# Patient Record
Sex: MALE | Race: WHITE | NOT HISPANIC OR LATINO | Employment: OTHER | URBAN - NONMETROPOLITAN AREA
[De-identification: names, ages, dates, MRNs, and addresses within clinical notes are randomized per-mention and may not be internally consistent; named-entity substitution may affect disease eponyms.]

---

## 2017-04-17 ENCOUNTER — OFFICE VISIT (OUTPATIENT)
Dept: SURGERY | Facility: CLINIC | Age: 62
End: 2017-04-17

## 2017-04-17 VITALS — BODY MASS INDEX: 24.28 KG/M2 | WEIGHT: 179 LBS

## 2017-04-17 DIAGNOSIS — Z45.2 ENCOUNTER FOR VENOUS ACCESS DEVICE CARE: Primary | ICD-10-CM

## 2017-04-17 PROCEDURE — 36590 REMOVAL TUNNELED CV CATH: CPT | Performed by: SURGERY

## 2017-04-17 NOTE — PROGRESS NOTES
Subjective   Jhonny Washington is a 62 y.o. male.     History of Present Illness He had a port for lung cancer but cannot get any more chemo. The port has not been used for several weeks. He needs it removed.    The following portions of the patient's history were reviewed and updated as appropriate: allergies, current medications, past family history, past medical history, past social history, past surgical history and problem list.    Review of Systems lung cancer    Objective   Physical Exam removed port    Assessment/Plan   Jhonny was seen today for follow-up.    Diagnoses and all orders for this visit:    Encounter for venous access device care    Return for sutures 1 wk.

## 2017-10-30 ENCOUNTER — HOSPITAL ENCOUNTER (EMERGENCY)
Facility: HOSPITAL | Age: 62
Discharge: HOME | End: 2017-10-30
Attending: EMERGENCY MEDICINE | Admitting: EMERGENCY MEDICINE

## 2017-10-30 VITALS
OXYGEN SATURATION: 98 % | WEIGHT: 172 LBS | RESPIRATION RATE: 22 BRPM | HEART RATE: 65 BPM | DIASTOLIC BLOOD PRESSURE: 90 MMHG | SYSTOLIC BLOOD PRESSURE: 153 MMHG | HEIGHT: 72 IN | TEMPERATURE: 98.6 F | BODY MASS INDEX: 23.3 KG/M2

## 2017-10-30 DIAGNOSIS — F41.9 ANXIETY: Primary | ICD-10-CM

## 2017-10-30 PROCEDURE — 99283 EMERGENCY DEPT VISIT LOW MDM: CPT

## 2017-10-30 RX ORDER — LORAZEPAM 1 MG/1
1 TABLET ORAL ONCE
Status: DISCONTINUED | OUTPATIENT
Start: 2017-10-30 | End: 2017-10-30 | Stop reason: HOSPADM

## 2017-10-30 RX ORDER — KETOROLAC TROMETHAMINE 10 MG/1
20 TABLET, FILM COATED ORAL ONCE
Status: DISCONTINUED | OUTPATIENT
Start: 2017-10-30 | End: 2017-10-30 | Stop reason: HOSPADM

## 2017-11-03 ENCOUNTER — APPOINTMENT (OUTPATIENT)
Dept: GENERAL RADIOLOGY | Facility: HOSPITAL | Age: 62
End: 2017-11-03

## 2017-11-03 ENCOUNTER — HOSPITAL ENCOUNTER (EMERGENCY)
Facility: HOSPITAL | Age: 62
Discharge: HOME | End: 2017-11-04
Attending: EMERGENCY MEDICINE | Admitting: EMERGENCY MEDICINE

## 2017-11-03 DIAGNOSIS — S67.22XA CRUSHING INJURY OF LEFT HAND, INITIAL ENCOUNTER: Primary | ICD-10-CM

## 2017-11-03 PROCEDURE — 73130 X-RAY EXAM OF HAND: CPT

## 2017-11-03 PROCEDURE — 99284 EMERGENCY DEPT VISIT MOD MDM: CPT

## 2017-11-03 PROCEDURE — 96372 THER/PROPH/DIAG INJ SC/IM: CPT

## 2017-11-03 PROCEDURE — 73130 X-RAY EXAM OF HAND: CPT | Performed by: RADIOLOGY

## 2017-11-03 PROCEDURE — 25010000002 HYDROMORPHONE PER 4 MG: Performed by: EMERGENCY MEDICINE

## 2017-11-03 RX ORDER — ONDANSETRON 4 MG/1
4 TABLET, ORALLY DISINTEGRATING ORAL ONCE
Status: COMPLETED | OUTPATIENT
Start: 2017-11-03 | End: 2017-11-03

## 2017-11-03 RX ADMIN — HYDROMORPHONE HYDROCHLORIDE 1 MG: 1 INJECTION, SOLUTION INTRAMUSCULAR; INTRAVENOUS; SUBCUTANEOUS at 23:37

## 2017-11-03 RX ADMIN — ONDANSETRON 4 MG: 4 TABLET, ORALLY DISINTEGRATING ORAL at 23:37

## 2017-11-04 ENCOUNTER — HOSPITAL ENCOUNTER (EMERGENCY)
Facility: HOSPITAL | Age: 62
Discharge: LEFT BEFORE DISCHARGE | End: 2017-11-05
Attending: FAMILY MEDICINE | Admitting: FAMILY MEDICINE

## 2017-11-04 VITALS
BODY MASS INDEX: 23.3 KG/M2 | WEIGHT: 172 LBS | RESPIRATION RATE: 20 BRPM | SYSTOLIC BLOOD PRESSURE: 161 MMHG | OXYGEN SATURATION: 98 % | DIASTOLIC BLOOD PRESSURE: 90 MMHG | TEMPERATURE: 98 F | HEART RATE: 69 BPM | HEIGHT: 72 IN

## 2017-11-04 VITALS
SYSTOLIC BLOOD PRESSURE: 152 MMHG | HEART RATE: 70 BPM | HEIGHT: 72 IN | DIASTOLIC BLOOD PRESSURE: 72 MMHG | OXYGEN SATURATION: 97 % | WEIGHT: 172 LBS | TEMPERATURE: 98.3 F | RESPIRATION RATE: 22 BRPM | BODY MASS INDEX: 23.3 KG/M2

## 2017-11-04 VITALS
DIASTOLIC BLOOD PRESSURE: 74 MMHG | HEART RATE: 80 BPM | HEIGHT: 72 IN | BODY MASS INDEX: 23.3 KG/M2 | OXYGEN SATURATION: 96 % | RESPIRATION RATE: 20 BRPM | WEIGHT: 172 LBS | SYSTOLIC BLOOD PRESSURE: 129 MMHG | TEMPERATURE: 98.3 F

## 2017-11-04 DIAGNOSIS — G89.3 MALIGNANT BONE PAIN: Primary | ICD-10-CM

## 2017-11-04 DIAGNOSIS — M89.8X9 MALIGNANT BONE PAIN: Primary | ICD-10-CM

## 2017-11-04 DIAGNOSIS — R52 PAIN: Primary | ICD-10-CM

## 2017-11-04 PROCEDURE — 99283 EMERGENCY DEPT VISIT LOW MDM: CPT

## 2017-11-04 RX ORDER — OXYCODONE AND ACETAMINOPHEN 10; 325 MG/1; MG/1
1 TABLET ORAL ONCE
Status: COMPLETED | OUTPATIENT
Start: 2017-11-04 | End: 2017-11-04

## 2017-11-04 RX ADMIN — OXYCODONE HYDROCHLORIDE AND ACETAMINOPHEN 1 TABLET: 10; 325 TABLET ORAL at 03:08

## 2017-11-04 NOTE — ED PROVIDER NOTES
Subjective   Patient is a 62 y.o. male presenting with upper extremity pain.   History provided by:  Patient  Upper Extremity Issue   Location:  Hand  Hand location:  L hand  Injury: yes    Time since incident:  1 day  Mechanism of injury: crush    Crush injury:     Mechanism: elevator.  Pain details:     Quality:  Aching    Severity:  Moderate    Onset quality:  Sudden  Relieved by:  Nothing  Associated symptoms: decreased range of motion, fatigue and swelling    Associated symptoms: no fever    Risk factors: known bone disorder        Review of Systems   Constitutional: Positive for fatigue and unexpected weight change. Negative for fever.   HENT: Negative.    Respiratory: Negative.    Cardiovascular: Negative.  Negative for chest pain.   Gastrointestinal: Negative.  Negative for abdominal pain.   Endocrine: Negative.    Genitourinary: Negative.  Negative for dysuria.   Musculoskeletal: Positive for myalgias.   Skin: Negative.    Neurological: Negative.    Psychiatric/Behavioral: Negative.    All other systems reviewed and are negative.      Past Medical History:   Diagnosis Date   • Cancer        Allergies   Allergen Reactions   • Sulfa Antibiotics        History reviewed. No pertinent surgical history.    History reviewed. No pertinent family history.    Social History     Social History   • Marital status:      Spouse name: N/A   • Number of children: N/A   • Years of education: N/A     Social History Main Topics   • Smoking status: Current Every Day Smoker     Packs/day: 0.50     Types: Cigarettes   • Smokeless tobacco: None   • Alcohol use No   • Drug use: No   • Sexual activity: Not Asked     Other Topics Concern   • None     Social History Narrative           Objective   Physical Exam   Constitutional: He is oriented to person, place, and time. He appears well-developed and well-nourished. No distress.   HENT:   Head: Normocephalic and atraumatic.   Nose: Nose normal.   Eyes: Conjunctivae and EOM  are normal. Pupils are equal, round, and reactive to light.   Neck: Normal range of motion. Neck supple. No JVD present. No tracheal deviation present.   Cardiovascular: Normal rate, regular rhythm and normal heart sounds.    No murmur heard.  Pulmonary/Chest: Effort normal and breath sounds normal. No respiratory distress. He has no wheezes.   Abdominal: Soft. Bowel sounds are normal. There is no tenderness.   Musculoskeletal: He exhibits edema and tenderness. He exhibits no deformity.   Tenderness and edema noted to thenar area left hand, and dorsum of left hand.    Neurological: He is alert and oriented to person, place, and time. No cranial nerve deficit.   Skin: Skin is warm and dry. No rash noted. He is not diaphoretic. No erythema. No pallor.   Psychiatric: He has a normal mood and affect. His behavior is normal. Thought content normal.   Nursing note and vitals reviewed.      Procedures         ED Course  ED Course   Comment By Time   X-ray interpreted by Dr. Li: No apparent acute bony abnormalities. JARETH Molina 11/04 0003                  MDM  Number of Diagnoses or Management Options  minor     Amount and/or Complexity of Data Reviewed  Tests in the radiology section of CPT®: reviewed    Risk of Complications, Morbidity, and/or Mortality  Presenting problems: low  Diagnostic procedures: low  Management options: low    Patient Progress  Patient progress: stable      Final diagnoses:   Crushing injury of left hand, initial encounter            JARETH Molina  11/04/17 0040

## 2017-11-05 ENCOUNTER — HOSPITAL ENCOUNTER (EMERGENCY)
Facility: HOSPITAL | Age: 62
Discharge: HOME | End: 2017-11-05
Attending: FAMILY MEDICINE | Admitting: FAMILY MEDICINE

## 2017-11-05 ENCOUNTER — HOSPITAL ENCOUNTER (EMERGENCY)
Facility: HOSPITAL | Age: 62
Discharge: HOME | End: 2017-11-05
Attending: EMERGENCY MEDICINE | Admitting: EMERGENCY MEDICINE

## 2017-11-05 VITALS
HEART RATE: 72 BPM | SYSTOLIC BLOOD PRESSURE: 125 MMHG | HEIGHT: 72 IN | TEMPERATURE: 97.6 F | WEIGHT: 165 LBS | OXYGEN SATURATION: 98 % | DIASTOLIC BLOOD PRESSURE: 72 MMHG | BODY MASS INDEX: 22.35 KG/M2 | RESPIRATION RATE: 16 BRPM

## 2017-11-05 VITALS
TEMPERATURE: 98 F | SYSTOLIC BLOOD PRESSURE: 110 MMHG | DIASTOLIC BLOOD PRESSURE: 68 MMHG | OXYGEN SATURATION: 99 % | HEART RATE: 98 BPM | WEIGHT: 172 LBS | BODY MASS INDEX: 23.3 KG/M2 | HEIGHT: 72 IN | RESPIRATION RATE: 20 BRPM

## 2017-11-05 DIAGNOSIS — G89.3 CHRONIC PAIN DUE TO NEOPLASM: Primary | ICD-10-CM

## 2017-11-05 DIAGNOSIS — R52 PAIN: Primary | ICD-10-CM

## 2017-11-05 DIAGNOSIS — F41.9 ANXIETY: ICD-10-CM

## 2017-11-05 PROCEDURE — 99283 EMERGENCY DEPT VISIT LOW MDM: CPT

## 2017-11-05 RX ORDER — DIAZEPAM 5 MG/1
10 TABLET ORAL ONCE
Status: COMPLETED | OUTPATIENT
Start: 2017-11-05 | End: 2017-11-05

## 2017-11-05 RX ORDER — OXYCODONE HYDROCHLORIDE AND ACETAMINOPHEN 5; 325 MG/1; MG/1
1 TABLET ORAL ONCE
Status: COMPLETED | OUTPATIENT
Start: 2017-11-05 | End: 2017-11-05

## 2017-11-05 RX ORDER — OXYCODONE AND ACETAMINOPHEN 10; 325 MG/1; MG/1
1 TABLET ORAL ONCE
Status: COMPLETED | OUTPATIENT
Start: 2017-11-05 | End: 2017-11-05

## 2017-11-05 RX ADMIN — OXYCODONE HYDROCHLORIDE AND ACETAMINOPHEN 1 TABLET: 10; 325 TABLET ORAL at 01:29

## 2017-11-05 RX ADMIN — OXYCODONE HYDROCHLORIDE AND ACETAMINOPHEN 1 TABLET: 5; 325 TABLET ORAL at 11:08

## 2017-11-05 RX ADMIN — DIAZEPAM 10 MG: 5 TABLET ORAL at 10:45

## 2017-11-05 NOTE — ED PROVIDER NOTES
Subjective   History of Present Illness  63 y/o M here w/ chest wall pain worse on his R side 2/2 cancer. Pt has been seen 3 times within the past 24 hours for this pain. Pt continues to request opiates. Pt left AMA several hours ago b/c we would not give opiates. Pt states no change in his pain.   Review of Systems   Constitutional: Negative for chills, fatigue and fever.   Eyes: Negative for photophobia and visual disturbance.   Respiratory: Negative for cough, chest tightness, shortness of breath and wheezing.    Cardiovascular: Negative for chest pain and palpitations.   Gastrointestinal: Negative for abdominal distention and abdominal pain.   Genitourinary: Negative for difficulty urinating and dysuria.   Musculoskeletal: Negative for back pain, myalgias, neck pain and neck stiffness.        +chest wall pain   Skin: Negative for color change and pallor.   All other systems reviewed and are negative.      Past Medical History:   Diagnosis Date   • Cancer        Allergies   Allergen Reactions   • Sulfa Antibiotics        No past surgical history on file.    No family history on file.    Social History     Social History   • Marital status:      Spouse name: N/A   • Number of children: N/A   • Years of education: N/A     Social History Main Topics   • Smoking status: Current Every Day Smoker     Packs/day: 0.50     Types: Cigarettes   • Smokeless tobacco: Never Used   • Alcohol use No   • Drug use: No   • Sexual activity: Not on file     Other Topics Concern   • Not on file     Social History Narrative   • No narrative on file           Objective   Physical Exam   Constitutional: He is oriented to person, place, and time. He appears well-developed and well-nourished. He is active.   HENT:   Head: Normocephalic and atraumatic.   Right Ear: Hearing and external ear normal.   Left Ear: Hearing and external ear normal.   Nose: Nose normal.   Mouth/Throat: Uvula is midline, oropharynx is clear and moist and  mucous membranes are normal.   Eyes: Conjunctivae, EOM and lids are normal. Pupils are equal, round, and reactive to light.   Neck: Trachea normal, normal range of motion, full passive range of motion without pain and phonation normal. Neck supple.   Cardiovascular: Normal rate, regular rhythm and normal heart sounds.    Pulmonary/Chest: Effort normal and breath sounds normal. He exhibits tenderness.       Abdominal: Soft. Normal appearance.   Neurological: He is alert and oriented to person, place, and time. GCS eye subscore is 4. GCS verbal subscore is 5. GCS motor subscore is 6.   Skin: Skin is warm, dry and intact.   Psychiatric: He has a normal mood and affect. His speech is normal and behavior is normal. Cognition and memory are normal.   Nursing note and vitals reviewed.      Procedures         ED Course  ED Course    Pt given percocet and told that would be the most we would be prescribing tonight. Pt agreeable.               MDM  Number of Diagnoses or Management Options  Pain: established and worsening  Risk of Complications, Morbidity, and/or Mortality  Presenting problems: high  Diagnostic procedures: high  Management options: high    Patient Progress  Patient progress: stable      Final diagnoses:   Pain            Jesus Berg MD  11/05/17 0124

## 2017-11-05 NOTE — ED PROVIDER NOTES
"Subjective   HPI Comments: Pt here with chronic pain from his lung/bone cancer, and also anxiety because he is out of his valium. He has been seen in the ED several times over the past few days d/t his pain. He states that he has had episodes of \"passing out\" over the past couple of days, because when he is out of his valium, he gets anxious, and then \"goes out\" for a few minutes. He denies any change in his chronic pain. States that if he could just get a valium to help calm his nerves, that would help him more than anything. States he was told by his oncologist that he only has 3 months to live and that is causing him increased anxiety and he has been having to take more of his prescribed valium. States he does not need anything for pain today, just a valium. He adamantly refuses any blood work or further workup.    Patient is a 62 y.o. male presenting with general illness.   History provided by:  Patient and EMS personnel   used: No    Illness   Associated symptoms: no chest pain, no congestion, no cough, no diarrhea, no ear pain, no fever, no headaches, no myalgias, no nausea, no rash, no shortness of breath, no sore throat, no vomiting and no wheezing        Review of Systems   Constitutional: Negative for activity change and fever.   HENT: Negative for congestion, ear pain and sore throat.    Eyes: Negative for pain.   Respiratory: Negative for cough, shortness of breath and wheezing.    Cardiovascular: Negative for chest pain.   Gastrointestinal: Negative for abdominal distention, diarrhea, nausea and vomiting.   Genitourinary: Negative for difficulty urinating and dysuria.   Musculoskeletal: Negative for arthralgias and myalgias.   Skin: Negative for rash and wound.   Neurological: Negative for dizziness and headaches.   Psychiatric/Behavioral: Negative for agitation.   All other systems reviewed and are negative.      Past Medical History:   Diagnosis Date   • Cancer        Allergies " "  Allergen Reactions   • Sulfa Antibiotics        History reviewed. No pertinent surgical history.    History reviewed. No pertinent family history.    Social History     Social History   • Marital status:      Spouse name: N/A   • Number of children: N/A   • Years of education: N/A     Social History Main Topics   • Smoking status: Current Every Day Smoker     Packs/day: 0.50     Types: Cigarettes   • Smokeless tobacco: Never Used   • Alcohol use No   • Drug use: No   • Sexual activity: Not Asked     Other Topics Concern   • None     Social History Narrative           Objective   Physical Exam   Constitutional: He is oriented to person, place, and time. He appears well-developed and well-nourished.   HENT:   Head: Normocephalic and atraumatic.   Eyes: EOM are normal. Pupils are equal, round, and reactive to light.   Neck: Normal range of motion. Neck supple.   Cardiovascular: Normal rate, regular rhythm and normal heart sounds.    Pulmonary/Chest: Effort normal and breath sounds normal. He has no decreased breath sounds. He has no rhonchi.   Abdominal: Soft. Bowel sounds are normal.   Musculoskeletal: Normal range of motion.   Neurological: He is alert and oriented to person, place, and time.   Skin: Skin is warm and dry.   Psychiatric: He has a normal mood and affect. His behavior is normal. Judgment and thought content normal.   Nursing note and vitals reviewed.      Procedures         ED Course  ED Course   Comment By Time   Pt initially stated he did not want anything for pain, then came up to nurses station requesting \"just a little something for pain.\" 1 percocet 5 ordered, he has been told he will not receive any more pain medicine here; he has an appt with his doctor tomorrow. JARETH Herrera 11/05 1059                  MDM  Number of Diagnoses or Management Options  Anxiety:   Chronic pain due to neoplasm:      Amount and/or Complexity of Data Reviewed  Clinical lab tests: ordered and " reviewed  Tests in the radiology section of CPT®: ordered and reviewed  Tests in the medicine section of CPT®: reviewed and ordered    Patient Progress  Patient progress: stable      Final diagnoses:   Chronic pain due to neoplasm   Anxiety            JARETH Herrera  11/05/17 1138

## 2017-11-07 ENCOUNTER — HOSPITAL ENCOUNTER (EMERGENCY)
Facility: HOSPITAL | Age: 62
Discharge: HOME | End: 2017-11-07
Attending: INTERNAL MEDICINE | Admitting: INTERNAL MEDICINE

## 2017-11-07 ENCOUNTER — HOSPITAL ENCOUNTER (EMERGENCY)
Facility: HOSPITAL | Age: 62
Discharge: HOME | End: 2017-11-07
Attending: EMERGENCY MEDICINE | Admitting: EMERGENCY MEDICINE

## 2017-11-07 VITALS
SYSTOLIC BLOOD PRESSURE: 128 MMHG | WEIGHT: 172 LBS | HEIGHT: 72 IN | TEMPERATURE: 98 F | HEART RATE: 80 BPM | RESPIRATION RATE: 18 BRPM | BODY MASS INDEX: 23.3 KG/M2 | OXYGEN SATURATION: 96 % | DIASTOLIC BLOOD PRESSURE: 74 MMHG

## 2017-11-07 VITALS
HEIGHT: 72 IN | SYSTOLIC BLOOD PRESSURE: 132 MMHG | RESPIRATION RATE: 20 BRPM | TEMPERATURE: 98.7 F | HEART RATE: 89 BPM | BODY MASS INDEX: 23.3 KG/M2 | WEIGHT: 172 LBS | DIASTOLIC BLOOD PRESSURE: 75 MMHG | OXYGEN SATURATION: 98 %

## 2017-11-07 DIAGNOSIS — G89.3 CHRONIC PAIN DUE TO NEOPLASM: Primary | ICD-10-CM

## 2017-11-07 PROCEDURE — 99284 EMERGENCY DEPT VISIT MOD MDM: CPT

## 2017-11-07 RX ORDER — DIAZEPAM 10 MG/1
10 TABLET ORAL 2 TIMES DAILY PRN
COMMUNITY
End: 2018-01-26 | Stop reason: HOSPADM

## 2017-11-07 RX ORDER — OXYCODONE HYDROCHLORIDE AND ACETAMINOPHEN 5; 325 MG/1; MG/1
1 TABLET ORAL ONCE
Status: COMPLETED | OUTPATIENT
Start: 2017-11-07 | End: 2017-11-07

## 2017-11-07 RX ORDER — OXYCODONE AND ACETAMINOPHEN 7.5; 325 MG/1; MG/1
1 TABLET ORAL ONCE
Status: COMPLETED | OUTPATIENT
Start: 2017-11-07 | End: 2017-11-07

## 2017-11-07 RX ORDER — OXYCODONE HCL 20 MG/1
20 TABLET, FILM COATED, EXTENDED RELEASE ORAL EVERY 12 HOURS
COMMUNITY
End: 2018-01-15

## 2017-11-07 RX ORDER — DIAZEPAM 5 MG/1
10 TABLET ORAL ONCE
Status: COMPLETED | OUTPATIENT
Start: 2017-11-07 | End: 2017-11-07

## 2017-11-07 RX ADMIN — DIAZEPAM 10 MG: 5 TABLET ORAL at 19:25

## 2017-11-07 RX ADMIN — DIAZEPAM 10 MG: 5 TABLET ORAL at 07:43

## 2017-11-07 RX ADMIN — OXYCODONE HYDROCHLORIDE AND ACETAMINOPHEN 1 TABLET: 5; 325 TABLET ORAL at 08:29

## 2017-11-07 RX ADMIN — OXYCODONE HYDROCHLORIDE AND ACETAMINOPHEN 1 TABLET: 5; 325 TABLET ORAL at 07:43

## 2017-11-07 RX ADMIN — OXYCODONE HYDROCHLORIDE AND ACETAMINOPHEN 1 TABLET: 7.5; 325 TABLET ORAL at 19:25

## 2017-11-07 NOTE — ED NOTES
Removed ccollar placed by [facility] ems per verbal order Dr. Park. Pt tolerated well.     Melida Jackson RN  11/07/17 0825
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
## 2017-11-09 ENCOUNTER — HOSPITAL ENCOUNTER (EMERGENCY)
Facility: HOSPITAL | Age: 62
Discharge: HOME | End: 2017-11-09
Attending: EMERGENCY MEDICINE | Admitting: EMERGENCY MEDICINE

## 2017-11-09 ENCOUNTER — APPOINTMENT (OUTPATIENT)
Dept: GENERAL RADIOLOGY | Facility: HOSPITAL | Age: 62
End: 2017-11-09

## 2017-11-09 VITALS
TEMPERATURE: 97.5 F | BODY MASS INDEX: 23.3 KG/M2 | RESPIRATION RATE: 20 BRPM | OXYGEN SATURATION: 97 % | DIASTOLIC BLOOD PRESSURE: 80 MMHG | HEIGHT: 72 IN | WEIGHT: 172 LBS | SYSTOLIC BLOOD PRESSURE: 122 MMHG | HEART RATE: 89 BPM

## 2017-11-09 DIAGNOSIS — S22.31XA CLOSED FRACTURE OF ONE RIB OF RIGHT SIDE, INITIAL ENCOUNTER: Primary | ICD-10-CM

## 2017-11-09 LAB
ANION GAP SERPL CALCULATED.3IONS-SCNC: 2.3 MMOL/L (ref 3.6–11.2)
ANISOCYTOSIS BLD QL: NORMAL
BASOPHILS # BLD AUTO: 0.03 10*3/MM3 (ref 0–0.3)
BASOPHILS NFR BLD AUTO: 0.4 % (ref 0–2)
BUN BLD-MCNC: 12 MG/DL (ref 7–21)
BUN/CREAT SERPL: 18.8 (ref 7–25)
CALCIUM SPEC-SCNC: 8.8 MG/DL (ref 7.7–10)
CHLORIDE SERPL-SCNC: 111 MMOL/L (ref 99–112)
CO2 SERPL-SCNC: 23.7 MMOL/L (ref 24.3–31.9)
CREAT BLD-MCNC: 0.64 MG/DL (ref 0.43–1.29)
DEPRECATED RDW RBC AUTO: 37 FL (ref 37–54)
EOSINOPHIL # BLD AUTO: 0.24 10*3/MM3 (ref 0–0.7)
EOSINOPHIL NFR BLD AUTO: 3.2 % (ref 0–5)
ERYTHROCYTE [DISTWIDTH] IN BLOOD BY AUTOMATED COUNT: 16.8 % (ref 11.5–14.5)
GFR SERPL CREATININE-BSD FRML MDRD: 127 ML/MIN/1.73
GLUCOSE BLD-MCNC: 115 MG/DL (ref 70–110)
HCT VFR BLD AUTO: 33 % (ref 42–52)
HGB BLD-MCNC: 10.6 G/DL (ref 14–18)
HYPOCHROMIA BLD QL: NORMAL
IMM GRANULOCYTES # BLD: 0.01 10*3/MM3 (ref 0–0.03)
IMM GRANULOCYTES NFR BLD: 0.1 % (ref 0–0.5)
LYMPHOCYTES # BLD AUTO: 1.34 10*3/MM3 (ref 1–3)
LYMPHOCYTES NFR BLD AUTO: 18 % (ref 21–51)
MCH RBC QN AUTO: 20 PG (ref 27–33)
MCHC RBC AUTO-ENTMCNC: 32.1 G/DL (ref 33–37)
MCV RBC AUTO: 62.4 FL (ref 80–94)
MICROCYTES BLD QL: NORMAL
MONOCYTES # BLD AUTO: 0.66 10*3/MM3 (ref 0.1–0.9)
MONOCYTES NFR BLD AUTO: 8.9 % (ref 0–10)
NEUTROPHILS # BLD AUTO: 5.15 10*3/MM3 (ref 1.4–6.5)
NEUTROPHILS NFR BLD AUTO: 69.4 % (ref 30–70)
OSMOLALITY SERPL CALC.SUM OF ELEC: 274.5 MOSM/KG (ref 273–305)
PLAT MORPH BLD: NORMAL
PLATELET # BLD AUTO: 255 10*3/MM3 (ref 130–400)
PMV BLD AUTO: 10.6 FL (ref 6–10)
POIKILOCYTOSIS BLD QL SMEAR: NORMAL
POTASSIUM BLD-SCNC: 4.3 MMOL/L (ref 3.5–5.3)
RBC # BLD AUTO: 5.29 10*6/MM3 (ref 4.7–6.1)
SODIUM BLD-SCNC: 137 MMOL/L (ref 135–153)
TROPONIN I SERPL-MCNC: <0.006 NG/ML
WBC NRBC COR # BLD: 7.43 10*3/MM3 (ref 4.5–12.5)

## 2017-11-09 PROCEDURE — 36415 COLL VENOUS BLD VENIPUNCTURE: CPT

## 2017-11-09 PROCEDURE — 71111 X-RAY EXAM RIBS/CHEST4/> VWS: CPT

## 2017-11-09 PROCEDURE — 80048 BASIC METABOLIC PNL TOTAL CA: CPT | Performed by: EMERGENCY MEDICINE

## 2017-11-09 PROCEDURE — 93005 ELECTROCARDIOGRAM TRACING: CPT | Performed by: EMERGENCY MEDICINE

## 2017-11-09 PROCEDURE — 84484 ASSAY OF TROPONIN QUANT: CPT | Performed by: EMERGENCY MEDICINE

## 2017-11-09 PROCEDURE — 85025 COMPLETE CBC W/AUTO DIFF WBC: CPT | Performed by: EMERGENCY MEDICINE

## 2017-11-09 PROCEDURE — 99283 EMERGENCY DEPT VISIT LOW MDM: CPT

## 2017-11-09 PROCEDURE — 85007 BL SMEAR W/DIFF WBC COUNT: CPT | Performed by: EMERGENCY MEDICINE

## 2017-11-09 PROCEDURE — 71111 X-RAY EXAM RIBS/CHEST4/> VWS: CPT | Performed by: RADIOLOGY

## 2017-11-09 RX ORDER — LORAZEPAM 1 MG/1
1 TABLET ORAL ONCE
Status: COMPLETED | OUTPATIENT
Start: 2017-11-09 | End: 2017-11-09

## 2017-11-09 RX ORDER — OXYCODONE HYDROCHLORIDE AND ACETAMINOPHEN 5; 325 MG/1; MG/1
1 TABLET ORAL ONCE
Status: COMPLETED | OUTPATIENT
Start: 2017-11-09 | End: 2017-11-09

## 2017-11-09 RX ORDER — GABAPENTIN 400 MG/1
400 CAPSULE ORAL 3 TIMES DAILY
COMMUNITY
End: 2018-01-15

## 2017-11-09 RX ORDER — HYDROCODONE BITARTRATE AND ACETAMINOPHEN 7.5; 325 MG/1; MG/1
1 TABLET ORAL EVERY 6 HOURS PRN
Qty: 12 TABLET | Refills: 0 | Status: SHIPPED | OUTPATIENT
Start: 2017-11-09 | End: 2018-01-06

## 2017-11-09 RX ADMIN — OXYCODONE HYDROCHLORIDE AND ACETAMINOPHEN 1 TABLET: 5; 325 TABLET ORAL at 09:19

## 2017-11-09 RX ADMIN — LORAZEPAM 1 MG: 1 TABLET ORAL at 09:19

## 2017-11-09 NOTE — DISCHARGE INSTRUCTIONS

## 2017-11-09 NOTE — ED PROVIDER NOTES
Subjective   HPI Comments: 9 th visit to ED for pain meds in last 10 days. Now has soft neck collar.     Patient is a 62 y.o. male presenting with syncope.   History provided by:  Patient   used: No    Syncope   Episode history:  Single  Most recent episode:  Today  Duration:  30 minutes  Timing:  Constant  Progression:  Partially resolved  Chronicity:  Recurrent  Context: standing up    Witnessed: no    Relieved by:  Nothing  Worsened by:  Nothing  Ineffective treatments:  None tried  Associated symptoms: no chest pain and no fever        Review of Systems   Constitutional: Negative.  Negative for fever.   HENT: Negative.    Respiratory: Negative.    Cardiovascular: Positive for syncope. Negative for chest pain.   Gastrointestinal: Negative.  Negative for abdominal pain.   Endocrine: Negative.    Genitourinary: Negative.  Negative for dysuria.   Skin: Negative.    Psychiatric/Behavioral: Negative.    All other systems reviewed and are negative.      Past Medical History:   Diagnosis Date   • Cancer    • Neck fracture        Allergies   Allergen Reactions   • Sulfa Antibiotics        Past Surgical History:   Procedure Laterality Date   • APPENDECTOMY         History reviewed. No pertinent family history.    Social History     Social History   • Marital status:      Spouse name: N/A   • Number of children: N/A   • Years of education: N/A     Social History Main Topics   • Smoking status: Current Every Day Smoker     Packs/day: 0.50     Types: Cigarettes   • Smokeless tobacco: Never Used   • Alcohol use No   • Drug use: No   • Sexual activity: Defer     Other Topics Concern   • None     Social History Narrative   • None           Objective   Physical Exam   Constitutional: He is oriented to person, place, and time. He appears well-developed and well-nourished. No distress.   HENT:   Head: Normocephalic and atraumatic.   Right Ear: External ear normal.   Left Ear: External ear normal.    Nose: Nose normal.   Eyes: Conjunctivae and EOM are normal. Pupils are equal, round, and reactive to light.   Neck: Normal range of motion. Neck supple. No JVD present. No tracheal deviation present.   Cardiovascular: Normal rate, regular rhythm and normal heart sounds.    No murmur heard.  Pulmonary/Chest: Effort normal and breath sounds normal. No respiratory distress. He has no wheezes. He exhibits tenderness.       Abdominal: Soft. Bowel sounds are normal. There is no tenderness.   Musculoskeletal: Normal range of motion. He exhibits no edema or deformity.   Neurological: He is alert and oriented to person, place, and time. No cranial nerve deficit.   Skin: Skin is warm and dry. No rash noted. He is not diaphoretic. No erythema. No pallor.   Psychiatric: He has a normal mood and affect. His behavior is normal. Thought content normal.   Nursing note and vitals reviewed.      Procedures        XR Ribs Bilateral 4+ View With PA Chest   Final Result       1. Subtle nondisplaced anterolateral right eighth rib fracture near   costochondral junction.   2. No additional rib fractures radiographically evident.   3. Stable appearance of the chest with COPD and chronic changes.       This report was finalized on 11/9/2017 10:27 AM by Dr. Olivier Marshall MD.                ED Course  ED Course    improved with pain meds              MDM    Final diagnoses:   Closed fracture of one rib of right side, initial encounter            Hai Park MD  11/10/17 0658

## 2017-11-13 ENCOUNTER — HOSPITAL ENCOUNTER (EMERGENCY)
Facility: HOSPITAL | Age: 62
Discharge: HOME | End: 2017-11-13
Attending: FAMILY MEDICINE | Admitting: FAMILY MEDICINE

## 2017-11-13 VITALS
HEIGHT: 74 IN | BODY MASS INDEX: 22.2 KG/M2 | DIASTOLIC BLOOD PRESSURE: 82 MMHG | OXYGEN SATURATION: 98 % | WEIGHT: 173 LBS | TEMPERATURE: 97.7 F | RESPIRATION RATE: 18 BRPM | HEART RATE: 87 BPM | SYSTOLIC BLOOD PRESSURE: 131 MMHG

## 2017-11-13 DIAGNOSIS — F41.8 ANXIETY ABOUT HEALTH: Primary | ICD-10-CM

## 2017-11-13 PROCEDURE — 99283 EMERGENCY DEPT VISIT LOW MDM: CPT

## 2017-11-13 RX ORDER — DIAZEPAM 5 MG/1
10 TABLET ORAL ONCE
Status: COMPLETED | OUTPATIENT
Start: 2017-11-13 | End: 2017-11-13

## 2017-11-13 RX ADMIN — DIAZEPAM 10 MG: 5 TABLET ORAL at 21:23

## 2017-11-14 NOTE — ED NOTES
[employer] contacted to give patient a ride home. States that it will be approximately 45 minutes before cab arrives. Provider and patient made aware. Will continue to monitor.      Shahla Jimenez RN  11/13/17 6598
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
## 2017-11-14 NOTE — ED NOTES
Discharge instructions reviewed with patient, patient instructed to return to ED if symptoms worsen or if any new problems arise. Patient verbalizes understanding of discharge instructions, patient ambulatory out of ED. No acute distress noted.       Shahla Jimenez RN  11/13/17 7365

## 2017-12-07 ENCOUNTER — HOSPITAL ENCOUNTER (EMERGENCY)
Facility: HOSPITAL | Age: 62
Discharge: HOME | End: 2017-12-07
Attending: EMERGENCY MEDICINE | Admitting: EMERGENCY MEDICINE

## 2017-12-07 VITALS
RESPIRATION RATE: 16 BRPM | DIASTOLIC BLOOD PRESSURE: 70 MMHG | WEIGHT: 170 LBS | BODY MASS INDEX: 23.03 KG/M2 | HEIGHT: 72 IN | TEMPERATURE: 98.1 F | SYSTOLIC BLOOD PRESSURE: 108 MMHG | OXYGEN SATURATION: 99 % | HEART RATE: 75 BPM

## 2017-12-07 DIAGNOSIS — F41.8 ANXIETY ABOUT HEALTH: ICD-10-CM

## 2017-12-07 DIAGNOSIS — G89.3 CHRONIC PAIN DUE TO NEOPLASM: Primary | ICD-10-CM

## 2017-12-07 PROCEDURE — 99283 EMERGENCY DEPT VISIT LOW MDM: CPT

## 2017-12-07 RX ORDER — OXYCODONE AND ACETAMINOPHEN 7.5; 325 MG/1; MG/1
1 TABLET ORAL ONCE
Status: COMPLETED | OUTPATIENT
Start: 2017-12-07 | End: 2017-12-07

## 2017-12-07 RX ORDER — DIAZEPAM 5 MG/1
10 TABLET ORAL ONCE
Status: COMPLETED | OUTPATIENT
Start: 2017-12-07 | End: 2017-12-07

## 2017-12-07 RX ADMIN — DIAZEPAM 10 MG: 5 TABLET ORAL at 15:44

## 2017-12-07 RX ADMIN — OXYCODONE HYDROCHLORIDE AND ACETAMINOPHEN 1 TABLET: 7.5; 325 TABLET ORAL at 15:44

## 2017-12-07 NOTE — ED NOTES
"[place] Pt had to be awoke with loud verbal and tactile stimuli. Pt states that he is in \"bad pain\".      Liz Andrade RN  12/07/17 4608    "
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
## 2017-12-07 NOTE — DISCHARGE INSTRUCTIONS
Please follow up with your PCP, pain management and oncology for further treatment of chronic pain and anxiety. Please return to ER if symptoms worsen.

## 2017-12-14 ENCOUNTER — HOSPITAL ENCOUNTER (EMERGENCY)
Facility: HOSPITAL | Age: 62
Discharge: HOME | End: 2017-12-14
Attending: EMERGENCY MEDICINE | Admitting: EMERGENCY MEDICINE

## 2017-12-14 VITALS
DIASTOLIC BLOOD PRESSURE: 73 MMHG | OXYGEN SATURATION: 96 % | TEMPERATURE: 97.8 F | HEIGHT: 72 IN | SYSTOLIC BLOOD PRESSURE: 121 MMHG | HEART RATE: 89 BPM | WEIGHT: 170 LBS | RESPIRATION RATE: 18 BRPM | BODY MASS INDEX: 23.03 KG/M2

## 2017-12-14 DIAGNOSIS — F19.10 SUBSTANCE ABUSE (HCC): Primary | ICD-10-CM

## 2017-12-14 PROCEDURE — 99284 EMERGENCY DEPT VISIT MOD MDM: CPT

## 2017-12-14 NOTE — NURSING NOTE
Spoke to Dr. Blackwood. Instructed that if pt does not feel his medication is a problem then he would not probably benefit from detox. Dr. blackwood also suggested to have his family help with his med management and pain control during this time due to his physical illnesses. Rbvox2. Patient and ed provider made aware of plan of care.

## 2017-12-14 NOTE — NURSING NOTE
Spoke to Brother with patients permission. Instructed that the patient got into trouble where he lives due to not paying his rent and they told him that if he goes to detox they will let him stay. He also reports that the patient has cancer and needs his meds and was hoping that if he brought him here we could give him a statement to take to the place saying he cant be taken off his meds. Instructed him that he would need to take the patient to his prescribing doctor to accomplish this and that due to him using this am and not being in withdrawal he does not meet criteria for inpt. Also instructed him that the patient is not wanting off all his meds and feels he needs them.

## 2017-12-14 NOTE — NURSING NOTE
Patient presents to intake and says that he is only here to please his family and his . He reports that they seem that he has a pill problem. He reports that he is a cancer patient and was only given several months go live now and that his dr writes him a lot of pain medicine and is family is concerned that he may be abusing it. He states that he does not feel he has a problem and that it actually helps him out. Denies any current withdrawal symptoms. patient reports that he took two 20 mg oxycodone this am. No etoh or other drug use reported.

## 2017-12-14 NOTE — ED PROVIDER NOTES
Subjective   HPI Comments: Pt doesn't want to go through detox     Patient is a 62 y.o. male presenting with drug/alcohol assessment.   History provided by:  Patient   used: No    Drug / Alcohol Assessment   This is a chronic problem. The current episode started 3 to 5 hours ago. The problem has not changed since onset.Suspected agents include opiates and prescription drugs. Pertinent negatives include no fever, no nausea and no vomiting.       Review of Systems   Constitutional: Negative for chills and fever.   HENT: Negative for congestion, ear pain and sore throat.    Respiratory: Negative for cough, shortness of breath and wheezing.    Cardiovascular: Negative for chest pain.   Gastrointestinal: Negative for diarrhea, nausea and vomiting.   Genitourinary: Negative for dysuria and flank pain.   Skin: Negative for rash.   Neurological: Negative for headaches.   Psychiatric/Behavioral: Negative for dysphoric mood and suicidal ideas. The patient is not nervous/anxious.    All other systems reviewed and are negative.      Past Medical History:   Diagnosis Date   • Cancer     bone and lung   • Depression    • Neck fracture        Allergies   Allergen Reactions   • Sulfa Antibiotics        Past Surgical History:   Procedure Laterality Date   • APPENDECTOMY         No family history on file.    Social History     Social History   • Marital status:      Spouse name: N/A   • Number of children: N/A   • Years of education: N/A     Social History Main Topics   • Smoking status: Current Every Day Smoker     Packs/day: 0.50     Types: Cigarettes   • Smokeless tobacco: Never Used   • Alcohol use No   • Drug use: No      Comment: oxycodone 20 mg tabs. and valium for my nerves.    • Sexual activity: Defer     Other Topics Concern   • None     Social History Narrative           Objective   Physical Exam   Constitutional: He is oriented to person, place, and time. He appears well-developed and  well-nourished.   HENT:   Head: Normocephalic.   Mouth/Throat: Oropharynx is clear and moist.   Neck: Neck supple.   Cardiovascular: Normal rate and regular rhythm.    Pulmonary/Chest: Effort normal and breath sounds normal.   Abdominal: Soft. Bowel sounds are normal. There is no tenderness.   Musculoskeletal: Normal range of motion.   Neurological: He is alert and oriented to person, place, and time.   Skin: Skin is warm and dry.   Psychiatric: He has a normal mood and affect. His behavior is normal. Judgment and thought content normal. He expresses no suicidal plans and no homicidal plans.   Nursing note and vitals reviewed.      Procedures         ED Course  ED Course                  MDM    Final diagnoses:   Substance abuse            JARETH Avila  12/14/17 7970

## 2018-01-06 ENCOUNTER — HOSPITAL ENCOUNTER (EMERGENCY)
Facility: HOSPITAL | Age: 63
Discharge: HOME | End: 2018-01-06
Attending: EMERGENCY MEDICINE | Admitting: EMERGENCY MEDICINE

## 2018-01-06 ENCOUNTER — APPOINTMENT (OUTPATIENT)
Dept: GENERAL RADIOLOGY | Facility: HOSPITAL | Age: 63
End: 2018-01-06

## 2018-01-06 ENCOUNTER — APPOINTMENT (OUTPATIENT)
Dept: CT IMAGING | Facility: HOSPITAL | Age: 63
End: 2018-01-06

## 2018-01-06 VITALS
SYSTOLIC BLOOD PRESSURE: 123 MMHG | RESPIRATION RATE: 18 BRPM | TEMPERATURE: 98 F | HEART RATE: 73 BPM | BODY MASS INDEX: 23.8 KG/M2 | OXYGEN SATURATION: 98 % | WEIGHT: 170 LBS | DIASTOLIC BLOOD PRESSURE: 74 MMHG | HEIGHT: 71 IN

## 2018-01-06 DIAGNOSIS — M51.9 LUMBAR DISC DISEASE: Primary | ICD-10-CM

## 2018-01-06 LAB
6-ACETYL MORPHINE: NEGATIVE
ALBUMIN SERPL-MCNC: 3.8 G/DL (ref 3.4–4.8)
ALBUMIN/GLOB SERPL: 1.3 G/DL (ref 1.5–2.5)
ALP SERPL-CCNC: 83 U/L (ref 40–129)
ALT SERPL W P-5'-P-CCNC: 10 U/L (ref 10–44)
AMPHET+METHAMPHET UR QL: NEGATIVE
ANION GAP SERPL CALCULATED.3IONS-SCNC: 1 MMOL/L (ref 3.6–11.2)
ANISOCYTOSIS BLD QL: ABNORMAL
AST SERPL-CCNC: 18 U/L (ref 10–34)
BARBITURATES UR QL SCN: NEGATIVE
BASOPHILS # BLD MANUAL: 0.23 10*3/MM3 (ref 0–0.3)
BASOPHILS NFR BLD AUTO: 3 % (ref 0–2)
BENZODIAZ UR QL SCN: POSITIVE
BILIRUB SERPL-MCNC: 0.8 MG/DL (ref 0.2–1.8)
BILIRUB UR QL STRIP: NEGATIVE
BUN BLD-MCNC: 8 MG/DL (ref 7–21)
BUN/CREAT SERPL: 13.6 (ref 7–25)
BUPRENORPHINE SERPL-MCNC: NEGATIVE NG/ML
CALCIUM SPEC-SCNC: 8.9 MG/DL (ref 7.7–10)
CANNABINOIDS SERPL QL: NEGATIVE
CHLORIDE SERPL-SCNC: 112 MMOL/L (ref 99–112)
CLARITY UR: CLEAR
CO2 SERPL-SCNC: 28 MMOL/L (ref 24.3–31.9)
COCAINE UR QL: NEGATIVE
COLOR UR: YELLOW
CREAT BLD-MCNC: 0.59 MG/DL (ref 0.43–1.29)
DEPRECATED RDW RBC AUTO: 37.3 FL (ref 37–54)
EOSINOPHIL # BLD MANUAL: 0.38 10*3/MM3 (ref 0–0.7)
EOSINOPHIL NFR BLD MANUAL: 5 % (ref 0–5)
ERYTHROCYTE [DISTWIDTH] IN BLOOD BY AUTOMATED COUNT: 16.7 % (ref 11.5–14.5)
FLUAV AG NPH QL: NEGATIVE
FLUBV AG NPH QL IA: NEGATIVE
GFR SERPL CREATININE-BSD FRML MDRD: 140 ML/MIN/1.73
GLOBULIN UR ELPH-MCNC: 3 GM/DL
GLUCOSE BLD-MCNC: 79 MG/DL (ref 70–110)
GLUCOSE UR STRIP-MCNC: NEGATIVE MG/DL
HCT VFR BLD AUTO: 34.8 % (ref 42–52)
HGB BLD-MCNC: 10.8 G/DL (ref 14–18)
HGB UR QL STRIP.AUTO: NEGATIVE
HYPOCHROMIA BLD QL: ABNORMAL
KETONES UR QL STRIP: NEGATIVE
LEUKOCYTE ESTERASE UR QL STRIP.AUTO: NEGATIVE
LYMPHOCYTES # BLD MANUAL: 1.06 10*3/MM3 (ref 1–3)
LYMPHOCYTES NFR BLD MANUAL: 14 % (ref 21–51)
LYMPHOCYTES NFR BLD MANUAL: 8 % (ref 0–10)
MCH RBC QN AUTO: 19.8 PG (ref 27–33)
MCHC RBC AUTO-ENTMCNC: 31 G/DL (ref 33–37)
MCV RBC AUTO: 63.7 FL (ref 80–94)
METHADONE UR QL SCN: NEGATIVE
MICROCYTES BLD QL: ABNORMAL
MONOCYTES # BLD AUTO: 0.6 10*3/MM3 (ref 0.1–0.9)
NEUTROPHILS # BLD AUTO: 5.28 10*3/MM3 (ref 1.4–6.5)
NEUTROPHILS NFR BLD MANUAL: 70 % (ref 30–70)
NITRITE UR QL STRIP: NEGATIVE
OPIATES UR QL: NEGATIVE
OSMOLALITY SERPL CALC.SUM OF ELEC: 278.5 MOSM/KG (ref 273–305)
OXYCODONE UR QL SCN: NEGATIVE
PCP UR QL SCN: NEGATIVE
PH UR STRIP.AUTO: 8 [PH] (ref 5–8)
PLAT MORPH BLD: NORMAL
PLATELET # BLD AUTO: 264 10*3/MM3 (ref 130–400)
PMV BLD AUTO: 10.3 FL (ref 6–10)
POIKILOCYTOSIS BLD QL SMEAR: ABNORMAL
POTASSIUM BLD-SCNC: 4.2 MMOL/L (ref 3.5–5.3)
PROT SERPL-MCNC: 6.8 G/DL (ref 6–8)
PROT UR QL STRIP: NEGATIVE
RBC # BLD AUTO: 5.46 10*6/MM3 (ref 4.7–6.1)
S PYO AG THROAT QL: NEGATIVE
SCAN SLIDE: NORMAL
SODIUM BLD-SCNC: 141 MMOL/L (ref 135–153)
SP GR UR STRIP: <=1.005 (ref 1–1.03)
UROBILINOGEN UR QL STRIP: NORMAL
WBC NRBC COR # BLD: 7.54 10*3/MM3 (ref 4.5–12.5)

## 2018-01-06 PROCEDURE — 80053 COMPREHEN METABOLIC PANEL: CPT | Performed by: EMERGENCY MEDICINE

## 2018-01-06 PROCEDURE — 80307 DRUG TEST PRSMV CHEM ANLYZR: CPT | Performed by: EMERGENCY MEDICINE

## 2018-01-06 PROCEDURE — 81003 URINALYSIS AUTO W/O SCOPE: CPT | Performed by: EMERGENCY MEDICINE

## 2018-01-06 PROCEDURE — 85025 COMPLETE CBC W/AUTO DIFF WBC: CPT | Performed by: EMERGENCY MEDICINE

## 2018-01-06 PROCEDURE — 71045 X-RAY EXAM CHEST 1 VIEW: CPT

## 2018-01-06 PROCEDURE — 71045 X-RAY EXAM CHEST 1 VIEW: CPT | Performed by: RADIOLOGY

## 2018-01-06 PROCEDURE — 72131 CT LUMBAR SPINE W/O DYE: CPT

## 2018-01-06 PROCEDURE — 96361 HYDRATE IV INFUSION ADD-ON: CPT

## 2018-01-06 PROCEDURE — 72131 CT LUMBAR SPINE W/O DYE: CPT | Performed by: RADIOLOGY

## 2018-01-06 PROCEDURE — 87880 STREP A ASSAY W/OPTIC: CPT | Performed by: EMERGENCY MEDICINE

## 2018-01-06 PROCEDURE — 96360 HYDRATION IV INFUSION INIT: CPT

## 2018-01-06 PROCEDURE — 87804 INFLUENZA ASSAY W/OPTIC: CPT | Performed by: EMERGENCY MEDICINE

## 2018-01-06 PROCEDURE — 99284 EMERGENCY DEPT VISIT MOD MDM: CPT

## 2018-01-06 PROCEDURE — 87081 CULTURE SCREEN ONLY: CPT | Performed by: EMERGENCY MEDICINE

## 2018-01-06 PROCEDURE — 85007 BL SMEAR W/DIFF WBC COUNT: CPT | Performed by: EMERGENCY MEDICINE

## 2018-01-06 RX ORDER — OXYCODONE AND ACETAMINOPHEN 10; 325 MG/1; MG/1
1 TABLET ORAL ONCE
Status: COMPLETED | OUTPATIENT
Start: 2018-01-06 | End: 2018-01-06

## 2018-01-06 RX ORDER — HYDROCODONE BITARTRATE AND ACETAMINOPHEN 7.5; 325 MG/1; MG/1
1 TABLET ORAL EVERY 6 HOURS PRN
Qty: 8 TABLET | Refills: 0 | Status: SHIPPED | OUTPATIENT
Start: 2018-01-06 | End: 2018-01-15

## 2018-01-06 RX ORDER — SODIUM CHLORIDE 9 MG/ML
125 INJECTION, SOLUTION INTRAVENOUS CONTINUOUS
Status: DISCONTINUED | OUTPATIENT
Start: 2018-01-06 | End: 2018-01-06 | Stop reason: HOSPADM

## 2018-01-06 RX ADMIN — SODIUM CHLORIDE 500 ML: 9 INJECTION, SOLUTION INTRAVENOUS at 12:51

## 2018-01-06 RX ADMIN — OXYCODONE HYDROCHLORIDE AND ACETAMINOPHEN 1 TABLET: 10; 325 TABLET ORAL at 12:51

## 2018-01-06 RX ADMIN — SODIUM CHLORIDE 125 ML/HR: 9 INJECTION, SOLUTION INTRAVENOUS at 12:51

## 2018-01-06 NOTE — ED PROVIDER NOTES
Subjective   HPI Comments: Patient is a 62-year-old white male who reports that he has multiple myeloma, and that he underwent chemotherapy that did not seem to help.  He now has a lot of pain in his spine, and he reports that his cancer doctor told him that he would probably be bedridden from his spine disease within the next couple of months.  He states that today he went out for a while, got approximately a quarter mile from his house, felt very weak in his legs and didn't have the strength to walk home.  Therefore he called an ambulance which brought him here.  By the time he arrives here he states that the feeling of weakness in his legs has disappeared.  He does tell me that he ran out of his pain medication yesterday and does not have an appointment with his pain doctor until Monday, wants to know if he can write him a small amount of pain medicine to last him until Monday.  He denies other symptoms or complaints.  He reports that he has otherwise been doing well, has not been ill.  He denies fever, chills, chest pain, shortness breath, cough, abdominal pain, nausea, vomiting, diarrhea, bloody stools, any fall or injury, any other symptoms or other complaints.      History provided by:  Patient      Review of Systems   Constitutional: Negative for chills and fever.   HENT: Negative for congestion, ear pain, sneezing and sore throat.    Eyes: Negative for photophobia, pain and redness.   Respiratory: Negative for cough and shortness of breath.    Cardiovascular: Negative for chest pain and palpitations.   Gastrointestinal: Negative for abdominal distention, abdominal pain, blood in stool, diarrhea, nausea and vomiting.   Endocrine: Negative for polydipsia, polyphagia and polyuria.   Genitourinary: Negative for difficulty urinating and flank pain.   Musculoskeletal: Positive for back pain. Negative for neck pain and neck stiffness.   Skin: Negative for color change and pallor.   Neurological: Negative for  seizures, syncope, speech difficulty, numbness and headaches.   Psychiatric/Behavioral: Negative for confusion.   All other systems reviewed and are negative.      Past Medical History:   Diagnosis Date   • Cancer     bone and lung   • Depression    • Neck fracture        Allergies   Allergen Reactions   • Sulfa Antibiotics        Past Surgical History:   Procedure Laterality Date   • APPENDECTOMY         History reviewed. No pertinent family history.    Social History     Social History   • Marital status:      Spouse name: N/A   • Number of children: N/A   • Years of education: N/A     Social History Main Topics   • Smoking status: Current Every Day Smoker     Packs/day: 0.50     Types: Cigarettes   • Smokeless tobacco: Never Used   • Alcohol use No   • Drug use: No      Comment: oxycodone 20 mg tabs. and valium for my nerves.    • Sexual activity: Defer     Other Topics Concern   • None     Social History Narrative           Objective   Physical Exam   Constitutional: He is oriented to person, place, and time. He appears well-developed and well-nourished. No distress.   HENT:   Head: Normocephalic and atraumatic.   Eyes: EOM are normal. Pupils are equal, round, and reactive to light. No scleral icterus.   Neck: Normal range of motion. Neck supple. No rigidity. No tracheal deviation present.   Cardiovascular: Normal rate, regular rhythm and intact distal pulses.    Pulmonary/Chest: Effort normal and breath sounds normal. No respiratory distress. He exhibits no tenderness.   Abdominal: Soft. Bowel sounds are normal. There is no tenderness. There is no rebound and no guarding.   Musculoskeletal: Normal range of motion. He exhibits no tenderness.   Neurological: He is alert and oriented to person, place, and time. He has normal strength. No cranial nerve deficit or sensory deficit. He exhibits normal muscle tone. Coordination normal. GCS eye subscore is 4. GCS verbal subscore is 5. GCS motor subscore is 6.    Deep tendon reflexes are 1+ and symmetrical throughout.  Cremasteric reflexes are present but weak bilaterally.  There is good rectal sphincter tone.  There is a negative straight leg raise test bilaterally.  There is no focal numbness or weakness, no focal deficits of any kind are appreciated.   Skin: Skin is warm and dry. No cyanosis. No pallor.   Psychiatric: He has a normal mood and affect. His behavior is normal.   Vitals reviewed.      Procedures  CT Lumbar Spine Without Contrast   ED Interpretation   Per virtual radiology, and congenitally diminutive canal with   spondylolisthesis and mid and lower lumbar stenosis.  Mild   broad-based disc bulge without stenosis L3 4 and more significant   stenosis L4 5.  Concern for possible dendritic change at the T10   level.      XR Chest 1 View   ED Interpretation   No apparent acute disease pending radiologist.        Results for orders placed or performed during the hospital encounter of 01/06/18   Influenza Antigen, Rapid - Swab, Nasopharynx   Result Value Ref Range    Influenza A Ag, EIA Negative Negative    Influenza B Ag, EIA Negative Negative   Rapid Strep A Screen - Swab, Throat   Result Value Ref Range    Strep A Ag Negative Negative   Comprehensive Metabolic Panel   Result Value Ref Range    Glucose 79 70 - 110 mg/dL    BUN 8 7 - 21 mg/dL    Creatinine 0.59 0.43 - 1.29 mg/dL    Sodium 141 135 - 153 mmol/L    Potassium 4.2 3.5 - 5.3 mmol/L    Chloride 112 99 - 112 mmol/L    CO2 28.0 24.3 - 31.9 mmol/L    Calcium 8.9 7.7 - 10.0 mg/dL    Total Protein 6.8 6.0 - 8.0 g/dL    Albumin 3.80 3.40 - 4.80 g/dL    ALT (SGPT) 10 10 - 44 U/L    AST (SGOT) 18 10 - 34 U/L    Alkaline Phosphatase 83 40 - 129 U/L    Total Bilirubin 0.8 0.2 - 1.8 mg/dL    eGFR Non African Amer 140 >60 mL/min/1.73    Globulin 3.0 gm/dL    A/G Ratio 1.3 (L) 1.5 - 2.5 g/dL    BUN/Creatinine Ratio 13.6 7.0 - 25.0    Anion Gap 1.0 (L) 3.6 - 11.2 mmol/L   Urinalysis With / Culture If Indicated -  Urine, Clean Catch   Result Value Ref Range    Color, UA Yellow Yellow, Straw    Appearance, UA Clear Clear    pH, UA 8.0 5.0 - 8.0    Specific Gravity, UA <=1.005 1.005 - 1.030    Glucose, UA Negative Negative    Ketones, UA Negative Negative    Bilirubin, UA Negative Negative    Blood, UA Negative Negative    Protein, UA Negative Negative    Leuk Esterase, UA Negative Negative    Nitrite, UA Negative Negative    Urobilinogen, UA 0.2 E.U./dL 0.2 - 1.0 E.U./dL   Urine Drug Screen - Urine, Clean Catch   Result Value Ref Range    Amphetamine Screen, Urine Negative Negative    Barbiturates Screen, Urine Negative Negative    Benzodiazepine Screen, Urine Positive (A) Negative    Cocaine Screen, Urine Negative Negative    Methadone Screen, Urine Negative Negative    Opiate Screen Negative Negative    Phencyclidine (PCP), Urine Negative Negative    THC, Screen, Urine Negative Negative    6-ACETYL MORPHINE Negative Negative    Buprenorphine, Screen, Urine Negative Negative    Oxycodone Screen, Urine Negative Negative   CBC Auto Differential   Result Value Ref Range    WBC 7.54 4.50 - 12.50 10*3/mm3    RBC 5.46 4.70 - 6.10 10*6/mm3    Hemoglobin 10.8 (L) 14.0 - 18.0 g/dL    Hematocrit 34.8 (L) 42.0 - 52.0 %    MCV 63.7 (L) 80.0 - 94.0 fL    MCH 19.8 (L) 27.0 - 33.0 pg    MCHC 31.0 (L) 33.0 - 37.0 g/dL    RDW 16.7 (H) 11.5 - 14.5 %    RDW-SD 37.3 37.0 - 54.0 fl    MPV 10.3 (H) 6.0 - 10.0 fL    Platelets 264 130 - 400 10*3/mm3   Scan Slide   Result Value Ref Range    Scan Slide     Osmolality, Calculated   Result Value Ref Range    Osmolality Calc 278.5 273.0 - 305.0 mOsm/kg   Manual Differential   Result Value Ref Range    Neutrophil % 70.0 30.0 - 70.0 %    Lymphocyte % 14.0 (L) 21.0 - 51.0 %    Monocyte % 8.0 0.0 - 10.0 %    Eosinophil % 5.0 0.0 - 5.0 %    Basophil % 3.0 (H) 0.0 - 2.0 %    Neutrophils Absolute 5.28 1.40 - 6.50 10*3/mm3    Lymphocytes Absolute 1.06 1.00 - 3.00 10*3/mm3    Monocytes Absolute 0.60 0.10 - 0.90  10*3/mm3    Eosinophils Absolute 0.38 0.00 - 0.70 10*3/mm3    Basophils Absolute 0.23 0.00 - 0.30 10*3/mm3    Anisocytosis Slight/1+ None Seen    Hypochromia Mod/2+ None Seen    Microcytes Slight/1+ None Seen    Poikilocytes Slight/1+ None Seen    Platelet Morphology Normal Normal              ED Course  ED Course   Patient's emergency department stay has been uncomplicated.  Never has he shown any signs of distress.  [facility] report #[id number].  Patient filled a prescription for 120 oxycodone 20 mg on December 8.  Also a prescription for 30 diazepam 5 mg on January 3.   We discussed his test results and his plan of care.  He voices understanding and agreement.              MDM    Final diagnoses:   Lumbar disc disease             Please note that portions of this note were completed with a voice recognition program. Efforts were made to edit the dictations, but occasionally words are mistranscribed.       José Miguel Li MD  01/06/18 4757

## 2018-01-06 NOTE — DISCHARGE INSTRUCTIONS
Home to rest.  Drink plenty of fluids.  Take your medications as prescribed.  Follow-up with your pain management doctor in [facility] on Monday as scheduled.  Return to the emergency department if problems.    Hypertension  Hypertension, commonly called high blood pressure, is when the force of blood pumping through your arteries is too strong. Your arteries are the blood vessels that carry blood from your heart throughout your body. A blood pressure reading consists of a higher number over a lower number, such as 110/72. The higher number (systolic) is the pressure inside your arteries when your heart pumps. The lower number (diastolic) is the pressure inside your arteries when your heart relaxes. Ideally you want your blood pressure below 120/80.  Hypertension forces your heart to work harder to pump blood. Your arteries may become narrow or stiff. Having untreated or uncontrolled hypertension can cause heart attack, stroke, kidney disease, and other problems.  RISK FACTORS  Some risk factors for high blood pressure are controllable. Others are not.   Risk factors you cannot control include:   · Race. You may be at higher risk if you are .  · Age. Risk increases with age.  · Gender. Men are at higher risk than women before age 45 years. After age 65, women are at higher risk than men.  Risk factors you can control include:  · Not getting enough exercise or physical activity.  · Being overweight.  · Getting too much fat, sugar, calories, or salt in your diet.  · Drinking too much alcohol.  SIGNS AND SYMPTOMS  Hypertension does not usually cause signs or symptoms. Extremely high blood pressure (hypertensive crisis) may cause headache, anxiety, shortness of breath, and nosebleed.  DIAGNOSIS  To check if you have hypertension, your health care provider will measure your blood pressure while you are seated, with your arm held at the level of your heart. It should be measured at least twice using  the same arm. Certain conditions can cause a difference in blood pressure between your right and left arms. A blood pressure reading that is higher than normal on one occasion does not mean that you need treatment. If it is not clear whether you have high blood pressure, you may be asked to return on a different day to have your blood pressure checked again. Or, you may be asked to monitor your blood pressure at home for 1 or more weeks.  TREATMENT  Treating high blood pressure includes making lifestyle changes and possibly taking medicine. Living a healthy lifestyle can help lower high blood pressure. You may need to change some of your habits.  Lifestyle changes may include:  · Following the DASH diet. This diet is high in fruits, vegetables, and whole grains. It is low in salt, red meat, and added sugars.  · Keep your sodium intake below 2,300 mg per day.  · Getting at least 30-45 minutes of aerobic exercise at least 4 times per week.  · Losing weight if necessary.  · Not smoking.  · Limiting alcoholic beverages.  · Learning ways to reduce stress.  Your health care provider may prescribe medicine if lifestyle changes are not enough to get your blood pressure under control, and if one of the following is true:  · You are 18-59 years of age and your systolic blood pressure is above 140.  · You are 60 years of age or older, and your systolic blood pressure is above 150.  · Your diastolic blood pressure is above 90.  · You have diabetes, and your systolic blood pressure is over 140 or your diastolic blood pressure is over 90.  · You have kidney disease and your blood pressure is above 140/90.  · You have heart disease and your blood pressure is above 140/90.  Your personal target blood pressure may vary depending on your medical conditions, your age, and other factors.  HOME CARE INSTRUCTIONS  · Have your blood pressure rechecked as directed by your health care provider.    · Take medicines only as directed by your  health care provider. Follow the directions carefully. Blood pressure medicines must be taken as prescribed. The medicine does not work as well when you skip doses. Skipping doses also puts you at risk for problems.  · Do not smoke.    · Monitor your blood pressure at home as directed by your health care provider.   SEEK MEDICAL CARE IF:   · You think you are having a reaction to medicines taken.  · You have recurrent headaches or feel dizzy.  · You have swelling in your ankles.  · You have trouble with your vision.  SEEK IMMEDIATE MEDICAL CARE IF:  · You develop a severe headache or confusion.  · You have unusual weakness, numbness, or feel faint.  · You have severe chest or abdominal pain.  · You vomit repeatedly.  · You have trouble breathing.  MAKE SURE YOU:   · Understand these instructions.  · Will watch your condition.  · Will get help right away if you are not doing well or get worse.     This information is not intended to replace advice given to you by your health care provider. Make sure you discuss any questions you have with your health care provider.     Document Released: 12/18/2006 Document Revised: 05/03/2016 Document Reviewed: 10/10/2014  [employer] Interactive Patient Education ©2017 [employer]

## 2018-01-07 LAB — BACTERIA SPEC AEROBE CULT: NORMAL

## 2018-01-15 ENCOUNTER — HOSPITAL ENCOUNTER (INPATIENT)
Facility: HOSPITAL | Age: 63
LOS: 11 days | Discharge: NURSING, REHAB OR LONG-TERM CARE | End: 2018-01-26
Attending: EMERGENCY MEDICINE | Admitting: FAMILY MEDICINE

## 2018-01-15 ENCOUNTER — APPOINTMENT (OUTPATIENT)
Dept: GENERAL RADIOLOGY | Facility: HOSPITAL | Age: 63
End: 2018-01-15

## 2018-01-15 ENCOUNTER — APPOINTMENT (OUTPATIENT)
Dept: CT IMAGING | Facility: HOSPITAL | Age: 63
End: 2018-01-15

## 2018-01-15 DIAGNOSIS — M62.82 NON-TRAUMATIC RHABDOMYOLYSIS: ICD-10-CM
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
## 2018-01-15 DIAGNOSIS — L89.159 DECUBITUS ULCER OF COCCYX, UNSPECIFIED PRESSURE ULCER STAGE: Primary | ICD-10-CM

## 2018-01-15 LAB
6-ACETYL MORPHINE: NEGATIVE
ALBUMIN SERPL-MCNC: 4 G/DL (ref 3.4–4.8)
ALBUMIN/GLOB SERPL: 1.1 G/DL (ref 1.5–2.5)
ALP SERPL-CCNC: 72 U/L (ref 40–129)
ALT SERPL W P-5'-P-CCNC: 36 U/L (ref 10–44)
AMMONIA BLD-SCNC: 28 UMOL/L (ref 16–60)
AMPHET+METHAMPHET UR QL: NEGATIVE
ANION GAP SERPL CALCULATED.3IONS-SCNC: 4.4 MMOL/L (ref 3.6–11.2)
ANION GAP SERPL CALCULATED.3IONS-SCNC: 7.8 MMOL/L (ref 3.6–11.2)
ANISOCYTOSIS BLD QL: NORMAL
AST SERPL-CCNC: 133 U/L (ref 10–34)
BACTERIA UR QL AUTO: ABNORMAL /HPF
BARBITURATES UR QL SCN: NEGATIVE
BASOPHILS # BLD AUTO: 0.03 10*3/MM3 (ref 0–0.3)
BASOPHILS NFR BLD AUTO: 0.2 % (ref 0–2)
BENZODIAZ UR QL SCN: POSITIVE
BILIRUB SERPL-MCNC: 4.2 MG/DL (ref 0.2–1.8)
BILIRUB UR QL STRIP: ABNORMAL
BUN BLD-MCNC: 45 MG/DL (ref 7–21)
BUN BLD-MCNC: 55 MG/DL (ref 7–21)
BUN/CREAT SERPL: 44.6 (ref 7–25)
BUN/CREAT SERPL: 52.4 (ref 7–25)
BUPRENORPHINE SERPL-MCNC: NEGATIVE NG/ML
CALCIUM SPEC-SCNC: 8.5 MG/DL (ref 7.7–10)
CALCIUM SPEC-SCNC: 8.7 MG/DL (ref 7.7–10)
CANNABINOIDS SERPL QL: NEGATIVE
CHLORIDE SERPL-SCNC: 102 MMOL/L (ref 99–112)
CHLORIDE SERPL-SCNC: 105 MMOL/L (ref 99–112)
CK SERPL-CCNC: 3339 U/L (ref 24–204)
CLARITY UR: ABNORMAL
CO2 SERPL-SCNC: 25.2 MMOL/L (ref 24.3–31.9)
CO2 SERPL-SCNC: 26.6 MMOL/L (ref 24.3–31.9)
COCAINE UR QL: NEGATIVE
COLOR UR: ABNORMAL
CREAT BLD-MCNC: 1.01 MG/DL (ref 0.43–1.29)
CREAT BLD-MCNC: 1.05 MG/DL (ref 0.43–1.29)
CRP SERPL-MCNC: 15.63 MG/DL (ref 0–0.99)
D-LACTATE SERPL-SCNC: 1.8 MMOL/L (ref 0.5–2)
DEPRECATED RDW RBC AUTO: 33.8 FL (ref 37–54)
EOSINOPHIL # BLD AUTO: 0 10*3/MM3 (ref 0–0.7)
EOSINOPHIL NFR BLD AUTO: 0 % (ref 0–5)
ERYTHROCYTE [DISTWIDTH] IN BLOOD BY AUTOMATED COUNT: 15.8 % (ref 11.5–14.5)
ERYTHROCYTE [SEDIMENTATION RATE] IN BLOOD: 49 MM/HR (ref 0–20)
GFR SERPL CREATININE-BSD FRML MDRD: 72 ML/MIN/1.73
GFR SERPL CREATININE-BSD FRML MDRD: 75 ML/MIN/1.73
GLOBULIN UR ELPH-MCNC: 3.7 GM/DL
GLUCOSE BLD-MCNC: 113 MG/DL (ref 70–110)
GLUCOSE BLD-MCNC: 133 MG/DL (ref 70–110)
GLUCOSE UR STRIP-MCNC: NEGATIVE MG/DL
HCT VFR BLD AUTO: 35.6 % (ref 42–52)
HGB BLD-MCNC: 11.7 G/DL (ref 14–18)
HGB UR QL STRIP.AUTO: ABNORMAL
HYALINE CASTS UR QL AUTO: ABNORMAL /LPF
HYPOCHROMIA BLD QL: NORMAL
IMM GRANULOCYTES # BLD: 0.06 10*3/MM3 (ref 0–0.03)
IMM GRANULOCYTES NFR BLD: 0.5 % (ref 0–0.5)
KETONES UR QL STRIP: NEGATIVE
LEUKOCYTE ESTERASE UR QL STRIP.AUTO: ABNORMAL
LYMPHOCYTES # BLD AUTO: 1.07 10*3/MM3 (ref 1–3)
LYMPHOCYTES NFR BLD AUTO: 8.2 % (ref 21–51)
MCH RBC QN AUTO: 19.9 PG (ref 27–33)
MCHC RBC AUTO-ENTMCNC: 32.9 G/DL (ref 33–37)
MCV RBC AUTO: 60.4 FL (ref 80–94)
METHADONE UR QL SCN: NEGATIVE
MICROCYTES BLD QL: NORMAL
MONOCYTES # BLD AUTO: 0.97 10*3/MM3 (ref 0.1–0.9)
MONOCYTES NFR BLD AUTO: 7.4 % (ref 0–10)
MYOGLOBIN SERPL-MCNC: 556 NG/ML (ref 0–109)
NEUTROPHILS # BLD AUTO: 10.98 10*3/MM3 (ref 1.4–6.5)
NEUTROPHILS NFR BLD AUTO: 83.7 % (ref 30–70)
NITRITE UR QL STRIP: POSITIVE
OPIATES UR QL: POSITIVE
OSMOLALITY SERPL CALC.SUM OF ELEC: 284.3 MOSM/KG (ref 273–305)
OSMOLALITY SERPL CALC.SUM OF ELEC: 287.1 MOSM/KG (ref 273–305)
OXYCODONE UR QL SCN: POSITIVE
PCP UR QL SCN: NEGATIVE
PH UR STRIP.AUTO: <=5 [PH] (ref 5–8)
PLAT MORPH BLD: NORMAL
PLATELET # BLD AUTO: 121 10*3/MM3 (ref 130–400)
PMV BLD AUTO: ABNORMAL FL (ref 6–10)
POIKILOCYTOSIS BLD QL SMEAR: NORMAL
POTASSIUM BLD-SCNC: 4.6 MMOL/L (ref 3.5–5.3)
POTASSIUM BLD-SCNC: 4.8 MMOL/L (ref 3.5–5.3)
PROT SERPL-MCNC: 7.7 G/DL (ref 6–8)
PROT UR QL STRIP: ABNORMAL
RBC # BLD AUTO: 5.89 10*6/MM3 (ref 4.7–6.1)
RBC # UR: ABNORMAL /HPF
REF LAB TEST METHOD: ABNORMAL
SODIUM BLD-SCNC: 135 MMOL/L (ref 135–153)
SODIUM BLD-SCNC: 136 MMOL/L (ref 135–153)
SP GR UR STRIP: 1.03 (ref 1–1.03)
SQUAMOUS #/AREA URNS HPF: ABNORMAL /HPF
TARGETS BLD QL SMEAR: NORMAL
TROPONIN I SERPL-MCNC: 0.06 NG/ML
TROPONIN I SERPL-MCNC: 0.07 NG/ML
UROBILINOGEN UR QL STRIP: ABNORMAL
WBC NRBC COR # BLD: 13.11 10*3/MM3 (ref 4.5–12.5)
WBC UR QL AUTO: ABNORMAL /HPF

## 2018-01-15 PROCEDURE — 72131 CT LUMBAR SPINE W/O DYE: CPT | Performed by: RADIOLOGY

## 2018-01-15 PROCEDURE — 85025 COMPLETE CBC W/AUTO DIFF WBC: CPT | Performed by: PHYSICIAN ASSISTANT

## 2018-01-15 PROCEDURE — 80307 DRUG TEST PRSMV CHEM ANLYZR: CPT | Performed by: PHYSICIAN ASSISTANT

## 2018-01-15 PROCEDURE — 99284 EMERGENCY DEPT VISIT MOD MDM: CPT

## 2018-01-15 PROCEDURE — 72131 CT LUMBAR SPINE W/O DYE: CPT

## 2018-01-15 PROCEDURE — 36415 COLL VENOUS BLD VENIPUNCTURE: CPT

## 2018-01-15 PROCEDURE — 85652 RBC SED RATE AUTOMATED: CPT | Performed by: PHYSICIAN ASSISTANT

## 2018-01-15 PROCEDURE — 93005 ELECTROCARDIOGRAM TRACING: CPT | Performed by: PHYSICIAN ASSISTANT

## 2018-01-15 PROCEDURE — 70450 CT HEAD/BRAIN W/O DYE: CPT

## 2018-01-15 PROCEDURE — 93010 ELECTROCARDIOGRAM REPORT: CPT | Performed by: INTERNAL MEDICINE

## 2018-01-15 PROCEDURE — 87086 URINE CULTURE/COLONY COUNT: CPT | Performed by: PHYSICIAN ASSISTANT

## 2018-01-15 PROCEDURE — 71045 X-RAY EXAM CHEST 1 VIEW: CPT

## 2018-01-15 PROCEDURE — 86140 C-REACTIVE PROTEIN: CPT | Performed by: PHYSICIAN ASSISTANT

## 2018-01-15 PROCEDURE — 71045 X-RAY EXAM CHEST 1 VIEW: CPT | Performed by: RADIOLOGY

## 2018-01-15 PROCEDURE — 81001 URINALYSIS AUTO W/SCOPE: CPT | Performed by: PHYSICIAN ASSISTANT

## 2018-01-15 PROCEDURE — 82140 ASSAY OF AMMONIA: CPT | Performed by: PHYSICIAN ASSISTANT

## 2018-01-15 PROCEDURE — 82550 ASSAY OF CK (CPK): CPT | Performed by: PHYSICIAN ASSISTANT

## 2018-01-15 PROCEDURE — 83874 ASSAY OF MYOGLOBIN: CPT | Performed by: PHYSICIAN ASSISTANT

## 2018-01-15 PROCEDURE — 25010000002 HYDROMORPHONE PER 4 MG

## 2018-01-15 PROCEDURE — 84484 ASSAY OF TROPONIN QUANT: CPT | Performed by: PHYSICIAN ASSISTANT

## 2018-01-15 PROCEDURE — 83605 ASSAY OF LACTIC ACID: CPT | Performed by: PHYSICIAN ASSISTANT

## 2018-01-15 PROCEDURE — 87040 BLOOD CULTURE FOR BACTERIA: CPT | Performed by: PHYSICIAN ASSISTANT

## 2018-01-15 PROCEDURE — 70450 CT HEAD/BRAIN W/O DYE: CPT | Performed by: RADIOLOGY

## 2018-01-15 PROCEDURE — 80074 ACUTE HEPATITIS PANEL: CPT | Performed by: PHYSICIAN ASSISTANT

## 2018-01-15 PROCEDURE — 80053 COMPREHEN METABOLIC PANEL: CPT | Performed by: PHYSICIAN ASSISTANT

## 2018-01-15 PROCEDURE — 85007 BL SMEAR W/DIFF WBC COUNT: CPT | Performed by: PHYSICIAN ASSISTANT

## 2018-01-15 RX ORDER — OXYCODONE HYDROCHLORIDE 20 MG/1
20 TABLET ORAL EVERY 6 HOURS PRN
COMMUNITY
End: 2018-01-26 | Stop reason: HOSPADM

## 2018-01-15 RX ORDER — SODIUM CHLORIDE 9 MG/ML
150 INJECTION, SOLUTION INTRAVENOUS CONTINUOUS
Status: DISCONTINUED | OUTPATIENT
Start: 2018-01-15 | End: 2018-01-18

## 2018-01-15 RX ORDER — DIAZEPAM 5 MG/1
10 TABLET ORAL 2 TIMES DAILY PRN
Status: DISCONTINUED | OUTPATIENT
Start: 2018-01-15 | End: 2018-01-16

## 2018-01-15 RX ORDER — HYDROMORPHONE HCL 110MG/55ML
PATIENT CONTROLLED ANALGESIA SYRINGE INTRAVENOUS
Status: COMPLETED
Start: 2018-01-15 | End: 2018-01-15

## 2018-01-15 RX ORDER — SODIUM CHLORIDE 0.9 % (FLUSH) 0.9 %
10 SYRINGE (ML) INJECTION AS NEEDED
Status: DISCONTINUED | OUTPATIENT
Start: 2018-01-15 | End: 2018-01-26 | Stop reason: HOSPADM

## 2018-01-15 RX ADMIN — SODIUM CHLORIDE 1000 ML: 9 INJECTION, SOLUTION INTRAVENOUS at 12:55

## 2018-01-15 RX ADMIN — SODIUM CHLORIDE 150 ML/HR: 9 INJECTION, SOLUTION INTRAVENOUS at 19:38

## 2018-01-15 RX ADMIN — HYDROMORPHONE HYDROCHLORIDE 1 MG: 2 INJECTION INTRAMUSCULAR; INTRAVENOUS; SUBCUTANEOUS at 17:03

## 2018-01-15 RX ADMIN — OXYCODONE HYDROCHLORIDE 20 MG: 5 TABLET ORAL at 23:21

## 2018-01-15 RX ADMIN — SODIUM CHLORIDE 150 ML/HR: 9 INJECTION, SOLUTION INTRAVENOUS at 19:37

## 2018-01-15 NOTE — ED PROVIDER NOTES
Subjective   HPI Comments: This is a 62-year-old white male presents with chief complaint fall.  Patient was brought in some emergency room by EMS after fall illness floor and sustained down for approximately 7 hours.  Patient does have significant history for multiple myeloma where he underwent chemotherapy at that did not seem to help.  Patient states that he fell and was unable to get up from the floor.  Patient states he has had generalized weakness for the past several days.  Patient denies any fever, chills, chest pain, shortness of breath, cough, abdominal pain, nausea, vomiting, diarrhea, bloody stools.      Patient is a 62 y.o. male presenting with fall.   History provided by:  Patient   used: No    Fall   Mechanism of injury: fall    Injury location:  Torso  Torso injury location:  Back  Incident location:  Around machinery  Time since incident:  1 day  Arrived directly from scene: no    Fall:     Fall occurred:  Standing    Impact surface:  Hard floor    Entrapped after fall: no    Suspicion of alcohol use: no    Suspicion of drug use: no    Tetanus status:  Unknown  Prior to arrival data:     Bystander interventions:  None    Patient ambulatory at scene: no      Blood loss:  Moderate    Orientation at scene:  Person, place, situation and time    Loss of consciousness: no      Amnesic to event: no      Airway interventions:  None    Breathing interventions:  None    IV access status:  None    IO access:  None    Fluids administered:  None    Cardiac interventions:  None    Medications administered:  None  Associated symptoms: back pain    Associated symptoms: no chest pain, no difficulty breathing, no headaches, no hearing loss, no loss of consciousness, no neck pain, no seizures and no vomiting    Risk factors: no AICD and no anticoagulation therapy        Review of Systems   HENT: Negative for hearing loss.    Cardiovascular: Negative for chest pain.   Gastrointestinal: Negative for  vomiting.   Musculoskeletal: Positive for back pain. Negative for neck pain.   Neurological: Negative for seizures, loss of consciousness and headaches.   All other systems reviewed and are negative.      Past Medical History:   Diagnosis Date   • Cancer     bone and lung   • Depression    • Neck fracture        Allergies   Allergen Reactions   • Sulfa Antibiotics        Past Surgical History:   Procedure Laterality Date   • APPENDECTOMY         History reviewed. No pertinent family history.    Social History     Social History   • Marital status:      Spouse name: N/A   • Number of children: N/A   • Years of education: N/A     Social History Main Topics   • Smoking status: Current Every Day Smoker     Packs/day: 0.50     Types: Cigarettes   • Smokeless tobacco: Never Used   • Alcohol use No   • Drug use: No      Comment: oxycodone 20 mg tabs. and valium for my nerves.    • Sexual activity: Defer     Other Topics Concern   • None     Social History Narrative           Objective   Physical Exam   Constitutional: He is oriented to person, place, and time. He appears well-developed and well-nourished.   HENT:   Head: Normocephalic and atraumatic.   Right Ear: External ear normal.   Left Ear: External ear normal.   Nose: Nose normal.   Mouth/Throat: Oropharynx is clear and moist.   Eyes: Conjunctivae and EOM are normal. Pupils are equal, round, and reactive to light.   Neck: Normal range of motion. Neck supple.   Cardiovascular: Normal rate, regular rhythm, normal heart sounds and intact distal pulses.  Exam reveals no friction rub.    No murmur heard.  Pulmonary/Chest: Effort normal and breath sounds normal.   Abdominal: Soft. Bowel sounds are normal.   Musculoskeletal: He exhibits tenderness. He exhibits no edema or deformity.        Lumbar back: He exhibits decreased range of motion, tenderness, swelling, pain and spasm. He exhibits no deformity.   Neurological: He is oriented to person, place, and time. He  has normal reflexes.   Skin: Skin is dry. Rash noted. There is erythema.        Large 5 X 7 cm  Sacral/coccyx decubitus ulcer    Psychiatric: He has a normal mood and affect. His behavior is normal. Judgment and thought content normal.   Nursing note and vitals reviewed.      Procedures         ED Course  ED Course   Comment By Time   Discussed care with Dr. Van will admit to telemetry. Jesus Carreon PA-C 01/15 2402   Discussed care with Dr. Del Cid. States that Dr. Dutton is taking call for him. Patient will be admitted to telemetry bed. Jesus Crareon PA-C 01/15 4983                  MDM  Number of Diagnoses or Management Options  Decubitus ulcer of coccyx, unspecified pressure ulcer stage: new and requires workup  Fall, initial encounter: new and requires workup  Non-traumatic rhabdomyolysis: new and requires workup     Amount and/or Complexity of Data Reviewed  Clinical lab tests: reviewed and ordered  Tests in the radiology section of CPT®: reviewed and ordered  Decide to obtain previous medical records or to obtain history from someone other than the patient: yes  Independent visualization of images, tracings, or specimens: yes    Risk of Complications, Morbidity, and/or Mortality  Presenting problems: moderate  Diagnostic procedures: moderate  Management options: moderate    Patient Progress  Patient progress: stable      Final diagnoses:   Decubitus ulcer of coccyx, unspecified pressure ulcer stage   Fall, initial encounter   Non-traumatic rhabdomyolysis            Jesus Carreon PA-C  01/15/18 0872

## 2018-01-15 NOTE — ED NOTES
[facility] [phone]     Xavier Stephenson RN  01/15/18 [id number]

## 2018-01-15 NOTE — SIGNIFICANT NOTE
Patient initially admitted to our service, patient later found to be Dr. Hobson patient. Defer any admission to Dr. Hobson, I have removed the patient from our list. Please feel free to call with any and all questions. Thank you.

## 2018-01-15 NOTE — ED NOTES
Pt has an unstageable escar on coccyx and sacrum. Pt's posterior is excoriated from urine saturated clothing. Pt has bruising to heels and posterior bony prominences. Pt states he was in floor for 7 hours, but the level of decay of skin integrity and presence of amount of urine makes timeline questionable.      Xavier Stephenson RN  01/15/18 7940

## 2018-01-15 NOTE — PLAN OF CARE
Problem: Skin Integrity Impairment, Risk/Actual (Adult)  Goal: Identify Related Risk Factors and Signs and Symptoms  Outcome: Outcome(s) achieved Date Met: 01/15/18    Goal: Skin Integrity/Wound Healing  Outcome: Ongoing (interventions implemented as appropriate)      Problem: Pain, Chronic (Adult)  Goal: Identify Related Risk Factors and Signs and Symptoms  Outcome: Outcome(s) achieved Date Met: 01/15/18    Goal: Acceptable Pain Control/Comfort Level  Outcome: Ongoing (interventions implemented as appropriate)

## 2018-01-16 ENCOUNTER — APPOINTMENT (OUTPATIENT)
Dept: ULTRASOUND IMAGING | Facility: HOSPITAL | Age: 63
End: 2018-01-16

## 2018-01-16 ENCOUNTER — APPOINTMENT (OUTPATIENT)
Dept: GENERAL RADIOLOGY | Facility: HOSPITAL | Age: 63
End: 2018-01-16

## 2018-01-16 LAB
ALBUMIN SERPL-MCNC: 3.4 G/DL (ref 3.4–4.8)
ALP SERPL-CCNC: 109 U/L (ref 40–129)
ALT SERPL W P-5'-P-CCNC: 46 U/L (ref 10–44)
ANION GAP SERPL CALCULATED.3IONS-SCNC: 5.1 MMOL/L (ref 3.6–11.2)
ANISOCYTOSIS BLD QL: NORMAL
AST SERPL-CCNC: 118 U/L (ref 10–34)
BASOPHILS # BLD AUTO: 0.02 10*3/MM3 (ref 0–0.3)
BASOPHILS NFR BLD AUTO: 0.2 % (ref 0–2)
BILIRUB CONJ SERPL-MCNC: 3.1 MG/DL (ref 0–0.2)
BILIRUB INDIRECT SERPL-MCNC: 1.6 MG/DL
BILIRUB SERPL-MCNC: 4.7 MG/DL (ref 0.2–1.8)
BUN BLD-MCNC: 44 MG/DL (ref 7–21)
BUN/CREAT SERPL: 44.4 (ref 7–25)
CALCIUM SPEC-SCNC: 8.2 MG/DL (ref 7.7–10)
CHLORIDE SERPL-SCNC: 107 MMOL/L (ref 99–112)
CK SERPL-CCNC: 1693 U/L (ref 24–204)
CO2 SERPL-SCNC: 25.9 MMOL/L (ref 24.3–31.9)
CREAT BLD-MCNC: 0.99 MG/DL (ref 0.43–1.29)
DEPRECATED RDW RBC AUTO: 34.4 FL (ref 37–54)
EOSINOPHIL # BLD AUTO: 0 10*3/MM3 (ref 0–0.7)
EOSINOPHIL NFR BLD AUTO: 0 % (ref 0–5)
ERYTHROCYTE [DISTWIDTH] IN BLOOD BY AUTOMATED COUNT: 16 % (ref 11.5–14.5)
GFR SERPL CREATININE-BSD FRML MDRD: 77 ML/MIN/1.73
GLUCOSE BLD-MCNC: 134 MG/DL (ref 70–110)
HAV IGM SERPL QL IA: NORMAL
HBV CORE IGM SERPL QL IA: NORMAL
HBV SURFACE AG SERPL QL IA: NORMAL
HCT VFR BLD AUTO: 32.6 % (ref 42–52)
HCV AB SER DONR QL: NORMAL
HGB BLD-MCNC: 10.5 G/DL (ref 14–18)
HYPOCHROMIA BLD QL: NORMAL
IMM GRANULOCYTES # BLD: 0.03 10*3/MM3 (ref 0–0.03)
IMM GRANULOCYTES NFR BLD: 0.4 % (ref 0–0.5)
LYMPHOCYTES # BLD AUTO: 0.97 10*3/MM3 (ref 1–3)
LYMPHOCYTES NFR BLD AUTO: 11.9 % (ref 21–51)
MCH RBC QN AUTO: 19.7 PG (ref 27–33)
MCHC RBC AUTO-ENTMCNC: 32.2 G/DL (ref 33–37)
MCV RBC AUTO: 61.3 FL (ref 80–94)
MICROCYTES BLD QL: NORMAL
MONOCYTES # BLD AUTO: 0.84 10*3/MM3 (ref 0.1–0.9)
MONOCYTES NFR BLD AUTO: 10.3 % (ref 0–10)
MYOGLOBIN SERPL-MCNC: 209 NG/ML (ref 0–109)
NEUTROPHILS # BLD AUTO: 6.28 10*3/MM3 (ref 1.4–6.5)
NEUTROPHILS NFR BLD AUTO: 77.2 % (ref 30–70)
NRBC BLD MANUAL-RTO: 0 /100 WBC (ref 0–0)
OSMOLALITY SERPL CALC.SUM OF ELEC: 288.8 MOSM/KG (ref 273–305)
OVALOCYTES BLD QL SMEAR: NORMAL
PLATELET # BLD AUTO: 125 10*3/MM3 (ref 130–400)
PMV BLD AUTO: ABNORMAL FL (ref 6–10)
POTASSIUM BLD-SCNC: 4.4 MMOL/L (ref 3.5–5.3)
PROT SERPL-MCNC: 6.4 G/DL (ref 6–8)
RBC # BLD AUTO: 5.32 10*6/MM3 (ref 4.7–6.1)
SMALL PLATELETS BLD QL SMEAR: NORMAL
SODIUM BLD-SCNC: 138 MMOL/L (ref 135–153)
TARGETS BLD QL SMEAR: NORMAL
WBC NRBC COR # BLD: 8.14 10*3/MM3 (ref 4.5–12.5)

## 2018-01-16 PROCEDURE — 82550 ASSAY OF CK (CPK): CPT | Performed by: FAMILY MEDICINE

## 2018-01-16 PROCEDURE — 80076 HEPATIC FUNCTION PANEL: CPT | Performed by: PHYSICIAN ASSISTANT

## 2018-01-16 PROCEDURE — 85025 COMPLETE CBC W/AUTO DIFF WBC: CPT | Performed by: FAMILY MEDICINE

## 2018-01-16 PROCEDURE — 25010000002 HEPARIN (PORCINE) PER 1000 UNITS: Performed by: PHYSICIAN ASSISTANT

## 2018-01-16 PROCEDURE — 80048 BASIC METABOLIC PNL TOTAL CA: CPT | Performed by: FAMILY MEDICINE

## 2018-01-16 PROCEDURE — 25010000002 CEFEPIME: Performed by: PHYSICIAN ASSISTANT

## 2018-01-16 PROCEDURE — 76700 US EXAM ABDOM COMPLETE: CPT

## 2018-01-16 PROCEDURE — 83874 ASSAY OF MYOGLOBIN: CPT | Performed by: FAMILY MEDICINE

## 2018-01-16 PROCEDURE — 25010000002 VANCOMYCIN PER 500 MG

## 2018-01-16 PROCEDURE — 73522 X-RAY EXAM HIPS BI 3-4 VIEWS: CPT

## 2018-01-16 PROCEDURE — 83874 ASSAY OF MYOGLOBIN: CPT | Performed by: PHYSICIAN ASSISTANT

## 2018-01-16 PROCEDURE — 73522 X-RAY EXAM HIPS BI 3-4 VIEWS: CPT | Performed by: RADIOLOGY

## 2018-01-16 PROCEDURE — 76700 US EXAM ABDOM COMPLETE: CPT | Performed by: RADIOLOGY

## 2018-01-16 PROCEDURE — 99221 1ST HOSP IP/OBS SF/LOW 40: CPT | Performed by: SURGERY

## 2018-01-16 PROCEDURE — 85007 BL SMEAR W/DIFF WBC COUNT: CPT | Performed by: FAMILY MEDICINE

## 2018-01-16 PROCEDURE — 94799 UNLISTED PULMONARY SVC/PX: CPT

## 2018-01-16 RX ORDER — OXYCODONE HYDROCHLORIDE 5 MG/1
5 TABLET ORAL EVERY 4 HOURS PRN
Status: DISPENSED | OUTPATIENT
Start: 2018-01-16 | End: 2018-01-26

## 2018-01-16 RX ORDER — HEPARIN SODIUM 5000 [USP'U]/ML
5000 INJECTION, SOLUTION INTRAVENOUS; SUBCUTANEOUS EVERY 8 HOURS SCHEDULED
Status: DISCONTINUED | OUTPATIENT
Start: 2018-01-16 | End: 2018-01-26 | Stop reason: HOSPADM

## 2018-01-16 RX ORDER — ACETAMINOPHEN 325 MG/1
650 TABLET ORAL EVERY 6 HOURS PRN
Status: DISCONTINUED | OUTPATIENT
Start: 2018-01-16 | End: 2018-01-26 | Stop reason: HOSPADM

## 2018-01-16 RX ADMIN — OXYCODONE HYDROCHLORIDE 20 MG: 5 TABLET ORAL at 05:54

## 2018-01-16 RX ADMIN — VANCOMYCIN HYDROCHLORIDE 1000 MG: 5 INJECTION, POWDER, LYOPHILIZED, FOR SOLUTION INTRAVENOUS at 14:38

## 2018-01-16 RX ADMIN — SODIUM CHLORIDE 150 ML/HR: 9 INJECTION, SOLUTION INTRAVENOUS at 02:35

## 2018-01-16 RX ADMIN — HEPARIN SODIUM 5000 UNITS: 5000 INJECTION, SOLUTION INTRAVENOUS; SUBCUTANEOUS at 14:39

## 2018-01-16 RX ADMIN — OXYCODONE HYDROCHLORIDE 5 MG: 5 TABLET ORAL at 17:52

## 2018-01-16 RX ADMIN — ACETAMINOPHEN 650 MG: 325 TABLET ORAL at 19:13

## 2018-01-16 RX ADMIN — CEFEPIME 2 G: 2 INJECTION, POWDER, FOR SOLUTION INTRAVENOUS at 12:40

## 2018-01-16 RX ADMIN — CEFEPIME 2 G: 2 INJECTION, POWDER, FOR SOLUTION INTRAVENOUS at 17:53

## 2018-01-16 RX ADMIN — HEPARIN SODIUM 5000 UNITS: 5000 INJECTION, SOLUTION INTRAVENOUS; SUBCUTANEOUS at 19:13

## 2018-01-16 RX ADMIN — SODIUM CHLORIDE 150 ML/HR: 9 INJECTION, SOLUTION INTRAVENOUS at 19:13

## 2018-01-16 RX ADMIN — SODIUM CHLORIDE 150 ML/HR: 9 INJECTION, SOLUTION INTRAVENOUS at 10:12

## 2018-01-16 RX ADMIN — METRONIDAZOLE 500 MG: 500 INJECTION, SOLUTION INTRAVENOUS at 11:36

## 2018-01-16 RX ADMIN — METRONIDAZOLE 500 MG: 500 INJECTION, SOLUTION INTRAVENOUS at 19:17

## 2018-01-16 NOTE — PROGRESS NOTES
Patient initiated on vancomycin for sepsis. Based on patient and population kinetics (ke = 0.06 hrs-1, t 1/2 = 10.9 hrs, Vd = 45.8 L), will start vancomycin at 1 gm q12 hrs. Will check a trough level at steady state and follow.    Thank you,    Carlie Olvera, [facility]

## 2018-01-16 NOTE — CONSULTS
Inpatient Consult to General Surgery  Consult performed by: MEÑO CONTRERAS  Consult ordered by: KAY NUNEZ  Reason for consult: Necrotic pressure ulceration of the sacrum and right hip  Assessment/Recommendations: No evidence of deep tissue necrosis at this time.  Appears to be dry gangrenous necrotic skin on the sacrum and right hip.  We'll continue to follow the wounds and recommend painting with Betadine twice daily.          Patient Care Team:  Samuel Duane Kreis, MD as PCP - General    Chief complaint: Sacral and hip wound    Subjective     HPI Comments: 63-year-old male found down presenting with rhabdomyolysis and pressure ulceration of the sacrum and right hip.  On examination the patient has a large area of necrotic skin but no evidence of tunneling purulence or malodor of a large area above the sacrum measuring approximately 15 x 8 cm.  There is a small area of necrotic tissue right at the trochanter of the right hip with no evidence of deep tissue infection.  No cellulitis in the area.  The wounds are tender to palpation.    Back Pain   Pertinent negatives include no abdominal pain, chest pain, dysuria, fever, headaches or weakness.       Review of Systems   Constitutional: Negative for activity change, appetite change, chills and fever.   HENT: Negative for sore throat and trouble swallowing.    Eyes: Negative for visual disturbance.   Respiratory: Negative for cough and shortness of breath.    Cardiovascular: Negative for chest pain and palpitations.   Gastrointestinal: Negative for abdominal distention, abdominal pain, blood in stool, constipation, diarrhea, nausea and vomiting.   Endocrine: Negative for cold intolerance and heat intolerance.   Genitourinary: Negative for dysuria.   Musculoskeletal: Positive for back pain. Negative for joint swelling.   Skin: Positive for wound. Negative for color change and rash.   Allergic/Immunologic: Negative for immunocompromised state.   Neurological:  Negative for dizziness, seizures, weakness and headaches.   Hematological: Negative for adenopathy. Does not bruise/bleed easily.   Psychiatric/Behavioral: Negative for agitation and confusion.        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 No pertinent family history.,   Social History   Substance Use Topics   • Smoking status: Current Every Day Smoker     Packs/day: 0.50     Types: Cigarettes   • Smokeless tobacco: Never Used   • Alcohol use No   ,   Prescriptions Prior to Admission   Medication Sig Dispense Refill Last Dose   • diazePAM (VALIUM) 10 MG tablet Take 10 mg by mouth 2 (Two) Times a Day As Needed for Anxiety.   1/15/2018 at 1100   • oxyCODONE (ROXICODONE) 20 MG tablet Take 20 mg by mouth Every 6 (Six) Hours As Needed for Moderate Pain .   1/15/2018 at 1100   , Scheduled Meds:    cefepime 2 g Intravenous Q12H   heparin (porcine) 5,000 Units Subcutaneous Q8H   HYDROmorphone 1 mg Intravenous Once   metroNIDAZOLE 500 mg Intravenous Q8H   vancomycin 1,000 mg Intravenous Q12H   , Continuous Infusions:    Pharmacy to dose vancomycin     sodium chloride 150 mL/hr Last Rate: 150 mL/hr (01/16/18 1012)   , PRN Meds:  •  acetaminophen  •  oxyCODONE  •  Pharmacy to dose vancomycin  •  Insert peripheral IV **AND** sodium chloride and Allergies:  Sulfa antibiotics    Objective      Vital Signs  Temp:  [98.1 °F (36.7 °C)-101.3 °F (38.5 °C)] 98.2 °F (36.8 °C)  Heart Rate:  [] 78  Resp:  [18-20] 18  BP: ()/(46-77)  103/68    Physical Exam   Constitutional: He is oriented to person, place, and time. He appears well-developed.   HENT:   Head: Normocephalic and atraumatic.   Mouth/Throat: Mucous membranes are normal.   Eyes: Conjunctivae are normal. Pupils are equal, round, and reactive to light.   Neck: Neck supple. No JVD present. No tracheal deviation present. No thyromegaly present.   Cardiovascular: Normal rate and regular rhythm.  Exam reveals no gallop and no friction rub.    No murmur heard.  Pulmonary/Chest: Effort normal and breath sounds normal.   Abdominal: Soft. He exhibits no distension. There is no splenomegaly or hepatomegaly. There is no tenderness. No hernia.   Musculoskeletal: Normal range of motion. He exhibits no deformity.   Neurological: He is alert and oriented to person, place, and time.   Skin: Skin is warm and dry.   63-year-old male with eschar and necrotic skin of the sacrum measuring 15 x 8 cm with no evidence of deep tissue infection cellulitis or purulent drainage.  The patient also has a similar eschar on the right hip measuring 3 x 3 cm with no deep infection cellulitis or drainage.   Psychiatric: He has a normal mood and affect.       Results Review:    I reviewed the patient's new clinical results.        Assessment/Plan     Active Problems:    Rhabdomyolysis    Decubitus ulcer of coccyx, unspecified pressure ulcer stage      Assessment:  (63-year-old male found down with pressure wounds of the sacrum and right hip.  This appears to involve only necrotic skin with no deep infection at this time.  Recommend painting the wound with Betadine twice daily and will follow.).     Plan:   (Painting the wound with Betadine twice daily  q2h turning to relieve pressure).       I discussed the patients findings and my recommendations with patient and nursing staff    Mohan Santoyo MD  01/16/18  3:55 PM

## 2018-01-16 NOTE — PROGRESS NOTES
Nutrition Services    Patient Name:  Jhonny Washington  YOB: 1955  MRN: [id number]  Admit Date:  1/15/2018    Recommend add MVI daily to promote wound healing.     Thank you    Electronically signed by:  Jaquelin Tristan RD  01/16/18 11:11 AM

## 2018-01-16 NOTE — CONSULTS
INFECTIOUS DISEASE CONSULTATION REPORT      Referring Provider: Dr. Del Cid  Reason for Consultation: fever/antibiotic managament      Principal problem: <principal problem not specified>    Subjective .     History of present illness:    As you well know Dr. Del Cid, Mr. Jhonny Washington is a 63 y.o. years old male with past medical history significant for anxiety, arthritis, depression, multiple myeloma, polysubstance dependency, TIA, who presented to [facility] Emergency Department on 1/15/2018 after being found with altered mental status and found unresponsive by his son.  Fever of 101.3 overnight with normal white count and elevated CRP level 15.63.  Patient has a large sacral ulcer and right hip ulcer.    Infectious Disease consultation was requested for antimicrobial management.     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 No pertinent family history.    Social History    Social History   Substance Use Topics   • Smoking status: Current Every Day Smoker     Packs/day: 0.50     Types: Cigarettes   • Smokeless tobacco: Never Used   • Alcohol use No       Allergies    Sulfa antibiotics    Objective     BP 92/53 (BP Location: Right arm, Patient Position: Lying)  Pulse 85  Temp 98.4 °F (36.9 °C)  "(Oral)   Resp 18  Ht 182.9 cm (72\")  Wt 65.4 kg (144 lb 1.6 oz)  SpO2 95%  BMI 19.54 kg/m2    Temp:  [98.1 °F (36.7 °C)-101.3 °F (38.5 °C)] 98.4 °F (36.9 °C)        Intake/Output Summary (Last 24 hours) at 01/16/18 1054  Last data filed at 01/16/18 1045   Gross per 24 hour   Intake             2040 ml   Output              275 ml   Net             1765 ml         Physical Exam:      General Appearance:    Alert, cooperative, in no acute distress, thin, frail    Head:    Normocephalic, without obvious abnormality, atraumatic   Eyes:            Lids and lashes normal, conjunctivae and sclerae normal, no   icterus, no pallor, corneas clear, PERRLA   Ears:    Ears appear intact with no abnormalities noted   Throat:   No oral lesions, no thrush, oral mucosa moist   Neck:   No adenopathy, supple, trachea midline, no thyromegaly, no   carotid bruit, no JVD   Back:     No tenderness to percussion or palpation, range of motion   normal   Lungs:     Clear to auscultation,respirations regular, even and unlabored. No wheezing, no ronchi and no crackles.    Heart:    Regular rhythm and normal rate, normal S1 and S2, no            murmur, no gallop, no rub, no click   Chest Wall:    No abnormalities observed   Abdomen:     Normal bowel sounds, no masses, no organomegaly, soft        non-tender, non-distended, no guarding, no rebound                tenderness   Rectal:     Deferred   Extremities:   Moves all extremities well, no edema, no cyanosis, no             redness   Pulses:   Pulses palpable and equal bilaterally   Skin:   Sacral decubitus ulcer and right hip ulcer    Lymph nodes:   No palpable adenopathy   Neurologic:   Awake, alert, Garbled speech        Results:      Results from last 7 days  Lab Units 01/16/18  0110 01/15/18  1245   WBC 10*3/mm3 8.14 13.11*     Lab Results   Component Value Date    NEUTROABS 6.28 01/16/2018         Results from last 7 days  Lab Units 01/16/18  0110   CREATININE mg/dL 0.99 "         Results from last 7 days  Lab Units 01/15/18  1245   CRP mg/dL 15.63*       Imaging Results (last 24 hours)     Procedure Component Value Units Date/Time    XR Chest AP [[id number]] Collected:  01/15/18 1323     Updated:  01/15/18 1348    Narrative:       EXAMINATION:  XR CHEST AP-      CLINICAL INDICATION:     weakness     TECHNIQUE:  XR CHEST AP-       COMPARISON: January 6, 2028      FINDINGS:   Coarse interstitial markings suggestive of chronic interstitial lung  disease.   Heart size is stable.   No pneumothorax.   No pleural effusion.   Bony and soft tissue structures are unremarkable.            Impression:       Stable radiographic appearance of the chest.     This report was finalized on 1/15/2018 1:23 PM by Dr. Carlos Jiménez MD.       CT Head Without Contrast [[id number]] Collected:  01/15/18 1307     Updated:  01/15/18 1349    Narrative:          EXAMINATION: CT HEAD WO CONTRAST-      CLINICAL INDICATION:     fall     TECHNIQUE: Contiguous axial CT images of the head were obtained without  contrast administration.      Radiation dose reduction techniques were utilized per ALARA protocol.  Automated exposure control was initiated through either or [employer] or  [employer] software packages by  protocol.       735.86 mGy.cm     COMPARISON:  None.       FINDINGS: Generalized cerebral atrophy is present. There is no mass  effect, midline displacement, or hydrocephalus. There are patchy areas  of decreased density within the periventricular white matter which  likely reflect chronic small vessel ischemic changes. There is no CT  evidence of acute infarct or hemorrhage. Bone windows reveal no osseous  abnormalities or fractures.        Impression:       Atrophy and chronic small vessel ischemic change, but there  are no acute intracranial abnormalities identified.         This report was finalized on 1/15/2018 1:08 PM by Dr. Carlos Jiménez MD.       CT Lumbar Spine Without Contrast [[id number]]  Collected:  01/15/18 1315     Updated:  01/15/18 1349    Narrative:       EXAMINATION: CT LUMBAR SPINE WO CONTRAST-      CLINICAL INDICATION:     fall     TECHNIQUE: Multiple axial CT images of the entire lumbar spine were  obtained without contrast administration. Reformatted images in the  coronal and/or sagittal plane(s) were generated from the axial data set  to facilitate diagnostic accuracy and/or surgical planning.      Radiation dose reduction techniques were utilized per ALARA protocol.  Automated exposure control was initiated through either or [employer] or  [employer] software packages by  protocol.       541.68 mGy.cm     COMPARISON:  None.       FINDINGS: Degenerative changes are present within the lumbar spine, but  resulting in no significant bony stenosis of the spinal canal. No acute  fracture or malalignment of the lumbar spine is identified.        Impression:       No acute fracture of the lumbar spine.      This report was finalized on 1/15/2018 1:16 PM by Dr. Carlos Jiménez MD.               Cultures:    Blood Culture   Date Value Ref Range Status   01/15/2018 No growth at less than 24 hours  Preliminary   01/15/2018 No growth at less than 24 hours  Preliminary     Urine Culture   Date Value Ref Range Status   01/15/2018 No growth at 24 hours  Preliminary       Results Review:    I have personally reviewed laboratory data, culture results, radiology studies and antimicrobial therapy.    Hospital Medications (active)       Dose Frequency Start End    acetaminophen (TYLENOL) tablet 650 mg 650 mg Every 6 Hours PRN 1/16/2018     Sig - Route: Take 2 tablets by mouth Every 6 (Six) Hours As Needed for Mild Pain . - Oral    Cosign for Ordering: Required by Samuel Duane Kreis, MD    cefepime (MAXIPIME) 2 g/100 mL 0.9% NS (mbp) 2 g Once 1/16/2018     Sig - Route: Infuse 100 mL into a venous catheter 1 (One) Time. - Intravenous    Cosign for Ordering: Required by Misty Bae MD     "cefepime (MAXIPIME) 2 g/100 mL 0.9% NS (mbp) 2 g Every 12 Hours 1/16/2018 1/23/2018    Sig - Route: Infuse 100 mL into a venous catheter Every 12 (Twelve) Hours. - Intravenous    Cosign for Ordering: Required by Misty Bae MD    heparin (porcine) 5000 UNIT/ML injection 5,000 Units 5,000 Units Every 8 Hours Scheduled 1/16/2018     Sig - Route: Inject 1 mL under the skin Every 8 (Eight) Hours. - Subcutaneous    Cosign for Ordering: Required by Samuel Duane Kreis, MD    HYDROmorphone (DILAUDID) 2 MG/ML injection  - ADS Override Pull   1/15/2018 1/15/2018    Notes to Pharmacy: Created by cabinet override    HYDROmorphone (DILAUDID) injection 1 mg 1 mg Once 1/15/2018     Sig - Route: Infuse 1 mL into a venous catheter 1 (One) Time. - Intravenous    metroNIDAZOLE (FLAGYL) IVPB 500 mg 500 mg Every 8 Hours 1/16/2018 1/23/2018    Sig - Route: Infuse 100 mL into a venous catheter Every 8 (Eight) Hours. - Intravenous    Cosign for Ordering: Required by Misty Bae MD    Pharmacy to dose vancomycin  Continuous PRN 1/16/2018 1/23/2018    Sig - Route: Continuous As Needed for Consult. - Does not apply    Cosign for Ordering: Required by Misty Bae MD    sodium chloride 0.9 % bolus 1,000 mL 1,000 mL Once 1/15/2018 1/15/2018    Sig - Route: Infuse 1,000 mL into a venous catheter 1 (One) Time. - Intravenous    Cosign for Ordering: Accepted by José Miguel Li MD on 1/15/2018  1:21 PM    sodium chloride 0.9 % flush 10 mL 10 mL As Needed 1/15/2018     Sig - Route: Infuse 10 mL into a venous catheter As Needed for Line Care. - Intravenous    Cosign for Ordering: Accepted by José Miguel Li MD on 1/15/2018  1:21 PM    Linked Group 1:  \"And\" Linked Group Details        sodium chloride 0.9 % infusion 150 mL/hr Continuous 1/15/2018     Sig - Route: Infuse 150 mL/hr into a venous catheter Continuous. - Intravenous    diazePAM (VALIUM) tablet 10 mg (Discontinued) 10 mg 2 Times Daily PRN 1/15/2018 1/16/2018    " Sig - Route: Take 2 tablets by mouth 2 (Two) Times a Day As Needed for Anxiety. - Oral    enoxaparin (LOVENOX) syringe 40 mg (Discontinued) 40 mg Every 24 Hours 1/16/2018 1/16/2018    Sig - Route: Inject 0.4 mL under the skin Daily. - Subcutaneous    Cosign for Ordering: Required by Samuel Duane Kreis, MD    oxyCODONE (ROXICODONE) immediate release tablet 20 mg (Discontinued) 20 mg Every 6 Hours PRN 1/15/2018 1/16/2018    Sig - Route: Take 20 mg by mouth Every 6 (Six) Hours As Needed for Moderate Pain . - Oral              Assessment/Plan     ASSESSMENT:    1.  Severe sepsis with encephalopathy  2.  Aspiration pneumonia    PLAN:    Patient is at high risk for MRSA and pseudomonal infection and need for anaerobic coverage as well.  Would recommend to initiate vancomycin per pharmacy dosing, cefepime 2 g IV every 12 hours and Flagyl 500 mg IV every 8 hours.  CRP ordered for a.m.  We will continue to follow closely and adjust antibiotic therapy appropriately.      Patient's findings and recommendations were discussed with patient and nursing staff    Code Status: Full Code    Shameka Galeana PA-C  01/16/18  10:54 AM

## 2018-01-16 NOTE — PLAN OF CARE
Problem: Skin Integrity Impairment, Risk/Actual (Adult)  Goal: Skin Integrity/Wound Healing  Outcome: Ongoing (interventions implemented as appropriate)      Problem: Pain, Chronic (Adult)  Goal: Acceptable Pain Control/Comfort Level  Outcome: Ongoing (interventions implemented as appropriate)      Problem: Patient Care Overview (Adult)  Goal: Plan of Care Review  Outcome: Ongoing (interventions implemented as appropriate)    Goal: Adult Individualization and Mutuality  Outcome: Ongoing (interventions implemented as appropriate)    Goal: Discharge Needs Assessment  Outcome: Ongoing (interventions implemented as appropriate)

## 2018-01-16 NOTE — PROGRESS NOTES
Discharge Planning Assessment   Nabe[place]     Patient Name: Jhonny Washington  MRN: [id number]  Today's Date: 1/16/2018    Admit Date: 1/15/2018          Discharge Needs Assessment       01/16/18 1607    Living Environment    Lives With alone    Living Arrangements apartment    Home Accessibility no concerns    Type of Financial/Environmental Concern none    Transportation Available car    Living Environment    Provides Primary Care For no one, unable/limited ability to care for self            Discharge Plan       01/16/18 1607    Case Management/Social Work Plan    Plan Pt admitted on 1/15/18.  SS received consult per [facility] for discharge planning.  SS spoke with pt on this date.  Pt lives at home alone at [facility].  Pt currently utilizes home 02 via unknown provider.  Pt currently does not utilize home health services.  Pt states no POA or Living will and states no further education needed.  SS will follow and assist with discharge needs.         Discharge Placement     No information found                Demographic Summary       01/16/18 1606    Referral Information    Admission Type inpatient    Arrived From admitted as an inpatient    Referral Source nursing    Reason For Consult discharge planning            Functional Status     None            Psychosocial     None            Abuse/Neglect     None            Legal     None            Substance Abuse     None            Patient Forms     None          Amber Jimenes

## 2018-01-16 NOTE — NURSING NOTE
Patient presents with unstageable pressure injury to right hip  6 x 4 cm, black and yellow necrotic,dry,    and coccyx 17 x 10 cm, black and yellow necrotic,dry  Dr Santoyo has been consulted and has given orders.  Speciality bed has been ordered as patient has no turning surface.

## 2018-01-16 NOTE — H&P
Chief complaint   Chief Complaint   Patient presents with   • Back Pain       Subjective     Patient is a 63 y.o. male presented to [facility] emergency room secondary to being found down and unresponsive.  Per nursing staff, patient was found by his son to be down and unresponsive with increased confusion, disorientation, and incontinent of urine.  No seizure activity was reported however EMS was activated and patient was brought to the emergency room for further evaluation.  Upon presentation patient was noted to have elevation in creatinine kinase at 3339, bilirubin 4.2, WBC 13,111, CRP 15.63 , CT head was negative for acute findings, he was apparently complaining of acute on chronic low back pain as well, however, CT lumbar spine demonstrated no focal findings.  He was found to have large sacral decubitus ulcer as well as right hip ulcer noted at the trochanteric region with noted large blackened eschar on both areas.  Urine was noted to be nitrite positive.  Patient was diagnosed with rhabdomyolysis and placed on IV fluid hydration and admitted for further evaluation.  Overnight patient has been noted febrile with temperature of 101.3.  He has continued to have chronic generalized pain throughout his body.  He has been somewhat lethargic per nursing staff overnight, drifting in and out of coherent speech.  Patient does take chronic pain medication with oxycodone 20 mg every 6 hours as well as Valium 10 mg every 12 hours.  He does have history of multiple myeloma diagnosed in 2015 which was treated with radiation and chemotherapy,although he does not endorse recent follow-up.  He appears to have had surgery evaluation to have his port removed in April 2017.  He has had long history of chronic neck and low back pain.  He has had innumerable emergency room visits over the preceding 3-4 years with most recently having 13 ER visits over the last 10 weeks most related to chronic pain and his pain and nerve  medications being stolen on an outpatient basis.  He states he follows with Dr. Orozco in [place] for pain and nerve medications.  CPK overnight has improved this 1693.  Renal function has remained stable.  He currently is on no antibiotic therapy.  Nursing staff does endorse that his oxygen saturation does dip below 90% when sleeping.  He reports that he has been basically bedridden over the preceding few  Weeks to months related to chronic neck and back pain.  Per nursing report, he was living at home with his girlfriend.  He cannot articulate degree of mobility within the home as well as how he completed transportation and community activities.  He states he was not urinary incontinent prior to this most recent hospitalization.  Family reports to nursing staff they did have contact with him however states that this was not consistent and he was ambulatory at [place], not utilizing any assistance devices.    Review of Systems   On review of systems the patient denies the following unless noted above:     Constitutional:  Fevers, chills, weight change, fatigue     Eyes: Vision changes, headache, double vision, loss of vision     ENT: Runny nose, nose bleeds, ringing in ears, pain with swallowing, sore throat     Cardiovascular: Chest pains, palpitations, PND, orthopnea     Respiratory: Cough, wheezing, SOA, hemoptysis     GI:  Abdominal pain, diarrhea, constipation, change in stool caliber,    Rectal bleeding, vomiting or nausea     : Difficulty voiding, dysuria, hematuria     Musculoskeletal: Changes of any chronic joint pain, swelling     Skin: Rash, itching, easy bruisability     Neurological: Unilateral weakness, new onset numbness, speech difficulties     Psychiatric: Sadness, tearfulness, feelings of hopelessness, racing thoughts     Endocrine:  Heat or cold intolerance, mood swings, polydipsia, polyphagia,    recent hypoglycemia      History  Past Medical History:   Diagnosis Date   • Anxiety    •  "Arthritis    • Chronic pain    • Decubitus ulcer    • Depression    • Migraine headache    • Neck fracture    • 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 No pertinent family history.  Social History   Substance Use Topics   • Smoking status: Current Every Day Smoker     Packs/day: 0.50     Types: Cigarettes   • Smokeless tobacco: Never Used   • Alcohol use No     Prescriptions Prior to Admission   Medication Sig Dispense Refill Last Dose   • diazePAM (VALIUM) 10 MG tablet Take 10 mg by mouth 2 (Two) Times a Day As Needed for Anxiety.   1/15/2018 at 1100   • oxyCODONE (ROXICODONE) 20 MG tablet Take 20 mg by mouth Every 6 (Six) Hours As Needed for Moderate Pain .   1/15/2018 at 1100     Allergies:  Sulfa antibiotics    Scheduled Meds:  HYDROmorphone 1 mg Intravenous Once     Continuous Infusions:  sodium chloride 150 mL/hr Last Rate: 150 mL/hr (01/16/18 0235)     PRN Meds:.•  diazePAM  •  oxyCODONE  •  Insert peripheral IV **AND** sodium chloride          Objective     Vital Signs    /68 (BP Location: Right arm, Patient Position: Lying)  Pulse 88  Temp 98.1 °F (36.7 °C) (Oral)   Resp 18  Ht 182.9 cm (72\")  Wt 65.4 kg (144 lb 1.6 oz)  SpO2 95%  BMI 19.54 kg/m2        Physical Exam:   General Appearance: alert, pleasant, appears older than stated age, interactive and   Cooperative, thin, frail   Head: normocephalic, without obvious abnormality and atraumatic   Eyes: lids and lashes normal, conjunctivae and sclerae normal, noted mild icterus, no   pallor, corneas clear and PERRLA   Ears: ears appear intact with no abnormalities noted   Nose: nares normal, septum midline, mucosa normal and no " drainage   Throat: no oral lesions, no thrush, oral mucosa dry and oopharynx normal   Neck: no adenopathy, supple, trachea midline, no thyromegaly, no carotid bruit   and no JVD   Back: no kyphosis present, no scoliosis present, no skin lesions, erythema, or   scars, no tenderness to percussion or palpation and range of motion normal   Lungs: clear to auscultation, respirations regular, respirations even and    respirations unlabored. No accessory muscle use.    Heart:: regular rhythm & normal rate, normal S1, S2, no murmur, no gallop, no   rub and no click.  Chest wall with no abnormalities observed. PMI nondisplaced   Abdomen: normal bowel sounds in all quadrants, no masses, no hepatomegaly,   no splenomegaly, soft non-tender, no guarding and no rebound tenderness   Extremities: no edema, no cyanosis, no redness, no tenderness, no clubbing   Musculoskeletal: joints with full range of motion and joints, no effusion.  Pedal   pulses palpable and equal bilaterally   Skin: no bleeding, bruising or rash and no lesions noted, large stage III-VI sacral decubitus ulcer, right hip            stage III- IV ulcer lateral trochanter   Lymph Nodes: no palpable adenopathy   Neurologic: Mental Status not orientated to person, place, time and situation.    Speech is intelligible, yet garbled, slurred, Nonlabored.  Alertness alert and awake and mood/affect   normal, Cranial Nerves 2 - 12 grossly intact as examined   Sensory intact in BLE and BUE.   Motor strength  LUE is 5/5 proximally, 5/5 distally      RUE is 5/5 proximally, 5/5 distally      LLE is 5/5 @ hip flexors, quads as well as       dorsiflexion / plantar flexion      RLE is 5/5 @ hip flexors, quads as well as        dosriflexion / plantar flexion   Reflexes: Right:  2+ biceps, 2+ brachioradialis      2+ patella, 2+ achilles     Left: 2+ biceps, 2+ brachioradialis      2+ patella, 2+ achilles    Results Review:   Lab Results (last 24 hours)     Procedure Component Value  Units Date/Time    Urine Culture - Urine, Urine, Clean Catch [[id number]] Collected:  01/15/18 1253    Specimen:  Urine from Urine, Clean Catch Updated:  01/15/18 1317    Urinalysis With / Culture If Indicated - Urine, Clean Catch [[id number]]  (Abnormal) Collected:  01/15/18 1253    Specimen:  Urine from Urine, Clean Catch Updated:  01/15/18 1317     Color, UA Orange (A)     Appearance, UA Cloudy (A)     pH, UA <=5.0     Specific Gravity, UA 1.026     Glucose, UA Negative     Ketones, UA Negative     Bilirubin, UA Small (1+) (A)     Blood, UA Small (1+) (A)     Protein, UA Trace (A)     Leuk Esterase, UA Trace (A)     Nitrite, UA Positive (A)     Urobilinogen, UA 1.0 E.U./dL    Urinalysis, Microscopic Only - Urine, Clean Catch [[id number]]  (Abnormal) Collected:  01/15/18 1253    Specimen:  Urine from Urine, Clean Catch Updated:  01/15/18 1320     RBC, UA 7-12 (A) /HPF      WBC, UA 0-2 /HPF      Bacteria, UA None Seen /HPF      Squamous Epithelial Cells, UA None Seen /HPF      Hyaline Casts, UA 3-6 /LPF      Methodology Automated Microscopy    Sedimentation Rate [[id number]]  (Abnormal) Collected:  01/15/18 1245    Specimen:  Blood from Arm, Right Updated:  01/15/18 1332     Sed Rate 49 (H) mm/hr     Lactic Acid, Plasma [[id number]]  (Normal) Collected:  01/15/18 1245    Specimen:  Blood from Arm, Right Updated:  01/15/18 1334     Lactate 1.8 mmol/L     CBC Auto Differential [[id number]]  (Abnormal) Collected:  01/15/18 1245    Specimen:  Blood from Arm, Right Updated:  01/15/18 1336     WBC 13.11 (H) 10*3/mm3      RBC 5.89 10*6/mm3      Hemoglobin 11.7 (L) g/dL      Hematocrit 35.6 (L) %      MCV 60.4 (L) fL      MCH 19.9 (L) pg      MCHC 32.9 (L) g/dL      RDW 15.8 (H) %      RDW-SD 33.8 (L) fl      MPV -- fL       Unable to calculate.         Platelets 121 (L) 10*3/mm3      Neutrophil % 83.7 (H) %      Lymphocyte % 8.2 (L) %      Monocyte % 7.4 %      Eosinophil % 0.0 %      Basophil % 0.2 %      Immature Grans % 0.5  %      Neutrophils, Absolute 10.98 (H) 10*3/mm3      Lymphocytes, Absolute 1.07 10*3/mm3      Monocytes, Absolute 0.97 (H) 10*3/mm3      Eosinophils, Absolute 0.00 10*3/mm3      Basophils, Absolute 0.03 10*3/mm3      Immature Grans, Absolute 0.06 (H) 10*3/mm3     Urine Drug Screen - Urine, Clean Catch [[id number]]  (Abnormal) Collected:  01/15/18 1253    Specimen:  Urine from Urine, Clean Catch Updated:  01/15/18 1347     Amphetamine Screen, Urine Negative     Barbiturates Screen, Urine Negative     Benzodiazepine Screen, Urine Positive (A)     Cocaine Screen, Urine Negative     Methadone Screen, Urine Negative     Opiate Screen Positive (A)     Phencyclidine (PCP), Urine Negative     THC, Screen, Urine Negative     6-ACETYL MORPHINE Negative     Buprenorphine, Screen, Urine Negative     Oxycodone Screen, Urine Positive (A)    Narrative:       Negative Thresholds For Drugs Screened:                  Amphetamines              1000 ng/ml               Barbiturates               200 ng/ml               Benzodiazepines            200 ng/ml              Cocaine                    300 ng/ml              Methadone                  300 ng/ml              Opiates                    300 ng/ml               Phencyclidine               25 ng/ml               THC                         50 ng/ml              6-Acetyl Morphine           10 ng/ml              Buprenorphine                5 ng/ml              Oxycodone                  300 ng/ml    The reference range for all drugs tested is negative. This report includes final unconfirmed qualitative results to be used for medical treatment purposes only. Unconfirmed results must not be used for non-medical purposes such as employment or legal testing. Clinical consideration should be applied to any drug of abuse test, especially when unconfirmed quantitative results are used.      Ammonia [[id number]]  (Normal) Collected:  01/15/18 1258    Specimen:  Blood from Arm, Right Updated:   01/15/18 1349     Ammonia 28 umol/L     Troponin [[id number]]  (Abnormal) Collected:  01/15/18 1245    Specimen:  Blood from Arm, Right Updated:  01/15/18 1350     Troponin I 0.065 (H) ng/mL     Narrative:       Ultra Troponin I Reference Range:         <=0.039 ng/mL: Negative    0.04-0.779 ng/mL: Indeterminate Range. Suspicious of MI.  Clinical correlation required.       >=0.78  ng/mL: Consistent with myocardial injury.  Clinical correlation required.    C-reactive Protein [[id number]]  (Abnormal) Collected:  01/15/18 1245    Specimen:  Blood from Arm, Right Updated:  01/15/18 1351     C-Reactive Protein 15.63 (H) mg/dL       1+ Icteric        Comprehensive Metabolic Panel [[id number]]  (Abnormal) Collected:  01/15/18 1245    Specimen:  Blood from Arm, Right Updated:  01/15/18 1352     Glucose 133 (H) mg/dL      BUN 55 (H) mg/dL      Creatinine 1.05 mg/dL      Sodium 135 mmol/L      Potassium 4.6 mmol/L      Chloride 102 mmol/L      CO2 25.2 mmol/L      Calcium 8.7 mg/dL      Total Protein 7.7 g/dL      Albumin 4.00 g/dL      ALT (SGPT) 36 U/L      AST (SGOT) 133 (H) U/L      Alkaline Phosphatase 72 U/L       Note New Reference Ranges        Total Bilirubin 4.2 (H) mg/dL       1+ Icteric         eGFR Non African Amer 72 mL/min/1.73      Globulin 3.7 gm/dL      A/G Ratio 1.1 (L) g/dL      BUN/Creatinine Ratio 52.4 (H)     Anion Gap 7.8 mmol/L     Osmolality, Calculated [[id number]]  (Normal) Collected:  01/15/18 1245    Specimen:  Blood from Arm, Right Updated:  01/15/18 1352     Osmolality Calc 287.1 mOsm/kg     CK [[id number]]  (Abnormal) Collected:  01/15/18 1245    Specimen:  Blood from Arm, Right Updated:  01/15/18 1403     Creatine Kinase 3339 (C) U/L       1+ Icteric        Myoglobin, Serum [[id number]]  (Abnormal) Collected:  01/15/18 1245    Specimen:  Blood from Arm, Right Updated:  01/15/18 1404     Myoglobin 556.0 (C) ng/mL     CBC & Differential [[id number]] Collected:  01/15/18 1245    Specimen:  Blood  Updated:  01/15/18 1433    Narrative:       The following orders were created for panel order CBC & Differential.  Procedure                               Abnormality         Status                     ---------                               -----------         ------                     Scan Slide[[id number]]                                       Final result               CBC Auto Differential[[id number]]        Abnormal            Final result                 Please view results for these tests on the individual orders.    Scan Slide [[id number]] Collected:  01/15/18 1245    Specimen:  Blood from Arm, Right Updated:  01/15/18 1433     Anisocytosis Slight/1+     Hypochromia Large/3+     Microcytes Mod/2+     Poikilocytes Slight/1+     Target Cells Mod/2+     Platelet Morphology Normal    Troponin [[id number]]  (Abnormal) Collected:  01/15/18 1502    Specimen:  Blood from Arm, Left Updated:  01/15/18 1534     Troponin I 0.058 (H) ng/mL     Narrative:       Ultra Troponin I Reference Range:         <=0.039 ng/mL: Negative    0.04-0.779 ng/mL: Indeterminate Range. Suspicious of MI.  Clinical correlation required.       >=0.78  ng/mL: Consistent with myocardial injury.  Clinical correlation required.    Basic Metabolic Panel [[id number]]  (Abnormal) Collected:  01/15/18 1918    Specimen:  Blood Updated:  01/15/18 2023     Glucose 113 (H) mg/dL      BUN 45 (H) mg/dL      Creatinine 1.01 mg/dL      Sodium 136 mmol/L      Potassium 4.8 mmol/L       1+ Icteric         Chloride 105 mmol/L      CO2 26.6 mmol/L      Calcium 8.5 mg/dL      eGFR Non African Amer 75 mL/min/1.73      BUN/Creatinine Ratio 44.6 (H)     Anion Gap 4.4 mmol/L     Narrative:       GFR Normal >60  Chronic Kidney Disease <60  Kidney Failure <15    Osmolality, Calculated [[id number]]  (Normal) Collected:  01/15/18 1918    Specimen:  Blood Updated:  01/15/18 2023     Osmolality Calc 284.3 mOsm/kg     Blood Culture - Blood, Blood, Venous Line [[id number]]   (Normal) Collected:  01/15/18 1245    Specimen:  Blood from Arm, Right Updated:  01/16/18 0201     Blood Culture No growth at less than 24 hours    Blood Culture - Blood, Blood, Venous Line [[id number]]  (Normal) Collected:  01/15/18 1327    Specimen:  Blood from Arm, Left Updated:  01/16/18 0201     Blood Culture No growth at less than 24 hours    Basic Metabolic Panel [[id number]]  (Abnormal) Collected:  01/16/18 0110    Specimen:  Blood Updated:  01/16/18 0250     Glucose 134 (H) mg/dL      BUN 44 (H) mg/dL      Creatinine 0.99 mg/dL      Sodium 138 mmol/L      Potassium 4.4 mmol/L       1+ Icteric         Chloride 107 mmol/L      CO2 25.9 mmol/L      Calcium 8.2 mg/dL      eGFR Non African Amer 77 mL/min/1.73      BUN/Creatinine Ratio 44.4 (H)     Anion Gap 5.1 mmol/L     Narrative:       GFR Normal >60  Chronic Kidney Disease <60  Kidney Failure <15    Osmolality, Calculated [[id number]]  (Normal) Collected:  01/16/18 0110    Specimen:  Blood Updated:  01/16/18 0250     Osmolality Calc 288.8 mOsm/kg     CK [[id number]]  (Abnormal) Collected:  01/16/18 0110    Specimen:  Blood Updated:  01/16/18 0300     Creatine Kinase 1693 (C) U/L       1+ Icteric        Myoglobin, Serum [[id number]]  (Abnormal) Collected:  01/16/18 0110    Specimen:  Blood Updated:  01/16/18 0300     Myoglobin 209.0 (C) ng/mL     CBC & Differential [[id number]] Collected:  01/16/18 0110    Specimen:  Blood Updated:  01/16/18 0342    Narrative:       The following orders were created for panel order CBC & Differential.  Procedure                               Abnormality         Status                     ---------                               -----------         ------                     Scan Slide[[id number]]                                       Final result               CBC Auto Differential[[id number]]        Abnormal            Final result                 Please view results for these tests on the individual orders.    CBC Auto Differential  [[id number]]  (Abnormal) Collected:  01/16/18 0110    Specimen:  Blood Updated:  01/16/18 0342     WBC 8.14 10*3/mm3      RBC 5.32 10*6/mm3      Hemoglobin 10.5 (L) g/dL      Hematocrit 32.6 (L) %      MCV 61.3 (L) fL      MCH 19.7 (L) pg      MCHC 32.2 (L) g/dL      RDW 16.0 (H) %      RDW-SD 34.4 (L) fl      MPV -- fL       Unable to calculate.         Platelets 125 (L) 10*3/mm3      Neutrophil % 77.2 (H) %      Lymphocyte % 11.9 (L) %      Monocyte % 10.3 (H) %      Eosinophil % 0.0 %      Basophil % 0.2 %      Immature Grans % 0.4 %      Neutrophils, Absolute 6.28 10*3/mm3      Lymphocytes, Absolute 0.97 (L) 10*3/mm3      Monocytes, Absolute 0.84 10*3/mm3      Eosinophils, Absolute 0.00 10*3/mm3      Basophils, Absolute 0.02 10*3/mm3      Immature Grans, Absolute 0.03 10*3/mm3      nRBC 0.0 /100 WBC     Scan Slide [[id number]] Collected:  01/16/18 0110    Specimen:  Blood Updated:  01/16/18 0342     Anisocytosis Slight/1+     Hypochromia Slight/1+     Microcytes Large/3+     Ovalocytes Slight/1+     Target Cells Slight/1+     Platelet Estimate Decreased        Imaging Results (last 24 hours)     Procedure Component Value Units Date/Time    XR Chest AP [[id number]] Collected:  01/15/18 1323     Updated:  01/15/18 1348    Narrative:       EXAMINATION:  XR CHEST AP-      CLINICAL INDICATION:     weakness     TECHNIQUE:  XR CHEST AP-       COMPARISON: January 6, 2028      FINDINGS:   Coarse interstitial markings suggestive of chronic interstitial lung  disease.   Heart size is stable.   No pneumothorax.   No pleural effusion.   Bony and soft tissue structures are unremarkable.            Impression:       Stable radiographic appearance of the chest.     This report was finalized on 1/15/2018 1:23 PM by Dr. Carlos Jiménez MD.       CT Head Without Contrast [[id number]] Collected:  01/15/18 1307     Updated:  01/15/18 1349    Narrative:          EXAMINATION: CT HEAD WO CONTRAST-      CLINICAL INDICATION:     fall      TECHNIQUE: Contiguous axial CT images of the head were obtained without  contrast administration.      Radiation dose reduction techniques were utilized per ALARA protocol.  Automated exposure control was initiated through either or CareDose or  DoseRight software packages by  protocol.       735.86 mGy.cm     COMPARISON:  None.       FINDINGS: Generalized cerebral atrophy is present. There is no mass  effect, midline displacement, or hydrocephalus. There are patchy areas  of decreased density within the periventricular white matter which  likely reflect chronic small vessel ischemic changes. There is no CT  evidence of acute infarct or hemorrhage. Bone windows reveal no osseous  abnormalities or fractures.        Impression:       Atrophy and chronic small vessel ischemic change, but there  are no acute intracranial abnormalities identified.         This report was finalized on 1/15/2018 1:08 PM by Dr. Carlos Jiménez MD.       CT Lumbar Spine Without Contrast [[id number]] Collected:  01/15/18 1315     Updated:  01/15/18 1349    Narrative:       EXAMINATION: CT LUMBAR SPINE WO CONTRAST-      CLINICAL INDICATION:     fall     TECHNIQUE: Multiple axial CT images of the entire lumbar spine were  obtained without contrast administration. Reformatted images in the  coronal and/or sagittal plane(s) were generated from the axial data set  to facilitate diagnostic accuracy and/or surgical planning.      Radiation dose reduction techniques were utilized per ALARA protocol.  Automated exposure control was initiated through either or CareDose or  DoseRight software packages by  protocol.       541.68 mGy.cm     COMPARISON:  None.       FINDINGS: Degenerative changes are present within the lumbar spine, but  resulting in no significant bony stenosis of the spinal canal. No acute  fracture or malalignment of the lumbar spine is identified.        Impression:       No acute fracture of the lumbar spine.       This report was finalized on 1/15/2018 1:16 PM by Dr. Carlos Jiménez MD.             Assessment/Plan   Rhabdomyolysis  Fever  Sacral Decubitus Ulcer/Hip Ulcer  Hyperbilirubinemia  Altered Mental Status  HX Multiple Myeloma  Chronic Neck Pain/Low Back Pain    Consult Surgery for sacral decubitus/wound care    Consult ID for fever and antibiotic management    Hold Oxycodone and Valium secondary to altered mental status    Check RUQ U/S secondary to hyperbilirubinemia and urine myoglobin    Repeat LFT's this AM    Continue with IVF @ 150 ml/hour    Tylenol prn fever    Wound care consult    Lovenox for DVT prophylaxis    Start O2 via NC to maintain O2 sats >90%    CBC, CMP daily    JARETH Condon  01/16/18  6:39 AM    I have seen this patient today and agree with the above note.  This patient was admitted to my service to the emergency department where supposedly he is a patient of mine.  However, he has never been seen in my clinic.  He sees a Dr. Orozco in [place].  This patient has a rather complex history with history of myeloma.  He takes chronic opioid medications for this.  He states that he has not been ambulatory for at least a week or 2 since falling out of the bed.  He states he hurt his lower back when this happened.  He was noted to have a CT of the lumbar spine revealing no acute fracture.  He was found to have a large sacral decubitus ulcer.  He was also noted to have acute rhabdomyolysis and was febrile overnight.  I have examined the patient and agree with the above note.  He is chronically ill in appearance.  Thin and frail.  He is able to answer questions but is a little bit somnolent.  Lung exam reveals clear to auscultation bilaterally cardiac exam reveals regular rate and regular rhythm no murmur, rub or gallop.  Abdominal exam is benign.  Extremities no cyanosis, clubbing or edema.  He is able to move both lower extremities.  He has a large decubitus ulceration and please refer to nursing  documentation for further details.  Is noted to have a white count when he came in.    Impression:  Sepsis due to decubitus ulceration.  Decubitus ulceration of the hip  Hyperbilirubinemia  Rhabdomyolysis  Metabolic encephalopathy due to sepsis  Multiplemyeloma  Chronic opioid use      Plan:  ID consult.  Start cefepime, Flagyl and vancomycin    Surgical consultation and wound care consultation  IV fluid hydration for abdomen mild lysis.  Monitor renal function.  Monitor CPK to ensure the trend is downward    Subcutaneous heparin for DVT prophylaxis    Discontinue Valium and discontinue high-dose oxycodone.  Start oxycodone 5 mg every 4 hours when necessary for pain.    Monitor LFTs.  Probably elevated due to sepsis with hypotension    Oxygen to maintain sat greater than 90%    Xray bilateral hips / pelvis    Samuel Duane Kreis, MD  01/16/18  1:36 PM

## 2018-01-17 ENCOUNTER — APPOINTMENT (OUTPATIENT)
Dept: GENERAL RADIOLOGY | Facility: HOSPITAL | Age: 63
End: 2018-01-17

## 2018-01-17 LAB
ALBUMIN SERPL-MCNC: 2.8 G/DL (ref 3.4–4.8)
ALBUMIN/GLOB SERPL: 0.9 G/DL (ref 1.5–2.5)
ALP SERPL-CCNC: 120 U/L (ref 40–129)
ALT SERPL W P-5'-P-CCNC: 37 U/L (ref 10–44)
ANION GAP SERPL CALCULATED.3IONS-SCNC: 3.3 MMOL/L (ref 3.6–11.2)
ANISOCYTOSIS BLD QL: NORMAL
AST SERPL-CCNC: 78 U/L (ref 10–34)
BACTERIA SPEC AEROBE CULT: NORMAL
BASOPHILS # BLD AUTO: 0.02 10*3/MM3 (ref 0–0.3)
BASOPHILS NFR BLD AUTO: 0.2 % (ref 0–2)
BILIRUB SERPL-MCNC: 3.5 MG/DL (ref 0.2–1.8)
BUN BLD-MCNC: 22 MG/DL (ref 7–21)
BUN/CREAT SERPL: 34.4 (ref 7–25)
CALCIUM SPEC-SCNC: 7.9 MG/DL (ref 7.7–10)
CHLORIDE SERPL-SCNC: 112 MMOL/L (ref 99–112)
CK SERPL-CCNC: 454 U/L (ref 24–204)
CO2 SERPL-SCNC: 22.7 MMOL/L (ref 24.3–31.9)
CREAT BLD-MCNC: 0.64 MG/DL (ref 0.43–1.29)
CRP SERPL-MCNC: 9.55 MG/DL (ref 0–0.99)
DEPRECATED RDW RBC AUTO: 35.4 FL (ref 37–54)
ELLIPTOCYTES BLD QL SMEAR: NORMAL
EOSINOPHIL # BLD AUTO: 0.03 10*3/MM3 (ref 0–0.7)
EOSINOPHIL NFR BLD AUTO: 0.3 % (ref 0–5)
ERYTHROCYTE [DISTWIDTH] IN BLOOD BY AUTOMATED COUNT: 16.1 % (ref 11.5–14.5)
GFR SERPL CREATININE-BSD FRML MDRD: 127 ML/MIN/1.73
GLOBULIN UR ELPH-MCNC: 3 GM/DL
GLUCOSE BLD-MCNC: 84 MG/DL (ref 70–110)
HCT VFR BLD AUTO: 29.9 % (ref 42–52)
HGB BLD-MCNC: 9.4 G/DL (ref 14–18)
HYPOCHROMIA BLD QL: NORMAL
IMM GRANULOCYTES # BLD: 0.04 10*3/MM3 (ref 0–0.03)
IMM GRANULOCYTES NFR BLD: 0.4 % (ref 0–0.5)
LYMPHOCYTES # BLD AUTO: 1.1 10*3/MM3 (ref 1–3)
LYMPHOCYTES NFR BLD AUTO: 10.9 % (ref 21–51)
MCH RBC QN AUTO: 19.8 PG (ref 27–33)
MCHC RBC AUTO-ENTMCNC: 31.4 G/DL (ref 33–37)
MCV RBC AUTO: 62.9 FL (ref 80–94)
MICROCYTES BLD QL: NORMAL
MONOCYTES # BLD AUTO: 0.73 10*3/MM3 (ref 0.1–0.9)
MONOCYTES NFR BLD AUTO: 7.2 % (ref 0–10)
NEUTROPHILS # BLD AUTO: 8.16 10*3/MM3 (ref 1.4–6.5)
NEUTROPHILS NFR BLD AUTO: 81 % (ref 30–70)
OSMOLALITY SERPL CALC.SUM OF ELEC: 278.2 MOSM/KG (ref 273–305)
PLAT MORPH BLD: NORMAL
PLATELET # BLD AUTO: 179 10*3/MM3 (ref 130–400)
PMV BLD AUTO: ABNORMAL FL (ref 6–10)
POIKILOCYTOSIS BLD QL SMEAR: NORMAL
POTASSIUM BLD-SCNC: 4.1 MMOL/L (ref 3.5–5.3)
PROT SERPL-MCNC: 5.8 G/DL (ref 6–8)
RBC # BLD AUTO: 4.75 10*6/MM3 (ref 4.7–6.1)
SCHISTOCYTES BLD QL SMEAR: NORMAL
SODIUM BLD-SCNC: 138 MMOL/L (ref 135–153)
WBC NRBC COR # BLD: 10.08 10*3/MM3 (ref 4.5–12.5)

## 2018-01-17 PROCEDURE — 85025 COMPLETE CBC W/AUTO DIFF WBC: CPT | Performed by: PHYSICIAN ASSISTANT

## 2018-01-17 PROCEDURE — 71046 X-RAY EXAM CHEST 2 VIEWS: CPT

## 2018-01-17 PROCEDURE — 97116 GAIT TRAINING THERAPY: CPT

## 2018-01-17 PROCEDURE — 97530 THERAPEUTIC ACTIVITIES: CPT

## 2018-01-17 PROCEDURE — G8979 MOBILITY GOAL STATUS: HCPCS

## 2018-01-17 PROCEDURE — 25010000002 CEFEPIME: Performed by: PHYSICIAN ASSISTANT

## 2018-01-17 PROCEDURE — 85007 BL SMEAR W/DIFF WBC COUNT: CPT | Performed by: PHYSICIAN ASSISTANT

## 2018-01-17 PROCEDURE — G8978 MOBILITY CURRENT STATUS: HCPCS

## 2018-01-17 PROCEDURE — 97162 PT EVAL MOD COMPLEX 30 MIN: CPT

## 2018-01-17 PROCEDURE — 99231 SBSQ HOSP IP/OBS SF/LOW 25: CPT | Performed by: SURGERY

## 2018-01-17 PROCEDURE — 25010000002 HEPARIN (PORCINE) PER 1000 UNITS: Performed by: PHYSICIAN ASSISTANT

## 2018-01-17 PROCEDURE — 94799 UNLISTED PULMONARY SVC/PX: CPT

## 2018-01-17 PROCEDURE — 99222 1ST HOSP IP/OBS MODERATE 55: CPT | Performed by: PSYCHIATRY & NEUROLOGY

## 2018-01-17 PROCEDURE — 80053 COMPREHEN METABOLIC PANEL: CPT | Performed by: PHYSICIAN ASSISTANT

## 2018-01-17 PROCEDURE — 71046 X-RAY EXAM CHEST 2 VIEWS: CPT | Performed by: RADIOLOGY

## 2018-01-17 PROCEDURE — 86140 C-REACTIVE PROTEIN: CPT | Performed by: PHYSICIAN ASSISTANT

## 2018-01-17 PROCEDURE — 25010000002 VANCOMYCIN PER 500 MG

## 2018-01-17 PROCEDURE — 82550 ASSAY OF CK (CPK): CPT | Performed by: FAMILY MEDICINE

## 2018-01-17 RX ADMIN — METRONIDAZOLE 500 MG: 500 INJECTION, SOLUTION INTRAVENOUS at 04:30

## 2018-01-17 RX ADMIN — OXYCODONE HYDROCHLORIDE 5 MG: 5 TABLET ORAL at 11:14

## 2018-01-17 RX ADMIN — HEPARIN SODIUM 5000 UNITS: 5000 INJECTION, SOLUTION INTRAVENOUS; SUBCUTANEOUS at 13:03

## 2018-01-17 RX ADMIN — OXYCODONE HYDROCHLORIDE 5 MG: 5 TABLET ORAL at 04:30

## 2018-01-17 RX ADMIN — VANCOMYCIN HYDROCHLORIDE 1000 MG: 5 INJECTION, POWDER, LYOPHILIZED, FOR SOLUTION INTRAVENOUS at 13:03

## 2018-01-17 RX ADMIN — HEPARIN SODIUM 5000 UNITS: 5000 INJECTION, SOLUTION INTRAVENOUS; SUBCUTANEOUS at 04:32

## 2018-01-17 RX ADMIN — SODIUM CHLORIDE 150 ML/HR: 9 INJECTION, SOLUTION INTRAVENOUS at 16:42

## 2018-01-17 RX ADMIN — METRONIDAZOLE 500 MG: 500 INJECTION, SOLUTION INTRAVENOUS at 20:19

## 2018-01-17 RX ADMIN — CEFEPIME 2 G: 2 INJECTION, POWDER, FOR SOLUTION INTRAVENOUS at 17:35

## 2018-01-17 RX ADMIN — HEPARIN SODIUM 5000 UNITS: 5000 INJECTION, SOLUTION INTRAVENOUS; SUBCUTANEOUS at 20:19

## 2018-01-17 RX ADMIN — OXYCODONE HYDROCHLORIDE 5 MG: 5 TABLET ORAL at 22:05

## 2018-01-17 RX ADMIN — CEFEPIME 2 G: 2 INJECTION, POWDER, FOR SOLUTION INTRAVENOUS at 04:30

## 2018-01-17 RX ADMIN — OXYCODONE HYDROCHLORIDE 5 MG: 5 TABLET ORAL at 17:34

## 2018-01-17 RX ADMIN — METRONIDAZOLE 500 MG: 500 INJECTION, SOLUTION INTRAVENOUS at 11:14

## 2018-01-17 RX ADMIN — VANCOMYCIN HYDROCHLORIDE 1000 MG: 5 INJECTION, POWDER, LYOPHILIZED, FOR SOLUTION INTRAVENOUS at 02:00

## 2018-01-17 NOTE — PROGRESS NOTES
Nutrition Services    Patient Name:  Jhonny Washington  YOB: 1955  MRN: [id number]  Admit Date:  1/15/2018    Recommend add MVI daily to promote wound healing.     Thank you    Electronically signed by:  Jaquelin Tristan RD  01/17/18 2:10 PM

## 2018-01-17 NOTE — PROGRESS NOTES
CC: sacral wound    No acute change. Patient without new complaints.    AFVSS  Sacral skin necrosis without cellulitis, purulence, or crepitance  Stable eschar of right hip wound.    64 yo M found down with rhabdomyolysis and wounds from pressure necrosis involving only the skin.  No infection at this time.  Continue to pain with betadine  Will continue to follow  OK to discharge home from wound standpoint with short term follow up once other medical issues resolved.

## 2018-01-17 NOTE — PLAN OF CARE
Problem: Skin Integrity Impairment, Risk/Actual (Adult)  Goal: Skin Integrity/Wound Healing  Outcome: Ongoing (interventions implemented as appropriate)      Problem: Pain, Chronic (Adult)  Goal: Acceptable Pain Control/Comfort Level  Outcome: Ongoing (interventions implemented as appropriate)      Problem: Patient Care Overview (Adult)  Goal: Plan of Care Review  Outcome: Ongoing (interventions implemented as appropriate)

## 2018-01-17 NOTE — NURSING NOTE
[facility] ADMISSION NOTE:  Patient enrolled in the [facility] program to assist with education and support during transition home from hospital. [facility] home  will see patient at home within 48 hours of discharge and will follow up with patient in home and telephonically for 6 weeks post discharge under the [facility] Model.    Clinical Assessment Instrument Scores:  Short Portable Mental Status Questionnaire- 5  Geriatric Depression Scale-8  Instrumental Activities of Daily Living-5  ALBERT Activities of Daily Living-6  Overall Quality of Life- 1  Subjective Health Rating- 0  Symptom Bother Scale-12  Generalized Anxiety Scale-8  Health Literacy Test- 5

## 2018-01-17 NOTE — PROGRESS NOTES
"  I have personally seen and examined the patient today and discussed overnight interval progress and pertinent issues with nursing staff.    Subjective:    The patient seems to be more comfortable today but is slightly confused with improving CRP level and normal white count.  Fever overnight of 101 and afebrile this morning.  Daughter at bedside and case discussed with her and the patient. Surgery recommends to continue wound care and no surgery at this time.    History taken from: patient chart family RN      Vital Signs    /73 (BP Location: Left arm, Patient Position: Lying)  Pulse 76  Temp 97.8 °F (36.6 °C) (Oral)   Resp 18  Ht 182.9 cm (72\")  Wt 70.8 kg (156 lb 1.6 oz)  SpO2 98%  BMI 21.17 kg/m2    Temp:  [97.8 °F (36.6 °C)-101 °F (38.3 °C)] 97.8 °F (36.6 °C)      Intake/Output Summary (Last 24 hours) at 01/17/18 1125  Last data filed at 01/17/18 0800   Gross per 24 hour   Intake             1120 ml   Output                0 ml   Net             1120 ml     Intake & Output (last 3 days)       01/14 0701 - 01/15 0700 01/15 0701 - 01/16 0700 01/16 0701 - 01/17 0700 01/17 0701 - 01/18 0700    P.O.  240 880 240    I.V. (mL/kg)  800 (12.2)      IV Piggyback  1000      Total Intake(mL/kg)  2040 (31.2) 880 (12.4) 240 (3.4)    Urine (mL/kg/hr)   275 (0.2)     Total Output     275      Net   +2040 +605 +240            Unmeasured Urine Occurrence  4 x 4 x 1 x        Physical Exam:       General Appearance:    Alert, cooperative, in no acute distress, thin, frail, daughter at bedside    Head:    Normocephalic, without obvious abnormality, atraumatic   Eyes:            Lids and lashes normal, conjunctivae and sclerae normal, no   icterus, no pallor, corneas clear, PERRLA   Ears:    Ears appear intact with no abnormalities noted   Throat:   No oral lesions, no thrush, oral mucosa moist   Neck:   No adenopathy, supple, trachea midline, no thyromegaly, no   carotid bruit, no JVD   Back:     No tenderness to " percussion or palpation, range of motion   normal   Lungs:    coarse breath sounds to right.    Heart:    Regular rhythm and normal rate, normal S1 and S2, no            murmur, no gallop, no rub, no click   Chest Wall:    No abnormalities observed   Abdomen:     Normal bowel sounds, no masses, no organomegaly, soft        non-tender, non-distended, no guarding, no rebound                tenderness   Rectal:     Deferred   Extremities:   Moves all extremities well, no edema, no cyanosis, no             redness   Pulses:   Pulses palpable and equal bilaterally   Skin:   Sacral decubitus ulcer and right hip ulcer    Lymph nodes:   No palpable adenopathy   Neurologic:   Awake, alert, Garbled speech        Results:      Results from last 7 days  Lab Units 01/17/18  0454 01/16/18  0110 01/15/18  1245   WBC 10*3/mm3 10.08 8.14 13.11*     Lab Results   Component Value Date    NEUTROABS 8.16 (H) 01/17/2018         Results from last 7 days  Lab Units 01/17/18  0454   CREATININE mg/dL 0.64         Results from last 7 days  Lab Units 01/17/18  0454 01/15/18  1245   CRP mg/dL 9.55* 15.63*       Imaging Results (last 24 hours)     Procedure Component Value Units Date/Time    US Abdomen Complete [[id number]] Collected:  01/16/18 1502     Updated:  01/16/18 1506    Narrative:       EXAMINATION: US ABDOMEN COMPLETE-         CLINICAL INDICATION:     hyperbilirubinemia; L89.159-Pressure ulcer of  sacral region, unspecified stage; W19.XXXA-Unspecified fall, initial  encounter; M62.82-Rhabdomyolysis     TECHNIQUE: Multiplanar gray scale ultrasound of the abdomen.      COMPARISON: NONE      FINDINGS:   Visualized pancreas is unremarkable.   Abnormal wall thickening of the gallbladder with extensive luminal  sludge noted. No large shadowing stones or obvious pericholecystic fluid  identified.  The common bile duct measures 5.5 mm and is within normal limits. .  There is diffuse fatty infiltration of the liver. No focal lesion  or  intrahepatic biliary dilatation identified.  Spleen is borderline enlarged measuring 14.2 cm in length without focal  splenic abnormality.   The RIGHT kidney measures 11.2 cm  in length without mass,  hydronephrosis, or shadowing stone.   The LEFT kidney measures 12.5 cm in length without mass, hydronephrosis,  or shadowing stone. 3.9 cm simple appearing cyst lower pole.  No ascites demonstrated.   IVC shows patency.  There is normal directional flow within the portal  vein.  Aorta is 2.2 cm diameter and is within normal limits.       Impression:       1. Abnormal wall thickening and distention of gallbladder with extensive  luminal sludge or lithogenic bile. Early changes of cholecystitis cannot  be excluded.  2. Common bile duct within normal limits for diameter.  3. Borderline splenic enlargement.      This report was finalized on 1/16/2018 3:04 PM by Dr. Olivier Marshall MD.       XR Hips Bilateral With or Without Pelvis 3-4 View [[id number]] Collected:  01/16/18 1559     Updated:  01/16/18 1602    Narrative:       EXAMINATION: XR HIPS BILATERAL W OR WO PELVIS 3-4 VIEW-      CLINICAL INDICATION:  bilateral hip and pelvic pain; L89.159-Pressure  ulcer of sacral region, unspecified stage; W19.XXXA-Unspecified fall,  initial encounter; M62.82-Rhabdomyolysis      TECHNIQUE: X-rays of the right and left hip in two views. One frontal  view of the pelvis.     COMPARISON: None      FINDINGS:  The alignment of the osseous structures is anatomic. There  is no displaced fracture, evidence of AVN nor intraosseous destructive  lesion. There are no soft tissue abnormalities.        Impression:       No acute displaced fracture.     This report was finalized on 1/16/2018 3:59 PM by Dr. Carlos Jiménez MD.       XR Chest PA & Lateral [[id number]] Updated:  01/17/18 1047            Results Review:    I have personally reviewed laboratory data, culture results, radiology studies and antimicrobial therapy.    Hospital Medications  (active)       Dose Frequency Start End    acetaminophen (TYLENOL) tablet 650 mg 650 mg Every 6 Hours PRN 1/16/2018     Sig - Route: Take 2 tablets by mouth Every 6 (Six) Hours As Needed for Mild Pain . - Oral    Cosign for Ordering: Accepted by Samuel Duane Kreis, MD on 1/16/2018  1:17 PM    cefepime (MAXIPIME) 2 g/100 mL 0.9% NS (mbp) 2 g Once 1/16/2018 1/16/2018    Sig - Route: Infuse 100 mL into a venous catheter 1 (One) Time. - Intravenous    Cosign for Ordering: Required by Misty Bae MD    cefepime (MAXIPIME) 2 g/100 mL 0.9% NS (mbp) 2 g Every 12 Hours 1/16/2018 1/23/2018    Sig - Route: Infuse 100 mL into a venous catheter Every 12 (Twelve) Hours. - Intravenous    Cosign for Ordering: Required by Misty Bae MD    heparin (porcine) 5000 UNIT/ML injection 5,000 Units 5,000 Units Every 8 Hours Scheduled 1/16/2018     Sig - Route: Inject 1 mL under the skin Every 8 (Eight) Hours. - Subcutaneous    Cosign for Ordering: Accepted by Samuel Duane Kreis, MD on 1/16/2018  1:17 PM    HYDROmorphone (DILAUDID) injection 1 mg 1 mg Once 1/15/2018     Sig - Route: Infuse 1 mL into a venous catheter 1 (One) Time. - Intravenous    metroNIDAZOLE (FLAGYL) IVPB 500 mg 500 mg Every 8 Hours 1/16/2018 1/23/2018    Sig - Route: Infuse 100 mL into a venous catheter Every 8 (Eight) Hours. - Intravenous    Cosign for Ordering: Required by Misty Bae MD    oxyCODONE (ROXICODONE) immediate release tablet 5 mg 5 mg Every 4 Hours PRN 1/16/2018 1/26/2018    Sig - Route: Take 1 tablet by mouth Every 4 (Four) Hours As Needed for Moderate Pain . - Oral    Pharmacy to dose vancomycin  Continuous PRN 1/16/2018 1/23/2018    Sig - Route: Continuous As Needed for Consult. - Does not apply    Cosign for Ordering: Required by Misty Bae MD    sodium chloride 0.9 % flush 10 mL 10 mL As Needed 1/15/2018     Sig - Route: Infuse 10 mL into a venous catheter As Needed for Line Care. - Intravenous    Cosign for Ordering:  "Accepted by José Miguel Li MD on 1/15/2018  1:21 PM    Linked Group 1:  \"And\" Linked Group Details        sodium chloride 0.9 % infusion 150 mL/hr Continuous 1/15/2018     Sig - Route: Infuse 150 mL/hr into a venous catheter Continuous. - Intravenous    vancomycin (VANCOCIN) 1,000 mg in sodium chloride 0.9 % 250 mL IVPB 1,000 mg Every 12 Hours 1/16/2018 1/23/2018    Sig - Route: Infuse 1,000 mg into a venous catheter Every 12 (Twelve) Hours. - Intravenous            Cultures:    Blood Culture   Date Value Ref Range Status   01/15/2018 No growth at 24 hours  Preliminary   01/15/2018 No growth at 24 hours  Preliminary     Urine Culture   Date Value Ref Range Status   01/15/2018 No growth at 24 hours  Preliminary           Assessment/Plan     ASSESSMENT:    1.  Severe sepsis with encephalopathy  2.  Aspiration pneumonia     PLAN:    The patient seems to be more comfortable today but is slightly confused with improving CRP level and normal white count.  Fever overnight of 101 and afebrile this morning.  Daughter at bedside and case discussed with her and the patient. Surgery recommends to continue wound care and no surgery at this time.     Patient is at high risk for MRSA and pseudomonal infection and need for anaerobic coverage as well.  Would recommend to continue vancomycin per pharmacy dosing, cefepime 2 g IV every 12 hours and Flagyl 500 mg IV every 8 hours for aspiration pneumonia.  CRP ordered for a.m.  We will continue to follow closely and adjust antibiotic therapy appropriately.      Patient's findings and recommendations were discussed with patient, family and nursing staff    Code Status: Full Code    Shameka Galeana PA-C  01/17/18  11:25 AM    "

## 2018-01-17 NOTE — NURSING NOTE
Spoke with Dr. Washburn's office, in regards to request per Dr. Del Cid about Dr. Washburn speaking with the family, Dr. Washburn will not speak with family regarding care unless they have POA or Guardianship

## 2018-01-17 NOTE — PROGRESS NOTES
Discharge Planning Assessment  [facility]     Patient Name: Jhonny Washington  MRN: [id number]  Today's Date: 1/17/2018    Admit Date: 1/15/2018          Discharge Needs Assessment     None            Discharge Plan       01/17/18 1615    Case Management/Social Work Plan    Plan SS spoke with pt regarding discharge plans. Pt gave SS permission and requested SS call his daughter Gita Palmer at [phone] regarding possible nursing home placement.  Pt and daughter agreeable to nursing home placement.  SS will follow.      01/17/18 1509    Case Management/Social Work Plan    Plan Pt brother/caregiver at [facility] on this date and stated concerns regarding pt's ability to make own decisions.  SS requested Psych evaluation for ability to make own decisions.  SS noted Psych evaluation.  SS will follow and assist with discharge needs.     Patient/Family In Agreement With Plan yes        Discharge Placement     No information found                Demographic Summary     None            Functional Status     None            Psychosocial     None            Abuse/Neglect     None            Legal     None            Substance Abuse     None            Patient Forms     None          Amber Jimenes

## 2018-01-17 NOTE — PLAN OF CARE
Problem: Inpatient Physical Therapy  Goal: Bed Mobility Goal LTG- PT  Outcome: Ongoing (interventions implemented as appropriate)   01/17/18 1652   Bed Mobility PT LTG   Bed Mobility PT LTG, Date Established 01/17/18   Bed Mobility PT LTG, Time to Achieve by discharge   Bed Mobility PT LTG, Activity Type all bed mobility   Bed Mobility PT LTG, [place] Level minimum assist (75% patient effort)   Bed Mobility PT Goal LTG, Assist Device bed rails     Goal: Transfer Training Goal 1 LTG- PT  Outcome: Ongoing (interventions implemented as appropriate)   01/17/18 1652   Transfer Training PT LTG   Transfer Training PT LTG, Date Established 01/17/18   Transfer Training PT LTG, Time to Achieve by discharge   Transfer Training PT LTG, Activity Type all transfers   Transfer Training PT LTG, [place] Level minimum assist (75% patient effort)   Transfer Training PT LTG, Assist Device walker, rolling     Goal: Gait Training Goal LTG- PT  Outcome: Ongoing (interventions implemented as appropriate)   01/17/18 1652   Gait Training PT LTG   Gait Training Goal PT LTG, Date Established 01/17/18   Gait Training Goal PT LTG, Time to Achieve by discharge   Gait Training Goal PT LTG, [place] Level minimum assist (75% patient effort)   Gait Training Goal PT LTG, Assist Device walker, rolling   Gait Training Goal PT LTG, Distance to Achieve 40

## 2018-01-17 NOTE — THERAPY EVALUATION
Acute Care - Physical Therapy Initial Evaluation   Nabeel     Patient Name: Jhonny Washington  : 1955  MRN: [id number]  Today's Date: 2018   Onset of Illness/Injury or Date of Surgery Date: 01/15/18  Date of Referral to PT: 18  Referring Physician: Dr. Del Cid      Admit Date: 1/15/2018     Visit Dx:    ICD-10-CM ICD-9-CM   1. Decubitus ulcer of coccyx, unspecified pressure ulcer stage L89.159 707.03     707.20   2. Fall, initial encounter W19.XXXA E888.9   3. Non-traumatic rhabdomyolysis M62.82 728.88     Patient Active Problem List   Diagnosis   • Encounter for venous access device care   • Rhabdomyolysis   • Decubitus ulcer of coccyx, unspecified pressure ulcer stage     Past Medical History:   Diagnosis Date   • Anxiety    • Arthritis    • Chronic pain    • Decubitus ulcer    • Depression    • Migraine headache    • Neck fracture    • Plasmacytoma/Multiple myeloma     Dx: 2015, Right [place] of Lung with T2 vertebral, and second Rib infiltration, S/P radiation/Chemotherapy   • Polysubstance dependency    • TIA (transient ischemic attack)          Past Surgical History:   Procedure Laterality Date   • APPENDECTOMY     • BACK SURGERY     • LUNG BIOPSY            PT ASSESSMENT (last 72 hours)      PT Evaluation       18 1637 18 0800    Rehab Evaluation    Document Type evaluation  -BC     Subjective Information agree to therapy;complains of;pain;weakness  -BC     Patient Effort, Rehab Treatment fair  -BC     Symptoms Noted During/After Treatment increased pain  -BC     General Information    Patient Profile Review yes  -BC     Onset of Illness/Injury or Date of Surgery Date 01/15/18  -BC     Referring Physician Dr. Del Cid  -BC     General Observations Pt. supine in bed, dtr in room. Pt. awake and alert, has pain with movement.  -BC     Prior Level of Function independent:;all household mobility  -BC     Equipment Currently Used at Home none  -BC     Plans/Goals Discussed  With patient and family;agreed upon  -BC     Risks Reviewed patient and family:;LOB;nausea/vomiting;dizziness;increased discomfort;change in vital signs;lines disloged  -BC     Benefits Reviewed patient and family:;improve function;increase independence;increase strength;increase balance;decrease pain;decrease risk of DVT;increase knowledge;improve skin integrity  -BC     Living Environment    Lives With alone  -BC     Living Arrangements apartment  -BC     Clinical Impression    Date of Referral to PT 01/17/18  -BC     Functional Level At Time Of Evaluation fair  -BC     Criteria for Skilled Therapeutic Interventions Met yes;treatment indicated  -BC     Rehab Potential fair, will monitor progress closely  -BC     Predicted Duration of Therapy Intervention (days/wks) LOS  -BC     Pain Assessment    Pain Assessment Ng-Baker FACES  -BC     Ng-Baker FACES Pain Rating 8  -BC     Pain Type Acute pain  -BC     Pain Location Sacrum   pressure over sacral area.  -BC     Cognitive Assessment/Intervention    Current Cognitive/Communication Assessment functional  -BC     Orientation Status oriented to;person;situation  -BC     Follows Commands/Answers Questions 100% of the time;able to follow single-step instructions;needs cueing  -BC     Personal Safety decreased awareness, need for assist;decreased awareness, need for safety;decreased insight to deficits  -BC     Personal Safety Interventions fall prevention program maintained;gait belt;muscle strengthening facilitated;nonskid shoes/slippers when out of bed  -BC     ROM (Range of Motion)    General ROM lower extremity range of motion deficits identified  -BC     General ROM Detail BLE ROM decreased by 30%  -BC     MMT (Manual Muscle Testing)    General MMT Assessment lower extremity strength deficits identified  -BC     General MMT Assessment Detail BLE MMT 3/5  -BC     Muscle Tone Assessment    Muscle Tone Assessment  Bilateral Upper Extremities;Bilateral Lower  Extremities  -EA    Bilateral Upper Extremities Muscle Tone Assessment  mildly decreased tone  -EA    Bilateral Lower Extremities Muscle Tone Assessment  mildly decreased tone  -EA    Bed Mobility, Assessment/Treatment    Bed Mobility, Assistive Device bed rails  -BC     Bed Mobility, Roll Left, [place] moderate assist (50% patient effort);2 person assist required  -BC     Bed Mobility, Roll Right, [place] moderate assist (50% patient effort);2 person assist required  -BC     Bed Mob, Supine to Sit, [place] moderate assist (50% patient effort);2 person assist required  -BC     Bed Mob, Sit to Supine, [place] moderate assist (50% patient effort);2 person assist required  -BC     Transfer Assessment/Treatment    Transfers, Sit-Stand [place] moderate assist (50% patient effort);2 person assist required  -BC     Transfers, Stand-Sit [place] moderate assist (50% patient effort);2 person assist required  -BC     Transfers, Sit-Stand-Sit, Assist Device rolling walker  -BC     Gait Assessment/Treatment    Gait, [place] Level moderate assist (50% patient effort);2 person assist required  -BC     Gait, Assistive Device rolling walker  -BC     Gait, Distance (Feet) 30  -BC     Positioning and Restraints    Pre-Treatment Position in bed  -BC     Post Treatment Position chair  -BC     In Chair notified nsg;reclined;call light within reach;encouraged to call for assist;with family/caregiver  -BC       01/16/18 1607 01/16/18 1000    Living Environment    Lives With alone  -SG     Living Arrangements apartment  -SG     Home Accessibility no concerns  -SG     Type of Financial/Environmental Concern none  -SG     Transportation Available car  -SG     Muscle Tone Assessment    Muscle Tone Assessment  Bilateral Upper Extremities;Bilateral Lower Extremities  -BT    Bilateral Upper Extremities Muscle Tone Assessment  moderately decreased tone  -BT    Bilateral Lower Extremities Muscle Tone Assessment   moderately decreased tone  -BT      01/15/18 2000 01/15/18 1800    General Information    Equipment Currently Used at Home  none  -MP    Living Environment    Lives With  alone  -MP    Living Arrangements  apartment  -MP    Home Accessibility  no concerns  -MP    Stair Railings at Home  none  -MP    Type of Financial/Environmental Concern  none  -MP    Transportation Available  car;family or friend will provide  -MP    Muscle Tone Assessment    Muscle Tone Assessment Bilateral Upper Extremities;Bilateral Lower Extremities  -TONEY     Bilateral Upper Extremities Muscle Tone Assessment moderately decreased tone  -TONEY     Bilateral Lower Extremities Muscle Tone Assessment moderately decreased tone  -TONEY       User Key  (r) = Recorded By, (t) = Taken By, (c) = Cosigned By    Initials Name Provider Type    EA Sabrina Gracia, RN Registered Nurse    TONEY Shoemaker, RN Registered Nurse    ELLY Dutton, RN Registered Nurse    BC Tanna Nettles, PT Physical Therapist    SG Amber Jimenes     BT Jesus Lanier, RN Registered Nurse          Physical Therapy Education     Title: PT OT SLP Therapies (Done)     Topic: Physical Therapy (Done)     Point: Mobility training (Done)    Learning Progress Summary    Learner Readiness Method Response Comment Documented by Status   Patient Acceptance E [facility] 01/17/18 1652 Done               Point: Home exercise program (Done)    Learning Progress Summary    Learner Readiness Method Response Comment Documented by Status   Patient Acceptance E [facility] 01/17/18 1652 Done               Point: Body mechanics (Done)    Learning Progress Summary    Learner Readiness Method Response Comment Documented by Status   Patient Acceptance E [facility] 01/17/18 1652 Done               Point: Precautions (Done)    Learning Progress Summary    Learner Readiness Method Response Comment Documented by Status   Patient Acceptance E [facility] 01/17/18 1652 Done                       User Key     Initials Effective Dates Name Provider Type Discipline    BC 03/14/16 -  Tanna Nettles PT Physical Therapist PT                PT Recommendation and Plan  Anticipated Equipment Needs At Discharge: front wheeled walker  Planned Therapy Interventions: balance training, bed mobility training, gait training, home exercise program, patient/family education, strengthening, transfer training  PT Frequency: 3-5 times/wk, per priority policy             IP PT Goals       01/17/18 1652          Bed Mobility PT LTG    Bed Mobility PT LTG, Date Established 01/17/18  -BC      Bed Mobility PT LTG, Time to Achieve by discharge  -BC      Bed Mobility PT LTG, Activity Type all bed mobility  -BC      Bed Mobility PT LTG, [place] Level minimum assist (75% patient effort)  -BC      Bed Mobility PT Goal  LTG, Assist Device bed rails  -BC      Transfer Training PT LTG    Transfer Training PT LTG, Date Established 01/17/18  -BC      Transfer Training PT LTG, Time to Achieve by discharge  -BC      Transfer Training PT LTG, Activity Type all transfers  -BC      Transfer Training PT LTG, [place] Level minimum assist (75% patient effort)  -BC      Transfer Training PT LTG, Assist Device walker, rolling  -BC      Gait Training PT LTG    Gait Training Goal PT LTG, Date Established 01/17/18  -BC      Gait Training Goal PT LTG, Time to Achieve by discharge  -BC      Gait Training Goal PT LTG, [place] Level minimum assist (75% patient effort)  -BC      Gait Training Goal PT LTG, Assist Device walker, rolling  -BC      Gait Training Goal PT LTG, Distance to Achieve 40  -BC        User Key  (r) = Recorded By, (t) = Taken By, (c) = Cosigned By    Initials Name Provider Type    BC Tanna Nettles, PT Physical Therapist                Outcome Measures       01/17/18 1600          How much help from another person do you currently need...    Turning from your back to your side while in flat bed without using bedrails? 2   -BC      Moving from lying on back to sitting on the side of a flat bed without bedrails? 2  -BC      Moving to and from a bed to a chair (including a wheelchair)? 2  -BC      Standing up from a chair using your arms (e.g., wheelchair, bedside chair)? 2  -BC      Climbing 3-5 steps with a railing? 1  -BC      To walk in hospital room? 3  -BC      AM-PAC 6 Clicks Score 12  -BC      Functional Assessment    Outcome Measure Options AM-PAC 6 Clicks Basic Mobility (PT)  -BC        User Key  (r) = Recorded By, (t) = Taken By, (c) = Cosigned By    Initials Name Provider Type    BC Tanna Nettles PT Physical Therapist           Time Calculation:         PT Charges       01/17/18 6725          Time Calculation    Start Time --   60  -BC      PT Received On 01/17/18  -BC      PT Goal Re-Cert Due Date 01/31/18  -BC        User Key  (r) = Recorded By, (t) = Taken By, (c) = Cosigned By    Initials Name Provider Type    BC Tanna Nettles PT Physical Therapist          Therapy Charges for Today     Code Description Service Date Service Provider Modifiers Qty    48328563244 HC PT MOBILITY CURRENT 1/17/2018 Tanna Nettles, PT GP, CL 1    34822828860 HC PT MOBILITY PROJECTED 1/17/2018 Tanna Nettles, PT GP, CK 1    94571130141 HC GAIT TRAINING EA 15 MIN 1/17/2018 Tanna Nettles, PT GP 1    67113447836 HC PT EVAL MOD COMPLEXITY 2 1/17/2018 Tanna Nettles, PT GP 1    74950292332 HC PT THERAPEUTIC ACT EA 15 MIN 1/17/2018 Tanna Nettles, PT GP 1    94018109825 HC PT THER SUPP EA 15 MIN 1/17/2018 Tanna Nettles, PT GP 4          PT G-Codes  Outcome Measure Options: AM-PAC 6 Clicks Basic Mobility (PT)  Score: 12  Functional Limitation: Mobility: Walking and moving around  Mobility: Walking and Moving Around Current Status (): At least 60 percent but less than 80 percent impaired, limited or restricted  Mobility: Walking and Moving Around Goal Status (): At least 40 percent but less than 60 percent impaired,  limited or restricted      Tanna Nettles, PT  1/17/2018

## 2018-01-17 NOTE — CONSULTS
"    Referring Provider: Dr. Del Cid  Reason for Consultation: Decision making capacity      Chief complaint \"it is my cancer\"    Subjective .     History of present illness:  The patient is a 64 y/o CM who is currently admitted to the [address] [place] [address] for rhabdomyolysis. He states that he is in the hospital and he is here because of his poor health. He states that he has bone cancer, and has been in a lot of pain and takes his pain pills as prescribed. States that he is not feeling depressed or hopeless and denies any thoughts of giving up. He denies any episodes of confusion. Per staff, the patient has been appropriate and has not had any episodes of confusion, agitation or inappropriate behaviors while he has been in the hospital.     Review of Systems  Gen: No fever or chills.  Resp: No soa  GI: No nvd  Musculoskeletal: back pain    History  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, Right [place] of Lung with T2 vertebral, and second Rib infiltration, S/P radiation/Chemotherapy   • Polysubstance dependency    • TIA (transient ischemic attack)     2014   ,   Past Surgical History:   Procedure Laterality Date   • APPENDECTOMY     • BACK SURGERY     • LUNG BIOPSY  2015   , History reviewed. No pertinent family history.,   Social History   Substance Use Topics   • Smoking status: Current Every Day Smoker     Packs/day: 0.50     Types: Cigarettes   • Smokeless tobacco: Never Used   • Alcohol use No   ,   Prescriptions Prior to Admission   Medication Sig Dispense Refill Last Dose   • diazePAM (VALIUM) 10 MG tablet Take 10 mg by mouth 2 (Two) Times a Day As Needed for Anxiety.   1/15/2018 at 1100   • oxyCODONE (ROXICODONE) 20 MG tablet Take 20 mg by mouth Every 6 (Six) Hours As Needed for Moderate Pain .   1/15/2018 at 1100   , Scheduled Meds:  "   cefepime 2 g Intravenous Q12H   heparin (porcine) 5,000 Units Subcutaneous Q8H   HYDROmorphone 1 mg Intravenous Once   metroNIDAZOLE 500 mg Intravenous Q8H   vancomycin 1,000 mg Intravenous Q12H   , Continuous Infusions:    Pharmacy to dose vancomycin     sodium chloride 150 mL/hr Last Rate: 150 mL/hr (01/17/18 0429)   , PRN Meds:  •  acetaminophen  •  oxyCODONE  •  Pharmacy to dose vancomycin  •  Insert peripheral IV **AND** sodium chloride and Allergies:  Sulfa antibiotics    Objective     Vital Signs   Temp:  [97.8 °F (36.6 °C)-101 °F (38.3 °C)] 97.8 °F (36.6 °C)  Heart Rate:  [74-96] 76  Resp:  [18] 18  BP: (103-115)/(63-75) 115/73    Mental Status Exam:   Mental Status Exam:    Hygiene:   fair  Cooperation:  Cooperative  Eye Contact:  Good  Psychomotor Behavior:  Appropriate  Affect:  Full range  Hopelessness: Denies  Speech:  Normal  Thought Progress:  Goal directed  Thought Content:  Normal  Suicidal:  None  Homicidal:  None  Hallucinations:  None  Delusion:  None  Memory:  Intact  Orientation:  Person, Place, Time and Situation  Reliability:  fair  Insight:  Fair  Judgement:  Fair  Impulse Control:  Fair    Results Review:   I reviewed the patient's new clinical results.  Lab Results (last 24 hours)     Procedure Component Value Units Date/Time    C-reactive Protein [[id number]]  (Abnormal) Collected:  01/17/18 0454    Specimen:  Blood Updated:  01/17/18 0714     C-Reactive Protein 9.55 (H) mg/dL     Comprehensive Metabolic Panel [[id number]]  (Abnormal) Collected:  01/17/18 0454    Specimen:  Blood Updated:  01/17/18 0716     Glucose 84 mg/dL      BUN 22 (H) mg/dL      Creatinine 0.64 mg/dL      Sodium 138 mmol/L      Potassium 4.1 mmol/L      Chloride 112 mmol/L      CO2 22.7 (L) mmol/L      Calcium 7.9 mg/dL      Total Protein 5.8 (L) g/dL      Albumin 2.80 (L) g/dL      ALT (SGPT) 37 U/L      AST (SGOT) 78 (H) U/L      Alkaline Phosphatase 120 U/L       Note New Reference Ranges        Total Bilirubin  3.5 (H) mg/dL       +1 Icterus        eGFR Non African Amer 127 mL/min/1.73      Globulin 3.0 gm/dL      A/G Ratio 0.9 (L) g/dL      BUN/Creatinine Ratio 34.4 (H)     Anion Gap 3.3 (L) mmol/L     Osmolality, Calculated [[id number]]  (Normal) Collected:  01/17/18 0454    Specimen:  Blood Updated:  01/17/18 0716     Osmolality Calc 278.2 mOsm/kg     CBC & Differential [[id number]] Collected:  01/17/18 0454    Specimen:  Blood Updated:  01/17/18 0727    Narrative:       The following orders were created for panel order CBC & Differential.  Procedure                               Abnormality         Status                     ---------                               -----------         ------                     Scan Slide[[id number]]                                       Final result               CBC Auto Differential[[id number]]        Abnormal            Final result                 Please view results for these tests on the individual orders.    CBC Auto Differential [[id number]]  (Abnormal) Collected:  01/17/18 0454    Specimen:  Blood Updated:  01/17/18 0727     WBC 10.08 10*3/mm3      RBC 4.75 10*6/mm3      Hemoglobin 9.4 (L) g/dL      Hematocrit 29.9 (L) %      MCV 62.9 (L) fL      MCH 19.8 (L) pg      MCHC 31.4 (L) g/dL      RDW 16.1 (H) %      RDW-SD 35.4 (L) fl      MPV -- fL       Unable to calculate         Platelets 179 10*3/mm3      Neutrophil % 81.0 (H) %      Lymphocyte % 10.9 (L) %      Monocyte % 7.2 %      Eosinophil % 0.3 %      Basophil % 0.2 %      Immature Grans % 0.4 %      Neutrophils, Absolute 8.16 (H) 10*3/mm3      Lymphocytes, Absolute 1.10 10*3/mm3      Monocytes, Absolute 0.73 10*3/mm3      Eosinophils, Absolute 0.03 10*3/mm3      Basophils, Absolute 0.02 10*3/mm3      Immature Grans, Absolute 0.04 (H) 10*3/mm3     Scan Slide [[id number]] Collected:  01/17/18 0454    Specimen:  Blood Updated:  01/17/18 0727     Anisocytosis Slight/1+     Elliptocytes Slight/1+     Hypochromia Mod/2+      Microcytes Mod/2+     Poikilocytes Slight/1+     Schistocytes Slight/1+     Platelet Morphology Normal    CK [[id number]]  (Abnormal) Collected:  01/17/18 0848    Specimen:  Blood Updated:  01/17/18 0927     Creatine Kinase 454 (C) U/L       1+ Icteric        Urine Culture - Urine, Urine, Clean Catch [[id number]] Collected:  01/15/18 1253    Specimen:  Urine from Urine, Clean Catch Updated:  01/17/18 1152     Urine Culture >100,000 CFU/mL Normal Urogenital [place]    Blood Culture - Blood, Blood, Venous Line [[id number]]  (Normal) Collected:  01/15/18 1245    Specimen:  Blood from Arm, Right Updated:  01/17/18 1401     Blood Culture No growth at 2 days    Blood Culture - Blood, Blood, Venous Line [[id number]]  (Normal) Collected:  01/15/18 1327    Specimen:  Blood from Arm, Left Updated:  01/17/18 1401     Blood Culture No growth at 2 days        Imaging Results (last 24 hours)     Procedure Component Value Units Date/Time    US Abdomen Complete [[id number]] Collected:  01/16/18 1502     Updated:  01/16/18 1506    Narrative:       EXAMINATION: US ABDOMEN COMPLETE-         CLINICAL INDICATION:     hyperbilirubinemia; L89.159-Pressure ulcer of  sacral region, unspecified stage; W19.XXXA-Unspecified fall, initial  encounter; M62.82-Rhabdomyolysis     TECHNIQUE: Multiplanar gray scale ultrasound of the abdomen.      COMPARISON: NONE      FINDINGS:   Visualized pancreas is unremarkable.   Abnormal wall thickening of the gallbladder with extensive luminal  sludge noted. No large shadowing stones or obvious pericholecystic fluid  identified.  The common bile duct measures 5.5 mm and is within normal limits. .  There is diffuse fatty infiltration of the liver. No focal lesion or  intrahepatic biliary dilatation identified.  Spleen is borderline enlarged measuring 14.2 cm in length without focal  splenic abnormality.   The RIGHT kidney measures 11.2 cm  in length without mass,  hydronephrosis, or shadowing stone.   The LEFT kidney  measures 12.5 cm in length without mass, hydronephrosis,  or shadowing stone. 3.9 cm simple appearing cyst lower pole.  No ascites demonstrated.   IVC shows patency.  There is normal directional flow within the portal  vein.  Aorta is 2.2 cm diameter and is within normal limits.       Impression:       1. Abnormal wall thickening and distention of gallbladder with extensive  luminal sludge or lithogenic bile. Early changes of cholecystitis cannot  be excluded.  2. Common bile duct within normal limits for diameter.  3. Borderline splenic enlargement.      This report was finalized on 1/16/2018 3:04 PM by Dr. Olivier Marshall MD.       XR Hips Bilateral With or Without Pelvis 3-4 View [[id number]] Collected:  01/16/18 1559     Updated:  01/16/18 1602    Narrative:       EXAMINATION: XR HIPS BILATERAL W OR WO PELVIS 3-4 VIEW-      CLINICAL INDICATION:  bilateral hip and pelvic pain; L89.159-Pressure  ulcer of sacral region, unspecified stage; W19.XXXA-Unspecified fall,  initial encounter; M62.82-Rhabdomyolysis      TECHNIQUE: X-rays of the right and left hip in two views. One frontal  view of the pelvis.     COMPARISON: None      FINDINGS:  The alignment of the osseous structures is anatomic. There  is no displaced fracture, evidence of AVN nor intraosseous destructive  lesion. There are no soft tissue abnormalities.        Impression:       No acute displaced fracture.     This report was finalized on 1/16/2018 3:59 PM by Dr. Carlos Jiménez MD.       XR Chest PA & Lateral [[id number]] Updated:  01/17/18 1047            Assessment/Plan     Active Problems:    Rhabdomyolysis    Decubitus ulcer of coccyx, unspecified pressure ulcer stage     - The patient is not confused, doesn't appear to have delirium or dementia and has the ability to make informed decisions about his medical care and disposition.      I discussed the patients findings and my recommendations with patient and nursing staff    Caden Washburn  MD  01/17/18  2:11 PM

## 2018-01-17 NOTE — PROGRESS NOTES
"     LOS: 2 days     Chief Complaint:  Rhabdomyolysis/Decubitus Ulcers with Sepsis    Subjective     Interval History:   He has done fairly well overnight with no major changes.  Did have a temp of 101 noted last evening.  His mental status seems to be improved today.  He continues to complain of diffuse bone/body pain.  He does not localize this pain.  He has been evaluated by infectious disease and surgery with input noted.  Labs for this morning are pending.  Ultrasound yesterday demonstrated abnormal wall thickening and distention of the gallbladder with noted sludge last lithogenic bile and possibility of cholecystitis.  He has no nausea vomiting or abdominal pain.  He is endorsing hunger and wanting food this morning. No CP, NO SOA.      Objective     Vital Signs  /75 (BP Location: Right arm, Patient Position: Lying)  Pulse 74  Temp 98.7 °F (37.1 °C) (Axillary)   Resp 18  Ht 182.9 cm (72\")  Wt 70.8 kg (156 lb 1.6 oz)  SpO2 96%  BMI 21.17 kg/m2  Intake & Output (last day)       01/16 0701 - 01/17 0700 01/17 0701 - 01/18 0700    P.O. 880     I.V. (mL/kg)      IV Piggyback      Total Intake(mL/kg) 880 (12.4)     Urine (mL/kg/hr) 275 (0.2)     Total Output 275      Net +605            Unmeasured Urine Occurrence 4 x             Physical Exam:     General Appearance:    Alert, cooperative, in no acute distress   Head:    Normocephalic, without obvious abnormality, atraumatic   Eyes:            Lids and lashes normal, conjunctivae and sclerae normal, no   icterus, no pallor, corneas clear, PERRLA   Ears:    Ears appear intact with no abnormalities noted   Throat:   No oral lesions, no thrush, oral mucosa moist   Neck:   No adenopathy, supple, trachea midline, no thyromegaly, no   carotid bruit, no JVD   Lungs:     Scattered Crackles bilaterally,respirations regular, even and                  unlabored    Heart:    Regular rhythm and normal rate, normal S1 and S2, no            murmur, no gallop, no rub, " no click   Chest Wall:    No abnormalities observed   Abdomen:     Normal bowel sounds, no masses, no organomegaly, soft        non-tender, non-distended, no guarding, no rebound                tenderness   Extremities:   Moves all extremities well, no edema, no cyanosis, no             redness   Pulses:   Pulses palpable and equal bilaterally   Skin:   No bleeding, bruising or rash, Sacral decubitus ulcer with blackened eschar and right trochanteric ulcer with eschar   Lymph nodes:   No palpable adenopathy   Neurologic:   Cranial nerves 2 - 12 grossly intact, sensation intact, DTR       present and equal bilaterally        Results Review:    Lab Results   Component Value Date    WBC 8.14 01/16/2018    HGB 10.5 (L) 01/16/2018    HCT 32.6 (L) 01/16/2018    MCV 61.3 (L) 01/16/2018     (L) 01/16/2018       Lab Results   Component Value Date    GLUCOSE 134 (H) 01/16/2018    BUN 44 (H) 01/16/2018    CREATININE 0.99 01/16/2018    EGFRIFNONA 77 01/16/2018    BCR 44.4 (H) 01/16/2018     01/16/2018    K 4.4 01/16/2018     01/16/2018    CO2 25.9 01/16/2018    CALCIUM 8.2 01/16/2018    PROTENTOTREF 7.2 11/12/2015    ALBUMIN 3.40 01/16/2018    LABIL2 1.1 (L) 01/15/2018     (H) 01/16/2018    ALT 46 (H) 01/16/2018     Lab Results   Component Value Date    INR 0.94 11/03/2015    INR <0.90 10/25/2015    INR <0.90 06/01/2015       No results found for: POCGLU       Medication Review:     Current Facility-Administered Medications:   •  acetaminophen (TYLENOL) tablet 650 mg, 650 mg, Oral, Q6H PRN, JARETH Condon, 650 mg at 01/16/18 1913  •  cefepime (MAXIPIME) 2 g/100 mL 0.9% NS (mbp), 2 g, Intravenous, Q12H, Shameka Galeana PA-C, 2 g at 01/17/18 0430  •  heparin (porcine) 5000 UNIT/ML injection 5,000 Units, 5,000 Units, Subcutaneous, Q8H, JARETH Condon 5,000 Units at 01/17/18 0432  •  HYDROmorphone (DILAUDID) injection 1 mg, 1 mg, Intravenous, Once, José Miguel Li MD  •  metroNIDAZOLE (FLAGYL)  IVPB 500 mg, 500 mg, Intravenous, Q8H, Shameka Galeana PA-C, 500 mg at 01/17/18 0430  •  oxyCODONE (ROXICODONE) immediate release tablet 5 mg, 5 mg, Oral, Q4H PRN, Samuel Duane Kreis, MD, 5 mg at 01/17/18 0430  •  Pharmacy to dose vancomycin, , Does not apply, Continuous PRN, Shameka Galeana PA-C  •  Insert peripheral IV, , , Once **AND** sodium chloride 0.9 % flush 10 mL, 10 mL, Intravenous, PRN, Jesus Carreon PA-C  •  sodium chloride 0.9 % infusion, 150 mL/hr, Intravenous, Continuous, Samuel Duane Kreis, MD, Last Rate: 150 mL/hr at 01/17/18 0429, 150 mL/hr at 01/17/18 0429  •  vancomycin (VANCOCIN) 1,000 mg in sodium chloride 0.9 % 250 mL IVPB, 1,000 mg, Intravenous, Q12H, Carlie Olvera, [facility], 1,000 mg at 01/17/18 0200      Assessment/Plan   Probable Aspiration Pneumonia with Sepsis  Sacral Decubitus Ulcer   Decubitus ulceration of the hip  Hyperbilirubinemia  Rhabdomyolysis  Metabolic encephalopathy due to sepsis  Multiple myeloma  Chronic opioid use  Fever    Appreciate surgery and ID input     Continue with Cefepime + Flagyl + Vancomycin    Await AM labs    Oxycodone 5 mg q 6 hours prn pain     Continue with IVF @ 150 ml/hour     Tylenol prn fever     Continue with wound care     Heparin for DVT prophylaxis    O2 via NC to maintain O2 sats >90%     CBC, CMP daily    JARETH Condon  01/17/18  7:03 AM     This patient is seen this morning by me.  I agree with the above note.  He is a little more awake and little more alert.  CK has not yet been drawn this morning.  He was evaluated by surgery and the wound is not felt to be the cause of his fever.  This morning he has right-sided coarse rales.  A little bit of cough.  He had a fever overnight.  He states he has not been ambulatory and feels weak in his legs.  Bilirubin is down to 3.5.  At this point continue cefepime, Flagyl and vancomycin.  He likely has an aspiration pneumonia.  I have decreased the dose of oxycodone and discontinued  Valium.  Monitor for any signs of withdrawal.  Decrease IV fluids 100 mL per hour.  CPK this morning.  ID and surgery input appreciated.  Consult physical therapy and occupational therapy    Samuel Duane Kreis, MD  01/17/18  8:00 AM

## 2018-01-17 NOTE — PROGRESS NOTES
Discharge Planning Assessment  [facility]     Patient Name: Jhonny Washington  MRN: [id number]  Today's Date: 1/17/2018    Admit Date: 1/15/2018          Discharge Needs Assessment     None            Discharge Plan       01/17/18 1509    Case Management/Social Work Plan    Plan Pt brother/caregiver at [facility] on this date and stated concerns regarding pt's ability to make own decisions.  SS requested Psych evaluation for ability to make own decisions.  SS noted Psych evaluation.  SS will follow and assist with discharge needs.     Patient/Family In Agreement With Plan yes        Discharge Placement     No information found                Demographic Summary     None            Functional Status     None            Psychosocial     None            Abuse/Neglect     None            Legal     None            Substance Abuse     None            Patient Forms     None          Amber Jimenes

## 2018-01-18 LAB
ALBUMIN SERPL-MCNC: 2.7 G/DL (ref 3.4–4.8)
ALBUMIN/GLOB SERPL: 1 G/DL (ref 1.5–2.5)
ALP SERPL-CCNC: 165 U/L (ref 40–129)
ALT SERPL W P-5'-P-CCNC: 34 U/L (ref 10–44)
ANION GAP SERPL CALCULATED.3IONS-SCNC: 4.3 MMOL/L (ref 3.6–11.2)
ANISOCYTOSIS BLD QL: ABNORMAL
AST SERPL-CCNC: 64 U/L (ref 10–34)
BILIRUB SERPL-MCNC: 1.7 MG/DL (ref 0.2–1.8)
BUN BLD-MCNC: 18 MG/DL (ref 7–21)
BUN/CREAT SERPL: 35.3 (ref 7–25)
CALCIUM SPEC-SCNC: 7.8 MG/DL (ref 7.7–10)
CHLORIDE SERPL-SCNC: 112 MMOL/L (ref 99–112)
CK SERPL-CCNC: 346 U/L (ref 24–204)
CO2 SERPL-SCNC: 20.7 MMOL/L (ref 24.3–31.9)
CREAT BLD-MCNC: 0.51 MG/DL (ref 0.43–1.29)
DEPRECATED RDW RBC AUTO: 35.3 FL (ref 37–54)
ELLIPTOCYTES BLD QL SMEAR: ABNORMAL
EOSINOPHIL # BLD MANUAL: 0.19 10*3/MM3 (ref 0–0.7)
EOSINOPHIL NFR BLD MANUAL: 2 % (ref 0–5)
ERYTHROCYTE [DISTWIDTH] IN BLOOD BY AUTOMATED COUNT: 16.2 % (ref 11.5–14.5)
GFR SERPL CREATININE-BSD FRML MDRD: >150 ML/MIN/1.73
GLOBULIN UR ELPH-MCNC: 2.8 GM/DL
GLUCOSE BLD-MCNC: 127 MG/DL (ref 70–110)
HCT VFR BLD AUTO: 29.5 % (ref 42–52)
HGB BLD-MCNC: 9.5 G/DL (ref 14–18)
HYPOCHROMIA BLD QL: ABNORMAL
LYMPHOCYTES # BLD MANUAL: 0.88 10*3/MM3 (ref 1–3)
LYMPHOCYTES NFR BLD MANUAL: 2 % (ref 0–10)
LYMPHOCYTES NFR BLD MANUAL: 9 % (ref 21–51)
MCH RBC QN AUTO: 20.2 PG (ref 27–33)
MCHC RBC AUTO-ENTMCNC: 32.2 G/DL (ref 33–37)
MCV RBC AUTO: 62.8 FL (ref 80–94)
MICROCYTES BLD QL: ABNORMAL
MONOCYTES # BLD AUTO: 0.19 10*3/MM3 (ref 0.1–0.9)
MYOGLOBIN UR-MCNC: 5 NG/ML (ref 0–13)
NEUTROPHILS # BLD AUTO: 8.47 10*3/MM3 (ref 1.4–6.5)
NEUTROPHILS NFR BLD MANUAL: 87 % (ref 30–70)
OSMOLALITY SERPL CALC.SUM OF ELEC: 277.3 MOSM/KG (ref 273–305)
PLAT MORPH BLD: NORMAL
PLATELET # BLD AUTO: 184 10*3/MM3 (ref 130–400)
PMV BLD AUTO: ABNORMAL FL (ref 6–10)
POTASSIUM BLD-SCNC: 3.9 MMOL/L (ref 3.5–5.3)
PROT SERPL-MCNC: 5.5 G/DL (ref 6–8)
RBC # BLD AUTO: 4.7 10*6/MM3 (ref 4.7–6.1)
SCAN SLIDE: NORMAL
SODIUM BLD-SCNC: 137 MMOL/L (ref 135–153)
TARGETS BLD QL SMEAR: ABNORMAL
VANCOMYCIN TROUGH SERPL-MCNC: 6.3 MCG/ML (ref 5–15)
WBC NRBC COR # BLD: 9.73 10*3/MM3 (ref 4.5–12.5)

## 2018-01-18 PROCEDURE — 85007 BL SMEAR W/DIFF WBC COUNT: CPT | Performed by: PHYSICIAN ASSISTANT

## 2018-01-18 PROCEDURE — 97116 GAIT TRAINING THERAPY: CPT

## 2018-01-18 PROCEDURE — 80053 COMPREHEN METABOLIC PANEL: CPT | Performed by: PHYSICIAN ASSISTANT

## 2018-01-18 PROCEDURE — 85025 COMPLETE CBC W/AUTO DIFF WBC: CPT | Performed by: PHYSICIAN ASSISTANT

## 2018-01-18 PROCEDURE — 25010000002 CEFEPIME: Performed by: PHYSICIAN ASSISTANT

## 2018-01-18 PROCEDURE — 97530 THERAPEUTIC ACTIVITIES: CPT

## 2018-01-18 PROCEDURE — 94799 UNLISTED PULMONARY SVC/PX: CPT

## 2018-01-18 PROCEDURE — 25010000002 HEPARIN (PORCINE) PER 1000 UNITS: Performed by: PHYSICIAN ASSISTANT

## 2018-01-18 PROCEDURE — 25010000002 VANCOMYCIN PER 500 MG

## 2018-01-18 PROCEDURE — 82550 ASSAY OF CK (CPK): CPT | Performed by: FAMILY MEDICINE

## 2018-01-18 PROCEDURE — 80202 ASSAY OF VANCOMYCIN: CPT

## 2018-01-18 RX ORDER — L.ACID,PARA/B.BIFIDUM/S.THERM 8B CELL
1 CAPSULE ORAL DAILY
Status: DISCONTINUED | OUTPATIENT
Start: 2018-01-18 | End: 2018-01-26 | Stop reason: HOSPADM

## 2018-01-18 RX ADMIN — VANCOMYCIN HYDROCHLORIDE 1000 MG: 5 INJECTION, POWDER, LYOPHILIZED, FOR SOLUTION INTRAVENOUS at 13:10

## 2018-01-18 RX ADMIN — OXYCODONE HYDROCHLORIDE 5 MG: 5 TABLET ORAL at 04:43

## 2018-01-18 RX ADMIN — METRONIDAZOLE 500 MG: 500 INJECTION, SOLUTION INTRAVENOUS at 20:13

## 2018-01-18 RX ADMIN — OXYCODONE HYDROCHLORIDE 5 MG: 5 TABLET ORAL at 15:57

## 2018-01-18 RX ADMIN — METRONIDAZOLE 500 MG: 500 INJECTION, SOLUTION INTRAVENOUS at 04:44

## 2018-01-18 RX ADMIN — SODIUM CHLORIDE 150 ML/HR: 9 INJECTION, SOLUTION INTRAVENOUS at 04:43

## 2018-01-18 RX ADMIN — METRONIDAZOLE 500 MG: 500 INJECTION, SOLUTION INTRAVENOUS at 11:19

## 2018-01-18 RX ADMIN — OXYCODONE HYDROCHLORIDE 5 MG: 5 TABLET ORAL at 20:13

## 2018-01-18 RX ADMIN — HEPARIN SODIUM 5000 UNITS: 5000 INJECTION, SOLUTION INTRAVENOUS; SUBCUTANEOUS at 20:13

## 2018-01-18 RX ADMIN — HEPARIN SODIUM 5000 UNITS: 5000 INJECTION, SOLUTION INTRAVENOUS; SUBCUTANEOUS at 13:10

## 2018-01-18 RX ADMIN — CEFEPIME 2 G: 2 INJECTION, POWDER, FOR SOLUTION INTRAVENOUS at 04:44

## 2018-01-18 RX ADMIN — Medication 1 CAPSULE: at 15:57

## 2018-01-18 RX ADMIN — CEFEPIME 2 G: 2 INJECTION, POWDER, FOR SOLUTION INTRAVENOUS at 17:01

## 2018-01-18 RX ADMIN — VANCOMYCIN HYDROCHLORIDE 1000 MG: 5 INJECTION, POWDER, LYOPHILIZED, FOR SOLUTION INTRAVENOUS at 02:19

## 2018-01-18 NOTE — DISCHARGE PLACEMENT REQUEST
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 malalignment of the lumbar spine is identified.        Impression:       No acute fracture of the lumbar spine.      This report was finalized on 1/15/2018 1:16 PM by Dr. Carlos Jiménez MD.             Assessment/Plan   Rhabdomyolysis  Fever  Sacral Decubitus Ulcer/Hip Ulcer  Hyperbilirubinemia  Altered Mental Status  HX Multiple Myeloma  Chronic Neck Pain/Low Back Pain    Consult Surgery for sacral decubitus/wound care    Consult ID for fever and antibiotic management    Hold Oxycodone and Valium secondary to altered mental status    Check RUQ U/S secondary to hyperbilirubinemia and urine myoglobin    Repeat LFT's this AM    Continue with IVF @ 150 ml/hour    Tylenol prn fever    Wound care consult    Lovenox for DVT prophylaxis    Start O2 via NC to maintain O2 sats >90%    CBC, CMP daily    JARETH Condon  01/16/18  6:39 AM    I have seen this patient today and agree with the above note.  This patient was admitted to my service to the emergency department where supposedly he is a patient of mine.  However, he has never been seen in my clinic.  He sees a Dr. Orozco in [place].  This patient has a rather complex history with history of myeloma.  He takes chronic opioid medications for this.  He  states that he has not been ambulatory for at least a week or 2 since falling out of the bed.  He states he hurt his lower back when this happened.  He was noted to have a CT of the lumbar spine revealing no acute fracture.  He was found to have a large sacral decubitus ulcer.  He was also noted to have acute rhabdomyolysis and was febrile overnight.  I have examined the patient and agree with the above note.  He is chronically ill in appearance.  Thin and frail.  He is able to answer questions but is a little bit somnolent.  Lung exam reveals clear to auscultation bilaterally cardiac exam reveals regular rate and regular rhythm no murmur, rub or gallop.  Abdominal exam is benign.  Extremities no cyanosis, clubbing or edema.  He is able to move both lower extremities.  He has a large decubitus ulceration and please refer to nursing documentation for further details.  Is noted to have a white count when he came in.    Impression:  Sepsis due to decubitus ulceration.  Decubitus ulceration of the hip  Hyperbilirubinemia  Rhabdomyolysis  Metabolic encephalopathy due to sepsis  Multiplemyeloma  Chronic opioid use      Plan:  ID consult.  Start cefepime, Flagyl and vancomycin    Surgical consultation and wound care consultation  IV fluid hydration for abdomen mild lysis.  Monitor renal function.  Monitor CPK to ensure the trend is downward    Subcutaneous heparin for DVT prophylaxis    Discontinue Valium and discontinue high-dose oxycodone.  Start oxycodone 5 mg every 4 hours when necessary for pain.    Monitor LFTs.  Probably elevated due to sepsis with hypotension    Oxygen to maintain sat greater than 90%    Xray bilateral hips / pelvis    Samuel Duane Kreis, MD  01/16/18  1:36 PM             Electronically signed by Samuel Duane Kreis, MD at 1/16/2018  1:37 PM        Vital Signs (last 24 hours)       01/17 0700  -  01/18 0659 01/18 0700  -  01/18 1233   Most Recent    Temp (°F) 97.8 -  100.1       97.9 (36.6)    Heart  Rate 70 -  102       102    Resp 16 - 18       18    /62 -  125/71       125/71    SpO2 (%) 93 -  98      (!)89     (!) 89          Lines, Drains & Airways    Active LDAs     Name:   Placement date:   Placement time:   Site:   Days:    Peripheral IV Line - Single Lumen 01/18/18 0500 20 gauge  01/18/18    0500      less than 1    Peripheral IV Line - Single Lumen 01/18/18 0539 cephalic vein (lateral side of arm), right 22 gauge  01/18/18    0539      less than 1                Prior to Admission Medications     Prescriptions Last Dose Informant Patient Reported? Taking?    diazePAM (VALIUM) 10 MG tablet 1/15/2018  Yes Yes    Take 10 mg by mouth 2 (Two) Times a Day As Needed for Anxiety.    oxyCODONE (ROXICODONE) 20 MG tablet 1/15/2018 Pharmacy Yes Yes    Take 20 mg by mouth Every 6 (Six) Hours As Needed for Moderate Pain .          Hospital Medications (active)       Dose Frequency Start End    acetaminophen (TYLENOL) tablet 650 mg 650 mg Every 6 Hours PRN 1/16/2018     Sig - Route: Take 2 tablets by mouth Every 6 (Six) Hours As Needed for Mild Pain . - Oral    Cosign for Ordering: Accepted by Samuel Duane Kreis, MD on 1/16/2018  1:17 PM    cefepime (MAXIPIME) 2 g/100 mL 0.9% NS (mbp) 2 g Every 12 Hours 1/16/2018 1/23/2018    Sig - Route: Infuse 100 mL into a venous catheter Every 12 (Twelve) Hours. - Intravenous    Cosign for Ordering: Accepted by Misty Bae MD on 1/18/2018 11:24 AM    heparin (porcine) 5000 UNIT/ML injection 5,000 Units 5,000 Units Every 8 Hours Scheduled 1/16/2018     Sig - Route: Inject 1 mL under the skin Every 8 (Eight) Hours. - Subcutaneous    Cosign for Ordering: Accepted by Samuel Duane Kreis, MD on 1/16/2018  1:17 PM    HYDROmorphone (DILAUDID) injection 1 mg 1 mg Once 1/15/2018     Sig - Route: Infuse 1 mL into a venous catheter 1 (One) Time. - Intravenous    metroNIDAZOLE (FLAGYL) IVPB 500 mg 500 mg Every 8 Hours 1/16/2018 1/23/2018    Sig - Route: Infuse 100 mL into a  "venous catheter Every 8 (Eight) Hours. - Intravenous    Cosign for Ordering: Accepted by Misty Bae MD on 1/18/2018 11:24 AM    oxyCODONE (ROXICODONE) immediate release tablet 5 mg 5 mg Every 4 Hours PRN 1/16/2018 1/26/2018    Sig - Route: Take 1 tablet by mouth Every 4 (Four) Hours As Needed for Moderate Pain . - Oral    Pharmacy to dose vancomycin  Continuous PRN 1/16/2018 1/23/2018    Sig - Route: Continuous As Needed for Consult. - Does not apply    Cosign for Ordering: Accepted by Misty Bae MD on 1/18/2018 11:24 AM    sodium chloride 0.9 % flush 10 mL 10 mL As Needed 1/15/2018     Sig - Route: Infuse 10 mL into a venous catheter As Needed for Line Care. - Intravenous    Cosign for Ordering: Accepted by José Miguel Li MD on 1/15/2018  1:21 PM    Linked Group 1:  \"And\" Linked Group Details        vancomycin (VANCOCIN) 1,000 mg in sodium chloride 0.9 % 250 mL IVPB 1,000 mg Every 12 Hours 1/16/2018 1/23/2018    Sig - Route: Infuse 1,000 mg into a venous catheter Every 12 (Twelve) Hours. - Intravenous    sodium chloride 0.9 % infusion (Discontinued) 150 mL/hr Continuous 1/15/2018 1/18/2018    Sig - Route: Infuse 150 mL/hr into a venous catheter Continuous. - Intravenous            Lab Results (last 24 hours)     Procedure Component Value Units Date/Time    Blood Culture - Blood, Blood, Venous Line [[id number]]  (Normal) Collected:  01/15/18 1245    Specimen:  Blood from Arm, Right Updated:  01/17/18 1401     Blood Culture No growth at 2 days    Blood Culture - Blood, Blood, Venous Line [[id number]]  (Normal) Collected:  01/15/18 1327    Specimen:  Blood from Arm, Left Updated:  01/17/18 1401     Blood Culture No growth at 2 days    Comprehensive Metabolic Panel [[id number]]  (Abnormal) Collected:  01/18/18 0036    Specimen:  Blood Updated:  01/18/18 0133     Glucose 127 (H) mg/dL      BUN 18 mg/dL      Creatinine 0.51 mg/dL      Sodium 137 mmol/L      Potassium 3.9 mmol/L      Chloride 112 " mmol/L      CO2 20.7 (L) mmol/L      Calcium 7.8 mg/dL      Total Protein 5.5 (L) g/dL      Albumin 2.70 (L) g/dL      ALT (SGPT) 34 U/L      AST (SGOT) 64 (H) U/L      Alkaline Phosphatase 165 (H) U/L       Note New Reference Ranges        Total Bilirubin 1.7 mg/dL       1+ Icteric         eGFR Non African Amer >150 mL/min/1.73      Globulin 2.8 gm/dL      A/G Ratio 1.0 (L) g/dL      BUN/Creatinine Ratio 35.3 (H)     Anion Gap 4.3 mmol/L     CK [[id number]]  (Abnormal) Collected:  01/18/18 0036    Specimen:  Blood Updated:  01/18/18 0133     Creatine Kinase 346 (H) U/L     Osmolality, Calculated [[id number]]  (Normal) Collected:  01/18/18 0036    Specimen:  Blood Updated:  01/18/18 0133     Osmolality Calc 277.3 mOsm/kg     Vancomycin, Trough [[id number]]  (Normal) Collected:  01/18/18 0036    Specimen:  Blood Updated:  01/18/18 0139     Vancomycin Trough 6.30 mcg/mL     CBC & Differential [[id number]] Collected:  01/18/18 0036    Specimen:  Blood Updated:  01/18/18 0318    Narrative:       The following orders were created for panel order CBC & Differential.  Procedure                               Abnormality         Status                     ---------                               -----------         ------                     Manual Differential[[id number]]          Abnormal            Final result               Scan Slide[[id number]]                                       Final result               CBC Auto Differential[[id number]]        Abnormal            Final result                 Please view results for these tests on the individual orders.    CBC Auto Differential [[id number]]  (Abnormal) Collected:  01/18/18 0036    Specimen:  Blood Updated:  01/18/18 0318     WBC 9.73 10*3/mm3      RBC 4.70 10*6/mm3      Hemoglobin 9.5 (L) g/dL      Hematocrit 29.5 (L) %      MCV 62.8 (L) fL      MCH 20.2 (L) pg      MCHC 32.2 (L) g/dL      RDW 16.2 (H) %      RDW-SD 35.3 (L) fl      MPV -- fL       Unable to calculate.          Platelets 184 10*3/mm3     Scan Slide [[id number]] Collected:  01/18/18 0036    Specimen:  Blood Updated:  01/18/18 0318     Scan Slide --      See Manual Differential Results       Manual Differential [[id number]]  (Abnormal) Collected:  01/18/18 0036    Specimen:  Blood Updated:  01/18/18 0318     Neutrophil % 87.0 (H) %      Lymphocyte % 9.0 (L) %      Monocyte % 2.0 %      Eosinophil % 2.0 %      Neutrophils Absolute 8.47 (H) 10*3/mm3      Lymphocytes Absolute 0.88 (L) 10*3/mm3      Monocytes Absolute 0.19 10*3/mm3      Eosinophils Absolute 0.19 10*3/mm3      Anisocytosis Slight/1+     Elliptocytes Slight/1+     Hypochromia Mod/2+     Microcytes Mod/2+     Target Cells Slight/1+     Platelet Morphology Normal        Orders (last 24 hrs)     Start     Ordered    01/19/18 0600  C-reactive Protein  Morning Draw      01/18/18 1226    01/19/18 0001  NPO Diet  Diet Effective Midnight      01/18/18 1155    01/18/18 0600  CK  Morning Draw      01/17/18 0652    01/18/18 0600  CBC Auto Differential  PROCEDURE ONCE      01/18/18 0001    01/18/18 0311  Manual Differential  Once      01/18/18 0310    01/18/18 0132  Osmolality, Calculated  Once      01/18/18 0131    01/18/18 0113  Scan Slide  Once      01/18/18 0112    01/18/18 0100  Vancomycin, Trough  Timed      01/17/18 1457    01/18/18 0100  A vancomycin trough level has been ordered prior to the 02:00 dose 1/18. Draw level at 01:00 on 1/18.  Hold dose if trough level is greater than 20 mg/L.  Nursing Communication  Once     Comments:  A vancomycin trough level has been ordered prior to the 02:00 dose 1/18. Draw level at 01:00 on 1/18.  Hold dose if trough level is greater than 20 mg/L.    01/17/18 1457    01/17/18 1509  Specialty Bed [facility]  Once     Comments:  Envella    01/17/18 1509    01/17/18 0600  CBC & Differential  Daily      01/16/18 0723    01/17/18 0600  Comprehensive Metabolic Panel  Daily      01/16/18 0723    01/16/18 1800  cefepime  (MAXIPIME) 2 g/100 mL 0.9% NS (mbp)  Every 12 Hours      01/16/18 1028    01/16/18 1400  heparin (porcine) 5000 UNIT/ML injection 5,000 Units  Every 8 Hours Scheduled      01/16/18 0816    01/16/18 1400  vancomycin (VANCOCIN) 1,000 mg in sodium chloride 0.9 % 250 mL IVPB  Every 12 Hours      01/16/18 1107    01/16/18 1400  oxyCODONE (ROXICODONE) immediate release tablet 5 mg  Every 4 Hours PRN      01/16/18 1337    01/16/18 1200  metroNIDAZOLE (FLAGYL) IVPB 500 mg  Every 8 Hours      01/16/18 1028    01/16/18 1026  Pharmacy to dose vancomycin  Continuous PRN      01/16/18 1028    01/16/18 0718  acetaminophen (TYLENOL) tablet 650 mg  Every 6 Hours PRN      01/16/18 0723    01/15/18 2000  sodium chloride 0.9 % infusion  Continuous,   Status:  Discontinued      01/15/18 1906    01/15/18 1700  HYDROmorphone (DILAUDID) injection 1 mg  Once      01/15/18 1554    01/15/18 1226  sodium chloride 0.9 % flush 10 mL  As Needed      01/15/18 1227    --  oxyCODONE (ROXICODONE) 20 MG tablet  Every 6 Hours PRN      01/15/18 1638          Operative/Procedure Notes (last 24 hours) (Notes from 1/17/2018 12:33 PM through 1/18/2018 12:33 PM)     No notes of this type exist for this encounter.           Physician Progress Notes (last 24 hours) (Notes from 1/17/2018 12:33 PM through 1/18/2018 12:33 PM)      Samuel Duane Kreis, MD at 1/18/2018  7:52 AM  Version 2 of 2              LOS: 3 days     Chief Complaint:  Rhabdomyolysis/Decubitus Ulcers with Sepsis    Subjective     Interval History:   He is done well overnight with no major changes ×24 hours.  Mild temp of 100.1 noted last evening however continues to improve.  CPK has improved to 346.  He no longer has leukocytosis.  He is doing well with broad-spectrum antibiotics, ID following.  He is doing well with current wound care, per surgery.  He has been participating with physical therapy.  This morning he complains of low back pain which has been chronic for numerous years.  Pain  "medications prescribed are helping however not completely resolving his pain.  His mental status has improved, he was evaluated by psychiatry yesterday and deemed appropriate for medical decision-making.      .Objective     Vital Signs  /71 (BP Location: Right arm, Patient Position: Lying)  Pulse 102  Temp 97.9 °F (36.6 °C) (Oral)   Resp 18  Ht 182.9 cm (72\")  Wt 70.8 kg (156 lb 1.6 oz)  SpO2 96%  BMI 21.17 kg/m2  Intake & Output (last day)       01/17 0701 - 01/18 0700 01/18 0701 - 01/19 0700    P.O. 240     Total Intake(mL/kg) 240 (3.4)     Urine (mL/kg/hr) 75 (0)     Stool 0 (0)     Total Output 75      Net +165            Unmeasured Urine Occurrence 4 x     Unmeasured Stool Occurrence 1 x             Physical Exam:     General Appearance:    Alert, cooperative, in no acute distress   Head:    Normocephalic, without obvious abnormality, atraumatic   Eyes:            Lids and lashes normal, conjunctivae and sclerae normal, no   icterus, no pallor, corneas clear, PERRLA   Ears:    Ears appear intact with no abnormalities noted   Throat:   No oral lesions, no thrush, oral mucosa moist   Neck:   No adenopathy, supple, trachea midline, no thyromegaly, no   carotid bruit, no JVD   Lungs:     Scattered Coarse Crackles bilaterally R>>L ,respirations regular, even and                  unlabored    Heart:    Regular rhythm and normal rate, normal S1 and S2, no            murmur, no gallop, no rub, no click   Chest Wall:    No abnormalities observed   Abdomen:     Normal bowel sounds, no masses, no organomegaly, soft        non-tender, non-distended, no guarding, no rebound                tenderness   Extremities:   Moves all extremities well, no edema, no cyanosis, no             redness   Pulses:   Pulses palpable and equal bilaterally   Skin:   No bleeding, bruising or rash, Sacral decubitus ulcer with blackened eschar and right trochanteric ulcer with eschar   Lymph nodes:   No palpable adenopathy "   Neurologic:   Cranial nerves 2 - 12 grossly intact, sensation intact, DTR       present and equal bilaterally        Results Review:    Lab Results   Component Value Date    WBC 9.73 01/18/2018    HGB 9.5 (L) 01/18/2018    HCT 29.5 (L) 01/18/2018    MCV 62.8 (L) 01/18/2018     01/18/2018       Lab Results   Component Value Date    GLUCOSE 127 (H) 01/18/2018    BUN 18 01/18/2018    CREATININE 0.51 01/18/2018    EGFRIFNONA >150 01/18/2018    BCR 35.3 (H) 01/18/2018     01/18/2018    K 3.9 01/18/2018     01/18/2018    CO2 20.7 (L) 01/18/2018    CALCIUM 7.8 01/18/2018    PROTENTOTREF 7.2 11/12/2015    ALBUMIN 2.70 (L) 01/18/2018    LABIL2 1.0 (L) 01/18/2018    AST 64 (H) 01/18/2018    ALT 34 01/18/2018     Lab Results   Component Value Date    INR 0.94 11/03/2015    INR <0.90 10/25/2015    INR <0.90 06/01/2015       No results found for: POCGLU       Medication Review:     Current Facility-Administered Medications:   •  acetaminophen (TYLENOL) tablet 650 mg, 650 mg, Oral, Q6H PRN, JARETH Condon, 650 mg at 01/16/18 1913  •  cefepime (MAXIPIME) 2 g/100 mL 0.9% NS (mbp), 2 g, Intravenous, Q12H, Shameka Galeana PA-C, 2 g at 01/18/18 0444  •  heparin (porcine) 5000 UNIT/ML injection 5,000 Units, 5,000 Units, Subcutaneous, Q8H, JARETH Condon, 5,000 Units at 01/17/18 2019  •  HYDROmorphone (DILAUDID) injection 1 mg, 1 mg, Intravenous, Once, José Miguel Li MD  •  metroNIDAZOLE (FLAGYL) IVPB 500 mg, 500 mg, Intravenous, Q8H, Shameka Galeana PA-C, 500 mg at 01/18/18 0444  •  oxyCODONE (ROXICODONE) immediate release tablet 5 mg, 5 mg, Oral, Q4H PRN, Samuel Duane Kreis, MD, 5 mg at 01/18/18 0443  •  Pharmacy to dose vancomycin, , Does not apply, Continuous PRN, Shameka Galeana PA-C  •  Insert peripheral IV, , , Once **AND** sodium chloride 0.9 % flush 10 mL, 10 mL, Intravenous, PRN, Jesus Carreon PA-C  •  sodium chloride 0.9 % infusion, 150 mL/hr, Intravenous, Continuous, Samuel Duane  MD Nehemias, Last Rate: 150 mL/hr at 01/18/18 0443, 150 mL/hr at 01/18/18 0443  •  vancomycin (VANCOCIN) 1,000 mg in sodium chloride 0.9 % 250 mL IVPB, 1,000 mg, Intravenous, Q12H, Carlie Olvera, [facility], 1,000 mg at 01/18/18 0219      Assessment/Plan   Probable Aspiration Pneumonia with Sepsis  Sacral Decubitus Ulcer   Decubitus ulceration of the hip  Hyperbilirubinemia  Rhabdomyolysis  Metabolic encephalopathy due to sepsis  Multiple myeloma  Chronic opioid use  Fever    Appreciate surgery and ID input     Continue with Cefepime + Flagyl + Vancomycin    Oxycodone 5 mg q 6 hours prn pain     Continue with IVF @ 150 ml/hour     Tylenol prn fever     Continue with wound care     Heparin for DVT prophylaxis    O2 via NC to maintain O2 sats >90%    Continue with PT/OT, likely discharge to SNF for physical rehab     CBC, CMP daily    JARETH Condon  01/18/18  7:52 AM     He is awake, alert, interactive.  He is answering questions appropriately.  I had a couple conversations yesterday with his family.  Apparently Jhonny currently has no home because he hadn't been paying his rent.  His confusion is largely resolved.  He was evaluated by psychiatry yesterday and felt to be competent.  He does admit to having self medicated with his opioid medications and overusing them.  He's taking a much lower dose now and states he's doing pretty well.  He does have pain but states is tolerable.  He is hoping to get stronger, work better with therapy and hopefully do short-term rehabilitation before getting his life back together at home.  Less than 2 weeks ago he was ambulatory and chopping firewood.  This is according to both his brother and his son.  On examination he continues to have rales throughout the lungs, right greater than left.  His bilirubin is down to 1.7.  His hepatitis B and C were both negative hepatitis A was negative.  At this point continue with cefepime, Flagyl and vancomycin.  Continue physical therapy and  "occupational therapy.  He will require skilled nursing facility at discharge.  I'm going to discontinue IV fluids today.  CPK is trending downward and nearly normalized.    Samuel Duane Kreis, MD  01/18/18  8:31 AM             Electronically signed by Samuel Duane Kreis, MD at 1/18/2018  8:31 AM      JARETH Condon at 1/18/2018  7:52 AM  Version 1 of 2              LOS: 3 days     Chief Complaint:  Rhabdomyolysis/Decubitus Ulcers with Sepsis    Subjective     Interval History:   He is done well overnight with no major changes ×24 hours.  Mild temp of 100.1 noted last evening however continues to improve.  CPK has improved to 346.  He no longer has leukocytosis.  He is doing well with broad-spectrum antibiotics, ID following.  He is doing well with current wound care, per surgery.  He has been participating with physical therapy.  This morning he complains of low back pain which has been chronic for numerous years.  Pain medications prescribed are helping however not completely resolving his pain.  His mental status has improved, he was evaluated by psychiatry yesterday and deemed appropriate for medical decision-making.      .Objective     Vital Signs  /71 (BP Location: Right arm, Patient Position: Lying)  Pulse 102  Temp 97.9 °F (36.6 °C) (Oral)   Resp 18  Ht 182.9 cm (72\")  Wt 70.8 kg (156 lb 1.6 oz)  SpO2 96%  BMI 21.17 kg/m2  Intake & Output (last day)       01/17 0701 - 01/18 0700 01/18 0701 - 01/19 0700    P.O. 240     Total Intake(mL/kg) 240 (3.4)     Urine (mL/kg/hr) 75 (0)     Stool 0 (0)     Total Output 75      Net +165            Unmeasured Urine Occurrence 4 x     Unmeasured Stool Occurrence 1 x             Physical Exam:     General Appearance:    Alert, cooperative, in no acute distress   Head:    Normocephalic, without obvious abnormality, atraumatic   Eyes:            Lids and lashes normal, conjunctivae and sclerae normal, no   icterus, no pallor, corneas clear, PERRLA   Ears:    " Ears appear intact with no abnormalities noted   Throat:   No oral lesions, no thrush, oral mucosa moist   Neck:   No adenopathy, supple, trachea midline, no thyromegaly, no   carotid bruit, no JVD   Lungs:     Scattered Coarse Crackles bilaterally R>>L ,respirations regular, even and                  unlabored    Heart:    Regular rhythm and normal rate, normal S1 and S2, no            murmur, no gallop, no rub, no click   Chest Wall:    No abnormalities observed   Abdomen:     Normal bowel sounds, no masses, no organomegaly, soft        non-tender, non-distended, no guarding, no rebound                tenderness   Extremities:   Moves all extremities well, no edema, no cyanosis, no             redness   Pulses:   Pulses palpable and equal bilaterally   Skin:   No bleeding, bruising or rash, Sacral decubitus ulcer with blackened eschar and right trochanteric ulcer with eschar   Lymph nodes:   No palpable adenopathy   Neurologic:   Cranial nerves 2 - 12 grossly intact, sensation intact, DTR       present and equal bilaterally        Results Review:    Lab Results   Component Value Date    WBC 9.73 01/18/2018    HGB 9.5 (L) 01/18/2018    HCT 29.5 (L) 01/18/2018    MCV 62.8 (L) 01/18/2018     01/18/2018       Lab Results   Component Value Date    GLUCOSE 127 (H) 01/18/2018    BUN 18 01/18/2018    CREATININE 0.51 01/18/2018    EGFRIFNONA >150 01/18/2018    BCR 35.3 (H) 01/18/2018     01/18/2018    K 3.9 01/18/2018     01/18/2018    CO2 20.7 (L) 01/18/2018    CALCIUM 7.8 01/18/2018    PROTENTOTREF 7.2 11/12/2015    ALBUMIN 2.70 (L) 01/18/2018    LABIL2 1.0 (L) 01/18/2018    AST 64 (H) 01/18/2018    ALT 34 01/18/2018     Lab Results   Component Value Date    INR 0.94 11/03/2015    INR <0.90 10/25/2015    INR <0.90 06/01/2015       No results found for: POCGLU       Medication Review:     Current Facility-Administered Medications:   •  acetaminophen (TYLENOL) tablet 650 mg, 650 mg, Oral, Q6H PRN, Olivier  JARETH Corrales, 650 mg at 01/16/18 1913  •  cefepime (MAXIPIME) 2 g/100 mL 0.9% NS (mbp), 2 g, Intravenous, Q12H, Shameka Galeana PA-C, 2 g at 01/18/18 0444  •  heparin (porcine) 5000 UNIT/ML injection 5,000 Units, 5,000 Units, Subcutaneous, Q8H, JARETH Condon, 5,000 Units at 01/17/18 2019  •  HYDROmorphone (DILAUDID) injection 1 mg, 1 mg, Intravenous, Once, José Miguel Li MD  •  metroNIDAZOLE (FLAGYL) IVPB 500 mg, 500 mg, Intravenous, Q8H, Shameka Galeana PA-C, 500 mg at 01/18/18 0444  •  oxyCODONE (ROXICODONE) immediate release tablet 5 mg, 5 mg, Oral, Q4H PRN, Samuel Duane Kreis, MD, 5 mg at 01/18/18 0443  •  Pharmacy to dose vancomycin, , Does not apply, Continuous PRN, Shameka Galeana PA-C  •  Insert peripheral IV, , , Once **AND** sodium chloride 0.9 % flush 10 mL, 10 mL, Intravenous, PRN, Jesus Carreon PA-C  •  sodium chloride 0.9 % infusion, 150 mL/hr, Intravenous, Continuous, Samuel Duane Kreis, MD, Last Rate: 150 mL/hr at 01/18/18 0443, 150 mL/hr at 01/18/18 0443  •  vancomycin (VANCOCIN) 1,000 mg in sodium chloride 0.9 % 250 mL IVPB, 1,000 mg, Intravenous, Q12H, Carlie Olvera [facility], 1,000 mg at 01/18/18 0219      Assessment/Plan   Probable Aspiration Pneumonia with Sepsis  Sacral Decubitus Ulcer   Decubitus ulceration of the hip  Hyperbilirubinemia  Rhabdomyolysis  Metabolic encephalopathy due to sepsis  Multiple myeloma  Chronic opioid use  Fever    Appreciate surgery and ID input     Continue with Cefepime + Flagyl + Vancomycin    Oxycodone 5 mg q 6 hours prn pain     Continue with IVF @ 150 ml/hour     Tylenol prn fever     Continue with wound care     Heparin for DVT prophylaxis    O2 via NC to maintain O2 sats >90%    Continue with PT/OT, likely discharge to SNF for physical rehab     CBC, CMP daily    JARETH Condon  01/18/18  7:52 AM     T       Electronically signed by JARETH Condon at 1/18/2018  7:56 AM      JOSÉ MIGUEL Love at 1/18/2018 12:24 PM  Version 1 of 1  "          I have personally seen and examined the patient today and discussed overnight interval progress and pertinent issues with nursing staff.    Subjective:    Nurse at bedside upon rounding this morning and assisted with evaluation of coccyx wound.  Very necrotic, painful area with foul odor.  Patient has been evaluated surgically and recommended no surgical intervention at this time.  White count normal.  No fever reported overnight.    History taken from: patient chart family RN      Vital Signs    /71 (BP Location: Right arm, Patient Position: Lying)  Pulse 102  Temp 97.9 °F (36.6 °C) (Oral)   Resp 18  Ht 182.9 cm (72\")  Wt 70.8 kg (156 lb 1.6 oz)  SpO2 (!) 89%  BMI 21.17 kg/m2    Temp:  [97.9 °F (36.6 °C)-100.1 °F (37.8 °C)] 97.9 °F (36.6 °C)      Intake/Output Summary (Last 24 hours) at 01/18/18 1224  Last data filed at 01/18/18 0336   Gross per 24 hour   Intake                0 ml   Output               75 ml   Net              -75 ml     Intake & Output (last 3 days)       01/15 0701 - 01/16 0700 01/16 0701 - 01/17 0700 01/17 0701 - 01/18 0700 01/18 0701 - 01/19 0700    P.O. 240 880 240     I.V. (mL/kg) 800 (12.2)       IV Piggyback 1000       Total Intake(mL/kg) 2040 (31.2) 880 (12.4) 240 (3.4)     Urine (mL/kg/hr)  275 (0.2) 75 (0)     Stool   0 (0)     Total Output   275 75      Net +2040 +605 +165              Unmeasured Urine Occurrence 4 x 4 x 4 x     Unmeasured Stool Occurrence   1 x         Physical Exam:       General Appearance:    Alert, cooperative, in no acute distress, thin, frail, daughter at bedside    Head:    Normocephalic, without obvious abnormality, atraumatic   Eyes:            Lids and lashes normal, conjunctivae and sclerae normal, no   icterus, no pallor, corneas clear, PERRLA   Ears:    Ears appear intact with no abnormalities noted   Throat:   No oral lesions, no thrush, oral mucosa moist   Neck:   No adenopathy, supple, trachea midline, no thyromegaly, no   " carotid bruit, no JVD   Back:     No tenderness to percussion or palpation, range of motion   normal   Lungs:    coarse breath sounds to right.    Heart:    Regular rhythm and normal rate, normal S1 and S2, no            murmur, no gallop, no rub, no click   Chest Wall:    No abnormalities observed   Abdomen:     Normal bowel sounds, no masses, no organomegaly, soft        non-tender, non-distended, no guarding, no rebound                tenderness   Rectal:     Deferred   Extremities:   Moves all extremities well, no edema, no cyanosis, no             redness   Pulses:   Pulses palpable and equal bilaterally   Skin:   Sacral decubitus ulcer and right hip ulcer    Lymph nodes:   No palpable adenopathy   Neurologic:   Awake, alert, Garbled speech        Results:      Results from last 7 days  Lab Units 01/18/18  0036 01/17/18  0454 01/16/18  0110 01/15/18  1245   WBC 10*3/mm3 9.73 10.08 8.14 13.11*     Lab Results   Component Value Date    NEUTROABS 8.47 (H) 01/18/2018         Results from last 7 days  Lab Units 01/18/18  0036   CREATININE mg/dL 0.51         Results from last 7 days  Lab Units 01/17/18  0454 01/15/18  1245   CRP mg/dL 9.55* 15.63*       Imaging Results (last 24 hours)     ** No results found for the last 24 hours. **            Results Review:    I have personally reviewed laboratory data, culture results, radiology studies and antimicrobial therapy.    Hospital Medications (active)       Dose Frequency Start End    acetaminophen (TYLENOL) tablet 650 mg 650 mg Every 6 Hours PRN 1/16/2018     Sig - Route: Take 2 tablets by mouth Every 6 (Six) Hours As Needed for Mild Pain . - Oral    Cosign for Ordering: Accepted by Samuel Duane Kreis, MD on 1/16/2018  1:17 PM    cefepime (MAXIPIME) 2 g/100 mL 0.9% NS (mbp) 2 g Once 1/16/2018 1/16/2018    Sig - Route: Infuse 100 mL into a venous catheter 1 (One) Time. - Intravenous    Cosign for Ordering: Required by Misty Bae MD    cefepime (MAXIPIME) 2  "g/100 mL 0.9% NS (mbp) 2 g Every 12 Hours 1/16/2018 1/23/2018    Sig - Route: Infuse 100 mL into a venous catheter Every 12 (Twelve) Hours. - Intravenous    Cosign for Ordering: Required by Misty Bae MD    heparin (porcine) 5000 UNIT/ML injection 5,000 Units 5,000 Units Every 8 Hours Scheduled 1/16/2018     Sig - Route: Inject 1 mL under the skin Every 8 (Eight) Hours. - Subcutaneous    Cosign for Ordering: Accepted by Samuel Duane Kreis, MD on 1/16/2018  1:17 PM    HYDROmorphone (DILAUDID) injection 1 mg 1 mg Once 1/15/2018     Sig - Route: Infuse 1 mL into a venous catheter 1 (One) Time. - Intravenous    metroNIDAZOLE (FLAGYL) IVPB 500 mg 500 mg Every 8 Hours 1/16/2018 1/23/2018    Sig - Route: Infuse 100 mL into a venous catheter Every 8 (Eight) Hours. - Intravenous    Cosign for Ordering: Required by Misty Bae MD    oxyCODONE (ROXICODONE) immediate release tablet 5 mg 5 mg Every 4 Hours PRN 1/16/2018 1/26/2018    Sig - Route: Take 1 tablet by mouth Every 4 (Four) Hours As Needed for Moderate Pain . - Oral    Pharmacy to dose vancomycin  Continuous PRN 1/16/2018 1/23/2018    Sig - Route: Continuous As Needed for Consult. - Does not apply    Cosign for Ordering: Required by Misty Bae MD    sodium chloride 0.9 % flush 10 mL 10 mL As Needed 1/15/2018     Sig - Route: Infuse 10 mL into a venous catheter As Needed for Line Care. - Intravenous    Cosign for Ordering: Accepted by José Miguel Li MD on 1/15/2018  1:21 PM    Linked Group 1:  \"And\" Linked Group Details        sodium chloride 0.9 % infusion 150 mL/hr Continuous 1/15/2018     Sig - Route: Infuse 150 mL/hr into a venous catheter Continuous. - Intravenous    vancomycin (VANCOCIN) 1,000 mg in sodium chloride 0.9 % 250 mL IVPB 1,000 mg Every 12 Hours 1/16/2018 1/23/2018    Sig - Route: Infuse 1,000 mg into a venous catheter Every 12 (Twelve) Hours. - Intravenous            Cultures:    Blood Culture   Date Value Ref Range Status " "  01/15/2018 No growth at 24 hours  Preliminary   01/15/2018 No growth at 24 hours  Preliminary     Urine Culture   Date Value Ref Range Status   01/15/2018 No growth at 24 hours  Preliminary           Assessment/Plan     ASSESSMENT:    1.  Severe sepsis with encephalopathy  2.  Aspiration pneumonia     PLAN:    Nurse at bedside upon rounding this morning and assisted with evaluation of coccyx wound.  Very necrotic, painful area with foul odor.  Patient has been evaluated surgically and recommended no surgical intervention at this time.  White count normal.  No fever reported overnight.     Patient is at high risk for MRSA and pseudomonal infection and need for anaerobic coverage as well.  Would recommend to continue vancomycin per pharmacy dosing, cefepime 2 g IV every 12 hours and Flagyl 500 mg IV every 8 hours for aspiration pneumonia.      CRP ordered for a.m.      We will continue to follow closely and adjust antibiotic therapy appropriately.    Patient's findings and recommendations were discussed with patient, family and nursing staff    Code Status: Full Code    JOSÉ MIGUEL Love  01/18/18  12:24 PM       Electronically signed by JOSÉ MIGUEL Love at 1/18/2018 12:26 PM           Consult Notes (last 24 hours) (Notes from 1/17/2018 12:33 PM through 1/18/2018 12:33 PM)      Caden Washburn MD at 1/17/2018  2:10 PM  Version 1 of 1     Consult Orders:    1. Inpatient Consult to Psychiatrist [[id number]] ordered by Samuel Duane Kreis, MD at 01/17/18 1047                    Referring Provider: Dr. Del Cid  Reason for Consultation: Decision making capacity      Chief complaint \"it is my cancer\"    Subjective .     History of present illness:  The patient is a 64 y/o CM who is currently admitted to the [address] [place] [address] for rhabdomyolysis. He states that he is in the hospital and he is here because of his poor health. He states that he has bone cancer, and has been in a lot of pain and takes his pain pills as prescribed. " States that he is not feeling depressed or hopeless and denies any thoughts of giving up. He denies any episodes of confusion. Per staff, the patient has been appropriate and has not had any episodes of confusion, agitation or inappropriate behaviors while he has been in the hospital.     Review of Systems  Gen: No fever or chills.  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 No pertinent family history.,   Social History   Substance Use Topics   • Smoking status: Current Every Day Smoker     Packs/day: 0.50     Types: Cigarettes   • Smokeless tobacco: Never Used   • Alcohol use No   ,   Prescriptions Prior to Admission   Medication Sig Dispense Refill Last Dose   • diazePAM (VALIUM) 10 MG tablet Take 10 mg by mouth 2 (Two) Times a Day As Needed for Anxiety.   1/15/2018 at 1100   • oxyCODONE (ROXICODONE) 20 MG tablet Take 20 mg by mouth Every 6 (Six) Hours As Needed for Moderate Pain .   1/15/2018 at 1100   , Scheduled Meds:    cefepime 2 g Intravenous Q12H   heparin (porcine) 5,000 Units Subcutaneous Q8H   HYDROmorphone 1 mg Intravenous Once   metroNIDAZOLE 500 mg Intravenous Q8H   vancomycin 1,000 mg Intravenous Q12H   , Continuous Infusions:    Pharmacy to dose vancomycin     sodium chloride 150 mL/hr Last Rate: 150 mL/hr (01/17/18 0429)   , PRN Meds:  •  acetaminophen  •  oxyCODONE  •  Pharmacy to dose vancomycin  •  Insert peripheral IV **AND** sodium chloride and  Allergies:  Sulfa antibiotics    Objective     Vital Signs   Temp:  [97.8 °F (36.6 °C)-101 °F (38.3 °C)] 97.8 °F (36.6 °C)  Heart Rate:  [74-96] 76  Resp:  [18] 18  BP: (103-115)/(63-75) 115/73    Mental Status Exam:   Mental Status Exam:    Hygiene:   fair  Cooperation:  Cooperative  Eye Contact:  Good  Psychomotor Behavior:  Appropriate  Affect:  Full range  Hopelessness: Denies  Speech:  Normal  Thought Progress:  Goal directed  Thought Content:  Normal  Suicidal:  None  Homicidal:  None  Hallucinations:  None  Delusion:  None  Memory:  Intact  Orientation:  Person, Place, Time and Situation  Reliability:  fair  Insight:  Fair  Judgement:  Fair  Impulse Control:  Fair    Results Review:   I reviewed the patient's new clinical results.  Lab Results (last 24 hours)     Procedure Component Value Units Date/Time    C-reactive Protein [[id number]]  (Abnormal) Collected:  01/17/18 0454    Specimen:  Blood Updated:  01/17/18 0714     C-Reactive Protein 9.55 (H) mg/dL     Comprehensive Metabolic Panel [[id number]]  (Abnormal) Collected:  01/17/18 0454    Specimen:  Blood Updated:  01/17/18 0716     Glucose 84 mg/dL      BUN 22 (H) mg/dL      Creatinine 0.64 mg/dL      Sodium 138 mmol/L      Potassium 4.1 mmol/L      Chloride 112 mmol/L      CO2 22.7 (L) mmol/L      Calcium 7.9 mg/dL      Total Protein 5.8 (L) g/dL      Albumin 2.80 (L) g/dL      ALT (SGPT) 37 U/L      AST (SGOT) 78 (H) U/L      Alkaline Phosphatase 120 U/L       Note New Reference Ranges        Total Bilirubin 3.5 (H) mg/dL       +1 Icterus        eGFR Non African Amer 127 mL/min/1.73      Globulin 3.0 gm/dL      A/G Ratio 0.9 (L) g/dL      BUN/Creatinine Ratio 34.4 (H)     Anion Gap 3.3 (L) mmol/L     Osmolality, Calculated [[id number]]  (Normal) Collected:  01/17/18 0454    Specimen:  Blood Updated:  01/17/18 0716     Osmolality Calc 278.2 mOsm/kg     CBC & Differential [[id number]] Collected:  01/17/18 0454    Specimen:  Blood Updated:  01/17/18 0727     Narrative:       The following orders were created for panel order CBC & Differential.  Procedure                               Abnormality         Status                     ---------                               -----------         ------                     Scan Slide[[id number]]                                       Final result               CBC Auto Differential[[id number]]        Abnormal            Final result                 Please view results for these tests on the individual orders.    CBC Auto Differential [[id number]]  (Abnormal) Collected:  01/17/18 0454    Specimen:  Blood Updated:  01/17/18 0727     WBC 10.08 10*3/mm3      RBC 4.75 10*6/mm3      Hemoglobin 9.4 (L) g/dL      Hematocrit 29.9 (L) %      MCV 62.9 (L) fL      MCH 19.8 (L) pg      MCHC 31.4 (L) g/dL      RDW 16.1 (H) %      RDW-SD 35.4 (L) fl      MPV -- fL       Unable to calculate         Platelets 179 10*3/mm3      Neutrophil % 81.0 (H) %      Lymphocyte % 10.9 (L) %      Monocyte % 7.2 %      Eosinophil % 0.3 %      Basophil % 0.2 %      Immature Grans % 0.4 %      Neutrophils, Absolute 8.16 (H) 10*3/mm3      Lymphocytes, Absolute 1.10 10*3/mm3      Monocytes, Absolute 0.73 10*3/mm3      Eosinophils, Absolute 0.03 10*3/mm3      Basophils, Absolute 0.02 10*3/mm3      Immature Grans, Absolute 0.04 (H) 10*3/mm3     Scan Slide [[id number]] Collected:  01/17/18 0454    Specimen:  Blood Updated:  01/17/18 0727     Anisocytosis Slight/1+     Elliptocytes Slight/1+     Hypochromia Mod/2+     Microcytes Mod/2+     Poikilocytes Slight/1+     Schistocytes Slight/1+     Platelet Morphology Normal    CK [[id number]]  (Abnormal) Collected:  01/17/18 0848    Specimen:  Blood Updated:  01/17/18 0927     Creatine Kinase 454 (C) U/L       1+ Icteric        Urine Culture - Urine, Urine, Clean Catch [[id number]] Collected:  01/15/18 1253    Specimen:  Urine from Urine, Clean Catch Updated:  01/17/18 1152     Urine Culture >100,000 CFU/mL Normal Urogenital  Jo Ann    Blood Culture - Blood, Blood, Venous Line [[id number]]  (Normal) Collected:  01/15/18 1245    Specimen:  Blood from Arm, Right Updated:  01/17/18 1401     Blood Culture No growth at 2 days    Blood Culture - Blood, Blood, Venous Line [[id number]]  (Normal) Collected:  01/15/18 1327    Specimen:  Blood from Arm, Left Updated:  01/17/18 1401     Blood Culture No growth at 2 days        Imaging Results (last 24 hours)     Procedure Component Value Units Date/Time    US Abdomen Complete [[id number]] Collected:  01/16/18 1502     Updated:  01/16/18 1506    Narrative:       EXAMINATION: US ABDOMEN COMPLETE-         CLINICAL INDICATION:     hyperbilirubinemia; L89.159-Pressure ulcer of  sacral region, unspecified stage; W19.XXXA-Unspecified fall, initial  encounter; M62.82-Rhabdomyolysis     TECHNIQUE: Multiplanar gray scale ultrasound of the abdomen.      COMPARISON: NONE      FINDINGS:   Visualized pancreas is unremarkable.   Abnormal wall thickening of the gallbladder with extensive luminal  sludge noted. No large shadowing stones or obvious pericholecystic fluid  identified.  The common bile duct measures 5.5 mm and is within normal limits. .  There is diffuse fatty infiltration of the liver. No focal lesion or  intrahepatic biliary dilatation identified.  Spleen is borderline enlarged measuring 14.2 cm in length without focal  splenic abnormality.   The RIGHT kidney measures 11.2 cm  in length without mass,  hydronephrosis, or shadowing stone.   The LEFT kidney measures 12.5 cm in length without mass, hydronephrosis,  or shadowing stone. 3.9 cm simple appearing cyst lower pole.  No ascites demonstrated.   IVC shows patency.  There is normal directional flow within the portal  vein.  Aorta is 2.2 cm diameter and is within normal limits.       Impression:       1. Abnormal wall thickening and distention of gallbladder with extensive  luminal sludge or lithogenic bile. Early changes of cholecystitis cannot  be  excluded.  2. Common bile duct within normal limits for diameter.  3. Borderline splenic enlargement.      This report was finalized on 1/16/2018 3:04 PM by Dr. Olivier Marshall MD.       XR Hips Bilateral With or Without Pelvis 3-4 View [[id number]] Collected:  01/16/18 1559     Updated:  01/16/18 1602    Narrative:       EXAMINATION: XR HIPS BILATERAL W OR WO PELVIS 3-4 VIEW-      CLINICAL INDICATION:  bilateral hip and pelvic pain; L89.159-Pressure  ulcer of sacral region, unspecified stage; W19.XXXA-Unspecified fall,  initial encounter; M62.82-Rhabdomyolysis      TECHNIQUE: X-rays of the right and left hip in two views. One frontal  view of the pelvis.     COMPARISON: None      FINDINGS:  The alignment of the osseous structures is anatomic. There  is no displaced fracture, evidence of AVN nor intraosseous destructive  lesion. There are no soft tissue abnormalities.        Impression:       No acute displaced fracture.     This report was finalized on 1/16/2018 3:59 PM by Dr. Carlos Jiménez MD.       XR Chest PA & Lateral [[id number]] Updated:  01/17/18 1047            Assessment/Plan     Active Problems:    Rhabdomyolysis    Decubitus ulcer of coccyx, unspecified pressure ulcer stage     - The patient is not confused, doesn't appear to have delirium or dementia and has the ability to make informed decisions about his medical care and disposition.      I discussed the patients findings and my recommendations with patient and nursing staff    Caden Washburn MD  01/17/18  2:11 PM           Electronically signed by Caden Washburn MD at 1/17/2018  2:22 PM           Physical Therapy Notes (last 24 hours) (Notes from 1/17/2018 12:34 PM through 1/18/2018 12:34 PM)      Tanna Nettles PT at 1/17/2018  4:53 PM  Version 1 of 1         Problem: Inpatient Physical Therapy  Goal: Bed Mobility Goal LTG- PT  Outcome: Ongoing (interventions implemented as appropriate)   01/17/18 1652   Bed Mobility PT LTG   Bed Mobility PT LTG, Date  Established 18   Bed Mobility PT LTG, Time to Achieve by discharge   Bed Mobility PT LTG, Activity Type all bed mobility   Bed Mobility PT LTG, [place] Level minimum assist (75% patient effort)   Bed Mobility PT Goal LTG, Assist Device bed rails     Goal: Transfer Training Goal 1 LTG- PT  Outcome: Ongoing (interventions implemented as appropriate)   18 165   Transfer Training PT LTG   Transfer Training PT LTG, Date Established 18   Transfer Training PT LTG, Time to Achieve by discharge   Transfer Training PT LTG, Activity Type all transfers   Transfer Training PT LTG, [place] Level minimum assist (75% patient effort)   Transfer Training PT LTG, Assist Device walker, rolling     Goal: Gait Training Goal LTG- PT  Outcome: Ongoing (interventions implemented as appropriate)   18 165   Gait Training PT LTG   Gait Training Goal PT LTG, Date Established 18   Gait Training Goal PT LTG, Time to Achieve by discharge   Gait Training Goal PT LTG, [place] Level minimum assist (75% patient effort)   Gait Training Goal PT LTG, Assist Device walker, rolling   Gait Training Goal PT LTG, Distance to Achieve 40            Electronically signed by Tanna Nettles PT at 2018  4:53 PM      Tanna Nettles PT at 2018  4:56 PM  Version 1 of 1         Acute Care - Physical Therapy Initial Evaluation   [place]     Patient Name: Jhonny Washington  : 1955  MRN: [id number]  Today's Date: 2018   Onset of Illness/Injury or Date of Surgery Date: 01/15/18  Date of Referral to PT: 18  Referring Physician: Dr. Del Cid      Admit Date: 1/15/2018     Visit Dx:    ICD-10-CM ICD-9-CM   1. Decubitus ulcer of coccyx, unspecified pressure ulcer stage L89.159 707.03     707.20   2. Fall, initial encounter W19.XXXA E888.9   3. 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, Right [place] of Lung with T2 vertebral, and second Rib infiltration, S/P radiation/Chemotherapy   • Polysubstance dependency    • TIA (transient ischemic attack)     2014     Past Surgical History:   Procedure Laterality Date   • APPENDECTOMY     • BACK SURGERY     • LUNG BIOPSY  2015          PT ASSESSMENT (last 72 hours)      PT Evaluation       01/17/18 1637 01/17/18 0800    Rehab Evaluation    Document Type evaluation  -BC     Subjective Information agree to therapy;complains of;pain;weakness  -BC     Patient Effort, Rehab Treatment fair  -BC     Symptoms Noted During/After Treatment increased pain  -BC     General Information    Patient Profile Review yes  -BC     Onset of Illness/Injury or Date of Surgery Date 01/15/18  -BC     Referring Physician Dr. Del Cid  -BC     General Observations Pt. supine in bed, dtr in room. Pt. awake and alert, has pain with movement.  -BC     Prior Level of Function independent:;all household mobility  -BC     Equipment Currently Used at Home none  -BC     Plans/Goals Discussed With patient and family;agreed upon  -BC     Risks Reviewed patient and family:;LOB;nausea/vomiting;dizziness;increased discomfort;change in vital signs;lines disloged  -BC     Benefits Reviewed patient and family:;improve function;increase independence;increase strength;increase balance;decrease pain;decrease risk of DVT;increase knowledge;improve skin integrity  -BC     Living Environment    Lives With alone  -BC     Living Arrangements apartment  -BC     Clinical Impression    Date of Referral to PT 01/17/18  -BC     Functional Level At Time Of Evaluation fair  -BC     Criteria for Skilled Therapeutic Interventions Met  yes;treatment indicated  -BC     Rehab Potential fair, will monitor progress closely  -BC     Predicted Duration of Therapy Intervention (days/wks) LOS  -BC     Pain Assessment    Pain Assessment Ng-Baker FACES  -BC     Ng-Baker FACES Pain Rating 8  -BC     Pain Type Acute pain  -BC     Pain Location Sacrum   pressure over sacral area.  -BC     Cognitive Assessment/Intervention    Current Cognitive/Communication Assessment functional  -BC     Orientation Status oriented to;person;situation  -BC     Follows Commands/Answers Questions 100% of the time;able to follow single-step instructions;needs cueing  -BC     Personal Safety decreased awareness, need for assist;decreased awareness, need for safety;decreased insight to deficits  -BC     Personal Safety Interventions fall prevention program maintained;gait belt;muscle strengthening facilitated;nonskid shoes/slippers when out of bed  -BC     ROM (Range of Motion)    General ROM lower extremity range of motion deficits identified  -BC     General ROM Detail BLE ROM decreased by 30%  -BC     MMT (Manual Muscle Testing)    General MMT Assessment lower extremity strength deficits identified  -BC     General MMT Assessment Detail BLE MMT 3/5  -BC     Muscle Tone Assessment    Muscle Tone Assessment  Bilateral Upper Extremities;Bilateral Lower Extremities  -EA    Bilateral Upper Extremities Muscle Tone Assessment  mildly decreased tone  -EA    Bilateral Lower Extremities Muscle Tone Assessment  mildly decreased tone  -EA    Bed Mobility, Assessment/Treatment    Bed Mobility, Assistive Device bed rails  -BC     Bed Mobility, Roll Left, [place] moderate assist (50% patient effort);2 person assist required  -BC     Bed Mobility, Roll Right, [place] moderate assist (50% patient effort);2 person assist required  -BC     Bed Mob, Supine to Sit, [place] moderate assist (50% patient effort);2 person assist required  -BC     Bed Mob, Sit to Supine, [place]  moderate assist (50% patient effort);2 person assist required  -BC     Transfer Assessment/Treatment    Transfers, Sit-Stand [place] moderate assist (50% patient effort);2 person assist required  -BC     Transfers, Stand-Sit [place] moderate assist (50% patient effort);2 person assist required  -BC     Transfers, Sit-Stand-Sit, Assist Device rolling walker  -BC     Gait Assessment/Treatment    Gait, [place] Level moderate assist (50% patient effort);2 person assist required  -BC     Gait, Assistive Device rolling walker  -BC     Gait, Distance (Feet) 30  -BC     Positioning and Restraints    Pre-Treatment Position in bed  -BC     Post Treatment Position chair  -BC     In Chair notified nsg;reclined;call light within reach;encouraged to call for assist;with family/caregiver  -BC       01/16/18 1607 01/16/18 1000    Living Environment    Lives With alone  -SG     Living Arrangements apartment  -SG     Home Accessibility no concerns  -SG     Type of Financial/Environmental Concern none  -SG     Transportation Available car  -SG     Muscle Tone Assessment    Muscle Tone Assessment  Bilateral Upper Extremities;Bilateral Lower Extremities  -BT    Bilateral Upper Extremities Muscle Tone Assessment  moderately decreased tone  -BT    Bilateral Lower Extremities Muscle Tone Assessment  moderately decreased tone  -BT      01/15/18 2000 01/15/18 1800    General Information    Equipment Currently Used at Home  none  -MP    Living Environment    Lives With  alone  -MP    Living Arrangements  apartment  -MP    Home Accessibility  no concerns  -MP    Stair Railings at Home  none  -MP    Type of Financial/Environmental Concern  none  -MP    Transportation Available  car;family or friend will provide  -MP    Muscle Tone Assessment    Muscle Tone Assessment Bilateral Upper Extremities;Bilateral Lower Extremities  -TONEY     Bilateral Upper Extremities Muscle Tone Assessment moderately decreased tone  -TONEY     Bilateral  Lower Extremities Muscle Tone Assessment moderately decreased tone  -       User Key  (r) = Recorded By, (t) = Taken By, (c) = Cosigned By    Initials Name Provider Type    MARIA LUISA Gracia, RN Registered Nurse    TONEY Dori Shoemaker, RN Registered Nurse    ELLY Dutton, RN Registered Nurse    BC Tanna Nettles, PT Physical Therapist    SG Amber Jimenes     BT Jesus Lanier, RN Registered Nurse          Physical Therapy Education     Title: PT OT SLP Therapies (Done)     Topic: Physical Therapy (Done)     Point: Mobility training (Done)    Learning Progress Summary    Learner Readiness Method Response Comment Documented by Status   Patient Acceptance E [facility] 01/17/18 1652 Done               Point: Home exercise program (Done)    Learning Progress Summary    Learner Readiness Method Response Comment Documented by Status   Patient Acceptance E [facility] 01/17/18 1652 Done               Point: Body mechanics (Done)    Learning Progress Summary    Learner Readiness Method Response Comment Documented by Status   Patient Acceptance E [facility] 01/17/18 1652 Done               Point: Precautions (Done)    Learning Progress Summary    Learner Readiness Method Response Comment Documented by Status   Patient Acceptance E [facility] 01/17/18 1652 Done                      User Key     Initials Effective Dates Name Provider Type Discipline    BC 03/14/16 -  Tanna Nettles, PT Physical Therapist PT                PT Recommendation and Plan  Anticipated Equipment Needs At Discharge: front wheeled walker  Planned Therapy Interventions: balance training, bed mobility training, gait training, home exercise program, patient/family education, strengthening, transfer training  PT Frequency: 3-5 times/wk, per priority policy             IP PT Goals       01/17/18 1652          Bed Mobility PT LTG    Bed Mobility PT LTG, Date Established 01/17/18  -BC      Bed Mobility PT LTG, Time to Achieve by  discharge  -BC      Bed Mobility PT LTG, Activity Type all bed mobility  -BC      Bed Mobility PT LTG, [place] Level minimum assist (75% patient effort)  -BC      Bed Mobility PT Goal  LTG, Assist Device bed rails  -BC      Transfer Training PT LTG    Transfer Training PT LTG, Date Established 01/17/18  -BC      Transfer Training PT LTG, Time to Achieve by discharge  -BC      Transfer Training PT LTG, Activity Type all transfers  -BC      Transfer Training PT LTG, [place] Level minimum assist (75% patient effort)  -BC      Transfer Training PT LTG, Assist Device walker, rolling  -BC      Gait Training PT LTG    Gait Training Goal PT LTG, Date Established 01/17/18  -BC      Gait Training Goal PT LTG, Time to Achieve by discharge  -BC      Gait Training Goal PT LTG, [place] Level minimum assist (75% patient effort)  -BC      Gait Training Goal PT LTG, Assist Device walker, rolling  -BC      Gait Training Goal PT LTG, Distance to Achieve 40  -BC        User Key  (r) = Recorded By, (t) = Taken By, (c) = Cosigned By    Initials Name Provider Type    HARRIETT Nettles, PT Physical Therapist                Outcome Measures       01/17/18 1600          How much help from another person do you currently need...    Turning from your back to your side while in flat bed without using bedrails? 2  -BC      Moving from lying on back to sitting on the side of a flat bed without bedrails? 2  -BC      Moving to and from a bed to a chair (including a wheelchair)? 2  -BC      Standing up from a chair using your arms (e.g., wheelchair, bedside chair)? 2  -BC      Climbing 3-5 steps with a railing? 1  -BC      To walk in hospital room? 3  -BC      AM-PAC 6 Clicks Score 12  -BC      Functional Assessment    Outcome Measure Options AM-PAC 6 Clicks Basic Mobility (PT)  -BC        User Key  (r) = Recorded By, (t) = Taken By, (c) = Cosigned By    Initials Name Provider Type    HARRIETT Nettles, PT Physical Therapist            Time Calculation:         PT Charges       01/17/18 2165          Time Calculation    Start Time --   60  -BC      PT Received On 01/17/18  -BC      PT Goal Re-Cert Due Date 01/31/18  -BC        User Key  (r) = Recorded By, (t) = Taken By, (c) = Cosigned By    Initials Name Provider Type    BC Tanan Nettles, PT Physical Therapist          Therapy Charges for Today     Code Description Service Date Service Provider Modifiers Qty    09890793538 HC PT MOBILITY CURRENT 1/17/2018 Tanna Nettles, PT GP, CL 1    61556040299 HC PT MOBILITY PROJECTED 1/17/2018 Tanna Nettles, PT GP, CK 1    20483263992 HC GAIT TRAINING EA 15 MIN 1/17/2018 Tanna Nettles, PT GP 1    05125316292 HC PT EVAL MOD COMPLEXITY 2 1/17/2018 Tanna Nettles, PT GP 1    52743873383 HC PT THERAPEUTIC ACT EA 15 MIN 1/17/2018 Tanna Nettles, PT GP 1    04859457888 HC PT THER SUPP EA 15 MIN 1/17/2018 Tanna Nettles, PT GP 4          PT G-Codes  Outcome Measure Options: AM-PAC 6 Clicks Basic Mobility (PT)  Score: 12  Functional Limitation: Mobility: Walking and moving around  Mobility: Walking and Moving Around Current Status (): At least 60 percent but less than 80 percent impaired, limited or restricted  Mobility: Walking and Moving Around Goal Status (): At least 40 percent but less than 60 percent impaired, limited or restricted      Tanna Nettles, PT  1/17/2018          Electronically signed by Tanna Nettles PT at 1/17/2018  4:56 PM

## 2018-01-18 NOTE — PROGRESS NOTES
Discharge Planning Assessment   [facility]     Patient Name: Jhonny Washington  MRN: [id number]  Today's Date: 1/18/2018    Admit Date: 1/15/2018          Discharge Needs Assessment     None            Discharge Plan       01/18/18 1236    Case Management/Social Work Plan    Plan Pt agreeable to nursing home placement.  SS faxed pt information to [facility] in [place] for review.  SS will follow.         Discharge Placement     No information found        Expected Discharge Date and Time     Expected Discharge Date Expected Discharge Time    Jan 20, 2018               Demographic Summary     None            Functional Status     None            Psychosocial     None            Abuse/Neglect     None            Legal     None            Substance Abuse     None            Patient Forms     None          Amber Jimenes

## 2018-01-18 NOTE — THERAPY TREATMENT NOTE
Acute Care - Physical Therapy Treatment Note  [facility]     Patient Name: Jhonny Washington  : 1955  MRN: [id number]  Today's Date: 2018  Onset of Illness/Injury or Date of Surgery Date: 01/15/18  Date of Referral to PT: 18  Referring Physician: Dr. Del Cid    Admit Date: 1/15/2018    Visit Dx:    ICD-10-CM ICD-9-CM   1. Decubitus ulcer of coccyx, unspecified pressure ulcer stage L89.159 707.03     707.20   2. Fall, initial encounter W19.XXXA E888.9   3. Non-traumatic rhabdomyolysis M62.82 728.88     Patient Active Problem List   Diagnosis   • Encounter for venous access device care   • Rhabdomyolysis   • Decubitus ulcer of coccyx, unspecified pressure ulcer stage               Adult Rehabilitation Note       18 1500          Rehab Assessment/Intervention    Discipline physical therapist  -BC      Document Type therapy note (daily note)  -BC      Subjective Information agree to therapy;complains of;pain;weakness  -BC      Patient Effort, Rehab Treatment fair  -BC      Recorded by [BC] Tanna Nettles, PT      Pain Assessment    Pain Assessment Ng-Baker FACES  -BC      Ng-Baker FACES Pain Rating 8  -BC      Pain Type Acute pain  -BC      Pain Location Sacrum  -BC      Recorded by [BC] Tanna Nettles PT      Cognitive Assessment/Intervention    Current Cognitive/Communication Assessment functional  -BC      Orientation Status oriented to;person;situation  -BC      Follows Commands/Answers Questions 100% of the time;able to follow single-step instructions  -BC      Personal Safety decreased awareness, need for assist;decreased awareness, need for safety;decreased insight to deficits  -BC      Personal Safety Interventions fall prevention program maintained;gait belt;muscle strengthening facilitated;nonskid shoes/slippers when out of bed  -BC      Recorded by [BC] Tanna Nettles, PT      Bed Mobility, Assessment/Treatment    Bed Mobility, Assistive Device bed rails  -BC      Bed  Mobility, Roll Left, [place] moderate assist (50% patient effort);2 person assist required  -BC      Bed Mobility, Roll Right, [place] moderate assist (50% patient effort);2 person assist required  -BC      Bed Mob, Supine to Sit, [place] moderate assist (50% patient effort);2 person assist required  -BC      Bed Mob, Sit to Supine, [place] moderate assist (50% patient effort);2 person assist required  -BC      Recorded by [BC] Tanna Nettles, PT      Transfer Assessment/Treatment    Transfers, Sit-Stand [place] moderate assist (50% patient effort);2 person assist required  -BC      Transfers, Stand-Sit [place] moderate assist (50% patient effort);2 person assist required  -BC      Transfers, Sit-Stand-Sit, Assist Device rolling walker  -BC      Recorded by [BC] Tanna Nettles, PT      Positioning and Restraints    Pre-Treatment Position in bed  -BC      Post Treatment Position chair  -BC      In Chair notified nsg;reclined;call light within reach;encouraged to call for assist  -BC      Recorded by [BC] Tanna Nettles, PT        User Key  (r) = Recorded By, (t) = Taken By, (c) = Cosigned By    Initials Name Effective Dates    BC Tanna Nettles, PT 03/14/16 -                 IP PT Goals       01/17/18 1652          Bed Mobility PT LTG    Bed Mobility PT LTG, Date Established 01/17/18  -BC      Bed Mobility PT LTG, Time to Achieve by discharge  -BC      Bed Mobility PT LTG, Activity Type all bed mobility  -BC      Bed Mobility PT LTG, [place] Level minimum assist (75% patient effort)  -BC      Bed Mobility PT Goal  LTG, Assist Device bed rails  -BC      Transfer Training PT LTG    Transfer Training PT LTG, Date Established 01/17/18  -BC      Transfer Training PT LTG, Time to Achieve by discharge  -BC      Transfer Training PT LTG, Activity Type all transfers  -BC      Transfer Training PT LTG, [place] Level minimum assist (75% patient effort)  -BC      Transfer Training  PT LTG, Assist Device walker, rolling  -BC      Gait Training PT LTG    Gait Training Goal PT LTG, Date Established 01/17/18  -BC      Gait Training Goal PT LTG, Time to Achieve by discharge  -BC      Gait Training Goal PT LTG, [place] Level minimum assist (75% patient effort)  -BC      Gait Training Goal PT LTG, Assist Device walker, rolling  -BC      Gait Training Goal PT LTG, Distance to Achieve 40  -BC        User Key  (r) = Recorded By, (t) = Taken By, (c) = Cosigned By    Initials Name Provider Type    HARRIETT Nettles, PT Physical Therapist          Physical Therapy Education     Title: PT OT SLP Therapies (Done)     Topic: Physical Therapy (Done)     Point: Mobility training (Done)    Learning Progress Summary    Learner Readiness Method Response Comment Documented by Status   Patient Acceptance E VU  BC 01/18/18 1532 Done    Acceptance E VU  BB 01/18/18 0951 Done    Acceptance E VU  SS 01/17/18 1945 Done    Acceptance E VU  BC 01/17/18 1652 Done               Point: Home exercise program (Done)    Learning Progress Summary    Learner Readiness Method Response Comment Documented by Status   Patient Acceptance E VU  BC 01/18/18 1532 Done    Acceptance E VU  BB 01/18/18 0951 Done    Acceptance E VU  SS 01/17/18 1945 Done    Acceptance E VU  BC 01/17/18 1652 Done               Point: Body mechanics (Done)    Learning Progress Summary    Learner Readiness Method Response Comment Documented by Status   Patient Acceptance E VU  BC 01/18/18 1532 Done    Acceptance E VU  BB 01/18/18 0951 Done    Acceptance E VU  SS 01/17/18 1945 Done    Acceptance E VU  BC 01/17/18 1652 Done               Point: Precautions (Done)    Learning Progress Summary    Learner Readiness Method Response Comment Documented by Status   Patient Acceptance E VU  BC 01/18/18 1532 Done    Acceptance E VU  BB 01/18/18 0951 Done    Acceptance E VU  SS 01/17/18 1945 Done    Acceptance E VU  BC 01/17/18 1652 Done                      User Key      Initials Effective Dates Name Provider Type Discipline    BC 03/14/16 -  Tanna Nettles, PT Physical Therapist PT    SS 11/18/16 -  Maribel Ornelas, RN Registered Nurse Nurse    BB 01/21/17 -  Theodore Martinez, RN Registered Nurse Nurse                    PT Recommendation and Plan  Anticipated Equipment Needs At Discharge: front wheeled walker  Planned Therapy Interventions: balance training, bed mobility training, gait training, home exercise program, patient/family education, strengthening, transfer training  PT Frequency: 3-5 times/wk, per priority policy             Outcome Measures       01/18/18 1500 01/17/18 1600       How much help from another person do you currently need...    Turning from your back to your side while in flat bed without using bedrails? 2  -BC 2  -BC     Moving from lying on back to sitting on the side of a flat bed without bedrails? 2  -BC 2  -BC     Moving to and from a bed to a chair (including a wheelchair)? 2  -BC 2  -BC     Standing up from a chair using your arms (e.g., wheelchair, bedside chair)? 2  -BC 2  -BC     Climbing 3-5 steps with a railing? 1  -BC 1  -BC     To walk in hospital room? 1  -BC 3  -BC     AM-PAC 6 Clicks Score 10  -BC 12  -BC     Functional Assessment    Outcome Measure Options AM-PAC 6 Clicks Basic Mobility (PT)  -BC AM-PAC 6 Clicks Basic Mobility (PT)  -BC       User Key  (r) = Recorded By, (t) = Taken By, (c) = Cosigned By    Initials Name Provider Type    BC Tanna Nettles PT Physical Therapist           Time Calculation:         PT Charges       01/18/18 1533          Time Calculation    Start Time --   30  -BC      PT Received On 01/18/18  -BC        User Key  (r) = Recorded By, (t) = Taken By, (c) = Cosigned By    Initials Name Provider Type    BC Tanna Nettles, PT Physical Therapist          Therapy Charges for Today     Code Description Service Date Service Provider Modifiers Qty    92127194068 HC PT MOBILITY CURRENT 1/17/2018 Tanna PERRY  Tho, PT GP, CL 1    73317580945 HC PT MOBILITY PROJECTED 1/17/2018 Tanna Nettles, PT GP, CK 1    71541823631 HC GAIT TRAINING EA 15 MIN 1/17/2018 Tanna Nettles, PT GP 1    47490364644 HC PT EVAL MOD COMPLEXITY 2 1/17/2018 Tanna Nettles, PT GP 1    59987966965 HC PT THERAPEUTIC ACT EA 15 MIN 1/17/2018 Tanna Nettles, PT GP 1    83785999836 HC PT THER SUPP EA 15 MIN 1/17/2018 Tanna Nettles, PT GP 4    89664755379 HC GAIT TRAINING EA 15 MIN 1/18/2018 Tanna Nettles, PT GP 1    66150443858 HC PT THERAPEUTIC ACT EA 15 MIN 1/18/2018 Tanna Nettles, PT GP 1    51645370958 HC PT THER SUPP EA 15 MIN 1/18/2018 Tanna Nettles, PT GP 2          PT G-Codes  Outcome Measure Options: AM-PAC 6 Clicks Basic Mobility (PT)  Score: 12  Functional Limitation: Mobility: Walking and moving around  Mobility: Walking and Moving Around Current Status (): At least 60 percent but less than 80 percent impaired, limited or restricted  Mobility: Walking and Moving Around Goal Status (): At least 40 percent but less than 60 percent impaired, limited or restricted    Tanna Nettles, PT  1/18/2018

## 2018-01-18 NOTE — PROGRESS NOTES
"     LOS: 3 days     Chief Complaint:  Rhabdomyolysis/Decubitus Ulcers with Sepsis    Subjective     Interval History:   He is done well overnight with no major changes ×24 hours.  Mild temp of 100.1 noted last evening however continues to improve.  CPK has improved to 346.  He no longer has leukocytosis.  He is doing well with broad-spectrum antibiotics, ID following.  He is doing well with current wound care, per surgery.  He has been participating with physical therapy.  This morning he complains of low back pain which has been chronic for numerous years.  Pain medications prescribed are helping however not completely resolving his pain.  His mental status has improved, he was evaluated by psychiatry yesterday and deemed appropriate for medical decision-making.      .Objective     Vital Signs  /71 (BP Location: Right arm, Patient Position: Lying)  Pulse 102  Temp 97.9 °F (36.6 °C) (Oral)   Resp 18  Ht 182.9 cm (72\")  Wt 70.8 kg (156 lb 1.6 oz)  SpO2 96%  BMI 21.17 kg/m2  Intake & Output (last day)       01/17 0701 - 01/18 0700 01/18 0701 - 01/19 0700    P.O. 240     Total Intake(mL/kg) 240 (3.4)     Urine (mL/kg/hr) 75 (0)     Stool 0 (0)     Total Output 75      Net +165            Unmeasured Urine Occurrence 4 x     Unmeasured Stool Occurrence 1 x             Physical Exam:     General Appearance:    Alert, cooperative, in no acute distress   Head:    Normocephalic, without obvious abnormality, atraumatic   Eyes:            Lids and lashes normal, conjunctivae and sclerae normal, no   icterus, no pallor, corneas clear, PERRLA   Ears:    Ears appear intact with no abnormalities noted   Throat:   No oral lesions, no thrush, oral mucosa moist   Neck:   No adenopathy, supple, trachea midline, no thyromegaly, no   carotid bruit, no JVD   Lungs:     Scattered Coarse Crackles bilaterally R>>L ,respirations regular, even and                  unlabored    Heart:    Regular rhythm and normal rate, normal S1 " and S2, no            murmur, no gallop, no rub, no click   Chest Wall:    No abnormalities observed   Abdomen:     Normal bowel sounds, no masses, no organomegaly, soft        non-tender, non-distended, no guarding, no rebound                tenderness   Extremities:   Moves all extremities well, no edema, no cyanosis, no             redness   Pulses:   Pulses palpable and equal bilaterally   Skin:   No bleeding, bruising or rash, Sacral decubitus ulcer with blackened eschar and right trochanteric ulcer with eschar   Lymph nodes:   No palpable adenopathy   Neurologic:   Cranial nerves 2 - 12 grossly intact, sensation intact, DTR       present and equal bilaterally        Results Review:    Lab Results   Component Value Date    WBC 9.73 01/18/2018    HGB 9.5 (L) 01/18/2018    HCT 29.5 (L) 01/18/2018    MCV 62.8 (L) 01/18/2018     01/18/2018       Lab Results   Component Value Date    GLUCOSE 127 (H) 01/18/2018    BUN 18 01/18/2018    CREATININE 0.51 01/18/2018    EGFRIFNONA >150 01/18/2018    BCR 35.3 (H) 01/18/2018     01/18/2018    K 3.9 01/18/2018     01/18/2018    CO2 20.7 (L) 01/18/2018    CALCIUM 7.8 01/18/2018    PROTENTOTREF 7.2 11/12/2015    ALBUMIN 2.70 (L) 01/18/2018    LABIL2 1.0 (L) 01/18/2018    AST 64 (H) 01/18/2018    ALT 34 01/18/2018     Lab Results   Component Value Date    INR 0.94 11/03/2015    INR <0.90 10/25/2015    INR <0.90 06/01/2015       No results found for: POCGLU       Medication Review:     Current Facility-Administered Medications:   •  acetaminophen (TYLENOL) tablet 650 mg, 650 mg, Oral, Q6H PRN, JARETH Condon 650 mg at 01/16/18 1913  •  cefepime (MAXIPIME) 2 g/100 mL 0.9% NS (mbp), 2 g, Intravenous, Q12H, Shameka Galeana PA-C, 2 g at 01/18/18 0444  •  heparin (porcine) 5000 UNIT/ML injection 5,000 Units, 5,000 Units, Subcutaneous, Q8H, JARETH Condon, 5,000 Units at 01/17/18 2019  •  HYDROmorphone (DILAUDID) injection 1 mg, 1 mg, Intravenous, Once,  José Miguel Li MD  •  metroNIDAZOLE (FLAGYL) IVPB 500 mg, 500 mg, Intravenous, Q8H, Shameka Galeana PA-C, 500 mg at 01/18/18 0444  •  oxyCODONE (ROXICODONE) immediate release tablet 5 mg, 5 mg, Oral, Q4H PRN, Samuel Duane Kreis, MD, 5 mg at 01/18/18 0443  •  Pharmacy to dose vancomycin, , Does not apply, Continuous PRN, Shameka Galeana PA-C  •  Insert peripheral IV, , , Once **AND** sodium chloride 0.9 % flush 10 mL, 10 mL, Intravenous, PRN, Jesus Carreon PA-C  •  sodium chloride 0.9 % infusion, 150 mL/hr, Intravenous, Continuous, Samuel Duane Kreis, MD, Last Rate: 150 mL/hr at 01/18/18 0443, 150 mL/hr at 01/18/18 0443  •  vancomycin (VANCOCIN) 1,000 mg in sodium chloride 0.9 % 250 mL IVPB, 1,000 mg, Intravenous, Q12H, [facility], 1,000 mg at 01/18/18 0219      Assessment/Plan   Probable Aspiration Pneumonia with Sepsis  Sacral Decubitus Ulcer   Decubitus ulceration of the hip  Hyperbilirubinemia  Rhabdomyolysis  Metabolic encephalopathy due to sepsis  Multiple myeloma  Chronic opioid use  Fever    Appreciate surgery and ID input     Continue with Cefepime + Flagyl + Vancomycin    Oxycodone 5 mg q 6 hours prn pain     Continue with IVF @ 150 ml/hour     Tylenol prn fever     Continue with wound care     Heparin for DVT prophylaxis    O2 via NC to maintain O2 sats >90%    Continue with PT/OT, likely discharge to SNF for physical rehab     CBC, CMP daily    JARETH Condon  01/18/18  7:52 AM     He is awake, alert, interactive.  He is answering questions appropriately.  I had a couple conversations yesterday with his family.  Apparently Jhonny currently has no home because he hadn't been paying his rent.  His confusion is largely resolved.  He was evaluated by psychiatry yesterday and felt to be competent.  He does admit to having self medicated with his opioid medications and overusing them.  He's taking a much lower dose now and states he's doing pretty well.  He does have pain but  states is tolerable.  He is hoping to get stronger, work better with therapy and hopefully do short-term rehabilitation before getting his life back together at home.  Less than 2 weeks ago he was ambulatory and chopping firewood.  This is according to both his brother and his son.  On examination he continues to have rales throughout the lungs, right greater than left.  His bilirubin is down to 1.7.  His hepatitis B and C were both negative hepatitis A was negative.  At this point continue with cefepime, Flagyl and vancomycin.  Continue physical therapy and occupational therapy.  He will require skilled nursing facility at discharge.  I'm going to discontinue IV fluids today.  CPK is trending downward and nearly normalized.    Samuel Duane Kreis, MD  01/18/18  8:31 AM           [phone]

## 2018-01-18 NOTE — PLAN OF CARE
Problem: Skin Integrity Impairment, Risk/Actual (Adult)  Goal: Skin Integrity/Wound Healing  Outcome: Ongoing (interventions implemented as appropriate)      Problem: Pain, Chronic (Adult)  Goal: Acceptable Pain Control/Comfort Level  Outcome: Ongoing (interventions implemented as appropriate)      Problem: Patient Care Overview (Adult)  Goal: Plan of Care Review  Outcome: Ongoing (interventions implemented as appropriate)    Goal: Adult Individualization and Mutuality  Outcome: Ongoing (interventions implemented as appropriate)      Problem: Pressure Ulcer Risk (Brien Scale) (Adult,Obstetrics,Pediatric)  Goal: Identify Related Risk Factors and Signs and Symptoms  Outcome: Ongoing (interventions implemented as appropriate)    Goal: Skin Integrity  Outcome: Ongoing (interventions implemented as appropriate)

## 2018-01-18 NOTE — NURSING NOTE
PT and family requested information on making son power of . PT wishes to go to drug rehabilitation and desires son to make decisions. Pastoral consult ordered

## 2018-01-18 NOTE — PROGRESS NOTES
"  I have personally seen and examined the patient today and discussed overnight interval progress and pertinent issues with nursing staff.    Subjective:    Nurse at bedside upon rounding this morning and assisted with evaluation of coccyx wound.  Very necrotic, painful area with foul odor.  Patient has been evaluated surgically and recommended no surgical intervention at this time.  White count normal.  No fever reported overnight.    History taken from: patient chart family RN      Vital Signs    /71 (BP Location: Right arm, Patient Position: Lying)  Pulse 102  Temp 97.9 °F (36.6 °C) (Oral)   Resp 18  Ht 182.9 cm (72\")  Wt 70.8 kg (156 lb 1.6 oz)  SpO2 (!) 89%  BMI 21.17 kg/m2    Temp:  [97.9 °F (36.6 °C)-100.1 °F (37.8 °C)] 97.9 °F (36.6 °C)      Intake/Output Summary (Last 24 hours) at 01/18/18 1224  Last data filed at 01/18/18 0336   Gross per 24 hour   Intake                0 ml   Output               75 ml   Net              -75 ml     Intake & Output (last 3 days)       01/15 0701 - 01/16 0700 01/16 0701 - 01/17 0700 01/17 0701 - 01/18 0700 01/18 0701 - 01/19 0700    P.O. 240 880 240     I.V. (mL/kg) 800 (12.2)       IV Piggyback 1000       Total Intake(mL/kg) 2040 (31.2) 880 (12.4) 240 (3.4)     Urine (mL/kg/hr)  275 (0.2) 75 (0)     Stool   0 (0)     Total Output   275 75      Net +2040 +605 +165              Unmeasured Urine Occurrence 4 x 4 x 4 x     Unmeasured Stool Occurrence   1 x         Physical Exam:       General Appearance:    Alert, cooperative, in no acute distress, thin, frail, daughter at bedside    Head:    Normocephalic, without obvious abnormality, atraumatic   Eyes:            Lids and lashes normal, conjunctivae and sclerae normal, no   icterus, no pallor, corneas clear, PERRLA   Ears:    Ears appear intact with no abnormalities noted   Throat:   No oral lesions, no thrush, oral mucosa moist   Neck:   No adenopathy, supple, trachea midline, no thyromegaly, no   carotid " bruit, no JVD   Back:     No tenderness to percussion or palpation, range of motion   normal   Lungs:    coarse breath sounds to right.    Heart:    Regular rhythm and normal rate, normal S1 and S2, no            murmur, no gallop, no rub, no click   Chest Wall:    No abnormalities observed   Abdomen:     Normal bowel sounds, no masses, no organomegaly, soft        non-tender, non-distended, no guarding, no rebound                tenderness   Rectal:     Deferred   Extremities:   Moves all extremities well, no edema, no cyanosis, no             redness   Pulses:   Pulses palpable and equal bilaterally   Skin:   Sacral decubitus ulcer and right hip ulcer    Lymph nodes:   No palpable adenopathy   Neurologic:   Awake, alert, Garbled speech        Results:      Results from last 7 days  Lab Units 01/18/18  0036 01/17/18  0454 01/16/18  0110 01/15/18  1245   WBC 10*3/mm3 9.73 10.08 8.14 13.11*     Lab Results   Component Value Date    NEUTROABS 8.47 (H) 01/18/2018         Results from last 7 days  Lab Units 01/18/18  0036   CREATININE mg/dL 0.51         Results from last 7 days  Lab Units 01/17/18  0454 01/15/18  1245   CRP mg/dL 9.55* 15.63*       Imaging Results (last 24 hours)     ** No results found for the last 24 hours. **            Results Review:    I have personally reviewed laboratory data, culture results, radiology studies and antimicrobial therapy.    Hospital Medications (active)       Dose Frequency Start End    acetaminophen (TYLENOL) tablet 650 mg 650 mg Every 6 Hours PRN 1/16/2018     Sig - Route: Take 2 tablets by mouth Every 6 (Six) Hours As Needed for Mild Pain . - Oral    Cosign for Ordering: Accepted by Samuel Duane Kreis, MD on 1/16/2018  1:17 PM    cefepime (MAXIPIME) 2 g/100 mL 0.9% NS (mbp) 2 g Once 1/16/2018 1/16/2018    Sig - Route: Infuse 100 mL into a venous catheter 1 (One) Time. - Intravenous    Cosign for Ordering: Required by Misty Bae MD    cefepime (MAXIPIME) 2 g/100 mL  "0.9% NS (mbp) 2 g Every 12 Hours 1/16/2018 1/23/2018    Sig - Route: Infuse 100 mL into a venous catheter Every 12 (Twelve) Hours. - Intravenous    Cosign for Ordering: Required by Misty Bae MD    heparin (porcine) 5000 UNIT/ML injection 5,000 Units 5,000 Units Every 8 Hours Scheduled 1/16/2018     Sig - Route: Inject 1 mL under the skin Every 8 (Eight) Hours. - Subcutaneous    Cosign for Ordering: Accepted by Samuel Duane Kreis, MD on 1/16/2018  1:17 PM    HYDROmorphone (DILAUDID) injection 1 mg 1 mg Once 1/15/2018     Sig - Route: Infuse 1 mL into a venous catheter 1 (One) Time. - Intravenous    metroNIDAZOLE (FLAGYL) IVPB 500 mg 500 mg Every 8 Hours 1/16/2018 1/23/2018    Sig - Route: Infuse 100 mL into a venous catheter Every 8 (Eight) Hours. - Intravenous    Cosign for Ordering: Required by Misty Bae MD    oxyCODONE (ROXICODONE) immediate release tablet 5 mg 5 mg Every 4 Hours PRN 1/16/2018 1/26/2018    Sig - Route: Take 1 tablet by mouth Every 4 (Four) Hours As Needed for Moderate Pain . - Oral    Pharmacy to dose vancomycin  Continuous PRN 1/16/2018 1/23/2018    Sig - Route: Continuous As Needed for Consult. - Does not apply    Cosign for Ordering: Required by Misty Bae MD    sodium chloride 0.9 % flush 10 mL 10 mL As Needed 1/15/2018     Sig - Route: Infuse 10 mL into a venous catheter As Needed for Line Care. - Intravenous    Cosign for Ordering: Accepted by José Miguel Li MD on 1/15/2018  1:21 PM    Linked Group 1:  \"And\" Linked Group Details        sodium chloride 0.9 % infusion 150 mL/hr Continuous 1/15/2018     Sig - Route: Infuse 150 mL/hr into a venous catheter Continuous. - Intravenous    vancomycin (VANCOCIN) 1,000 mg in sodium chloride 0.9 % 250 mL IVPB 1,000 mg Every 12 Hours 1/16/2018 1/23/2018    Sig - Route: Infuse 1,000 mg into a venous catheter Every 12 (Twelve) Hours. - Intravenous            Cultures:    Blood Culture   Date Value Ref Range Status "   01/15/2018 No growth at 24 hours  Preliminary   01/15/2018 No growth at 24 hours  Preliminary     Urine Culture   Date Value Ref Range Status   01/15/2018 No growth at 24 hours  Preliminary           Assessment/Plan     ASSESSMENT:    1.  Severe sepsis with encephalopathy  2.  Aspiration pneumonia     PLAN:    Nurse at bedside upon rounding this morning and assisted with evaluation of coccyx wound.  Very necrotic, painful area with foul odor.  Patient has been evaluated surgically and recommended no surgical intervention at this time.  White count normal.  No fever reported overnight.     Patient is at high risk for MRSA and pseudomonal infection and need for anaerobic coverage as well.  Would recommend to continue vancomycin per pharmacy dosing, cefepime 2 g IV every 12 hours and Flagyl 500 mg IV every 8 hours for aspiration pneumonia.      CRP ordered for a.m.      We will continue to follow closely and adjust antibiotic therapy appropriately.    Patient's findings and recommendations were discussed with patient, family and nursing staff    Code Status: Full Code    JOSÉ MIGUEL Love  01/18/18  12:24 PM

## 2018-01-18 NOTE — THERAPY EVALUATION
Acute Care - Occupational Therapy Response to referral   [place]     Patient Name: Jhonny Washington  : 1955  MRN: [id number]  Today's Date: 2018  Onset of Illness/Injury or Date of Surgery Date: 01/15/18     Referring Physician: Dr. Del Cid    Admit Date: 1/15/2018       ICD-10-CM ICD-9-CM   1. Decubitus ulcer of coccyx, unspecified pressure ulcer stage L89.159 707.03     707.20   2. Fall, initial encounter W19.XXXA E888.9   3. 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, and second Rib infiltration, S/P radiation/Chemotherapy   • Polysubstance dependency    • TIA (transient ischemic attack)          Past Surgical History:   Procedure Laterality Date   • APPENDECTOMY     • BACK SURGERY     • LUNG BIOPSY            OT ASSESSMENT FLOWSHEET (last 72 hours)      OT Evaluation       18 1512 18 1500 18 0745 18 1637 18 0800    Rehab Evaluation    Document Type evaluation  -KR (P)  therapy note (daily note)  -BC  evaluation  -BC     Subjective Information  (P)  agree to therapy;complains of;pain;weakness  -BC  agree to therapy;complains of;pain;weakness  -BC     Patient Effort, Rehab Treatment  (P)  fair  -BC  fair  -BC     Symptoms Noted During/After Treatment    increased pain  -BC     Symptoms Noted Comment Pt. will be transferred to SNF where he will receive 24/ care and assist for all self care as needed. Pt. will perform PT at this time for strength and mobility as tolerated. No further skilled OT retraining needed on acute level.   -KR         General Information    Patient Profile Review    yes  -BC     Onset of Illness/Injury or Date of Surgery Date    01/15/18  -BC     Referring Physician    Dr. Del Cid  -BC     General Observations    Pt. supine in bed, dtr in room. Pt. awake and alert, has pain with movement.  -BC     Prior Level of Function    independent:;all household mobility  -BC     Equipment Currently Used at Home    none  -BC     Plans/Goals Discussed With    patient and family;agreed upon  -BC     Risks Reviewed    patient and family:;LOB;nausea/vomiting;dizziness;increased discomfort;change in vital signs;lines disloged  -BC     Benefits Reviewed    patient and family:;improve function;increase independence;increase strength;increase balance;decrease pain;decrease risk of DVT;increase knowledge;improve skin integrity  -BC     Living Environment    Lives With    alone  -BC     Living Arrangements    apartment  -BC     Pain Assessment    Pain Assessment  (P)  Ng-Baker FACES  -BC  Ng-Baker FACES  -BC     Ng-Baker FACES Pain Rating    8  -BC     Pain Type    Acute pain  -BC     Pain Location    Sacrum   pressure over sacral area.  -BC     Cognitive Assessment/Intervention    Current Cognitive/Communication Assessment    functional  -BC     Orientation Status    oriented to;person;situation  -BC     Follows Commands/Answers Questions    100% of the time;able to follow single-step instructions;needs cueing  -BC     Personal Safety    decreased awareness, need for assist;decreased awareness, need for safety;decreased insight to deficits  -BC     Personal Safety Interventions    fall prevention program maintained;gait belt;muscle strengthening facilitated;nonskid shoes/slippers when out of bed  -BC     ROM (Range of Motion)    General ROM    lower extremity range of motion deficits identified  -BC     General ROM Detail    BLE ROM decreased by 30%  -BC     MMT (Manual Muscle Testing)    General MMT Assessment    lower extremity strength  deficits identified  -BC     General MMT Assessment Detail    BLE MMT 3/5  -BC     Muscle Tone Assessment    Muscle Tone Assessment   Bilateral Upper Extremities;Bilateral Lower Extremities  -BB  Bilateral Upper Extremities;Bilateral Lower Extremities  -EA    Bilateral Upper Extremities Muscle Tone Assessment   mildly decreased tone  -BB  mildly decreased tone  -EA    Bilateral Lower Extremities Muscle Tone Assessment   mildly decreased tone  -BB  mildly decreased tone  -EA    Bed Mobility, Assessment/Treatment    Bed Mobility, Assistive Device    bed rails  -BC     Bed Mobility, Roll Left, [place]    moderate assist (50% patient effort);2 person assist required  -BC     Bed Mobility, Roll Right, [place]    moderate assist (50% patient effort);2 person assist required  -BC     Bed Mob, Supine to Sit, [place]    moderate assist (50% patient effort);2 person assist required  -BC     Bed Mob, Sit to Supine, [place]    moderate assist (50% patient effort);2 person assist required  -BC     Transfer Assessment/Treatment    Transfers, Sit-Stand [place]    moderate assist (50% patient effort);2 person assist required  -BC     Transfers, Stand-Sit [place]    moderate assist (50% patient effort);2 person assist required  -BC     Transfers, Sit-Stand-Sit, Assist Device    rolling walker  -BC     Positioning and Restraints    Pre-Treatment Position    in bed  -BC     Post Treatment Position    chair  -BC     In Chair    notified nsg;reclined;call light within reach;encouraged to call for assist;with family/caregiver  -BC       01/16/18 1607 01/16/18 1000 01/15/18 2000 01/15/18 1800       General Information    Equipment Currently Used at Home    none  -MP     Living Environment    Lives With alone  -SG   alone  -MP     Living Arrangements apartment  -SG   apartment  -MP     Home Accessibility no concerns  -SG   no concerns  -MP     Stair Railings at Home    none  -MP     Type of  Financial/Environmental Concern none  -SG   none  -MP     Transportation Available car  -SG   car;family or friend will provide  -MP     Functional Level Prior    Ambulation    2-->assistive person  -MP     Transferring    2-->assistive person  -MP     Toileting    2-->assistive person  -MP     Bathing    2-->assistive person  -MP     Dressing    2-->assistive person  -MP     Eating    2-->assistive person  -MP     Communication    0-->understands/communicates without difficulty  -MP     Swallowing    0-->swallows foods/liquids without difficulty  -MP     Prior Functional Level Comment    no  -MP     Muscle Tone Assessment    Muscle Tone Assessment  Bilateral Upper Extremities;Bilateral Lower Extremities  -BT Bilateral Upper Extremities;Bilateral Lower Extremities  -TONEY      Bilateral Upper Extremities Muscle Tone Assessment  moderately decreased tone  -BT moderately decreased tone  -TONEY      Bilateral Lower Extremities Muscle Tone Assessment  moderately decreased tone  -BT moderately decreased tone  -TONEY        User Key  (r) = Recorded By, (t) = Taken By, (c) = Cosigned By    Initials Name Effective Dates    EA Sabrina Gracia RN 06/16/16 -     TONEY Dori Shoemaker RN 06/16/16 -     MP Iris Dutton RN 06/16/16 -     BC Tanna Nettles, PT 03/14/16 -     KR Olivier Bustillo, OT 03/14/16 -     SG Amber Jimenes 03/14/16 -     BB Theodore Martinez, RN 01/21/17 -     BT Jesus Lanier, RN 05/22/17 -                  OT Recommendation and Plan                    Outcome Measures       01/17/18 1600          How much help from another person do you currently need...    Turning from your back to your side while in flat bed without using bedrails? 2  -BC      Moving from lying on back to sitting on the side of a flat bed without bedrails? 2  -BC      Moving to and from a bed to a chair (including a wheelchair)? 2  -BC      Standing up from a chair using your arms (e.g., wheelchair, bedside chair)? 2  -BC       Climbing 3-5 steps with a railing? 1  -BC      To walk in hospital room? 3  -BC      AM-PAC 6 Clicks Score 12  -BC      Functional Assessment    Outcome Measure Options AM-PAC 6 Clicks Basic Mobility (PT)  -BC        User Key  (r) = Recorded By, (t) = Taken By, (c) = Cosigned By    Initials Name Provider Type    BC Tanna Nettles, PT Physical Therapist          Time Calculation:                 Olivier Bustillo, OT  1/18/2018

## 2018-01-19 ENCOUNTER — ANESTHESIA EVENT (OUTPATIENT)
Dept: PERIOP | Facility: HOSPITAL | Age: 63
End: 2018-01-19

## 2018-01-19 ENCOUNTER — ANESTHESIA (OUTPATIENT)
Dept: PERIOP | Facility: HOSPITAL | Age: 63
End: 2018-01-19

## 2018-01-19 LAB
ALBUMIN SERPL-MCNC: 2.4 G/DL (ref 3.4–4.8)
ALBUMIN/GLOB SERPL: 0.9 G/DL (ref 1.5–2.5)
ALP SERPL-CCNC: 151 U/L (ref 40–129)
ALT SERPL W P-5'-P-CCNC: 24 U/L (ref 10–44)
ANION GAP SERPL CALCULATED.3IONS-SCNC: 4.3 MMOL/L (ref 3.6–11.2)
ANISOCYTOSIS BLD QL: NORMAL
AST SERPL-CCNC: 47 U/L (ref 10–34)
BASOPHILS # BLD AUTO: 0.02 10*3/MM3 (ref 0–0.3)
BASOPHILS NFR BLD AUTO: 0.2 % (ref 0–2)
BILIRUB SERPL-MCNC: 1.6 MG/DL (ref 0.2–1.8)
BUN BLD-MCNC: 13 MG/DL (ref 7–21)
BUN/CREAT SERPL: 28.9 (ref 7–25)
CALCIUM SPEC-SCNC: 7.5 MG/DL (ref 7.7–10)
CHLORIDE SERPL-SCNC: 113 MMOL/L (ref 99–112)
CO2 SERPL-SCNC: 18.7 MMOL/L (ref 24.3–31.9)
CREAT BLD-MCNC: 0.45 MG/DL (ref 0.43–1.29)
CRP SERPL-MCNC: 4.83 MG/DL (ref 0–0.99)
DEPRECATED RDW RBC AUTO: 34.1 FL (ref 37–54)
ELLIPTOCYTES BLD QL SMEAR: NORMAL
EOSINOPHIL # BLD AUTO: 0.2 10*3/MM3 (ref 0–0.7)
EOSINOPHIL NFR BLD AUTO: 1.6 % (ref 0–5)
ERYTHROCYTE [DISTWIDTH] IN BLOOD BY AUTOMATED COUNT: 15.8 % (ref 11.5–14.5)
GFR SERPL CREATININE-BSD FRML MDRD: >150 ML/MIN/1.73
GLOBULIN UR ELPH-MCNC: 2.7 GM/DL
GLUCOSE BLD-MCNC: 107 MG/DL (ref 70–110)
GLUCOSE BLDC GLUCOMTR-MCNC: 107 MG/DL (ref 70–130)
GLUCOSE BLDC GLUCOMTR-MCNC: 108 MG/DL (ref 70–130)
GLUCOSE BLDC GLUCOMTR-MCNC: 124 MG/DL (ref 70–130)
HCT VFR BLD AUTO: 27 % (ref 42–52)
HGB BLD-MCNC: 8.9 G/DL (ref 14–18)
HYPOCHROMIA BLD QL: NORMAL
IMM GRANULOCYTES # BLD: 0.06 10*3/MM3 (ref 0–0.03)
IMM GRANULOCYTES NFR BLD: 0.5 % (ref 0–0.5)
LYMPHOCYTES # BLD AUTO: 1.58 10*3/MM3 (ref 1–3)
LYMPHOCYTES NFR BLD AUTO: 12.5 % (ref 21–51)
MCH RBC QN AUTO: 20.2 PG (ref 27–33)
MCHC RBC AUTO-ENTMCNC: 33 G/DL (ref 33–37)
MCV RBC AUTO: 61.2 FL (ref 80–94)
MICROCYTES BLD QL: NORMAL
MONOCYTES # BLD AUTO: 1.2 10*3/MM3 (ref 0.1–0.9)
MONOCYTES NFR BLD AUTO: 9.5 % (ref 0–10)
NEUTROPHILS # BLD AUTO: 9.55 10*3/MM3 (ref 1.4–6.5)
NEUTROPHILS NFR BLD AUTO: 75.7 % (ref 30–70)
NRBC BLD MANUAL-RTO: 0 /100 WBC (ref 0–0)
OSMOLALITY SERPL CALC.SUM OF ELEC: 272.5 MOSM/KG (ref 273–305)
PLAT MORPH BLD: NORMAL
PLATELET # BLD AUTO: 268 10*3/MM3 (ref 130–400)
PMV BLD AUTO: ABNORMAL FL (ref 6–10)
POTASSIUM BLD-SCNC: 3.6 MMOL/L (ref 3.5–5.3)
PROT SERPL-MCNC: 5.1 G/DL (ref 6–8)
RBC # BLD AUTO: 4.41 10*6/MM3 (ref 4.7–6.1)
SCHISTOCYTES BLD QL SMEAR: NORMAL
SODIUM BLD-SCNC: 136 MMOL/L (ref 135–153)
TARGETS BLD QL SMEAR: NORMAL
WBC NRBC COR # BLD: 12.61 10*3/MM3 (ref 4.5–12.5)

## 2018-01-19 PROCEDURE — 25010000002 VANCOMYCIN PER 500 MG

## 2018-01-19 PROCEDURE — 87070 CULTURE OTHR SPECIMN AEROBIC: CPT | Performed by: SURGERY

## 2018-01-19 PROCEDURE — 82962 GLUCOSE BLOOD TEST: CPT

## 2018-01-19 PROCEDURE — 0JBL0ZZ EXCISION OF RIGHT UPPER LEG SUBCUTANEOUS TISSUE AND FASCIA, OPEN APPROACH: ICD-10-PCS | Performed by: SURGERY

## 2018-01-19 PROCEDURE — 25010000002 ONDANSETRON PER 1 MG: Performed by: NURSE ANESTHETIST, CERTIFIED REGISTERED

## 2018-01-19 PROCEDURE — 25010000002 HEPARIN (PORCINE) PER 1000 UNITS: Performed by: PHYSICIAN ASSISTANT

## 2018-01-19 PROCEDURE — 25010000002 PROPOFOL 10 MG/ML EMULSION: Performed by: NURSE ANESTHETIST, CERTIFIED REGISTERED

## 2018-01-19 PROCEDURE — 97597 DBRDMT OPN WND 1ST 20 CM/<: CPT | Performed by: SURGERY

## 2018-01-19 PROCEDURE — 25010000002 FENTANYL CITRATE (PF) 100 MCG/2ML SOLUTION: Performed by: NURSE ANESTHETIST, CERTIFIED REGISTERED

## 2018-01-19 PROCEDURE — 25010000002 CEFEPIME: Performed by: PHYSICIAN ASSISTANT

## 2018-01-19 PROCEDURE — 11042 DBRDMT SUBQ TIS 1ST 20SQCM/<: CPT | Performed by: SURGERY

## 2018-01-19 PROCEDURE — 85007 BL SMEAR W/DIFF WBC COUNT: CPT | Performed by: PHYSICIAN ASSISTANT

## 2018-01-19 PROCEDURE — 87077 CULTURE AEROBIC IDENTIFY: CPT | Performed by: SURGERY

## 2018-01-19 PROCEDURE — 11045 DBRDMT SUBQ TISS EACH ADDL: CPT | Performed by: SURGERY

## 2018-01-19 PROCEDURE — 25010000002 MIDAZOLAM PER 1 MG: Performed by: NURSE ANESTHETIST, CERTIFIED REGISTERED

## 2018-01-19 PROCEDURE — 0JB90ZZ EXCISION OF BUTTOCK SUBCUTANEOUS TISSUE AND FASCIA, OPEN APPROACH: ICD-10-PCS | Performed by: SURGERY

## 2018-01-19 PROCEDURE — 87186 SC STD MICRODIL/AGAR DIL: CPT | Performed by: SURGERY

## 2018-01-19 PROCEDURE — 86140 C-REACTIVE PROTEIN: CPT | Performed by: NURSE PRACTITIONER

## 2018-01-19 PROCEDURE — 85025 COMPLETE CBC W/AUTO DIFF WBC: CPT | Performed by: PHYSICIAN ASSISTANT

## 2018-01-19 PROCEDURE — 80053 COMPREHEN METABOLIC PANEL: CPT | Performed by: PHYSICIAN ASSISTANT

## 2018-01-19 PROCEDURE — 87205 SMEAR GRAM STAIN: CPT | Performed by: SURGERY

## 2018-01-19 PROCEDURE — 94799 UNLISTED PULMONARY SVC/PX: CPT

## 2018-01-19 RX ORDER — MIDAZOLAM HYDROCHLORIDE 1 MG/ML
INJECTION INTRAMUSCULAR; INTRAVENOUS AS NEEDED
Status: DISCONTINUED | OUTPATIENT
Start: 2018-01-19 | End: 2018-01-19 | Stop reason: SURG

## 2018-01-19 RX ORDER — SODIUM CHLORIDE, SODIUM LACTATE, POTASSIUM CHLORIDE, CALCIUM CHLORIDE 600; 310; 30; 20 MG/100ML; MG/100ML; MG/100ML; MG/100ML
125 INJECTION, SOLUTION INTRAVENOUS CONTINUOUS
Status: DISCONTINUED | OUTPATIENT
Start: 2018-01-19 | End: 2018-01-26 | Stop reason: HOSPADM

## 2018-01-19 RX ORDER — IPRATROPIUM BROMIDE AND ALBUTEROL SULFATE 2.5; .5 MG/3ML; MG/3ML
3 SOLUTION RESPIRATORY (INHALATION) ONCE AS NEEDED
Status: DISCONTINUED | OUTPATIENT
Start: 2018-01-19 | End: 2018-01-19 | Stop reason: HOSPADM

## 2018-01-19 RX ORDER — FENTANYL CITRATE 50 UG/ML
50 INJECTION, SOLUTION INTRAMUSCULAR; INTRAVENOUS
Status: COMPLETED | OUTPATIENT
Start: 2018-01-19 | End: 2018-01-19

## 2018-01-19 RX ORDER — MAGNESIUM HYDROXIDE 1200 MG/15ML
LIQUID ORAL AS NEEDED
Status: DISCONTINUED | OUTPATIENT
Start: 2018-01-19 | End: 2018-01-19 | Stop reason: HOSPADM

## 2018-01-19 RX ORDER — ONDANSETRON 2 MG/ML
4 INJECTION INTRAMUSCULAR; INTRAVENOUS ONCE AS NEEDED
Status: DISCONTINUED | OUTPATIENT
Start: 2018-01-19 | End: 2018-01-19 | Stop reason: HOSPADM

## 2018-01-19 RX ORDER — SODIUM CHLORIDE, SODIUM LACTATE, POTASSIUM CHLORIDE, CALCIUM CHLORIDE 600; 310; 30; 20 MG/100ML; MG/100ML; MG/100ML; MG/100ML
100 INJECTION, SOLUTION INTRAVENOUS CONTINUOUS
Status: DISCONTINUED | OUTPATIENT
Start: 2018-01-19 | End: 2018-01-26 | Stop reason: HOSPADM

## 2018-01-19 RX ORDER — MEPERIDINE HYDROCHLORIDE 25 MG/ML
12.5 INJECTION INTRAMUSCULAR; INTRAVENOUS; SUBCUTANEOUS
Status: DISCONTINUED | OUTPATIENT
Start: 2018-01-19 | End: 2018-01-19 | Stop reason: HOSPADM

## 2018-01-19 RX ORDER — FENTANYL CITRATE 50 UG/ML
INJECTION, SOLUTION INTRAMUSCULAR; INTRAVENOUS AS NEEDED
Status: DISCONTINUED | OUTPATIENT
Start: 2018-01-19 | End: 2018-01-19 | Stop reason: SURG

## 2018-01-19 RX ORDER — LIDOCAINE HYDROCHLORIDE 20 MG/ML
INJECTION, SOLUTION EPIDURAL; INFILTRATION; INTRACAUDAL; PERINEURAL AS NEEDED
Status: DISCONTINUED | OUTPATIENT
Start: 2018-01-19 | End: 2018-01-19 | Stop reason: SURG

## 2018-01-19 RX ORDER — MIDAZOLAM HYDROCHLORIDE 1 MG/ML
2 INJECTION INTRAMUSCULAR; INTRAVENOUS
Status: DISCONTINUED | OUTPATIENT
Start: 2018-01-19 | End: 2018-01-19 | Stop reason: HOSPADM

## 2018-01-19 RX ORDER — OXYCODONE HYDROCHLORIDE AND ACETAMINOPHEN 5; 325 MG/1; MG/1
1 TABLET ORAL ONCE AS NEEDED
Status: DISCONTINUED | OUTPATIENT
Start: 2018-01-19 | End: 2018-01-19 | Stop reason: HOSPADM

## 2018-01-19 RX ORDER — MIDAZOLAM HYDROCHLORIDE 1 MG/ML
1 INJECTION INTRAMUSCULAR; INTRAVENOUS
Status: DISCONTINUED | OUTPATIENT
Start: 2018-01-19 | End: 2018-01-19 | Stop reason: HOSPADM

## 2018-01-19 RX ORDER — MEPERIDINE HYDROCHLORIDE 25 MG/ML
INJECTION INTRAMUSCULAR; INTRAVENOUS; SUBCUTANEOUS AS NEEDED
Status: DISCONTINUED | OUTPATIENT
Start: 2018-01-19 | End: 2018-01-19 | Stop reason: SURG

## 2018-01-19 RX ORDER — FAMOTIDINE 10 MG/ML
INJECTION, SOLUTION INTRAVENOUS AS NEEDED
Status: DISCONTINUED | OUTPATIENT
Start: 2018-01-19 | End: 2018-01-19 | Stop reason: SURG

## 2018-01-19 RX ORDER — SODIUM CHLORIDE 0.9 % (FLUSH) 0.9 %
1-10 SYRINGE (ML) INJECTION AS NEEDED
Status: DISCONTINUED | OUTPATIENT
Start: 2018-01-19 | End: 2018-01-19 | Stop reason: HOSPADM

## 2018-01-19 RX ORDER — ONDANSETRON 2 MG/ML
INJECTION INTRAMUSCULAR; INTRAVENOUS AS NEEDED
Status: DISCONTINUED | OUTPATIENT
Start: 2018-01-19 | End: 2018-01-19 | Stop reason: SURG

## 2018-01-19 RX ORDER — PROPOFOL 10 MG/ML
VIAL (ML) INTRAVENOUS AS NEEDED
Status: DISCONTINUED | OUTPATIENT
Start: 2018-01-19 | End: 2018-01-19 | Stop reason: SURG

## 2018-01-19 RX ADMIN — EPHEDRINE SULFATE 10 MG: 50 INJECTION INTRAMUSCULAR; INTRAVENOUS; SUBCUTANEOUS at 11:55

## 2018-01-19 RX ADMIN — PROPOFOL 120 MG: 10 INJECTION, EMULSION INTRAVENOUS at 11:48

## 2018-01-19 RX ADMIN — CEFEPIME 2 G: 2 INJECTION, POWDER, FOR SOLUTION INTRAVENOUS at 17:59

## 2018-01-19 RX ADMIN — METRONIDAZOLE 500 MG: 500 INJECTION, SOLUTION INTRAVENOUS at 20:12

## 2018-01-19 RX ADMIN — FENTANYL CITRATE 50 MCG: 50 INJECTION INTRAMUSCULAR; INTRAVENOUS at 11:44

## 2018-01-19 RX ADMIN — HEPARIN SODIUM 5000 UNITS: 5000 INJECTION, SOLUTION INTRAVENOUS; SUBCUTANEOUS at 14:20

## 2018-01-19 RX ADMIN — CEFEPIME 2 G: 2 INJECTION, POWDER, FOR SOLUTION INTRAVENOUS at 04:11

## 2018-01-19 RX ADMIN — OXYCODONE HYDROCHLORIDE 5 MG: 5 TABLET ORAL at 17:19

## 2018-01-19 RX ADMIN — FAMOTIDINE 20 MG: 10 INJECTION, SOLUTION INTRAVENOUS at 11:52

## 2018-01-19 RX ADMIN — ONDANSETRON 4 MG: 2 INJECTION, SOLUTION INTRAMUSCULAR; INTRAVENOUS at 11:38

## 2018-01-19 RX ADMIN — SODIUM CHLORIDE, POTASSIUM CHLORIDE, SODIUM LACTATE AND CALCIUM CHLORIDE: 600; 310; 30; 20 INJECTION, SOLUTION INTRAVENOUS at 11:38

## 2018-01-19 RX ADMIN — VANCOMYCIN HYDROCHLORIDE 1500 MG: 5 INJECTION, POWDER, LYOPHILIZED, FOR SOLUTION INTRAVENOUS at 14:19

## 2018-01-19 RX ADMIN — METRONIDAZOLE 500 MG: 500 INJECTION, SOLUTION INTRAVENOUS at 03:54

## 2018-01-19 RX ADMIN — HEPARIN SODIUM 5000 UNITS: 5000 INJECTION, SOLUTION INTRAVENOUS; SUBCUTANEOUS at 20:12

## 2018-01-19 RX ADMIN — VANCOMYCIN HYDROCHLORIDE 1500 MG: 5 INJECTION, POWDER, LYOPHILIZED, FOR SOLUTION INTRAVENOUS at 02:29

## 2018-01-19 RX ADMIN — FENTANYL CITRATE 50 MCG: 50 INJECTION INTRAMUSCULAR; INTRAVENOUS at 11:41

## 2018-01-19 RX ADMIN — FENTANYL CITRATE 50 MCG: 50 INJECTION INTRAMUSCULAR; INTRAVENOUS at 12:50

## 2018-01-19 RX ADMIN — LIDOCAINE HYDROCHLORIDE 100 MG: 20 INJECTION, SOLUTION EPIDURAL; INFILTRATION; INTRACAUDAL; PERINEURAL at 11:48

## 2018-01-19 RX ADMIN — OXYCODONE HYDROCHLORIDE 5 MG: 5 TABLET ORAL at 21:02

## 2018-01-19 RX ADMIN — HEPARIN SODIUM 5000 UNITS: 5000 INJECTION, SOLUTION INTRAVENOUS; SUBCUTANEOUS at 04:11

## 2018-01-19 RX ADMIN — MEPERIDINE HYDROCHLORIDE 25 MG: 25 INJECTION, SOLUTION INTRAMUSCULAR; INTRAVENOUS; SUBCUTANEOUS at 12:15

## 2018-01-19 RX ADMIN — FENTANYL CITRATE 50 MCG: 50 INJECTION INTRAMUSCULAR; INTRAVENOUS at 12:10

## 2018-01-19 RX ADMIN — MEPERIDINE HYDROCHLORIDE 25 MG: 25 INJECTION, SOLUTION INTRAMUSCULAR; INTRAVENOUS; SUBCUTANEOUS at 12:20

## 2018-01-19 RX ADMIN — MIDAZOLAM HYDROCHLORIDE 2 MG: 1 INJECTION, SOLUTION INTRAMUSCULAR; INTRAVENOUS at 11:38

## 2018-01-19 RX ADMIN — FENTANYL CITRATE 50 MCG: 50 INJECTION INTRAMUSCULAR; INTRAVENOUS at 12:42

## 2018-01-19 RX ADMIN — FENTANYL CITRATE 50 MCG: 50 INJECTION INTRAMUSCULAR; INTRAVENOUS at 12:05

## 2018-01-19 NOTE — PROGRESS NOTES
CC: sacral wound    No acute events    AFVSS  Sacral skin necrosis and right hip necrosis stable but little debridement with dressing changes.    62 yo M with sacral wound with necrotic skin  OR for debridement

## 2018-01-19 NOTE — ANESTHESIA POSTPROCEDURE EVALUATION
Patient: Jhonny Washington    Procedure Summary     Date Anesthesia Start Anesthesia Stop Room / Location    01/19/18 1138 1229 [facility] / [facility]       Procedure Diagnosis Surgeon Provider    DEBRIDEMENT OF RIGHT HIP ULCER/BUTTOCKS WOUND (N/A Abdomen) Decubitus ulcer of coccyx, unspecified pressure ulcer stage  (Decubitus ulcer of coccyx, unspecified pressure ulcer stage [L89.159]) MD Terrence Jeffrey MD          Anesthesia Type: general  Last vitals  BP   99/69 (01/19/18 1231)   Temp   97.8 °F (36.6 °C) (01/19/18 1231)   Pulse   111 (01/19/18 1231)   Resp   18 (01/19/18 1231)     SpO2   93 % (01/19/18 1231)     Post Anesthesia Care and Evaluation    Patient location during evaluation: PACU  Patient participation: complete - patient participated  Level of consciousness: awake and alert  Pain score: 1  Pain management: adequate  Airway patency: patent  Anesthetic complications: No anesthetic complications  PONV Status: controlled  Cardiovascular status: acceptable  Respiratory status: acceptable  Hydration status: acceptable

## 2018-01-19 NOTE — ANESTHESIA PREPROCEDURE EVALUATION
Anesthesia Evaluation     no history of anesthetic complications:  NPO Solid Status: > 8 hours  NPO Liquid Status: > 8 hours     Airway   Mallampati: II  TM distance: <3 FB  Neck ROM: full  no difficulty expected  Dental    (+) poor dentition    Pulmonary - normal exam   Cardiovascular - normal exam        Neuro/Psych  (+) TIA, CVA, headaches,     GI/Hepatic/Renal/Endo      Musculoskeletal     (+) back pain,   Abdominal  - normal exam   Substance History      OB/GYN          Other                                              Anesthesia Plan    ASA 3     general     intravenous induction   Anesthetic plan and risks discussed with patient.

## 2018-01-19 NOTE — OP NOTE
DEBRIDEMENT OF ISCHEAL ULCER/BUTTOCKS WOUND  Procedure Note    Jhonny Washington  1/15/2018 - 1/19/2018    Pre-op Diagnosis:   Decubitus ulcer of coccyx, unspecified pressure ulcer stage [L89.159]    Post-op Diagnosis:   Decubitus ulceration with subcutaneous abscess    Indications: See above    Procedure(s):  Excisional DEBRIDEMENT OF ISCHEAL ULCER/BUTTOCKS WOUND    Surgeon(s):  Mohan Santoyo MD    Anesthesia: Monitor Anesthesia Care with [facility]    Staff:   Circulator: Keira Goodwin RN  Scrub Person: Jacqueline Tristan  Assistant: Mickey Stoner    Findings: Wound of the sacral and buttocks and right hip with necrotic skin and underlying subcutaneous necrotic infected fat total wound measured 10 x 15 cm     Operative Procedure: The patient was taken operating suite and placed in left lateral decubitus position.  Mac anesthesia was administered.  The sacrum and right hip were prepped and draped in usual sterile fashion.  Timeout was performed.  With sharp dissection the 10 x 15 area of necrotic skin was excised.  The underlying surface of subcutaneous fat was debrided with the first visit to healthy subcutaneous tissue.  At the superior and right lateral aspects there were areas of infected fat that were debrided and cultured.  The wound was then made hemostatic with cautery and irrigated and packed with gauze.  With attention to the right hip the necrotic skin was sharply excised and the wound measured 3 x 3 cm.  No underlying infection was noted.  A Mepilex was placed at this location and the patient was taken to recovery.    Estimated Blood Loss: 25 mL    Specimens: Tissue for culture sacral wound, wound culture sacral wound                   Drains: None    Grafts or Implants: None    Complications: None      Mohan Santoyo MD     Date: 1/19/2018  Time: 12:18 PM

## 2018-01-19 NOTE — PROGRESS NOTES
"     LOS: 4 days     Chief Complaint:  Rhabdomyolysis/Decubitus Ulcers with Sepsis    Subjective     Interval History:   He is done well overnight with no major changes ×24 hours.  He is doing well with broad-spectrum antibiotics, ID following.  He is doing well with current wound care, per surgery.  He has been participating with physical therapy, reamins \"weak\".  This morning he again complains of low back pain.  Pain medications prescribed are helping however not completely resolving his pain.  His mental status has been stable. WBC's up slightly to 12, 610.    .Objective     Vital Signs  /75 (BP Location: Left arm, Patient Position: Lying)  Pulse 71  Temp 97.9 °F (36.6 °C) (Oral)   Resp 18  Ht 182.9 cm (72\")  Wt 70.8 kg (156 lb 1.6 oz)  SpO2 93%  BMI 21.17 kg/m2  Intake & Output (last day)       01/18 0701 - 01/19 0700 01/19 0701 - 01/20 0700    P.O. 240     Total Intake(mL/kg) 240 (3.4)     Urine (mL/kg/hr) 275 (0.2)     Stool      Total Output 275      Net -35            Unmeasured Urine Occurrence 3 x             Physical Exam:     General Appearance:    Alert, cooperative, in no acute distress   Head:    Normocephalic, without obvious abnormality, atraumatic   Eyes:            Lids and lashes normal, conjunctivae and sclerae normal, no   icterus, no pallor, corneas clear, PERRLA   Ears:    Ears appear intact with no abnormalities noted   Throat:   No oral lesions, no thrush, oral mucosa moist   Neck:   No adenopathy, supple, trachea midline, no thyromegaly, no   carotid bruit, no JVD   Lungs:     Scattered Coarse Crackles bilaterally R>>L ,respirations regular, even and                  unlabored    Heart:    Regular rhythm and normal rate, normal S1 and S2, no            murmur, no gallop, no rub, no click   Chest Wall:    No abnormalities observed   Abdomen:     Normal bowel sounds, no masses, no organomegaly, soft        non-tender, non-distended, no guarding, no rebound                " tenderness   Extremities:   Moves all extremities well, no edema, no cyanosis, no             redness   Pulses:   Pulses palpable and equal bilaterally   Skin:   No bleeding, bruising or rash, Sacral decubitus ulcer with blackened eschar and right trochanteric ulcer with eschar   Lymph nodes:   No palpable adenopathy   Neurologic:   Cranial nerves 2 - 12 grossly intact, sensation intact, DTR       present and equal bilaterally        Results Review:    Lab Results   Component Value Date    WBC 12.61 (H) 01/19/2018    HGB 8.9 (L) 01/19/2018    HCT 27.0 (L) 01/19/2018    MCV 61.2 (L) 01/19/2018     01/19/2018       Lab Results   Component Value Date    GLUCOSE 107 01/19/2018    BUN 13 01/19/2018    CREATININE 0.45 01/19/2018    EGFRIFNONA >150 01/19/2018    BCR 28.9 (H) 01/19/2018     01/19/2018    K 3.6 01/19/2018     (H) 01/19/2018    CO2 18.7 (L) 01/19/2018    CALCIUM 7.5 (L) 01/19/2018    PROTENTOTREF 7.2 11/12/2015    ALBUMIN 2.40 (L) 01/19/2018    LABIL2 0.9 (L) 01/19/2018    AST 47 (H) 01/19/2018    ALT 24 01/19/2018     Lab Results   Component Value Date    INR 0.94 11/03/2015    INR <0.90 10/25/2015    INR <0.90 06/01/2015       Glucose   Date Value Ref Range Status   01/19/2018 124 70 - 130 mg/dL Final          Medication Review:     Current Facility-Administered Medications:   •  acetaminophen (TYLENOL) tablet 650 mg, 650 mg, Oral, Q6H PRN, JARETH Condon, 650 mg at 01/16/18 1913  •  cefepime (MAXIPIME) 2 g/100 mL 0.9% NS (mbp), 2 g, Intravenous, Q12H, Shameka Galeana PA-C, 2 g at 01/19/18 0411  •  heparin (porcine) 5000 UNIT/ML injection 5,000 Units, 5,000 Units, Subcutaneous, Q8H, JARETH Condon, 5,000 Units at 01/19/18 0411  •  HYDROmorphone (DILAUDID) injection 1 mg, 1 mg, Intravenous, Once, José Miguel Li MD  •  lactobacillus acidophilus (RISAQUAD) capsule 1 capsule, 1 capsule, Oral, Daily, [facility], 1 capsule at 01/18/18 1557  •  metroNIDAZOLE (FLAGYL) IVPB  500 mg, 500 mg, Intravenous, Q8H, Shameka Galeana PA-C, 500 mg at 01/19/18 0354  •  oxyCODONE (ROXICODONE) immediate release tablet 5 mg, 5 mg, Oral, Q4H PRN, Samuel Duane Kreis, MD, 5 mg at 01/18/18 2013  •  Insert peripheral IV, , , Once **AND** sodium chloride 0.9 % flush 10 mL, 10 mL, Intravenous, PRN, Jesus Carreon PA-C  •  vancomycin (VANCOCIN) 1,500 mg in sodium chloride 0.9 % 500 mL IVPB, 1,500 mg, Intravenous, Q12H, [facility], [facility], 1,500 mg at 01/19/18 0229      Assessment/Plan   Probable Aspiration Pneumonia with Sepsis  Sacral Decubitus Ulcer   Decubitus ulceration of the hip  Hyperbilirubinemia  Rhabdomyolysis  Metabolic encephalopathy due to sepsis  Multiple myeloma  Chronic opioid use  Fever    Appreciate surgery and ID input     Continue with Cefepime + Flagyl + Vancomycin    Oxycodone 5 mg q 6 hours prn pain     Tylenol prn fever     Continue with wound care     Heparin for DVT prophylaxis    O2 via NC to maintain O2 sats >90%    Continue with PT/OT, S/S following for discharge to SNF for physical rehab     CBC, CMP daily    JARETH Condon  01/19/18  7:43 AM     This patient is seen this morning by me and I agree with the above note.  He continues to have some pain.  He is planned for debridement today in the OR.  Continue current antibiotic regimen.  CPK has normalized    Samuel Duane Kreis, MD  01/19/18  11:43 AM

## 2018-01-19 NOTE — SIGNIFICANT NOTE
01/19/18 1404   Rehab Treatment   Discipline physical therapist   Rehab Evaluation   Evaluation Not Performed other (see comments)  (Pt gone for surgery today, will follow up at later date)

## 2018-01-19 NOTE — PROGRESS NOTES
"  I have personally seen and examined the patient today and discussed overnight interval progress and pertinent issues with nursing staff.    Subjective:    Wound with evidence of bleeding and sloughing.  No fever or diarrhea reported overnight.  Negative cultures so far.  Significantly improved CRP level.  Dr. Santoyo to do I&D of sacral and hip wounds today.    History taken from: patient chart family RN      Vital Signs    /74  Pulse 71  Temp 98.3 °F (36.8 °C)  Resp 18  Ht 182 cm (71.65\")  Wt 70.8 kg (156 lb)  SpO2 93%  BMI 21.36 kg/m2    Temp:  [97.7 °F (36.5 °C)-99.3 °F (37.4 °C)] 98.3 °F (36.8 °C)      Intake/Output Summary (Last 24 hours) at 01/19/18 1049  Last data filed at 01/19/18 0700   Gross per 24 hour   Intake              240 ml   Output              475 ml   Net             -235 ml     Intake & Output (last 3 days)       01/16 0701 - 01/17 0700 01/17 0701 - 01/18 0700 01/18 0701 - 01/19 0700 01/19 0701 - 01/20 0700    P.O. 880 240 240     I.V. (mL/kg)        IV Piggyback        Total Intake(mL/kg) 880 (12.4) 240 (3.4) 240 (3.4)     Urine (mL/kg/hr) 275 (0.2) 75 (0) 475 (0.3)     Stool  0 (0)      Total Output 275 75 475      Net +605 +165 -235              Unmeasured Urine Occurrence 4 x 4 x 3 x 1 x    Unmeasured Stool Occurrence  1 x          Physical Exam:       General Appearance:    Alert, cooperative, in no acute distress, thin, frail, daughter at bedside    Head:    Normocephalic, without obvious abnormality, atraumatic   Eyes:            Lids and lashes normal, conjunctivae and sclerae normal, no   icterus, no pallor, corneas clear, PERRLA   Ears:    Ears appear intact with no abnormalities noted   Throat:   No oral lesions, no thrush, oral mucosa moist   Neck:   No adenopathy, supple, trachea midline, no thyromegaly, no   carotid bruit, no JVD   Back:     No tenderness to percussion or palpation, range of motion   normal   Lungs:    coarse breath sounds to right.    Heart:    " Regular rhythm and normal rate, normal S1 and S2, no            murmur, no gallop, no rub, no click   Chest Wall:    No abnormalities observed   Abdomen:     Normal bowel sounds, no masses, no organomegaly, soft        non-tender, non-distended, no guarding, no rebound                tenderness   Rectal:     Deferred   Extremities:   Moves all extremities well, no edema, no cyanosis, no             redness   Pulses:   Pulses palpable and equal bilaterally   Skin:   Sacral decubitus ulcer and right hip ulcer    Lymph nodes:   No palpable adenopathy   Neurologic:   Awake, alert, Garbled speech        Results:      Results from last 7 days  Lab Units 01/19/18  0121 01/18/18  0036 01/17/18  0454 01/16/18  0110 01/15/18  1245   WBC 10*3/mm3 12.61* 9.73 10.08 8.14 13.11*     Lab Results   Component Value Date    NEUTROABS 9.55 (H) 01/19/2018         Results from last 7 days  Lab Units 01/19/18  0121   CREATININE mg/dL 0.45         Results from last 7 days  Lab Units 01/19/18  0121 01/17/18  0454 01/15/18  1245   CRP mg/dL 4.83* 9.55* 15.63*       Imaging Results (last 24 hours)     Procedure Component Value Units Date/Time    XR Chest PA & Lateral [[id number]] Collected:  01/18/18 1457     Updated:  01/18/18 1459    Narrative:       EXAMINATION:  XR CHEST PA AND LATERAL-      CLINICAL INDICATION:     cough; L89.159-Pressure ulcer of sacral region,  unspecified stage; W19.XXXA-Unspecified fall, initial encounter;  M62.82-Rhabdomyolysis     TECHNIQUE:  XR CHEST PA AND LATERAL-       COMPARISON: NONE      FINDINGS:   The lungs remain aerated.   Heart size is stable.   No pneumothorax.   No pleural effusion.   Bony and soft tissue structures are unremarkable.            Impression:       Stable radiographic appearance of the chest.     This report was finalized on 1/18/2018 2:57 PM by Dr. Carlos Jiménez MD.               Results Review:    I have personally reviewed laboratory data, culture results, radiology studies and  antimicrobial therapy.    Hospital Medications (active)       Dose Frequency Start End    acetaminophen (TYLENOL) tablet 650 mg 650 mg Every 6 Hours PRN 1/16/2018     Sig - Route: Take 2 tablets by mouth Every 6 (Six) Hours As Needed for Mild Pain . - Oral    Cosign for Ordering: Accepted by Samuel Duane Kreis, MD on 1/16/2018  1:17 PM    cefepime (MAXIPIME) 2 g/100 mL 0.9% NS (mbp) 2 g Once 1/16/2018 1/16/2018    Sig - Route: Infuse 100 mL into a venous catheter 1 (One) Time. - Intravenous    Cosign for Ordering: Required by Misty Bae MD    cefepime (MAXIPIME) 2 g/100 mL 0.9% NS (mbp) 2 g Every 12 Hours 1/16/2018 1/23/2018    Sig - Route: Infuse 100 mL into a venous catheter Every 12 (Twelve) Hours. - Intravenous    Cosign for Ordering: Required by Misty Bae MD    heparin (porcine) 5000 UNIT/ML injection 5,000 Units 5,000 Units Every 8 Hours Scheduled 1/16/2018     Sig - Route: Inject 1 mL under the skin Every 8 (Eight) Hours. - Subcutaneous    Cosign for Ordering: Accepted by Samuel Duane Kreis, MD on 1/16/2018  1:17 PM    HYDROmorphone (DILAUDID) injection 1 mg 1 mg Once 1/15/2018     Sig - Route: Infuse 1 mL into a venous catheter 1 (One) Time. - Intravenous    metroNIDAZOLE (FLAGYL) IVPB 500 mg 500 mg Every 8 Hours 1/16/2018 1/23/2018    Sig - Route: Infuse 100 mL into a venous catheter Every 8 (Eight) Hours. - Intravenous    Cosign for Ordering: Required by Misty Bae MD    oxyCODONE (ROXICODONE) immediate release tablet 5 mg 5 mg Every 4 Hours PRN 1/16/2018 1/26/2018    Sig - Route: Take 1 tablet by mouth Every 4 (Four) Hours As Needed for Moderate Pain . - Oral    Pharmacy to dose vancomycin  Continuous PRN 1/16/2018 1/23/2018    Sig - Route: Continuous As Needed for Consult. - Does not apply    Cosign for Ordering: Required by Misty Bae MD    sodium chloride 0.9 % flush 10 mL 10 mL As Needed 1/15/2018     Sig - Route: Infuse 10 mL into a venous catheter As Needed for  "Line Care. - Intravenous    Cosign for Ordering: Accepted by José Miguel Li MD on 1/15/2018  1:21 PM    Linked Group 1:  \"And\" Linked Group Details        sodium chloride 0.9 % infusion 150 mL/hr Continuous 1/15/2018     Sig - Route: Infuse 150 mL/hr into a venous catheter Continuous. - Intravenous    vancomycin (VANCOCIN) 1,000 mg in sodium chloride 0.9 % 250 mL IVPB 1,000 mg Every 12 Hours 1/16/2018 1/23/2018    Sig - Route: Infuse 1,000 mg into a venous catheter Every 12 (Twelve) Hours. - Intravenous            Cultures:    Blood Culture   Date Value Ref Range Status   01/15/2018 No growth at 24 hours  Preliminary   01/15/2018 No growth at 24 hours  Preliminary     Urine Culture   Date Value Ref Range Status   01/15/2018 No growth at 24 hours  Preliminary           Assessment/Plan     ASSESSMENT:    1.  Severe sepsis with encephalopathy  2.  Aspiration pneumonia     PLAN:    Wound with evidence of bleeding and sloughing.  No fever or diarrhea reported overnight.  Negative cultures so far.  Significantly improved CRP level.  Dr. Santoyo to do I&D of sacral and hip wounds today.     Patient is at high risk for MRSA and pseudomonal infection and need for anaerobic coverage as well.  Would recommend to continue vancomycin per pharmacy dosing, cefepime 2 g IV every 12 hours and Flagyl 500 mg IV every 8 hours for aspiration pneumonia.      CRP ordered for a.m.      We will continue to follow closely and adjust antibiotic therapy appropriately.    Patient's findings and recommendations were discussed with patient, family and nursing staff    Code Status: Full Code     Scribed for Dr. Misty Driscoll, JOSÉ MIGUEL  01/19/18  10:49 AM      Physician Attestation:    The documentation recorded by the scribe accurately reflects the service I personally performed and the decisions made by me.    Misty Bae MD  Infectious Diseases  01/19/18  2:02 PM    "

## 2018-01-19 NOTE — PLAN OF CARE
Problem: Skin Integrity Impairment, Risk/Actual (Adult)  Goal: Skin Integrity/Wound Healing  Outcome: Ongoing (interventions implemented as appropriate)      Problem: Pain, Chronic (Adult)  Goal: Acceptable Pain Control/Comfort Level  Outcome: Ongoing (interventions implemented as appropriate)      Problem: Patient Care Overview (Adult)  Goal: Plan of Care Review  Outcome: Ongoing (interventions implemented as appropriate)    Goal: Adult Individualization and Mutuality  Outcome: Ongoing (interventions implemented as appropriate)    Goal: Discharge Needs Assessment  Outcome: Ongoing (interventions implemented as appropriate)      Problem: Pressure Ulcer Risk (Brien Scale) (Adult,Obstetrics,Pediatric)  Goal: Identify Related Risk Factors and Signs and Symptoms  Outcome: Ongoing (interventions implemented as appropriate)    Goal: Skin Integrity  Outcome: Ongoing (interventions implemented as appropriate)

## 2018-01-19 NOTE — PROGRESS NOTES
Discharge Planning Assessment   Nabeel     Patient Name: Jhonny Washington  MRN: [id number]  Today's Date: 1/19/2018    Admit Date: 1/15/2018          Discharge Needs Assessment     None            Discharge Plan       01/19/18 1437    Case Management/Social Work Plan    Plan SS spoke with pt and family in pt's room with approval of pt.  Pt continues to be agreeable to nursing home placement for Physcial Rehab.  Pt on all area nursing home lists.  No beds available at this time.  Pt family continue to states pt will need drug Rehab.  Pt admitted to SS that he has an issue with substance abuse.  SS provided pt and family with list of available substance abuse rehab facilities.  SS educated pt and family on process of getting Power of Atty and recommended pt and family consult with an .  Pt and family stated understanding.  SS will follow and assist with discharge needs.     Patient/Family In Agreement With Plan yes        Discharge Placement     No information found        Expected Discharge Date and Time     Expected Discharge Date Expected Discharge Time    Jan 20, 2018               Demographic Summary     None            Functional Status     None            Psychosocial     None            Abuse/Neglect     None            Legal     None            Substance Abuse     None            Patient Forms     None          Amber Jimenes

## 2018-01-20 LAB
ALBUMIN SERPL-MCNC: 2.3 G/DL (ref 3.4–4.8)
ALBUMIN/GLOB SERPL: 0.9 G/DL (ref 1.5–2.5)
ALP SERPL-CCNC: 134 U/L (ref 40–129)
ALT SERPL W P-5'-P-CCNC: 35 U/L (ref 10–44)
ANION GAP SERPL CALCULATED.3IONS-SCNC: 0.7 MMOL/L (ref 3.6–11.2)
ANISOCYTOSIS BLD QL: ABNORMAL
AST SERPL-CCNC: 64 U/L (ref 10–34)
BACTERIA SPEC AEROBE CULT: NORMAL
BACTERIA SPEC AEROBE CULT: NORMAL
BILIRUB SERPL-MCNC: 1.2 MG/DL (ref 0.2–1.8)
BUN BLD-MCNC: 11 MG/DL (ref 7–21)
BUN/CREAT SERPL: 28.9 (ref 7–25)
CALCIUM SPEC-SCNC: 7.4 MG/DL (ref 7.7–10)
CHLORIDE SERPL-SCNC: 113 MMOL/L (ref 99–112)
CO2 SERPL-SCNC: 23.3 MMOL/L (ref 24.3–31.9)
CREAT BLD-MCNC: 0.38 MG/DL (ref 0.43–1.29)
DEPRECATED RDW RBC AUTO: 31.8 FL (ref 37–54)
EOSINOPHIL # BLD MANUAL: 0.11 10*3/MM3 (ref 0–0.7)
EOSINOPHIL NFR BLD MANUAL: 1 % (ref 0–5)
ERYTHROCYTE [DISTWIDTH] IN BLOOD BY AUTOMATED COUNT: 15.7 % (ref 11.5–14.5)
GFR SERPL CREATININE-BSD FRML MDRD: >150 ML/MIN/1.73
GLOBULIN UR ELPH-MCNC: 2.5 GM/DL
GLUCOSE BLD-MCNC: 120 MG/DL (ref 70–110)
HCT VFR BLD AUTO: 26.5 % (ref 42–52)
HGB BLD-MCNC: 8.6 G/DL (ref 14–18)
LYMPHOCYTES # BLD MANUAL: 1.71 10*3/MM3 (ref 1–3)
LYMPHOCYTES NFR BLD MANUAL: 15 % (ref 21–51)
LYMPHOCYTES NFR BLD MANUAL: 4 % (ref 0–10)
MCH RBC QN AUTO: 19.5 PG (ref 27–33)
MCHC RBC AUTO-ENTMCNC: 32.5 G/DL (ref 33–37)
MCV RBC AUTO: 60.1 FL (ref 80–94)
MICROCYTES BLD QL: ABNORMAL
MONOCYTES # BLD AUTO: 0.46 10*3/MM3 (ref 0.1–0.9)
NEUTROPHILS # BLD AUTO: 9.14 10*3/MM3 (ref 1.4–6.5)
NEUTROPHILS NFR BLD MANUAL: 80 % (ref 30–70)
OSMOLALITY SERPL CALC.SUM OF ELEC: 274.4 MOSM/KG (ref 273–305)
OVALOCYTES BLD QL SMEAR: ABNORMAL
PLAT MORPH BLD: NORMAL
PLATELET # BLD AUTO: 394 10*3/MM3 (ref 130–400)
PMV BLD AUTO: 11.5 FL (ref 6–10)
POTASSIUM BLD-SCNC: 3.5 MMOL/L (ref 3.5–5.3)
PROT SERPL-MCNC: 4.8 G/DL (ref 6–8)
RBC # BLD AUTO: 4.41 10*6/MM3 (ref 4.7–6.1)
SODIUM BLD-SCNC: 137 MMOL/L (ref 135–153)
TARGETS BLD QL SMEAR: ABNORMAL
VANCOMYCIN TROUGH SERPL-MCNC: 10.5 MCG/ML (ref 5–15)
WBC NRBC COR # BLD: 11.43 10*3/MM3 (ref 4.5–12.5)

## 2018-01-20 PROCEDURE — 94799 UNLISTED PULMONARY SVC/PX: CPT

## 2018-01-20 PROCEDURE — 80202 ASSAY OF VANCOMYCIN: CPT

## 2018-01-20 PROCEDURE — 25010000002 HEPARIN (PORCINE) PER 1000 UNITS: Performed by: PHYSICIAN ASSISTANT

## 2018-01-20 PROCEDURE — 25010000002 CEFEPIME: Performed by: PHYSICIAN ASSISTANT

## 2018-01-20 PROCEDURE — 85025 COMPLETE CBC W/AUTO DIFF WBC: CPT | Performed by: PHYSICIAN ASSISTANT

## 2018-01-20 PROCEDURE — 80053 COMPREHEN METABOLIC PANEL: CPT | Performed by: PHYSICIAN ASSISTANT

## 2018-01-20 PROCEDURE — 25010000002 VANCOMYCIN PER 500 MG

## 2018-01-20 PROCEDURE — 85007 BL SMEAR W/DIFF WBC COUNT: CPT | Performed by: PHYSICIAN ASSISTANT

## 2018-01-20 RX ORDER — MAGNESIUM HYDROXIDE 1200 MG/15ML
LIQUID ORAL
Status: COMPLETED
Start: 2018-01-20 | End: 2018-01-20

## 2018-01-20 RX ADMIN — OXYCODONE HYDROCHLORIDE 5 MG: 5 TABLET ORAL at 09:19

## 2018-01-20 RX ADMIN — METRONIDAZOLE 500 MG: 500 INJECTION, SOLUTION INTRAVENOUS at 04:15

## 2018-01-20 RX ADMIN — CEFEPIME 2 G: 2 INJECTION, POWDER, FOR SOLUTION INTRAVENOUS at 17:27

## 2018-01-20 RX ADMIN — OXYCODONE HYDROCHLORIDE 5 MG: 5 TABLET ORAL at 14:35

## 2018-01-20 RX ADMIN — METRONIDAZOLE 500 MG: 500 INJECTION, SOLUTION INTRAVENOUS at 11:59

## 2018-01-20 RX ADMIN — HEPARIN SODIUM 5000 UNITS: 5000 INJECTION, SOLUTION INTRAVENOUS; SUBCUTANEOUS at 21:20

## 2018-01-20 RX ADMIN — VANCOMYCIN HYDROCHLORIDE 1500 MG: 5 INJECTION, POWDER, LYOPHILIZED, FOR SOLUTION INTRAVENOUS at 14:35

## 2018-01-20 RX ADMIN — CEFEPIME 2 G: 2 INJECTION, POWDER, FOR SOLUTION INTRAVENOUS at 05:37

## 2018-01-20 RX ADMIN — Medication 1 CAPSULE: at 07:10

## 2018-01-20 RX ADMIN — VANCOMYCIN HYDROCHLORIDE 1500 MG: 5 INJECTION, POWDER, LYOPHILIZED, FOR SOLUTION INTRAVENOUS at 01:09

## 2018-01-20 RX ADMIN — SODIUM CHLORIDE: 900 IRRIGANT IRRIGATION at 17:15

## 2018-01-20 RX ADMIN — OXYCODONE HYDROCHLORIDE 5 MG: 5 TABLET ORAL at 18:13

## 2018-01-20 RX ADMIN — HEPARIN SODIUM 5000 UNITS: 5000 INJECTION, SOLUTION INTRAVENOUS; SUBCUTANEOUS at 14:35

## 2018-01-20 RX ADMIN — OXYCODONE HYDROCHLORIDE 5 MG: 5 TABLET ORAL at 01:09

## 2018-01-20 RX ADMIN — OXYCODONE HYDROCHLORIDE 5 MG: 5 TABLET ORAL at 05:21

## 2018-01-20 RX ADMIN — HEPARIN SODIUM 5000 UNITS: 5000 INJECTION, SOLUTION INTRAVENOUS; SUBCUTANEOUS at 05:22

## 2018-01-20 RX ADMIN — OXYCODONE HYDROCHLORIDE 5 MG: 5 TABLET ORAL at 22:16

## 2018-01-20 RX ADMIN — METRONIDAZOLE 500 MG: 500 INJECTION, SOLUTION INTRAVENOUS at 21:20

## 2018-01-20 NOTE — PROGRESS NOTES
"  I have personally seen and examined the patient today and discussed overnight interval progress and pertinent issues with nursing staff.    Subjective:    Status post coccyx wound debridement by Dr. Santoyo on 1/19/18 with intraoperative cultures obtained and in process.  Significantly improved CRP with normal white count.  Nurse reports no fever or diarrhea overnight.  Surgeon to do first postop wound care today.      History taken from: patient chart family RN      Vital Signs    /73 (BP Location: Left arm, Patient Position: Lying)  Pulse 66  Temp 97.5 °F (36.4 °C) (Axillary)   Resp 18  Ht 182 cm (71.65\")  Wt 76.4 kg (168 lb 6.9 oz)  SpO2 95%  BMI 23.06 kg/m2    Temp:  [97.5 °F (36.4 °C)-98.3 °F (36.8 °C)] 97.5 °F (36.4 °C)      Intake/Output Summary (Last 24 hours) at 01/20/18 0951  Last data filed at 01/20/18 0537   Gross per 24 hour   Intake             1220 ml   Output              750 ml   Net              470 ml     Intake & Output (last 3 days)       01/17 0701 - 01/18 0700 01/18 0701 - 01/19 0700 01/19 0701 - 01/20 0700 01/20 0701 - 01/21 0700    P.O. 240 240 120     I.V. (mL/kg)   500 (6.5)     IV Piggyback   600     Total Intake(mL/kg) 240 (3.4) 240 (3.4) 1220 (16)     Urine (mL/kg/hr) 75 (0) 475 (0.3) 750 (0.4)     Stool 0 (0)       Total Output 75 475 750      Net +165 -235 +470              Unmeasured Urine Occurrence 4 x 3 x 1 x     Unmeasured Stool Occurrence 1 x           Physical Exam:       General Appearance:    Alert, cooperative, in no acute distress, thin, frail, daughter at bedside    Head:    Normocephalic, without obvious abnormality, atraumatic   Eyes:            Lids and lashes normal, conjunctivae and sclerae normal, no   icterus, no pallor, corneas clear, PERRLA   Ears:    Ears appear intact with no abnormalities noted   Throat:   No oral lesions, no thrush, oral mucosa moist   Neck:   No adenopathy, supple, trachea midline, no thyromegaly, no   carotid bruit, no JVD "   Back:     No tenderness to percussion or palpation, range of motion   normal   Lungs:    coarse breath sounds to right.    Heart:    Regular rhythm and normal rate, normal S1 and S2, no            murmur, no gallop, no rub, no click   Chest Wall:    No abnormalities observed   Abdomen:     Normal bowel sounds, no masses, no organomegaly, soft        non-tender, non-distended, no guarding, no rebound                tenderness   Rectal:     Deferred   Extremities:   Moves all extremities well, no edema, no cyanosis, no             redness   Pulses:   Pulses palpable and equal bilaterally   Skin:   Sacral decubitus ulcer and right hip ulcer    Lymph nodes:   No palpable adenopathy   Neurologic:   Awake, alert, Garbled speech        Results:      Results from last 7 days  Lab Units 01/20/18  0054 01/19/18  0121 01/18/18  0036 01/17/18  0454 01/16/18  0110 01/15/18  1245   WBC 10*3/mm3 11.43 12.61* 9.73 10.08 8.14 13.11*     Lab Results   Component Value Date    NEUTROABS 9.14 (H) 01/20/2018         Results from last 7 days  Lab Units 01/20/18  0054   CREATININE mg/dL 0.38*         Results from last 7 days  Lab Units 01/19/18  0121 01/17/18  0454 01/15/18  1245   CRP mg/dL 4.83* 9.55* 15.63*       Imaging Results (last 24 hours)     ** No results found for the last 24 hours. **            Results Review:    I have personally reviewed laboratory data, culture results, radiology studies and antimicrobial therapy.    Hospital Medications (active)       Dose Frequency Start End    acetaminophen (TYLENOL) tablet 650 mg 650 mg Every 6 Hours PRN 1/16/2018     Sig - Route: Take 2 tablets by mouth Every 6 (Six) Hours As Needed for Mild Pain . - Oral    Cosign for Ordering: Accepted by Samuel Duane Kreis, MD on 1/16/2018  1:17 PM    cefepime (MAXIPIME) 2 g/100 mL 0.9% NS (mbp) 2 g Once 1/16/2018 1/16/2018    Sig - Route: Infuse 100 mL into a venous catheter 1 (One) Time. - Intravenous    Cosign for Ordering: Required by Misty  "John Bae MD    cefepime (MAXIPIME) 2 g/100 mL 0.9% NS (mbp) 2 g Every 12 Hours 1/16/2018 1/23/2018    Sig - Route: Infuse 100 mL into a venous catheter Every 12 (Twelve) Hours. - Intravenous    Cosign for Ordering: Required by Misty Bae MD    heparin (porcine) 5000 UNIT/ML injection 5,000 Units 5,000 Units Every 8 Hours Scheduled 1/16/2018     Sig - Route: Inject 1 mL under the skin Every 8 (Eight) Hours. - Subcutaneous    Cosign for Ordering: Accepted by Samuel Duane Kreis, MD on 1/16/2018  1:17 PM    HYDROmorphone (DILAUDID) injection 1 mg 1 mg Once 1/15/2018     Sig - Route: Infuse 1 mL into a venous catheter 1 (One) Time. - Intravenous    metroNIDAZOLE (FLAGYL) IVPB 500 mg 500 mg Every 8 Hours 1/16/2018 1/23/2018    Sig - Route: Infuse 100 mL into a venous catheter Every 8 (Eight) Hours. - Intravenous    Cosign for Ordering: Required by Misty Bae MD    oxyCODONE (ROXICODONE) immediate release tablet 5 mg 5 mg Every 4 Hours PRN 1/16/2018 1/26/2018    Sig - Route: Take 1 tablet by mouth Every 4 (Four) Hours As Needed for Moderate Pain . - Oral    Pharmacy to dose vancomycin  Continuous PRN 1/16/2018 1/23/2018    Sig - Route: Continuous As Needed for Consult. - Does not apply    Cosign for Ordering: Required by Misty Bae MD    sodium chloride 0.9 % flush 10 mL 10 mL As Needed 1/15/2018     Sig - Route: Infuse 10 mL into a venous catheter As Needed for Line Care. - Intravenous    Cosign for Ordering: Accepted by José Miguel Li MD on 1/15/2018  1:21 PM    Linked Group 1:  \"And\" Linked Group Details        sodium chloride 0.9 % infusion 150 mL/hr Continuous 1/15/2018     Sig - Route: Infuse 150 mL/hr into a venous catheter Continuous. - Intravenous    vancomycin (VANCOCIN) 1,000 mg in sodium chloride 0.9 % 250 mL IVPB 1,000 mg Every 12 Hours 1/16/2018 1/23/2018    Sig - Route: Infuse 1,000 mg into a venous catheter Every 12 (Twelve) Hours. - Intravenous    "         Cultures:    Blood Culture   Date Value Ref Range Status   01/15/2018 No growth at 24 hours  Preliminary   01/15/2018 No growth at 24 hours  Preliminary     Urine Culture   Date Value Ref Range Status   01/15/2018 No growth at 24 hours  Preliminary           Assessment/Plan     ASSESSMENT:    1.  Severe sepsis with encephalopathy  2.  Aspiration pneumonia     PLAN:    Status post coccyx wound debridement by Dr. Santoyo on 1/19/18 with intraoperative cultures obtained and in process.  Significantly improved CRP with normal white count.  Nurse reports no fever or diarrhea overnight.  Surgeon to do first postop wound care today.      Patient is at high risk for MRSA and pseudomonal infection and need for anaerobic coverage as well. Continue vancomycin per pharmacy dosing, cefepime 2 g IV every 12 hours and Flagyl 500 mg IV every 8 hours for aspiration pneumonia.     We'll continue to follow intraoperative wound cultures and sensitivities and make antibiotic adjustments if necessary.     CRP ordered for a.m.     Patient's findings and recommendations were discussed with patient, family and nursing staff    Code Status: Full Code     Laverne Driscoll, JOSÉ MIUGEL  01/20/18  9:51 AM

## 2018-01-20 NOTE — PLAN OF CARE
Problem: Pain, Chronic (Adult)  Goal: Acceptable Pain Control/Comfort Level  Outcome: Ongoing (interventions implemented as appropriate)   01/19/18 3587   Pain, Chronic (Adult)   Acceptable Pain Control/Comfort Level making progress toward outcome

## 2018-01-20 NOTE — PLAN OF CARE
Problem: Fall Risk (Adult)  Goal: Absence of Falls  Outcome: Ongoing (interventions implemented as appropriate)   01/19/18 6569   Fall Risk (Adult)   Absence of Falls making progress toward outcome

## 2018-01-20 NOTE — PLAN OF CARE
Problem: Pressure Ulcer Risk (Brien Scale) (Adult,Obstetrics,Pediatric)  Goal: Skin Integrity  Outcome: Ongoing (interventions implemented as appropriate)   01/19/18 9812   Pressure Ulcer Risk (Brien Scale) (Adult,Obstetrics,Pediatric)   Skin Integrity making progress toward outcome

## 2018-01-20 NOTE — PLAN OF CARE
Problem: Skin Integrity Impairment, Risk/Actual (Adult)  Goal: Skin Integrity/Wound Healing  Outcome: Ongoing (interventions implemented as appropriate)   01/19/18 9129   Skin Integrity Impairment, Risk/Actual (Adult)   Skin Integrity/Wound Healing making progress toward outcome

## 2018-01-20 NOTE — PLAN OF CARE
Problem: Skin Integrity Impairment, Risk/Actual (Adult)  Goal: Skin Integrity/Wound Healing  Outcome: Ongoing (interventions implemented as appropriate)      Problem: Pain, Chronic (Adult)  Goal: Acceptable Pain Control/Comfort Level  Outcome: Ongoing (interventions implemented as appropriate)      Problem: Patient Care Overview (Adult)  Goal: Plan of Care Review  Outcome: Ongoing (interventions implemented as appropriate)    Goal: Adult Individualization and Mutuality  Outcome: Ongoing (interventions implemented as appropriate)    Goal: Discharge Needs Assessment  Outcome: Ongoing (interventions implemented as appropriate)      Problem: Pressure Ulcer Risk (Brien Scale) (Adult,Obstetrics,Pediatric)  Goal: Identify Related Risk Factors and Signs and Symptoms  Outcome: Ongoing (interventions implemented as appropriate)    Goal: Skin Integrity  Outcome: Ongoing (interventions implemented as appropriate)      Problem: Fall Risk (Adult)  Goal: Identify Related Risk Factors and Signs and Symptoms  Outcome: Ongoing (interventions implemented as appropriate)    Goal: Absence of Falls  Outcome: Ongoing (interventions implemented as appropriate)

## 2018-01-20 NOTE — PROGRESS NOTES
Pharmacokinetics Service Note:    Mr. Washington continues on day 5 of 7 of vancomycin for his coccyx wound and aspiration PNA.  The current dosage is 1.75 gm q12hrs.  A 10 hour post 2 hour infusion trough level was reported as 10.5 mg/L this afternoon.  This is lower than desired and consistent with excellent clearance.  Will increase the dosage to 1.75 gm q12hrs to target troughs = 15-20 mg/L and will plan to repeat a steady state trough level if treatment continues.    Thank you.  Janelle Espino, Pharm.D.  1/20/2018  3:22 PM

## 2018-01-20 NOTE — PROGRESS NOTES
"     LOS: 5 days     Chief Complaint:  Rhabdomyolysis/Decubitus Ulcers with Sepsis    Subjective     Interval History:     He went to the OR yesterday with debridement of his wound but apparently it didn't require a lot of improvement.    .Objective     Vital Signs  /73 (BP Location: Left arm, Patient Position: Lying)  Pulse 66  Temp 97.5 °F (36.4 °C) (Axillary)   Resp 18  Ht 182 cm (71.65\")  Wt 76.4 kg (168 lb 6.9 oz)  SpO2 95%  BMI 23.06 kg/m2  Intake & Output (last day)       01/19 0701 - 01/20 0700 01/20 0701 - 01/21 0700    P.O. 120     I.V. (mL/kg) 500 (6.5)     IV Piggyback 600     Total Intake(mL/kg) 1220 (16)     Urine (mL/kg/hr) 750 (0.4)     Total Output 750      Net +470            Unmeasured Urine Occurrence 1 x             Physical Exam:     General Appearance:    Alert, cooperative, in no acute distress   Head:    Normocephalic, without obvious abnormality, atraumatic   Eyes:            Lids and lashes normal, conjunctivae and sclerae normal, no   icterus, no pallor, corneas clear, PERRLA   Ears:    Ears appear intact with no abnormalities noted       Neck:   No adenopathy, supple, trachea midline, no thyromegaly, no   carotid bruit, no JVD   Lungs:     Scattered Coarse Crackles bilaterally R>>L ,respirations regular, even and                  unlabored    Heart:    Regular rhythm and normal rate, normal S1 and S2, no            murmur, no gallop, no rub, no click   Chest Wall:    No abnormalities observed   Abdomen:     Normal bowel sounds, no masses, no organomegaly, soft        non-tender, non-distended, no guarding, no rebound                tenderness   Extremities:   Moves all extremities well, no edema, no cyanosis, no             redness   Pulses:   Pulses palpable and equal bilaterally   Skin:   No bleeding, bruising or rash, Sacral decubitus ulcer with blackened eschar and right trochanteric ulcer with eschar   Lymph nodes:   No palpable adenopathy   Neurologic:   Cranial nerves " 2 - 12 grossly intact, sensation intact, DTR       present and equal bilaterally        Results Review:    Lab Results   Component Value Date    WBC 11.43 01/20/2018    HGB 8.6 (L) 01/20/2018    HCT 26.5 (L) 01/20/2018    MCV 60.1 (L) 01/20/2018     01/20/2018       Lab Results   Component Value Date    GLUCOSE 120 (H) 01/20/2018    BUN 11 01/20/2018    CREATININE 0.38 (L) 01/20/2018    EGFRIFNONA >150 01/20/2018    BCR 28.9 (H) 01/20/2018     01/20/2018    K 3.5 01/20/2018     (H) 01/20/2018    CO2 23.3 (L) 01/20/2018    CALCIUM 7.4 (L) 01/20/2018    PROTENTOTREF 7.2 11/12/2015    ALBUMIN 2.30 (L) 01/20/2018    LABIL2 0.9 (L) 01/20/2018    AST 64 (H) 01/20/2018    ALT 35 01/20/2018     Lab Results   Component Value Date    INR 0.94 11/03/2015    INR <0.90 10/25/2015    INR <0.90 06/01/2015       Glucose   Date Value Ref Range Status   01/19/2018 107 70 - 130 mg/dL Final   01/19/2018 108 70 - 130 mg/dL Final   01/19/2018 124 70 - 130 mg/dL Final          Medication Review:     Current Facility-Administered Medications:   •  acetaminophen (TYLENOL) tablet 650 mg, 650 mg, Oral, Q6H PRN, JARETH Condon, 650 mg at 01/16/18 1913  •  cefepime (MAXIPIME) 2 g/100 mL 0.9% NS (mbp), 2 g, Intravenous, Q12H, Shameka Galeana PA-C, Last Rate: 25 mL/hr at 01/20/18 0537, 2 g at 01/20/18 0537  •  heparin (porcine) 5000 UNIT/ML injection 5,000 Units, 5,000 Units, Subcutaneous, Q8H, JARETH Condon, 5,000 Units at 01/20/18 0522  •  HYDROmorphone (DILAUDID) injection 1 mg, 1 mg, Intravenous, Once, José Miguel Li MD  •  lactated ringers infusion, 100 mL/hr, Intravenous, Continuous, Mohan Santoyo MD  •  lactated ringers infusion, 125 mL/hr, Intravenous, Continuous, Terrence Montero MD, Stopped at 01/19/18 1251  •  lactobacillus acidophilus (RISAQUAD) capsule 1 capsule, 1 capsule, Oral, Daily, Camila Haley, [facility], 1 capsule at 01/20/18 0710  •  metroNIDAZOLE (FLAGYL) IVPB 500 mg, 500 mg, Intravenous,  Q8H, Shameka Galeana PA-C, Last Rate: 100 mL/hr at 01/20/18 0415, 500 mg at 01/20/18 0415  •  oxyCODONE (ROXICODONE) immediate release tablet 5 mg, 5 mg, Oral, Q4H PRN, Samuel Duane Kreis, MD, 5 mg at 01/20/18 0521  •  Insert peripheral IV, , , Once **AND** sodium chloride 0.9 % flush 10 mL, 10 mL, Intravenous, PRN, Jesus Carreon PA-C  •  vancomycin (VANCOCIN) 1,500 mg in sodium chloride 0.9 % 500 mL IVPB, 1,500 mg, Intravenous, Q12H, Camila Haley, [facility], Last Rate: 250 mL/hr at 01/20/18 0109, 1,500 mg at 01/20/18 0109      Assessment/Plan   Probable Aspiration Pneumonia with Sepsis  Sacral Decubitus Ulcer   Decubitus ulceration of the hip  Hyperbilirubinemia  Rhabdomyolysis  Metabolic encephalopathy due to sepsis  Multiple myeloma  Chronic opioid use  Fever    Appreciate surgery and ID input     Continue with Cefepime + Flagyl + Vancomycin    Oxycodone 5 mg q 6 hours prn pain     Tylenol prn fever     Continue with wound care     Heparin for DVT prophylaxis    O2 via NC to maintain O2 sats >90%    Continue with PT/OT, S/S following for discharge to SNF for physical rehab     Rhabdo has resolved    Samuel Duane Kreis, MD  01/20/18  8:51 AM

## 2018-01-21 LAB
ALBUMIN SERPL-MCNC: 2.3 G/DL (ref 3.4–4.8)
ALBUMIN/GLOB SERPL: 0.9 G/DL (ref 1.5–2.5)
ALP SERPL-CCNC: 117 U/L (ref 40–129)
ALT SERPL W P-5'-P-CCNC: 37 U/L (ref 10–44)
ANION GAP SERPL CALCULATED.3IONS-SCNC: 2.1 MMOL/L (ref 3.6–11.2)
ANISOCYTOSIS BLD QL: NORMAL
AST SERPL-CCNC: 71 U/L (ref 10–34)
BASOPHILS # BLD AUTO: 0.03 10*3/MM3 (ref 0–0.3)
BASOPHILS NFR BLD AUTO: 0.3 % (ref 0–2)
BILIRUB SERPL-MCNC: 1 MG/DL (ref 0.2–1.8)
BUN BLD-MCNC: 11 MG/DL (ref 7–21)
BUN/CREAT SERPL: 26.8 (ref 7–25)
CALCIUM SPEC-SCNC: 7.4 MG/DL (ref 7.7–10)
CHLORIDE SERPL-SCNC: 109 MMOL/L (ref 99–112)
CO2 SERPL-SCNC: 23.9 MMOL/L (ref 24.3–31.9)
CREAT BLD-MCNC: 0.41 MG/DL (ref 0.43–1.29)
CRP SERPL-MCNC: 2.07 MG/DL (ref 0–0.99)
DEPRECATED RDW RBC AUTO: 33.9 FL (ref 37–54)
EOSINOPHIL # BLD AUTO: 0.34 10*3/MM3 (ref 0–0.7)
EOSINOPHIL NFR BLD AUTO: 3.2 % (ref 0–5)
ERYTHROCYTE [DISTWIDTH] IN BLOOD BY AUTOMATED COUNT: 15.7 % (ref 11.5–14.5)
GFR SERPL CREATININE-BSD FRML MDRD: >150 ML/MIN/1.73
GLOBULIN UR ELPH-MCNC: 2.5 GM/DL
GLUCOSE BLD-MCNC: 113 MG/DL (ref 70–110)
HCT VFR BLD AUTO: 26.7 % (ref 42–52)
HGB BLD-MCNC: 8.6 G/DL (ref 14–18)
HYPOCHROMIA BLD QL: NORMAL
IMM GRANULOCYTES # BLD: 0.03 10*3/MM3 (ref 0–0.03)
IMM GRANULOCYTES NFR BLD: 0.3 % (ref 0–0.5)
LYMPHOCYTES # BLD AUTO: 2.23 10*3/MM3 (ref 1–3)
LYMPHOCYTES NFR BLD AUTO: 20.7 % (ref 21–51)
MCH RBC QN AUTO: 19.7 PG (ref 27–33)
MCHC RBC AUTO-ENTMCNC: 32.2 G/DL (ref 33–37)
MCV RBC AUTO: 61.1 FL (ref 80–94)
MICROCYTES BLD QL: NORMAL
MONOCYTES # BLD AUTO: 1.04 10*3/MM3 (ref 0.1–0.9)
MONOCYTES NFR BLD AUTO: 9.7 % (ref 0–10)
NEUTROPHILS # BLD AUTO: 7.09 10*3/MM3 (ref 1.4–6.5)
NEUTROPHILS NFR BLD AUTO: 65.8 % (ref 30–70)
OSMOLALITY SERPL CALC.SUM OF ELEC: 270.3 MOSM/KG (ref 273–305)
OVALOCYTES BLD QL SMEAR: NORMAL
PLAT MORPH BLD: NORMAL
PLATELET # BLD AUTO: 427 10*3/MM3 (ref 130–400)
PMV BLD AUTO: 10.5 FL (ref 6–10)
POTASSIUM BLD-SCNC: 3.2 MMOL/L (ref 3.5–5.3)
POTASSIUM BLD-SCNC: 3.9 MMOL/L (ref 3.5–5.3)
PROT SERPL-MCNC: 4.8 G/DL (ref 6–8)
RBC # BLD AUTO: 4.37 10*6/MM3 (ref 4.7–6.1)
SODIUM BLD-SCNC: 135 MMOL/L (ref 135–153)
TARGETS BLD QL SMEAR: NORMAL
WBC NRBC COR # BLD: 10.76 10*3/MM3 (ref 4.5–12.5)

## 2018-01-21 PROCEDURE — 85007 BL SMEAR W/DIFF WBC COUNT: CPT | Performed by: PHYSICIAN ASSISTANT

## 2018-01-21 PROCEDURE — 25010000002 CEFEPIME: Performed by: INTERNAL MEDICINE

## 2018-01-21 PROCEDURE — 84132 ASSAY OF SERUM POTASSIUM: CPT | Performed by: FAMILY MEDICINE

## 2018-01-21 PROCEDURE — 25010000002 ONDANSETRON PER 1 MG: Performed by: FAMILY MEDICINE

## 2018-01-21 PROCEDURE — 25010000002 VANCOMYCIN PER 500 MG: Performed by: INTERNAL MEDICINE

## 2018-01-21 PROCEDURE — 25010000002 VANCOMYCIN PER 500 MG

## 2018-01-21 PROCEDURE — 85025 COMPLETE CBC W/AUTO DIFF WBC: CPT | Performed by: PHYSICIAN ASSISTANT

## 2018-01-21 PROCEDURE — 25010000002 CEFEPIME: Performed by: PHYSICIAN ASSISTANT

## 2018-01-21 PROCEDURE — 25010000002 HEPARIN (PORCINE) PER 1000 UNITS: Performed by: PHYSICIAN ASSISTANT

## 2018-01-21 PROCEDURE — 94799 UNLISTED PULMONARY SVC/PX: CPT

## 2018-01-21 PROCEDURE — 86140 C-REACTIVE PROTEIN: CPT | Performed by: NURSE PRACTITIONER

## 2018-01-21 PROCEDURE — 80053 COMPREHEN METABOLIC PANEL: CPT | Performed by: PHYSICIAN ASSISTANT

## 2018-01-21 RX ORDER — POTASSIUM CHLORIDE 20 MEQ/1
40 TABLET, EXTENDED RELEASE ORAL EVERY 4 HOURS
Status: COMPLETED | OUTPATIENT
Start: 2018-01-21 | End: 2018-01-21

## 2018-01-21 RX ORDER — POTASSIUM CHLORIDE 750 MG/1
40 CAPSULE, EXTENDED RELEASE ORAL AS NEEDED
Status: DISCONTINUED | OUTPATIENT
Start: 2018-01-21 | End: 2018-01-26 | Stop reason: HOSPADM

## 2018-01-21 RX ORDER — MAGNESIUM SULFATE HEPTAHYDRATE 40 MG/ML
4 INJECTION, SOLUTION INTRAVENOUS AS NEEDED
Status: DISCONTINUED | OUTPATIENT
Start: 2018-01-21 | End: 2018-01-26 | Stop reason: HOSPADM

## 2018-01-21 RX ORDER — POTASSIUM CHLORIDE 750 MG/1
40 CAPSULE, EXTENDED RELEASE ORAL AS NEEDED
Status: DISCONTINUED | OUTPATIENT
Start: 2018-01-21 | End: 2018-01-21 | Stop reason: SDUPTHER

## 2018-01-21 RX ORDER — POTASSIUM CHLORIDE 20 MEQ/1
40 TABLET, EXTENDED RELEASE ORAL EVERY 4 HOURS
Status: DISPENSED | OUTPATIENT
Start: 2018-01-21 | End: 2018-01-21

## 2018-01-21 RX ORDER — OXYCODONE HYDROCHLORIDE 5 MG/1
5 TABLET ORAL ONCE
Status: COMPLETED | OUTPATIENT
Start: 2018-01-21 | End: 2018-01-21

## 2018-01-21 RX ORDER — ONDANSETRON 4 MG/1
4 TABLET, ORALLY DISINTEGRATING ORAL EVERY 6 HOURS PRN
Status: DISCONTINUED | OUTPATIENT
Start: 2018-01-21 | End: 2018-01-26 | Stop reason: HOSPADM

## 2018-01-21 RX ORDER — MAGNESIUM SULFATE HEPTAHYDRATE 40 MG/ML
2 INJECTION, SOLUTION INTRAVENOUS AS NEEDED
Status: DISCONTINUED | OUTPATIENT
Start: 2018-01-21 | End: 2018-01-26 | Stop reason: HOSPADM

## 2018-01-21 RX ORDER — MAGNESIUM HYDROXIDE 1200 MG/15ML
LIQUID ORAL
Status: COMPLETED
Start: 2018-01-21 | End: 2018-01-21

## 2018-01-21 RX ORDER — POTASSIUM CHLORIDE 1.5 G/1.77G
40 POWDER, FOR SOLUTION ORAL AS NEEDED
Status: DISCONTINUED | OUTPATIENT
Start: 2018-01-21 | End: 2018-01-26 | Stop reason: HOSPADM

## 2018-01-21 RX ORDER — POTASSIUM CHLORIDE 7.45 MG/ML
10 INJECTION INTRAVENOUS
Status: DISCONTINUED | OUTPATIENT
Start: 2018-01-21 | End: 2018-01-26 | Stop reason: HOSPADM

## 2018-01-21 RX ORDER — ONDANSETRON 2 MG/ML
4 INJECTION INTRAMUSCULAR; INTRAVENOUS EVERY 6 HOURS PRN
Status: DISCONTINUED | OUTPATIENT
Start: 2018-01-21 | End: 2018-01-26 | Stop reason: HOSPADM

## 2018-01-21 RX ORDER — ONDANSETRON 4 MG/1
4 TABLET, FILM COATED ORAL EVERY 6 HOURS PRN
Status: DISCONTINUED | OUTPATIENT
Start: 2018-01-21 | End: 2018-01-26 | Stop reason: HOSPADM

## 2018-01-21 RX ADMIN — OXYCODONE HYDROCHLORIDE 5 MG: 5 TABLET ORAL at 02:50

## 2018-01-21 RX ADMIN — HEPARIN SODIUM 5000 UNITS: 5000 INJECTION, SOLUTION INTRAVENOUS; SUBCUTANEOUS at 15:05

## 2018-01-21 RX ADMIN — METRONIDAZOLE 500 MG: 500 INJECTION, SOLUTION INTRAVENOUS at 20:04

## 2018-01-21 RX ADMIN — OXYCODONE HYDROCHLORIDE 5 MG: 5 TABLET ORAL at 15:56

## 2018-01-21 RX ADMIN — METRONIDAZOLE 500 MG: 500 INJECTION, SOLUTION INTRAVENOUS at 10:50

## 2018-01-21 RX ADMIN — HEPARIN SODIUM 5000 UNITS: 5000 INJECTION, SOLUTION INTRAVENOUS; SUBCUTANEOUS at 20:02

## 2018-01-21 RX ADMIN — Medication 10 ML: at 15:06

## 2018-01-21 RX ADMIN — METRONIDAZOLE 500 MG: 500 INJECTION, SOLUTION INTRAVENOUS at 04:54

## 2018-01-21 RX ADMIN — HEPARIN SODIUM 5000 UNITS: 5000 INJECTION, SOLUTION INTRAVENOUS; SUBCUTANEOUS at 04:54

## 2018-01-21 RX ADMIN — ONDANSETRON 4 MG: 2 INJECTION, SOLUTION INTRAMUSCULAR; INTRAVENOUS at 15:06

## 2018-01-21 RX ADMIN — CEFEPIME 2 G: 2 INJECTION, POWDER, FOR SOLUTION INTRAVENOUS at 17:59

## 2018-01-21 RX ADMIN — CEFEPIME 2 G: 2 INJECTION, POWDER, FOR SOLUTION INTRAVENOUS at 04:54

## 2018-01-21 RX ADMIN — POTASSIUM CHLORIDE 40 MEQ: 1500 TABLET, EXTENDED RELEASE ORAL at 12:32

## 2018-01-21 RX ADMIN — VANCOMYCIN HYDROCHLORIDE 1750 MG: 5 INJECTION, POWDER, LYOPHILIZED, FOR SOLUTION INTRAVENOUS at 02:43

## 2018-01-21 RX ADMIN — Medication 1 CAPSULE: at 12:32

## 2018-01-21 RX ADMIN — OXYCODONE HYDROCHLORIDE 5 MG: 5 TABLET ORAL at 12:32

## 2018-01-21 RX ADMIN — VANCOMYCIN HYDROCHLORIDE 1750 MG: 5 INJECTION, POWDER, LYOPHILIZED, FOR SOLUTION INTRAVENOUS at 15:06

## 2018-01-21 RX ADMIN — POTASSIUM CHLORIDE 40 MEQ: 1500 TABLET, EXTENDED RELEASE ORAL at 15:04

## 2018-01-21 RX ADMIN — POTASSIUM CHLORIDE 40 MEQ: 1500 TABLET, EXTENDED RELEASE ORAL at 09:29

## 2018-01-21 RX ADMIN — ONDANSETRON 4 MG: 2 INJECTION, SOLUTION INTRAMUSCULAR; INTRAVENOUS at 09:34

## 2018-01-21 RX ADMIN — SODIUM CHLORIDE 1000 ML: 900 IRRIGANT IRRIGATION at 16:00

## 2018-01-21 RX ADMIN — OXYCODONE HYDROCHLORIDE 5 MG: 5 TABLET ORAL at 20:02

## 2018-01-21 RX ADMIN — OXYCODONE HYDROCHLORIDE 5 MG: 5 TABLET ORAL at 03:46

## 2018-01-21 RX ADMIN — OXYCODONE HYDROCHLORIDE 5 MG: 5 TABLET ORAL at 09:25

## 2018-01-21 RX ADMIN — METRONIDAZOLE 500 MG: 500 INJECTION, SOLUTION INTRAVENOUS at 10:51

## 2018-01-21 NOTE — PROGRESS NOTES
"  I have personally seen and examined the patient today and discussed overnight interval progress and pertinent issues with nursing staff.    Subjective:    Status post coccyx wound debridement by Dr. Santoyo on 1/19/18 with intraoperative cultures obtained and in process.    CRP continues to admit frequently improved with normal white count.    No fever or diarrhea reported overnight.    History taken from: patient chart family RN      Vital Signs    /77 (BP Location: Left arm, Patient Position: Lying)  Pulse 60  Temp 98.5 °F (36.9 °C) (Oral)   Resp 18  Ht 182 cm (71.65\")  Wt 76.4 kg (168 lb 6.9 oz)  SpO2 93%  BMI 23.06 kg/m2    Temp:  [98 °F (36.7 °C)-98.5 °F (36.9 °C)] 98.5 °F (36.9 °C)      Intake/Output Summary (Last 24 hours) at 01/21/18 0931  Last data filed at 01/21/18 0307   Gross per 24 hour   Intake             1420 ml   Output             1700 ml   Net             -280 ml     Intake & Output (last 3 days)       01/18 0701 - 01/19 0700 01/19 0701 - 01/20 0700 01/20 0701 - 01/21 0700 01/21 0701 - 01/22 0700    P.O.      I.V. (mL/kg)  500 (6.5)      IV Piggyback  600 500     Total Intake(mL/kg) 240 (3.4) 1220 (16) 1660 (21.7)     Urine (mL/kg/hr) 475 (0.3) 750 (0.4) 1700 (0.9)     Stool        Total Output       Net -235 +470 -40              Unmeasured Urine Occurrence 3 x 1 x          Physical Exam:       General Appearance:    Alert, cooperative, in no acute distress, thin, frail, daughter at bedside    Head:    Normocephalic, without obvious abnormality, atraumatic   Eyes:            Lids and lashes normal, conjunctivae and sclerae normal, no   icterus, no pallor, corneas clear, PERRLA   Ears:    Ears appear intact with no abnormalities noted   Throat:   No oral lesions, no thrush, oral mucosa moist   Neck:   No adenopathy, supple, trachea midline, no thyromegaly, no   carotid bruit, no JVD   Back:     No tenderness to percussion or palpation, range of motion   normal "   Lungs:    coarse breath sounds to right.    Heart:    Regular rhythm and normal rate, normal S1 and S2, no            murmur, no gallop, no rub, no click   Chest Wall:    No abnormalities observed   Abdomen:     Normal bowel sounds, no masses, no organomegaly, soft        non-tender, non-distended, no guarding, no rebound                tenderness   Rectal:     Deferred   Extremities:   Moves all extremities well, no edema, no cyanosis, no             redness   Pulses:   Pulses palpable and equal bilaterally   Skin:   Sacral decubitus ulcer and right hip ulcer    Lymph nodes:   No palpable adenopathy   Neurologic:   Awake, alert, Garbled speech        Results:      Results from last 7 days  Lab Units 01/21/18  0053 01/20/18  0054 01/19/18  0121 01/18/18  0036 01/17/18  0454 01/16/18  0110 01/15/18  1245   WBC 10*3/mm3 10.76 11.43 12.61* 9.73 10.08 8.14 13.11*     Lab Results   Component Value Date    NEUTROABS 7.09 (H) 01/21/2018         Results from last 7 days  Lab Units 01/21/18  0053   CREATININE mg/dL 0.41*         Results from last 7 days  Lab Units 01/21/18  0053 01/19/18  0121 01/17/18  0454   CRP mg/dL 2.07* 4.83* 9.55*       Imaging Results (last 24 hours)     ** No results found for the last 24 hours. **            Results Review:    I have personally reviewed laboratory data, culture results, radiology studies and antimicrobial therapy.    Hospital Medications (active)       Dose Frequency Start End    acetaminophen (TYLENOL) tablet 650 mg 650 mg Every 6 Hours PRN 1/16/2018     Sig - Route: Take 2 tablets by mouth Every 6 (Six) Hours As Needed for Mild Pain . - Oral    Cosign for Ordering: Accepted by Samuel Duane Kreis, MD on 1/16/2018  1:17 PM    cefepime (MAXIPIME) 2 g/100 mL 0.9% NS (mbp) 2 g Once 1/16/2018 1/16/2018    Sig - Route: Infuse 100 mL into a venous catheter 1 (One) Time. - Intravenous    Cosign for Ordering: Required by Misty Bae MD    cefepime (MAXIPIME) 2 g/100 mL 0.9% NS  "(mbp) 2 g Every 12 Hours 1/16/2018 1/23/2018    Sig - Route: Infuse 100 mL into a venous catheter Every 12 (Twelve) Hours. - Intravenous    Cosign for Ordering: Required by Misty Bae MD    heparin (porcine) 5000 UNIT/ML injection 5,000 Units 5,000 Units Every 8 Hours Scheduled 1/16/2018     Sig - Route: Inject 1 mL under the skin Every 8 (Eight) Hours. - Subcutaneous    Cosign for Ordering: Accepted by Samuel Duane Kreis, MD on 1/16/2018  1:17 PM    HYDROmorphone (DILAUDID) injection 1 mg 1 mg Once 1/15/2018     Sig - Route: Infuse 1 mL into a venous catheter 1 (One) Time. - Intravenous    metroNIDAZOLE (FLAGYL) IVPB 500 mg 500 mg Every 8 Hours 1/16/2018 1/23/2018    Sig - Route: Infuse 100 mL into a venous catheter Every 8 (Eight) Hours. - Intravenous    Cosign for Ordering: Required by Misty Bae MD    oxyCODONE (ROXICODONE) immediate release tablet 5 mg 5 mg Every 4 Hours PRN 1/16/2018 1/26/2018    Sig - Route: Take 1 tablet by mouth Every 4 (Four) Hours As Needed for Moderate Pain . - Oral    Pharmacy to dose vancomycin  Continuous PRN 1/16/2018 1/23/2018    Sig - Route: Continuous As Needed for Consult. - Does not apply    Cosign for Ordering: Required by Misty Bae MD    sodium chloride 0.9 % flush 10 mL 10 mL As Needed 1/15/2018     Sig - Route: Infuse 10 mL into a venous catheter As Needed for Line Care. - Intravenous    Cosign for Ordering: Accepted by José Miguel Li MD on 1/15/2018  1:21 PM    Linked Group 1:  \"And\" Linked Group Details        sodium chloride 0.9 % infusion 150 mL/hr Continuous 1/15/2018     Sig - Route: Infuse 150 mL/hr into a venous catheter Continuous. - Intravenous    vancomycin (VANCOCIN) 1,000 mg in sodium chloride 0.9 % 250 mL IVPB 1,000 mg Every 12 Hours 1/16/2018 1/23/2018    Sig - Route: Infuse 1,000 mg into a venous catheter Every 12 (Twelve) Hours. - Intravenous            Cultures:    Blood Culture   Date Value Ref Range Status   01/15/2018 No " growth at 24 hours  Preliminary   01/15/2018 No growth at 24 hours  Preliminary     Urine Culture   Date Value Ref Range Status   01/15/2018 No growth at 24 hours  Preliminary           Assessment/Plan     ASSESSMENT:    1.  Severe sepsis with encephalopathy  2.  Aspiration pneumonia     PLAN:    Status post coccyx wound debridement by Dr. Santoyo on 1/19/18 with intraoperative cultures obtained and in process.    CRP continues to admit frequently improved with normal white count.    No fever or diarrhea reported overnight.    Patient is at high risk for MRSA and pseudomonal infection and need for anaerobic coverage as well. Continue vancomycin per pharmacy dosing, cefepime 2 g IV every 12 hours and Flagyl 500 mg IV every 8 hours for aspiration pneumonia.     We'll continue to follow intraoperative wound cultures and sensitivities and make antibiotic adjustments if necessary.     CRP ordered for a.m.     Patient's findings and recommendations were discussed with patient, family and nursing staff    Code Status: Full Code     Laverne Driscoll, APRN  01/21/18  9:31 AM

## 2018-01-21 NOTE — SIGNIFICANT NOTE
The pressure ulcer noted on admission are no longer viable since the wounds have been debrided.  The coccyx is a large open wound with yellow, slough, pink reddish and black eschar areas noted in the wound.  The dressing has been removed dry.  The areas are showing signs of healing with bright red tissue and bleeding noted, several areas remain shinny and slough covered.   Patient tolerated the wound care very poorly due to the severe pain during the dressing change.  Patient becomes tense and cries and hollers out during the dressing change.  Will continue to monitor dressing and patient.   Dr. Del Cid notified via phone that pain medication was given approximately 43 minutes early due to severe pain.

## 2018-01-21 NOTE — PROGRESS NOTES
"     LOS: 6 days     Chief Complaint:  Rhabdomyolysis/Decubitus Ulcers with Sepsis    Subjective     Interval History:     He complains of having some nausea.  No vomiting.  He does have some buttocks region pain.  He states he had a bowel movement yesterday but no diarrhea.  Potassium level today is 3.2    .Objective     Vital Signs  /77 (BP Location: Left arm, Patient Position: Lying)  Pulse 60  Temp 98.5 °F (36.9 °C) (Oral)   Resp 18  Ht 182 cm (71.65\")  Wt 76.4 kg (168 lb 6.9 oz)  SpO2 93%  BMI 23.06 kg/m2  Intake & Output (last day)       01/20 0701 - 01/21 0700 01/21 0701 - 01/22 0700    P.O. 1160 240    I.V. (mL/kg)      IV Piggyback 500     Total Intake(mL/kg) 1660 (21.7) 240 (3.1)    Urine (mL/kg/hr) 1700 (0.9) 400 (1.9)    Total Output 1700 400    Net -40 -160                  Physical Exam:     General Appearance:    Alert, cooperative, in no acute distress   Head:    Normocephalic, without obvious abnormality, atraumatic   Eyes:            Lids and lashes normal, conjunctivae and sclerae normal, no   icterus, no pallor, corneas clear, PERRLA   Ears:    Ears appear intact with no abnormalities noted       Neck:   No adenopathy, supple, trachea midline, no thyromegaly, no   carotid bruit, no JVD   Lungs:     Scattered Coarse Crackles bilaterally R>>L ,respirations regular, even and                  unlabored    Heart:    Regular rhythm and normal rate, normal S1 and S2, no            murmur, no gallop, no rub, no click   Chest Wall:    No abnormalities observed   Abdomen:     Normal bowel sounds, no masses, no organomegaly, soft        non-tender, non-distended, no guarding, no rebound                tenderness   Extremities:   Moves all extremities well, no edema, no cyanosis, no             redness   Pulses:   Pulses palpable and equal bilaterally   Skin:   No bleeding, bruising or rash, Sacral decubitus ulcer with blackened eschar and right trochanteric ulcer with eschar   Lymph nodes:   No " palpable adenopathy   Neurologic:   Cranial nerves 2 - 12 grossly intact, sensation intact, DTR       present and equal bilaterally        Results Review:    Lab Results   Component Value Date    WBC 10.76 01/21/2018    HGB 8.6 (L) 01/21/2018    HCT 26.7 (L) 01/21/2018    MCV 61.1 (L) 01/21/2018     (H) 01/21/2018       Lab Results   Component Value Date    GLUCOSE 113 (H) 01/21/2018    BUN 11 01/21/2018    CREATININE 0.41 (L) 01/21/2018    EGFRIFNONA >150 01/21/2018    BCR 26.8 (H) 01/21/2018     01/21/2018    K 3.2 (L) 01/21/2018     01/21/2018    CO2 23.9 (L) 01/21/2018    CALCIUM 7.4 (L) 01/21/2018    PROTENTOTREF 7.2 11/12/2015    ALBUMIN 2.30 (L) 01/21/2018    LABIL2 0.9 (L) 01/21/2018    AST 71 (H) 01/21/2018    ALT 37 01/21/2018     Lab Results   Component Value Date    INR 0.94 11/03/2015    INR <0.90 10/25/2015    INR <0.90 06/01/2015       Glucose   Date Value Ref Range Status   01/19/2018 107 70 - 130 mg/dL Final   01/19/2018 108 70 - 130 mg/dL Final   01/19/2018 124 70 - 130 mg/dL Final          Medication Review:     Current Facility-Administered Medications:   •  acetaminophen (TYLENOL) tablet 650 mg, 650 mg, Oral, Q6H PRN, JARETH Condon, 650 mg at 01/16/18 1913  •  cefepime (MAXIPIME) 2 g/100 mL 0.9% NS (mbp), 2 g, Intravenous, Q12H, Misty Bae MD, Last Rate: 25 mL/hr at 01/20/18 0537, 2 g at 01/21/18 0454  •  heparin (porcine) 5000 UNIT/ML injection 5,000 Units, 5,000 Units, Subcutaneous, Q8H, JARETH Condon, 5,000 Units at 01/21/18 0454  •  HYDROmorphone (DILAUDID) injection 1 mg, 1 mg, Intravenous, Once, José Miguel Li MD  •  lactated ringers infusion, 100 mL/hr, Intravenous, Continuous, Mohan Santoyo MD  •  lactated ringers infusion, 125 mL/hr, Intravenous, Continuous, Terrence Montero MD, Stopped at 01/19/18 1251  •  lactobacillus acidophilus (RISAQUAD) capsule 1 capsule, 1 capsule, Oral, Daily, Camila Haley [facility], 1 capsule at 01/20/18 0710  •   Magnesium Sulfate 2 gram Bolus, followed by 8 gram infusion (total Mg dose 10 grams)- Mg less than or equal to 1mg/dL, 2 g, Intravenous, PRN **OR** Magnesium Sulfate 6 gram Infusion (2 gm x 3) -Mg 1.1 -1.5 mg/dL, 2 g, Intravenous, PRN **OR** magnesium sulfate 4 gram infusion- Mg 1.6-1.9 mg/dL, 4 g, Intravenous, PRN, Samuel Duane Kreis, MD  •  metroNIDAZOLE (FLAGYL) IVPB 500 mg, 500 mg, Intravenous, Q8H, Misty Bae MD, Last Rate: 100 mL/hr at 01/20/18 0415, 500 mg at 01/21/18 0454  •  ondansetron (ZOFRAN) tablet 4 mg, 4 mg, Oral, Q6H PRN **OR** ondansetron ODT (ZOFRAN-ODT) disintegrating tablet 4 mg, 4 mg, Oral, Q6H PRN **OR** ondansetron (ZOFRAN) injection 4 mg, 4 mg, Intravenous, Q6H PRN, Samuel Duane Kreis, MD, 4 mg at 01/21/18 0934  •  oxyCODONE (ROXICODONE) immediate release tablet 5 mg, 5 mg, Oral, Q4H PRN, Samuel Duane Kreis, MD, 5 mg at 01/21/18 0925  •  potassium chloride (K-DUR,KLOR-CON) CR tablet 40 mEq, 40 mEq, Oral, Q4H, Samuel Duane Kreis, MD, 40 mEq at 01/21/18 0929  •  potassium chloride (K-DUR,KLOR-CON) CR tablet 40 mEq, 40 mEq, Oral, Q4H, Samuel Duane Kreis, MD  •  potassium chloride (MICRO-K) CR capsule 40 mEq, 40 mEq, Oral, PRN **OR** potassium chloride (KLOR-CON) packet 40 mEq, 40 mEq, Oral, PRN **OR** potassium chloride 10 mEq in 100 mL IVPB, 10 mEq, Intravenous, Q1H PRN, Samuel Duane Kreis, MD  •  Insert peripheral IV, , , Once **AND** sodium chloride 0.9 % flush 10 mL, 10 mL, Intravenous, PRN, Jesus Carreon PA-C  •  vancomycin (VANCOCIN) 1,750 mg in sodium chloride 0.9 % 500 mL IVPB, 1,750 mg, Intravenous, Q12H, Misty Bae MD, 1,750 mg at 01/21/18 0243      Assessment/Plan   Probable Aspiration Pneumonia with Sepsis  Sacral Decubitus Ulcer   Decubitus ulceration of the hip  Hyperbilirubinemia  Rhabdomyolysis  Metabolic encephalopathy due to sepsis  Multiple myeloma  Chronic opioid use  Fever    Appreciate surgery and ID input     Continue with Cefepime + Flagyl +  Vancomycin    Oxycodone 5 mg q 6 hours prn pain     Tylenol prn fever     Continue with wound care     Heparin for DVT prophylaxis    O2 via NC to maintain O2 sats >90%    Continue with PT/OT, S/S following for discharge to SNF for physical rehab     Rhabdo has resolved    Continue care hospital referral made for ongoing wound care    Check magnesium level.  Correct hypokalemia with protocol initiated this morning    Samuel Duane Kreis, MD  01/21/18  9:48 AM

## 2018-01-21 NOTE — PLAN OF CARE
Problem: Skin Integrity Impairment, Risk/Actual (Adult)  Goal: Skin Integrity/Wound Healing  Outcome: Ongoing (interventions implemented as appropriate)   01/21/18 0115   Skin Integrity Impairment, Risk/Actual (Adult)   Skin Integrity/Wound Healing making progress toward outcome       Problem: Pain, Chronic (Adult)  Goal: Acceptable Pain Control/Comfort Level  Outcome: Ongoing (interventions implemented as appropriate)   01/21/18 0115   Pain, Chronic (Adult)   Acceptable Pain Control/Comfort Level making progress toward outcome       Problem: Patient Care Overview (Adult)  Goal: Plan of Care Review  Outcome: Ongoing (interventions implemented as appropriate)   01/17/18 1051 01/20/18 2245   Coping/Psychosocial Response Interventions   Plan Of Care Reviewed With --  patient   Patient Care Overview   Progress progress toward functional goals as expected --      Goal: Discharge Needs Assessment  Outcome: Ongoing (interventions implemented as appropriate)   01/16/18 1607 01/17/18 1637 01/19/18 0901   Discharge Needs Assessment   Concerns To Be Addressed --  --  basic needs concerns;compliance issue concerns   Readmission Within The Last 30 Days --  --  no previous admission in last 30 days   Living Environment   Transportation Available car --  --    Self-Care   Equipment Currently Used at Home --  none --        Problem: Pressure Ulcer Risk (Brien Scale) (Adult,Obstetrics,Pediatric)  Goal: Identify Related Risk Factors and Signs and Symptoms  Outcome: Ongoing (interventions implemented as appropriate)   01/19/18 0901   Pressure Ulcer Risk (Brien Scale)   Related Risk Factors (Pressure Ulcer Risk (Brien Scale)) fluid intake inadequate     Goal: Skin Integrity  Outcome: Ongoing (interventions implemented as appropriate)   01/21/18 0115   Pressure Ulcer Risk (Brien Scale) (Adult,Obstetrics,Pediatric)   Skin Integrity making progress toward outcome       Problem: Fall Risk (Adult)  Goal: Identify Related Risk Factors  and Signs and Symptoms  Outcome: Ongoing (interventions implemented as appropriate)   01/20/18 0735   Fall Risk   Fall Risk: Related Risk Factors fatigue/slow reaction;gait/mobility problems;history of falls     Goal: Absence of Falls  Outcome: Ongoing (interventions implemented as appropriate)   01/21/18 0115   Fall Risk (Adult)   Absence of Falls making progress toward outcome

## 2018-01-22 LAB
ALBUMIN SERPL-MCNC: 2.3 G/DL (ref 3.4–4.8)
ALBUMIN/GLOB SERPL: 0.9 G/DL (ref 1.5–2.5)
ALP SERPL-CCNC: 118 U/L (ref 40–129)
ALT SERPL W P-5'-P-CCNC: 30 U/L (ref 10–44)
ANION GAP SERPL CALCULATED.3IONS-SCNC: 3.8 MMOL/L (ref 3.6–11.2)
ANISOCYTOSIS BLD QL: NORMAL
AST SERPL-CCNC: 60 U/L (ref 10–34)
BASOPHILS # BLD AUTO: 0.08 10*3/MM3 (ref 0–0.3)
BASOPHILS NFR BLD AUTO: 0.7 % (ref 0–2)
BILIRUB SERPL-MCNC: 0.7 MG/DL (ref 0.2–1.8)
BUN BLD-MCNC: 10 MG/DL (ref 7–21)
BUN/CREAT SERPL: 19.2 (ref 7–25)
CALCIUM SPEC-SCNC: 7.4 MG/DL (ref 7.7–10)
CHLORIDE SERPL-SCNC: 111 MMOL/L (ref 99–112)
CO2 SERPL-SCNC: 20.2 MMOL/L (ref 24.3–31.9)
CREAT BLD-MCNC: 0.52 MG/DL (ref 0.43–1.29)
CRP SERPL-MCNC: 1.2 MG/DL (ref 0–0.99)
DEPRECATED RDW RBC AUTO: 32.8 FL (ref 37–54)
EOSINOPHIL # BLD AUTO: 0.27 10*3/MM3 (ref 0–0.7)
EOSINOPHIL NFR BLD AUTO: 2.4 % (ref 0–5)
ERYTHROCYTE [DISTWIDTH] IN BLOOD BY AUTOMATED COUNT: 16 % (ref 11.5–14.5)
GFR SERPL CREATININE-BSD FRML MDRD: >150 ML/MIN/1.73
GLOBULIN UR ELPH-MCNC: 2.6 GM/DL
GLUCOSE BLD-MCNC: 122 MG/DL (ref 70–110)
HCT VFR BLD AUTO: 26.4 % (ref 42–52)
HGB BLD-MCNC: 8.5 G/DL (ref 14–18)
HYPOCHROMIA BLD QL: NORMAL
IMM GRANULOCYTES # BLD: 0.08 10*3/MM3 (ref 0–0.03)
IMM GRANULOCYTES NFR BLD: 0.7 % (ref 0–0.5)
LYMPHOCYTES # BLD AUTO: 2.33 10*3/MM3 (ref 1–3)
LYMPHOCYTES NFR BLD AUTO: 20.4 % (ref 21–51)
MAGNESIUM SERPL-MCNC: 1.6 MG/DL (ref 1.7–2.6)
MCH RBC QN AUTO: 19.5 PG (ref 27–33)
MCHC RBC AUTO-ENTMCNC: 32.2 G/DL (ref 33–37)
MCV RBC AUTO: 60.6 FL (ref 80–94)
MICROCYTES BLD QL: NORMAL
MONOCYTES # BLD AUTO: 1.08 10*3/MM3 (ref 0.1–0.9)
MONOCYTES NFR BLD AUTO: 9.5 % (ref 0–10)
NEUTROPHILS # BLD AUTO: 7.56 10*3/MM3 (ref 1.4–6.5)
NEUTROPHILS NFR BLD AUTO: 66.3 % (ref 30–70)
OSMOLALITY SERPL CALC.SUM OF ELEC: 270.4 MOSM/KG (ref 273–305)
OVALOCYTES BLD QL SMEAR: NORMAL
PLAT MORPH BLD: NORMAL
PLATELET # BLD AUTO: 496 10*3/MM3 (ref 130–400)
PMV BLD AUTO: 10.6 FL (ref 6–10)
POTASSIUM BLD-SCNC: 4 MMOL/L (ref 3.5–5.3)
PROT SERPL-MCNC: 4.9 G/DL (ref 6–8)
RBC # BLD AUTO: 4.36 10*6/MM3 (ref 4.7–6.1)
SODIUM BLD-SCNC: 135 MMOL/L (ref 135–153)
VANCOMYCIN TROUGH SERPL-MCNC: 12.6 MCG/ML (ref 5–15)
WBC NRBC COR # BLD: 11.4 10*3/MM3 (ref 4.5–12.5)

## 2018-01-22 PROCEDURE — 97530 THERAPEUTIC ACTIVITIES: CPT

## 2018-01-22 PROCEDURE — 80202 ASSAY OF VANCOMYCIN: CPT

## 2018-01-22 PROCEDURE — 25010000002 VANCOMYCIN PER 500 MG: Performed by: INTERNAL MEDICINE

## 2018-01-22 PROCEDURE — 25010000002 CEFEPIME: Performed by: INTERNAL MEDICINE

## 2018-01-22 PROCEDURE — 25010000002 HEPARIN (PORCINE) PER 1000 UNITS: Performed by: PHYSICIAN ASSISTANT

## 2018-01-22 PROCEDURE — 25010000002 ONDANSETRON PER 1 MG: Performed by: FAMILY MEDICINE

## 2018-01-22 PROCEDURE — 97116 GAIT TRAINING THERAPY: CPT

## 2018-01-22 PROCEDURE — 94799 UNLISTED PULMONARY SVC/PX: CPT

## 2018-01-22 PROCEDURE — 80053 COMPREHEN METABOLIC PANEL: CPT | Performed by: PHYSICIAN ASSISTANT

## 2018-01-22 PROCEDURE — 86140 C-REACTIVE PROTEIN: CPT | Performed by: NURSE PRACTITIONER

## 2018-01-22 PROCEDURE — 85025 COMPLETE CBC W/AUTO DIFF WBC: CPT | Performed by: PHYSICIAN ASSISTANT

## 2018-01-22 PROCEDURE — 83735 ASSAY OF MAGNESIUM: CPT | Performed by: FAMILY MEDICINE

## 2018-01-22 PROCEDURE — 85007 BL SMEAR W/DIFF WBC COUNT: CPT | Performed by: PHYSICIAN ASSISTANT

## 2018-01-22 RX ORDER — MAGNESIUM SULFATE HEPTAHYDRATE 40 MG/ML
4 INJECTION, SOLUTION INTRAVENOUS ONCE
Status: DISCONTINUED | OUTPATIENT
Start: 2018-01-22 | End: 2018-01-24

## 2018-01-22 RX ORDER — MULTIVITAMIN
1 TABLET ORAL DAILY
Status: DISCONTINUED | OUTPATIENT
Start: 2018-01-22 | End: 2018-01-26 | Stop reason: HOSPADM

## 2018-01-22 RX ADMIN — Medication 1 TABLET: at 08:33

## 2018-01-22 RX ADMIN — ONDANSETRON 4 MG: 2 INJECTION, SOLUTION INTRAMUSCULAR; INTRAVENOUS at 21:26

## 2018-01-22 RX ADMIN — OXYCODONE HYDROCHLORIDE 5 MG: 5 TABLET ORAL at 01:50

## 2018-01-22 RX ADMIN — HEPARIN SODIUM 5000 UNITS: 5000 INJECTION, SOLUTION INTRAVENOUS; SUBCUTANEOUS at 04:49

## 2018-01-22 RX ADMIN — METRONIDAZOLE 500 MG: 500 INJECTION, SOLUTION INTRAVENOUS at 21:17

## 2018-01-22 RX ADMIN — HEPARIN SODIUM 5000 UNITS: 5000 INJECTION, SOLUTION INTRAVENOUS; SUBCUTANEOUS at 14:12

## 2018-01-22 RX ADMIN — VANCOMYCIN HYDROCHLORIDE 1750 MG: 5 INJECTION, POWDER, LYOPHILIZED, FOR SOLUTION INTRAVENOUS at 01:50

## 2018-01-22 RX ADMIN — CEFEPIME 2 G: 2 INJECTION, POWDER, FOR SOLUTION INTRAVENOUS at 04:51

## 2018-01-22 RX ADMIN — METRONIDAZOLE 500 MG: 500 INJECTION, SOLUTION INTRAVENOUS at 04:50

## 2018-01-22 RX ADMIN — OXYCODONE HYDROCHLORIDE 5 MG: 5 TABLET ORAL at 14:12

## 2018-01-22 RX ADMIN — OXYCODONE HYDROCHLORIDE 5 MG: 5 TABLET ORAL at 17:53

## 2018-01-22 RX ADMIN — Medication 1 CAPSULE: at 08:33

## 2018-01-22 RX ADMIN — CEFEPIME 2 G: 2 INJECTION, POWDER, FOR SOLUTION INTRAVENOUS at 17:50

## 2018-01-22 RX ADMIN — HEPARIN SODIUM 5000 UNITS: 5000 INJECTION, SOLUTION INTRAVENOUS; SUBCUTANEOUS at 21:18

## 2018-01-22 RX ADMIN — OXYCODONE HYDROCHLORIDE 5 MG: 5 TABLET ORAL at 21:25

## 2018-01-22 NOTE — PROGRESS NOTES
"  I have personally seen and examined the patient today and discussed overnight interval progress and pertinent issues with nursing staff.    Subjective:    Sacral wound with sloughing and necrosis ,minimal bleeding and no odor.  Cultures in process with GNR so far.  Normal white count and almost normal CRP level.    Awaiting possible transfer to [facility].  Patient may need repeat debridement if transferred to [facility].    History taken from: patient chart family RN      Vital Signs    /81 (BP Location: Left arm, Patient Position: Lying)  Pulse 72  Temp 98.1 °F (36.7 °C) (Oral)   Resp 18  Ht 182 cm (71.65\")  Wt 76.4 kg (168 lb 6.9 oz)  SpO2 95%  BMI 23.06 kg/m2    Temp:  [98 °F (36.7 °C)-98.2 °F (36.8 °C)] 98.1 °F (36.7 °C)      Intake/Output Summary (Last 24 hours) at 01/22/18 0923  Last data filed at 01/22/18 0904   Gross per 24 hour   Intake              340 ml   Output             1650 ml   Net            -1310 ml     Intake & Output (last 3 days)       01/19 0701 - 01/20 0700 01/20 0701 - 01/21 0700 01/21 0701 - 01/22 0700 01/22 0701 - 01/23 0700    P.O. 120 1160 240 240    I.V. (mL/kg) 500 (6.5)       IV Piggyback 600 500 100     Total Intake(mL/kg) 1220 (16) 1660 (21.7) 340 (4.5) 240 (3.1)    Urine (mL/kg/hr) 750 (0.4) 1700 (0.9) 1350 (0.7) 300 (1.6)    Total Output 750 1700 1350 300    Net +470 -40 -1010 -60            Unmeasured Urine Occurrence 1 x  1 x         Physical Exam:       General Appearance:    Alert, cooperative, in no acute distress, thin, frail, daughter at bedside    Head:    Normocephalic, without obvious abnormality, atraumatic   Eyes:            Lids and lashes normal, conjunctivae and sclerae normal, no   icterus, no pallor, corneas clear, PERRLA   Ears:    Ears appear intact with no abnormalities noted   Throat:   No oral lesions, no thrush, oral mucosa moist   Neck:   No adenopathy, supple, trachea midline, no thyromegaly, no   carotid bruit, no JVD   Back:     No tenderness to " percussion or palpation, range of motion   normal   Lungs:    coarse breath sounds to right.    Heart:    Regular rhythm and normal rate, normal S1 and S2, no            murmur, no gallop, no rub, no click   Chest Wall:    No abnormalities observed   Abdomen:     Normal bowel sounds, no masses, no organomegaly, soft        non-tender, non-distended, no guarding, no rebound                tenderness   Rectal:     Deferred   Extremities:   Moves all extremities well, no edema, no cyanosis, no             redness   Pulses:   Pulses palpable and equal bilaterally   Skin:   Sacral decubitus ulcer and right hip ulcer    Lymph nodes:   No palpable adenopathy   Neurologic:   Awake, alert, Garbled speech        Results:      Results from last 7 days  Lab Units 01/22/18  0126 01/21/18  0053 01/20/18  0054 01/19/18  0121 01/18/18  0036 01/17/18  0454 01/16/18  0110   WBC 10*3/mm3 11.40 10.76 11.43 12.61* 9.73 10.08 8.14     Lab Results   Component Value Date    NEUTROABS 7.56 (H) 01/22/2018         Results from last 7 days  Lab Units 01/22/18  0126   CREATININE mg/dL 0.52         Results from last 7 days  Lab Units 01/22/18  0126 01/21/18  0053 01/19/18  0121   CRP mg/dL 1.20* 2.07* 4.83*       Imaging Results (last 24 hours)     ** No results found for the last 24 hours. **            Results Review:    I have personally reviewed laboratory data, culture results, radiology studies and antimicrobial therapy.    Hospital Medications (active)       Dose Frequency Start End    acetaminophen (TYLENOL) tablet 650 mg 650 mg Every 6 Hours PRN 1/16/2018     Sig - Route: Take 2 tablets by mouth Every 6 (Six) Hours As Needed for Mild Pain . - Oral    Cosign for Ordering: Accepted by Samuel Duane Kreis, MD on 1/16/2018  1:17 PM    cefepime (MAXIPIME) 2 g/100 mL 0.9% NS (mbp) 2 g Once 1/16/2018 1/16/2018    Sig - Route: Infuse 100 mL into a venous catheter 1 (One) Time. - Intravenous    Cosign for Ordering: Required by Misty Bae  "MD    cefepime (MAXIPIME) 2 g/100 mL 0.9% NS (mbp) 2 g Every 12 Hours 1/16/2018 1/23/2018    Sig - Route: Infuse 100 mL into a venous catheter Every 12 (Twelve) Hours. - Intravenous    Cosign for Ordering: Required by Misty Bae MD    heparin (porcine) 5000 UNIT/ML injection 5,000 Units 5,000 Units Every 8 Hours Scheduled 1/16/2018     Sig - Route: Inject 1 mL under the skin Every 8 (Eight) Hours. - Subcutaneous    Cosign for Ordering: Accepted by Samuel Duane Kreis, MD on 1/16/2018  1:17 PM    HYDROmorphone (DILAUDID) injection 1 mg 1 mg Once 1/15/2018     Sig - Route: Infuse 1 mL into a venous catheter 1 (One) Time. - Intravenous    metroNIDAZOLE (FLAGYL) IVPB 500 mg 500 mg Every 8 Hours 1/16/2018 1/23/2018    Sig - Route: Infuse 100 mL into a venous catheter Every 8 (Eight) Hours. - Intravenous    Cosign for Ordering: Required by Misty Bae MD    oxyCODONE (ROXICODONE) immediate release tablet 5 mg 5 mg Every 4 Hours PRN 1/16/2018 1/26/2018    Sig - Route: Take 1 tablet by mouth Every 4 (Four) Hours As Needed for Moderate Pain . - Oral    Pharmacy to dose vancomycin  Continuous PRN 1/16/2018 1/23/2018    Sig - Route: Continuous As Needed for Consult. - Does not apply    Cosign for Ordering: Required by Misty Bae MD    sodium chloride 0.9 % flush 10 mL 10 mL As Needed 1/15/2018     Sig - Route: Infuse 10 mL into a venous catheter As Needed for Line Care. - Intravenous    Cosign for Ordering: Accepted by José Miguel Li MD on 1/15/2018  1:21 PM    Linked Group 1:  \"And\" Linked Group Details        sodium chloride 0.9 % infusion 150 mL/hr Continuous 1/15/2018     Sig - Route: Infuse 150 mL/hr into a venous catheter Continuous. - Intravenous    vancomycin (VANCOCIN) 1,000 mg in sodium chloride 0.9 % 250 mL IVPB 1,000 mg Every 12 Hours 1/16/2018 1/23/2018    Sig - Route: Infuse 1,000 mg into a venous catheter Every 12 (Twelve) Hours. - Intravenous            Cultures:    Blood Culture "   Date Value Ref Range Status   01/15/2018 No growth at 24 hours  Preliminary   01/15/2018 No growth at 24 hours  Preliminary     Urine Culture   Date Value Ref Range Status   01/15/2018 No growth at 24 hours  Preliminary           Assessment/Plan     ASSESSMENT:    1.  Severe sepsis with encephalopathy  2.  Aspiration pneumonia     PLAN:    Sacral wound with sloughing and necrosis ,minimal bleeding and no odor.  Cultures in process with GNR so far.  Normal white count and almost normal CRP level.    Awaiting possible transfer to [facility].  Patient may need repeat debridement if transferred to [facility].    Patient is at high risk for MRSA and pseudomonal infection and need for anaerobic coverage as well. Continue vancomycin per pharmacy dosing, cefepime 2 g IV every 12 hours and Flagyl 500 mg IV every 8 hours for aspiration pneumonia.     We'll continue to follow intraoperative wound cultures and sensitivities and make antibiotic adjustments if necessary.     CRP ordered for a.m.     Patient's findings and recommendations were discussed with patient, family and nursing staff    Code Status: Full Code     Scribed for Dr. Misty Driscoll, APRN  01/22/18  9:23 AM      Physician Attestation:    The documentation recorded by the scribe accurately reflects the service I personally performed and the decisions made by me.    Misty Bae MD  Infectious Diseases  01/22/18  2:25 PM

## 2018-01-22 NOTE — PROGRESS NOTES
Vancomycin trough level obtained this afternoon was reported at 12.6 mg/L.  This is at the desired trough range based on timing of the level.  Recommend continue current dose of 1750 mg iv q12hrs.  Pharmacy will continue to follow.  Thank you.

## 2018-01-22 NOTE — PLAN OF CARE
Problem: Skin Integrity Impairment, Risk/Actual (Adult)  Goal: Skin Integrity/Wound Healing  Outcome: Ongoing (interventions implemented as appropriate)      Problem: Pain, Chronic (Adult)  Goal: Identify Related Risk Factors and Signs and Symptoms  Outcome: Ongoing (interventions implemented as appropriate)    Goal: Acceptable Pain Control/Comfort Level  Outcome: Ongoing (interventions implemented as appropriate)      Problem: Patient Care Overview (Adult)  Goal: Plan of Care Review  Outcome: Ongoing (interventions implemented as appropriate)      Problem: Pressure Ulcer Risk (Brien Scale) (Adult,Obstetrics,Pediatric)  Goal: Identify Related Risk Factors and Signs and Symptoms  Outcome: Ongoing (interventions implemented as appropriate)    Goal: Skin Integrity  Outcome: Ongoing (interventions implemented as appropriate)      Problem: Fall Risk (Adult)  Goal: Identify Related Risk Factors and Signs and Symptoms  Outcome: Outcome(s) achieved Date Met: 01/22/18    Goal: Absence of Falls  Outcome: Ongoing (interventions implemented as appropriate)

## 2018-01-22 NOTE — THERAPY TREATMENT NOTE
Acute Care - Physical Therapy Treatment Note   [place]     Patient Name: Jhonny Washington  : 1955  MRN: [id number]  Today's Date: 2018  Onset of Illness/Injury or Date of Surgery Date: 01/15/18  Date of Referral to PT: 18  Referring Physician: Dr. Del Cid    Admit Date: 1/15/2018    Visit Dx:    ICD-10-CM ICD-9-CM   1. Decubitus ulcer of coccyx, unspecified pressure ulcer stage L89.159 707.03     707.20   2. Fall, initial encounter W19.XXXA E888.9   3. Non-traumatic rhabdomyolysis M62.82 728.88     Patient Active Problem List   Diagnosis   • Encounter for venous access device care   • Rhabdomyolysis   • Decubitus ulcer of coccyx, unspecified pressure ulcer stage               Adult Rehabilitation Note       18 1423          Rehab Assessment/Intervention    Discipline physical therapist  -CT      Document Type therapy note (daily note)  -CT      Subjective Information agree to therapy;complains of;pain;weakness  -CT      Precautions/Limitations fall precautions;oxygen therapy device and L/min  -CT      Patient Response to Treatment Pt tolerated [facility] treatment session well with rest breaks provided as needed. Pt able to ambulate but unable to tolerate sitting up in chair on waffle cushion.   -CT      Recorded by [CT] Saima Eugene PT      Cognitive Assessment/Intervention    Current Cognitive/Communication Assessment functional  -CT      Orientation Status oriented to;person;situation  -CT      Personal Safety Interventions fall prevention program maintained;gait belt;muscle strengthening facilitated;nonskid shoes/slippers when out of bed  -CT      Recorded by [CT] Saima Eugene PT      Bed Mobility, Assessment/Treatment    Bed Mobility, Assistive Device bed rails  -CT      Bed Mobility, Roll Left, [place] moderate assist (50% patient effort);2 person assist required  -CT      Bed Mobility, Roll Right, [place] moderate assist (50% patient effort);2 person assist required  -CT       Bed Mobility, Scoot/Bridge, [place] not tested  -CT      Bed Mob, Supine to Sit, [place] moderate assist (50% patient effort);2 person assist required  -CT      Bed Mob, Sit to Supine, [place] moderate assist (50% patient effort);2 person assist required  -CT      Recorded by [CT] Saima Eugene, PT      Transfer Assessment/Treatment    Transfers, Sit-Stand [place] moderate assist (50% patient effort);2 person assist required  -CT      Transfers, Stand-Sit [place] moderate assist (50% patient effort);2 person assist required  -CT      Transfers, Sit-Stand-Sit, Assist Device rolling walker  -CT      Recorded by [CT] Saima Eugene PT      Positioning and Restraints    Pre-Treatment Position in bed  -CT      Post Treatment Position bed  -CT      In Bed supine;call light within reach;encouraged to call for assist;side rails up x3;notified nsg  -CT      Recorded by [CT] Saima Eugene, PT        User Key  (r) = Recorded By, (t) = Taken By, (c) = Cosigned By    Initials Name Effective Dates    CT Saima Eugene, PT 03/14/16 -                 IP PT Goals       01/17/18 1652          Bed Mobility PT LTG    Bed Mobility PT LTG, Date Established 01/17/18  -BC      Bed Mobility PT LTG, Time to Achieve by discharge  -BC      Bed Mobility PT LTG, Activity Type all bed mobility  -BC      Bed Mobility PT LTG, [place] Level minimum assist (75% patient effort)  -BC      Bed Mobility PT Goal  LTG, Assist Device bed rails  -BC      Transfer Training PT LTG    Transfer Training PT LTG, Date Established 01/17/18  -BC      Transfer Training PT LTG, Time to Achieve by discharge  -BC      Transfer Training PT LTG, Activity Type all transfers  -BC      Transfer Training PT LTG, [place] Level minimum assist (75% patient effort)  -BC      Transfer Training PT LTG, Assist Device walker, rolling  -BC      Gait Training PT LTG    Gait Training Goal PT LTG, Date Established 01/17/18  -BC      Gait Training  Goal PT LTG, Time to Achieve by discharge  -BC      Gait Training Goal PT LTG, [place] Level minimum assist (75% patient effort)  -BC      Gait Training Goal PT LTG, Assist Device walker, rolling  -BC      Gait Training Goal PT LTG, Distance to Achieve 40  -BC        User Key  (r) = Recorded By, (t) = Taken By, (c) = Cosigned By    Initials Name Provider Type    HARRIETT Nettles, PT Physical Therapist          Physical Therapy Education     Title: PT OT SLP Therapies (Done)     Topic: Physical Therapy (Done)     Point: Mobility training (Done)    Learning Progress Summary    Learner Readiness Method Response Comment Documented by Status   Patient Acceptance E VU  CT 01/22/18 1428 Done    Acceptance E VU,NR   01/21/18 2221 Done    Acceptance E VU,NR   01/21/18 0116 Done    Acceptance E VU  BB 01/20/18 0737 Done    Acceptance E VU  BB 01/19/18 0901 Done    Acceptance E VU,NR   01/18/18 1911 Done    Acceptance E VU  BC 01/18/18 1532 Done    Acceptance E VU  BB 01/18/18 0951 Done    Acceptance E VU  SS 01/17/18 1945 Done    Acceptance E VU  BC 01/17/18 1652 Done   Family Acceptance E VU  BB 01/20/18 0737 Done    Acceptance E VU  BB 01/19/18 0901 Done               Point: Home exercise program (Done)    Learning Progress Summary    Learner Readiness Method Response Comment Documented by Status   Patient Acceptance E VU  CT 01/22/18 1428 Done    Acceptance E VU,NR   01/21/18 2221 Done    Acceptance E VU,NR   01/21/18 0116 Done    Acceptance E VU  BB 01/20/18 0737 Done    Acceptance E VU  BB 01/19/18 0901 Done    Acceptance E VU,NR   01/18/18 1911 Done    Acceptance E VU  BC 01/18/18 1532 Done    Acceptance E VU  BB 01/18/18 0951 Done    Acceptance E VU  SS 01/17/18 1945 Done    Acceptance E VU  BC 01/17/18 1652 Done   Family Acceptance E VU  BB 01/20/18 0737 Done    Acceptance E VU  BB 01/19/18 0901 Done               Point: Body mechanics (Done)    Learning Progress Summary    Learner Readiness Method  Response Comment Documented by Status   Patient Acceptance E VU  CT 01/22/18 1428 Done    Acceptance E VU,NR  AG 01/21/18 2221 Done    Acceptance E VU,NR  AG 01/21/18 0116 Done    Acceptance E VU  BB 01/20/18 0737 Done    Acceptance E VU  BB 01/19/18 0901 Done    Acceptance E VU,NR   01/18/18 1911 Done    Acceptance E VU  BC 01/18/18 1532 Done    Acceptance E VU  BB 01/18/18 0951 Done    Acceptance E VU  SS 01/17/18 1945 Done    Acceptance E VU  BC 01/17/18 1652 Done   Family Acceptance E VU  BB 01/20/18 0737 Done    Acceptance E VU  BB 01/19/18 0901 Done               Point: Precautions (Done)    Learning Progress Summary    Learner Readiness Method Response Comment Documented by Status   Patient Acceptance E VU  CT 01/22/18 1428 Done    Acceptance E VU,NR  AG 01/21/18 2221 Done    Acceptance E VU,NR  AG 01/21/18 0116 Done    Acceptance E VU  BB 01/20/18 0737 Done    Acceptance E VU  BB 01/19/18 0901 Done    Acceptance E VU,NR   01/18/18 1911 Done    Acceptance E VU  BC 01/18/18 1532 Done    Acceptance E VU  BB 01/18/18 0951 Done    Acceptance E VU  SS 01/17/18 1945 Done    Acceptance E VU  BC 01/17/18 1652 Done   Family Acceptance E VU  BB 01/20/18 0737 Done    Acceptance E VU  BB 01/19/18 0901 Done                      User Key     Initials Effective Dates Name Provider Type Discipline    BC 03/14/16 -  Tanna Nettles, PT Physical Therapist PT    CT 03/14/16 -  Saima Eugene PT Physical Therapist PT     06/16/16 -  April Hannah De Jesus, RN Registered Nurse Nurse     11/18/16 -  Maribel Ornelas, RN Registered Nurse Nurse     01/21/17 -  Theodore Martinez, RN Registered Nurse Nurse     04/14/17 -  Sagn Brown, RN Registered Nurse Nurse                    PT Recommendation and Plan  Anticipated Equipment Needs At Discharge: front wheeled walker  Planned Therapy Interventions: balance training, bed mobility training, gait training, home exercise program, patient/family education, strengthening,  transfer training  PT Frequency: 3-5 times/wk, per priority policy             Outcome Measures       01/22/18 1400          How much help from another person do you currently need...    Turning from your back to your side while in flat bed without using bedrails? 2  -CT      Moving from lying on back to sitting on the side of a flat bed without bedrails? 2  -CT      Moving to and from a bed to a chair (including a wheelchair)? 2  -CT      Standing up from a chair using your arms (e.g., wheelchair, bedside chair)? 2  -CT      Climbing 3-5 steps with a railing? 1  -CT      To walk in hospital room? 2  -CT      AM-PAC 6 Clicks Score 11  -CT      Functional Assessment    Outcome Measure Options AM-PAC 6 Clicks Basic Mobility (PT)  -CT        User Key  (r) = Recorded By, (t) = Taken By, (c) = Cosigned By    Initials Name Provider Type    CT Saima Eugene PT Physical Therapist           Time Calculation:         PT Charges       01/22/18 1431          Time Calculation    PT Received On 01/22/18  -CT      PT - Next Appointment 01/23/18  -CT      PT Goal Re-Cert Due Date 01/31/18  -CT      Time Calculation- PT    Total Timed Code Minutes- PT 25 minute(s)  -CT        User Key  (r) = Recorded By, (t) = Taken By, (c) = Cosigned By    Initials Name Provider Type    CT Saima Eugene PT Physical Therapist          Therapy Charges for Today     Code Description Service Date Service Provider Modifiers Qty    71617078425 HC GAIT TRAINING EA 15 MIN 1/22/2018 Saima Eugene, PT GP 1    66742142607 HC PT THERAPEUTIC ACT EA 15 MIN 1/22/2018 Saima Eugene PT GP 1    30348635803 HC PT THER SUPP EA 15 MIN 1/22/2018 Saima Eugene, PT GP 2          PT G-Codes  Outcome Measure Options: AM-PAC 6 Clicks Basic Mobility (PT)  Score: 12  Functional Limitation: Mobility: Walking and moving around  Mobility: Walking and Moving Around Current Status (): At least 60 percent but less than 80 percent impaired, limited or  restricted  Mobility: Walking and Moving Around Goal Status (): At least 40 percent but less than 60 percent impaired, limited or restricted    Saima Eugene, PT  1/22/2018

## 2018-01-22 NOTE — PROGRESS NOTES
Discharge Planning Assessment   [place]     Patient Name: Jhonny Washington  MRN: [id number]  Today's Date: 1/22/2018    Admit Date: 1/15/2018          Discharge Needs Assessment     None            Discharge Plan       01/22/18 1349    Case Management/Social Work Plan    Plan SS spoke with Miguelina at [facility].  [facility] will continue to follow pt and notify SS when pt can be accepted.  SS will follow.    Patient/Family In Agreement With Plan yes        Discharge Placement     No information found        Expected Discharge Date and Time     Expected Discharge Date Expected Discharge Time    Jan 20, 2018               Demographic Summary     None            Functional Status     None            Psychosocial     None            Abuse/Neglect     None            Legal     None            Substance Abuse     None            Patient Forms     None          Amber Jimenes

## 2018-01-22 NOTE — PROGRESS NOTES
"     LOS: 7 days     Chief Complaint:  Rhabdomyolysis/Decubitus Ulcers with Sepsis    Subjective     Interval History:   He has no major changes over the last 24 hours.  He complains of ongoing pain in the sacral decubitus region consistent with previous surgical debridement however no progressive symptoms at this point.  Patient does have chronic low back pain. Preliminary wound culture consistent with Gram Negative Bacilli, ID following.  His energy level is improved, appetite improved.  He was up and ambulatory to bathroom and back with nursing assistance yesterday.CRP 1.20. Potassium 4.0, Mg 1.6.    Objective     Vital Signs  /81 (BP Location: Left arm, Patient Position: Lying)  Pulse 72  Temp 98.1 °F (36.7 °C) (Oral)   Resp 18  Ht 182 cm (71.65\")  Wt 76.4 kg (168 lb 6.9 oz)  SpO2 95%  BMI 23.06 kg/m2  Intake & Output (last day)       01/21 0701 - 01/22 0700 01/22 0701 - 01/23 0700    P.O. 240     IV Piggyback 100     Total Intake(mL/kg) 340 (4.5)     Urine (mL/kg/hr) 1350 (0.7)     Total Output 1350      Net -1010            Unmeasured Urine Occurrence 1 x             Physical Exam:     General Appearance:    Alert, cooperative, in no acute distress   Head:    Normocephalic, without obvious abnormality, atraumatic   Eyes:            Lids and lashes normal, conjunctivae and sclerae normal, no   icterus, no pallor, corneas clear, PERRLA   Ears:    Ears appear intact with no abnormalities noted       Neck:   No adenopathy, supple, trachea midline, no thyromegaly, no   carotid bruit, no JVD   Lungs:     Scattered Coarse Crackles bilaterally R>>L ,respirations regular, even and                  unlabored    Heart:    Regular rhythm and normal rate, normal S1 and S2, no            murmur, no gallop, no rub, no click   Chest Wall:    No abnormalities observed   Abdomen:     Normal bowel sounds, no masses, no organomegaly, soft        non-tender, non-distended, no guarding, no rebound                " tenderness   Extremities:   Moves all extremities well, no edema, no cyanosis, no             redness   Pulses:   Pulses palpable and equal bilaterally   Skin:   No bleeding, bruising or rash, Sacral decubitus ulcer with dressing in place and right trochanteric ulcer with eschar   Lymph nodes:   No palpable adenopathy   Neurologic:   Cranial nerves 2 - 12 grossly intact, sensation intact, DTR       present and equal bilaterally        Results Review:    Lab Results   Component Value Date    WBC 11.40 01/22/2018    HGB 8.5 (L) 01/22/2018    HCT 26.4 (L) 01/22/2018    MCV 60.6 (L) 01/22/2018     (H) 01/22/2018       Lab Results   Component Value Date    GLUCOSE 122 (H) 01/22/2018    BUN 10 01/22/2018    CREATININE 0.52 01/22/2018    EGFRIFNONA >150 01/22/2018    BCR 19.2 01/22/2018     01/22/2018    K 4.0 01/22/2018     01/22/2018    CO2 20.2 (L) 01/22/2018    CALCIUM 7.4 (L) 01/22/2018    PROTENTOTREF 7.2 11/12/2015    ALBUMIN 2.30 (L) 01/22/2018    LABIL2 0.9 (L) 01/22/2018    AST 60 (H) 01/22/2018    ALT 30 01/22/2018     Lab Results   Component Value Date    INR 0.94 11/03/2015    INR <0.90 10/25/2015    INR <0.90 06/01/2015       Glucose   Date Value Ref Range Status   01/19/2018 107 70 - 130 mg/dL Final   01/19/2018 108 70 - 130 mg/dL Final          Medication Review:     Current Facility-Administered Medications:   •  acetaminophen (TYLENOL) tablet 650 mg, 650 mg, Oral, Q6H PRN, JARETH Condon, 650 mg at 01/16/18 1913  •  cefepime (MAXIPIME) 2 g/100 mL 0.9% NS (mbp), 2 g, Intravenous, Q12H, Misty Bae MD, Last Rate: 25 mL/hr at 01/20/18 0537, 2 g at 01/22/18 0451  •  heparin (porcine) 5000 UNIT/ML injection 5,000 Units, 5,000 Units, Subcutaneous, Q8H, JARETH Condon, 5,000 Units at 01/22/18 0449  •  HYDROmorphone (DILAUDID) injection 1 mg, 1 mg, Intravenous, Once, José Miguel Li MD  •  lactated ringers infusion, 100 mL/hr, Intravenous, Continuous, Mohan Santoyo MD  •   lactated ringers infusion, 125 mL/hr, Intravenous, Continuous, Terrence Montero MD, Stopped at 01/19/18 1251  •  lactobacillus acidophilus (RISAQUAD) capsule 1 capsule, 1 capsule, Oral, Daily, Camila Haley, [facility], 1 capsule at 01/21/18 1232  •  Magnesium Sulfate 2 gram Bolus, followed by 8 gram infusion (total Mg dose 10 grams)- Mg less than or equal to 1mg/dL, 2 g, Intravenous, PRN **OR** Magnesium Sulfate 6 gram Infusion (2 gm x 3) -Mg 1.1 -1.5 mg/dL, 2 g, Intravenous, PRN **OR** magnesium sulfate 4 gram infusion- Mg 1.6-1.9 mg/dL, 4 g, Intravenous, PRN, Samuel Duane Kreis, MD  •  magnesium sulfate 4 gram infusion- Mg 1.6-1.9 mg/dL, 4 g, Intravenous, Once, Samuel Duane Kreis, MD  •  metroNIDAZOLE (FLAGYL) IVPB 500 mg, 500 mg, Intravenous, Q8H, Misty Bae MD, Last Rate: 100 mL/hr at 01/20/18 0415, 500 mg at 01/22/18 0450  •  ondansetron (ZOFRAN) tablet 4 mg, 4 mg, Oral, Q6H PRN **OR** ondansetron ODT (ZOFRAN-ODT) disintegrating tablet 4 mg, 4 mg, Oral, Q6H PRN **OR** ondansetron (ZOFRAN) injection 4 mg, 4 mg, Intravenous, Q6H PRN, Samuel Duane Kreis, MD, 4 mg at 01/21/18 1506  •  oxyCODONE (ROXICODONE) immediate release tablet 5 mg, 5 mg, Oral, Q4H PRN, Samuel Duane Kreis, MD, 5 mg at 01/22/18 0150  •  potassium chloride (MICRO-K) CR capsule 40 mEq, 40 mEq, Oral, PRN **OR** potassium chloride (KLOR-CON) packet 40 mEq, 40 mEq, Oral, PRN **OR** potassium chloride 10 mEq in 100 mL IVPB, 10 mEq, Intravenous, Q1H PRN, Samuel Duane Kreis, MD  •  Insert peripheral IV, , , Once **AND** sodium chloride 0.9 % flush 10 mL, 10 mL, Intravenous, PRN, Jesus Carreon PA-C, 10 mL at 01/21/18 1506  •  vancomycin (VANCOCIN) 1,750 mg in sodium chloride 0.9 % 500 mL IVPB, 1,750 mg, Intravenous, Q12H, Misty Bae MD, 1,750 mg at 01/22/18 0150      Assessment/Plan   Probable Aspiration Pneumonia with Sepsis  Sacral Decubitus Ulcer   Decubitus ulceration of the hip  Hyperbilirubinemia (resolved)  Rhabdomyolysis  (resolved)  Metabolic encephalopathy due to sepsis (resolved)  Multiple myeloma  Chronic opioid use  Protein Malnutrition  Fever (resolved)      Appreciate surgery and ID input     Continue with Cefepime + Flagyl + Vancomycin, await final wound culture    Oxycodone 5 mg q 4 hours prn pain     Tylenol prn fever     Continue with wound care     Heparin for DVT prophylaxis    O2 via NC to maintain O2 sats >90%    Continue with PT/OT, S/S following for discharge [facility]      Continue care hospital referral made for ongoing wound care    Continue K+/Mg replacement protocol, add MVI daily    JARETH Condon  01/22/18  7:02 AM       He is seen this morning by me and I agree with the above note.  Continue care referral made.    Samuel Duane Kreis, MD  01/22/18  2:21 PM

## 2018-01-22 NOTE — DISCHARGE PLACEMENT REQUEST
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 identified.        Impression:       No acute fracture of the lumbar spine.      This report was finalized on 1/15/2018 1:16 PM by Dr. Carlos Jiménez MD.             Assessment/Plan   Rhabdomyolysis  Fever  Sacral Decubitus Ulcer/Hip Ulcer  Hyperbilirubinemia  Altered Mental Status  HX Multiple Myeloma  Chronic Neck Pain/Low Back Pain    Consult Surgery for sacral decubitus/wound care    Consult ID for fever and antibiotic management    Hold Oxycodone and Valium secondary to altered mental status    Check RUQ U/S secondary to hyperbilirubinemia and urine myoglobin    Repeat LFT's this AM    Continue with IVF @ 150 ml/hour    Tylenol prn fever    Wound care consult    Lovenox for DVT prophylaxis    Start O2 via NC to maintain O2 sats >90%    CBC, CMP daily    JARETH Condon  01/16/18  6:39 AM    I have seen this patient today and agree with the above note.  This patient was admitted to my service to the emergency department where supposedly he is a patient of mine.  However, he has never been seen in my clinic.  He sees a Dr. Orozco in [place].  This patient has a rather complex history with history of myeloma.  He takes chronic opioid medications for this.  He states that he has not been ambulatory  for at least a week or 2 since falling out of the bed.  He states he hurt his lower back when this happened.  He was noted to have a CT of the lumbar spine revealing no acute fracture.  He was found to have a large sacral decubitus ulcer.  He was also noted to have acute rhabdomyolysis and was febrile overnight.  I have examined the patient and agree with the above note.  He is chronically ill in appearance.  Thin and frail.  He is able to answer questions but is a little bit somnolent.  Lung exam reveals clear to auscultation bilaterally cardiac exam reveals regular rate and regular rhythm no murmur, rub or gallop.  Abdominal exam is benign.  Extremities no cyanosis, clubbing or edema.  He is able to move both lower extremities.  He has a large decubitus ulceration and please refer to nursing documentation for further details.  Is noted to have a white count when he came in.    Impression:  Sepsis due to decubitus ulceration.  Decubitus ulceration of the hip  Hyperbilirubinemia  Rhabdomyolysis  Metabolic encephalopathy due to sepsis  Multiplemyeloma  Chronic opioid use      Plan:  ID consult.  Start cefepime, Flagyl and vancomycin    Surgical consultation and wound care consultation  IV fluid hydration for abdomen mild lysis.  Monitor renal function.  Monitor CPK to ensure the trend is downward    Subcutaneous heparin for DVT prophylaxis    Discontinue Valium and discontinue high-dose oxycodone.  Start oxycodone 5 mg every 4 hours when necessary for pain.    Monitor LFTs.  Probably elevated due to sepsis with hypotension    Oxygen to maintain sat greater than 90%    Xray bilateral hips / pelvis    Samuel Duane Kreis, MD  01/16/18  1:36 PM             Electronically signed by Samuel Duane Kreis, MD at 1/16/2018  1:37 PM        Vital Signs (last 24 hours)       01/21 0700  -  01/22 0659 01/22 0700  -  01/22 1109   Most Recent    Temp (°F) 98 -  98.2      98.2     98.2 (36.8)    Heart Rate 65 -  72      73     73     Resp   18      16     16    /74 -  136/81      126/78     126/78    SpO2 (%) 92 -  96    (!)89 -  92     92          Prior to Admission Medications     Prescriptions Last Dose Informant Patient Reported? Taking?    diazePAM (VALIUM) 10 MG tablet 1/15/2018  Yes Yes    Take 10 mg by mouth 2 (Two) Times a Day As Needed for Anxiety.    oxyCODONE (ROXICODONE) 20 MG tablet 1/15/2018 Pharmacy Yes Yes    Take 20 mg by mouth Every 6 (Six) Hours As Needed for Moderate Pain .          Hospital Medications (active)       Dose Frequency Start End    acetaminophen (TYLENOL) tablet 650 mg 650 mg Every 6 Hours PRN 1/16/2018     Sig - Route: Take 2 tablets by mouth Every 6 (Six) Hours As Needed for Mild Pain . - Oral    Cosign for Ordering: Accepted by Samuel Duane Kreis, MD on 1/16/2018  1:17 PM    cefepime (MAXIPIME) 2 g/100 mL 0.9% NS (mbp) 2 g Every 12 Hours 1/16/2018 1/28/2018    Sig - Route: Infuse 100 mL into a venous catheter Every 12 (Twelve) Hours. - Intravenous    Cosign for Ordering: Accepted by Misty Bae MD on 1/18/2018 11:24 AM    heparin (porcine) 5000 UNIT/ML injection 5,000 Units 5,000 Units Every 8 Hours Scheduled 1/16/2018     Sig - Route: Inject 1 mL under the skin Every 8 (Eight) Hours. - Subcutaneous    Cosign for Ordering: Accepted by Samuel Duane Kreis, MD on 1/16/2018  1:17 PM    HYDROmorphone (DILAUDID) injection 1 mg 1 mg Once 1/15/2018     Sig - Route: Infuse 1 mL into a venous catheter 1 (One) Time. - Intravenous    lactated ringers infusion 100 mL/hr Continuous 1/19/2018     Sig - Route: Infuse 100 mL/hr into a venous catheter Continuous. - Intravenous    lactated ringers infusion 125 mL/hr Continuous 1/19/2018     Sig - Route: Infuse 125 mL/hr into a venous catheter Continuous. - Intravenous    lactobacillus acidophilus (RISAQUAD) capsule 1 capsule 1 capsule Daily 1/18/2018     Sig - Route: Take 1 capsule by mouth Daily. - Oral    Magnesium Sulfate 2 gram Bolus, followed by 8 gram  "infusion (total Mg dose 10 grams)- Mg less than or equal to 1mg/dL 2 g As Needed 1/21/2018     Sig - Route: Infuse 50 mL into a venous catheter As Needed (Mg less than or equal to 1mg/dL). - Intravenous    Linked Group 1:  \"Or\" Linked Group Details        magnesium sulfate 4 gram infusion- Mg 1.6-1.9 mg/dL 4 g As Needed 1/21/2018     Sig - Route: Infuse 100 mL into a venous catheter As Needed (Mg 1.6-1.9 mg/dL). - Intravenous    Linked Group 1:  \"Or\" Linked Group Details        magnesium sulfate 4 gram infusion- Mg 1.6-1.9 mg/dL 4 g Once 1/22/2018     Sig - Route: Infuse 100 mL into a venous catheter 1 (One) Time. - Intravenous    Magnesium Sulfate 6 gram Infusion (2 gm x 3) -Mg 1.1 -1.5 mg/dL 2 g As Needed 1/21/2018     Sig - Route: Infuse 50 mL into a venous catheter As Needed (Mg 1.1 -1.5 mg/dL). - Intravenous    Linked Group 1:  \"Or\" Linked Group Details        metroNIDAZOLE (FLAGYL) IVPB 500 mg 500 mg Every 8 Hours 1/16/2018 1/28/2018    Sig - Route: Infuse 100 mL into a venous catheter Every 8 (Eight) Hours. - Intravenous    Cosign for Ordering: Accepted by Misty Bae MD on 1/18/2018 11:24 AM    multivitamin (DAILY MINH) tablet 1 tablet 1 tablet Daily 1/22/2018     Sig - Route: Take 1 tablet by mouth Daily. - Oral    Cosign for Ordering: Required by Samuel Duane Kreis, MD    ondansetron (ZOFRAN) injection 4 mg 4 mg Every 6 Hours PRN 1/21/2018     Sig - Route: Infuse 2 mL into a venous catheter Every 6 (Six) Hours As Needed for Nausea or Vomiting. - Intravenous    Linked Group 2:  \"Or\" Linked Group Details        ondansetron (ZOFRAN) tablet 4 mg 4 mg Every 6 Hours PRN 1/21/2018     Sig - Route: Take 1 tablet by mouth Every 6 (Six) Hours As Needed for Nausea or Vomiting. - Oral    Linked Group 2:  \"Or\" Linked Group Details        ondansetron ODT (ZOFRAN-ODT) disintegrating tablet 4 mg 4 mg Every 6 Hours PRN 1/21/2018     Sig - Route: Take 1 tablet by mouth Every 6 (Six) Hours As Needed for Nausea or " "Vomiting. - Oral    Linked Group 2:  \"Or\" Linked Group Details        oxyCODONE (ROXICODONE) immediate release tablet 5 mg 5 mg Every 4 Hours PRN 1/16/2018 1/26/2018    Sig - Route: Take 1 tablet by mouth Every 4 (Four) Hours As Needed for Moderate Pain . - Oral    potassium chloride (K-DUR,KLOR-CON) CR tablet 40 mEq 40 mEq Every 4 Hours 1/21/2018 1/21/2018    Sig - Route: Take 2 tablets by mouth Every 4 (Four) Hours. - Oral    potassium chloride (K-DUR,KLOR-CON) CR tablet 40 mEq 40 mEq Every 4 Hours 1/21/2018 1/21/2018    Sig - Route: Take 2 tablets by mouth Every 4 (Four) Hours. - Oral    potassium chloride (KLOR-CON) packet 40 mEq 40 mEq As Needed 1/21/2018     Sig - Route: Take 40 mEq by mouth As Needed (potassium replacement, see admin instructions). - Oral    Linked Group 3:  \"Or\" Linked Group Details        potassium chloride (MICRO-K) CR capsule 40 mEq 40 mEq As Needed 1/21/2018     Sig - Route: Take 4 capsules by mouth As Needed (potassium replacement.  see admin instructions). - Oral    Linked Group 3:  \"Or\" Linked Group Details        potassium chloride 10 mEq in 100 mL IVPB 10 mEq Every 1 Hour PRN 1/21/2018     Sig - Route: Infuse 100 mL into a venous catheter Every 1 (One) Hour As Needed (potassium protocol PERIPHERAL - see admin instructions). - Intravenous    Linked Group 3:  \"Or\" Linked Group Details        sodium chloride (NS) 0.9 % irrigation solution  - ADS Override Pull   1/21/2018 1/21/2018    Notes to Pharmacy: Created by cabinet override    sodium chloride 0.9 % flush 10 mL 10 mL As Needed 1/15/2018     Sig - Route: Infuse 10 mL into a venous catheter As Needed for Line Care. - Intravenous    Cosign for Ordering: Accepted by José Miguel Li MD on 1/15/2018  1:21 PM    Linked Group 4:  \"And\" Linked Group Details        vancomycin (VANCOCIN) 1,750 mg in sodium chloride 0.9 % 500 mL IVPB 1,750 mg Every 12 Hours 1/21/2018 1/24/2018    Sig - Route: Infuse 1,750 mg into a venous catheter Every " 12 (Twelve) Hours. - Intravenous            Lab Results (last 24 hours)     Procedure Component Value Units Date/Time    Potassium [[id number]]  (Normal) Collected:  01/21/18 1650    Specimen:  Blood Updated:  01/21/18 1727     Potassium 3.9 mmol/L     CBC & Differential [[id number]] Collected:  01/22/18 0126    Specimen:  Blood Updated:  01/22/18 0151    Narrative:       The following orders were created for panel order CBC & Differential.  Procedure                               Abnormality         Status                     ---------                               -----------         ------                     Scan Slide[[id number]]                                       Final result               CBC Auto Differential[[id number]]        Abnormal            Final result                 Please view results for these tests on the individual orders.    CBC Auto Differential [[id number]]  (Abnormal) Collected:  01/22/18 0126    Specimen:  Blood Updated:  01/22/18 0151     WBC 11.40 10*3/mm3      RBC 4.36 (L) 10*6/mm3      Hemoglobin 8.5 (L) g/dL      Hematocrit 26.4 (L) %      MCV 60.6 (L) fL      MCH 19.5 (L) pg      MCHC 32.2 (L) g/dL      RDW 16.0 (H) %      RDW-SD 32.8 (L) fl      MPV 10.6 (H) fL      Platelets 496 (H) 10*3/mm3      Neutrophil % 66.3 %      Lymphocyte % 20.4 (L) %      Monocyte % 9.5 %      Eosinophil % 2.4 %      Basophil % 0.7 %      Immature Grans % 0.7 (H) %      Neutrophils, Absolute 7.56 (H) 10*3/mm3      Lymphocytes, Absolute 2.33 10*3/mm3      Monocytes, Absolute 1.08 (H) 10*3/mm3      Eosinophils, Absolute 0.27 10*3/mm3      Basophils, Absolute 0.08 10*3/mm3      Immature Grans, Absolute 0.08 (H) 10*3/mm3     Scan Slide [[id number]] Collected:  01/22/18 0126    Specimen:  Blood Updated:  01/22/18 0151     Anisocytosis Slight/1+     Hypochromia Mod/2+     Microcytes Large/3+     Ovalocytes Slight/1+     Platelet Morphology Normal    C-reactive Protein [[id number]]  (Abnormal) Collected:  01/22/18  0126    Specimen:  Blood Updated:  01/22/18 0203     C-Reactive Protein 1.20 (H) mg/dL     Comprehensive Metabolic Panel [[id number]]  (Abnormal) Collected:  01/22/18 0126    Specimen:  Blood Updated:  01/22/18 0241     Glucose 122 (H) mg/dL      BUN 10 mg/dL      Creatinine 0.52 mg/dL      Sodium 135 mmol/L      Potassium 4.0 mmol/L      Chloride 111 mmol/L      CO2 20.2 (L) mmol/L      Calcium 7.4 (L) mg/dL      Total Protein 4.9 (L) g/dL      Albumin 2.30 (L) g/dL      ALT (SGPT) 30 U/L      AST (SGOT) 60 (H) U/L      Alkaline Phosphatase 118 U/L       Note New Reference Ranges        Total Bilirubin 0.7 mg/dL      eGFR Non African Amer >150 mL/min/1.73      Globulin 2.6 gm/dL      A/G Ratio 0.9 (L) g/dL      BUN/Creatinine Ratio 19.2     Anion Gap 3.8 mmol/L     Osmolality, Calculated [[id number]]  (Abnormal) Collected:  01/22/18 0126    Specimen:  Blood Updated:  01/22/18 0241     Osmolality Calc 270.4 (L) mOsm/kg     Magnesium [[id number]]  (Abnormal) Collected:  01/22/18 0525    Specimen:  Blood Updated:  01/22/18 0600     Magnesium 1.6 (L) mg/dL         Orders (last 24 hrs)     Start     Ordered    01/22/18 1300  Vancomycin, Trough  Timed      01/22/18 0843    01/22/18 1200  DIET MESSAGE High protein chocolate milk shake, patient has a very poor appetite.  Picks at his food and may eat about 20 to 30% at most.  ThanksGladys  Daily With Lunch & Dinner     Comments:  High protein chocolate milk shake, patient has a very poor appetite.  Picks at his food and may eat about 20 to 30% at most.  Gladys Jaimes    01/21/18 1905    01/22/18 1200  A vancomycin trough level has been ordered prior to the 1400 dose 1/22. Draw level at 1300.  Hold dose if trough level is greater than 20 mg/L.  Nursing Communication  Once     Comments:  A vancomycin trough level has been ordered prior to the 1400 dose 1/22. Draw level at 1300.  Hold dose if trough level is greater than 20 mg/L.    01/22/18 0843    01/22/18 0900  multivitamin  (DAILY MINH) tablet 1 tablet  Daily      01/22/18 0709    01/22/18 0700  magnesium sulfate 4 gram infusion- Mg 1.6-1.9 mg/dL  Once      01/22/18 0616    01/22/18 0600  C-reactive Protein  Morning Draw      01/21/18 0934    01/22/18 0600  CBC Auto Differential  PROCEDURE ONCE      01/22/18 0001    01/22/18 0600  Magnesium  Morning Draw      01/22/18 0314    01/22/18 0204  Osmolality, Calculated  Once      01/22/18 0203    01/22/18 0137  Scan Slide  Once      01/22/18 0136    01/21/18 1800  Potassium  Timed      01/21/18 1621    01/21/18 1535  sodium chloride (NS) 0.9 % irrigation solution  - ADS Override Pull     Comments:  Created by cabinet override    01/21/18 1535    01/21/18 1000  potassium chloride (K-DUR,KLOR-CON) CR tablet 40 mEq  Every 4 Hours      01/21/18 0929    01/21/18 0924  Magnesium Sulfate 2 gram Bolus, followed by 8 gram infusion (total Mg dose 10 grams)- Mg less than or equal to 1mg/dL  As Needed      01/21/18 0924    01/21/18 0924  Magnesium Sulfate 6 gram Infusion (2 gm x 3) -Mg 1.1 -1.5 mg/dL  As Needed      01/21/18 0924    01/21/18 0924  magnesium sulfate 4 gram infusion- Mg 1.6-1.9 mg/dL  As Needed      01/21/18 0924    01/21/18 0924  potassium chloride (MICRO-K) CR capsule 40 mEq  As Needed      01/21/18 0924    01/21/18 0924  potassium chloride (KLOR-CON) packet 40 mEq  As Needed      01/21/18 0924    01/21/18 0924  potassium chloride 10 mEq in 100 mL IVPB  Every 1 Hour PRN      01/21/18 0924    01/21/18 0923  ondansetron (ZOFRAN) tablet 4 mg  Every 6 Hours PRN      01/21/18 0923    01/21/18 0923  ondansetron ODT (ZOFRAN-ODT) disintegrating tablet 4 mg  Every 6 Hours PRN      01/21/18 0923    01/21/18 0923  ondansetron (ZOFRAN) injection 4 mg  Every 6 Hours PRN      01/21/18 0923    01/21/18 0800  potassium chloride (K-DUR,KLOR-CON) CR tablet 40 mEq  Every 4 Hours      01/21/18 0725    01/21/18 0200  vancomycin (VANCOCIN) 1,750 mg in sodium chloride 0.9 % 500 mL IVPB  Every 12 Hours       01/20/18 1520    01/19/18 1045  lactated ringers infusion  Continuous      01/19/18 1000    01/19/18 1045  lactated ringers infusion  Continuous      01/19/18 1007    01/18/18 1600  lactobacillus acidophilus (RISAQUAD) capsule 1 capsule  Daily      01/18/18 1405    01/17/18 0600  CBC & Differential  Daily      01/16/18 0723    01/17/18 0600  Comprehensive Metabolic Panel  Daily      01/16/18 0723    01/16/18 1800  cefepime (MAXIPIME) 2 g/100 mL 0.9% NS (mbp)  Every 12 Hours      01/16/18 1028    01/16/18 1400  heparin (porcine) 5000 UNIT/ML injection 5,000 Units  Every 8 Hours Scheduled      01/16/18 0816    01/16/18 1400  oxyCODONE (ROXICODONE) immediate release tablet 5 mg  Every 4 Hours PRN      01/16/18 1337    01/16/18 1200  metroNIDAZOLE (FLAGYL) IVPB 500 mg  Every 8 Hours      01/16/18 1028    01/16/18 0718  acetaminophen (TYLENOL) tablet 650 mg  Every 6 Hours PRN      01/16/18 0723    01/15/18 1700  HYDROmorphone (DILAUDID) injection 1 mg  Once      01/15/18 1554    01/15/18 1226  sodium chloride 0.9 % flush 10 mL  As Needed      01/15/18 1227    Unscheduled  Magnesium  As Needed      01/21/18 0924    Unscheduled  Potassium  As Needed      01/21/18 0924    --  oxyCODONE (ROXICODONE) 20 MG tablet  Every 6 Hours PRN      01/15/18 1638          Operative/Procedure Notes (last 24 hours) (Notes from 1/21/2018 11:09 AM through 1/22/2018 11:09 AM)     No notes of this type exist for this encounter.           Physician Progress Notes (last 24 hours) (Notes from 1/21/2018 11:09 AM through 1/22/2018 11:09 AM)      JARETH Condon at 1/22/2018  7:02 AM  Version 1 of 1              LOS: 7 days     Chief Complaint:  Rhabdomyolysis/Decubitus Ulcers with Sepsis    Subjective     Interval History:   He has no major changes over the last 24 hours.  He complains of ongoing pain in the sacral decubitus region consistent with previous surgical debridement however no progressive symptoms at this point.  Patient does have  "chronic low back pain. Preliminary wound culture consistent with Gram Negative Bacilli, ID following.  His energy level is improved, appetite improved.  He was up and ambulatory to bathroom and back with nursing assistance yesterday.CRP 1.20. Potassium 4.0, Mg 1.6.    Objective     Vital Signs  /81 (BP Location: Left arm, Patient Position: Lying)  Pulse 72  Temp 98.1 °F (36.7 °C) (Oral)   Resp 18  Ht 182 cm (71.65\")  Wt 76.4 kg (168 lb 6.9 oz)  SpO2 95%  BMI 23.06 kg/m2  Intake & Output (last day)       01/21 0701 - 01/22 0700 01/22 0701 - 01/23 0700    P.O. 240     IV Piggyback 100     Total Intake(mL/kg) 340 (4.5)     Urine (mL/kg/hr) 1350 (0.7)     Total Output 1350      Net -1010            Unmeasured Urine Occurrence 1 x             Physical Exam:     General Appearance:    Alert, cooperative, in no acute distress   Head:    Normocephalic, without obvious abnormality, atraumatic   Eyes:            Lids and lashes normal, conjunctivae and sclerae normal, no   icterus, no pallor, corneas clear, PERRLA   Ears:    Ears appear intact with no abnormalities noted       Neck:   No adenopathy, supple, trachea midline, no thyromegaly, no   carotid bruit, no JVD   Lungs:     Scattered Coarse Crackles bilaterally R>>L ,respirations regular, even and                  unlabored    Heart:    Regular rhythm and normal rate, normal S1 and S2, no            murmur, no gallop, no rub, no click   Chest Wall:    No abnormalities observed   Abdomen:     Normal bowel sounds, no masses, no organomegaly, soft        non-tender, non-distended, no guarding, no rebound                tenderness   Extremities:   Moves all extremities well, no edema, no cyanosis, no             redness   Pulses:   Pulses palpable and equal bilaterally   Skin:   No bleeding, bruising or rash, Sacral decubitus ulcer with dressing in place and right trochanteric ulcer with eschar   Lymph nodes:   No palpable adenopathy   Neurologic:   Cranial " nerves 2 - 12 grossly intact, sensation intact, DTR       present and equal bilaterally        Results Review:    Lab Results   Component Value Date    WBC 11.40 01/22/2018    HGB 8.5 (L) 01/22/2018    HCT 26.4 (L) 01/22/2018    MCV 60.6 (L) 01/22/2018     (H) 01/22/2018       Lab Results   Component Value Date    GLUCOSE 122 (H) 01/22/2018    BUN 10 01/22/2018    CREATININE 0.52 01/22/2018    EGFRIFNONA >150 01/22/2018    BCR 19.2 01/22/2018     01/22/2018    K 4.0 01/22/2018     01/22/2018    CO2 20.2 (L) 01/22/2018    CALCIUM 7.4 (L) 01/22/2018    PROTENTOTREF 7.2 11/12/2015    ALBUMIN 2.30 (L) 01/22/2018    LABIL2 0.9 (L) 01/22/2018    AST 60 (H) 01/22/2018    ALT 30 01/22/2018     Lab Results   Component Value Date    INR 0.94 11/03/2015    INR <0.90 10/25/2015    INR <0.90 06/01/2015       Glucose   Date Value Ref Range Status   01/19/2018 107 70 - 130 mg/dL Final   01/19/2018 108 70 - 130 mg/dL Final          Medication Review:     Current Facility-Administered Medications:   •  acetaminophen (TYLENOL) tablet 650 mg, 650 mg, Oral, Q6H PRN, JARETH Condon, 650 mg at 01/16/18 1913  •  cefepime (MAXIPIME) 2 g/100 mL 0.9% NS (mbp), 2 g, Intravenous, Q12H, Misty Bae MD, Last Rate: 25 mL/hr at 01/20/18 0537, 2 g at 01/22/18 0451  •  heparin (porcine) 5000 UNIT/ML injection 5,000 Units, 5,000 Units, Subcutaneous, Q8H, JARETH Condon, 5,000 Units at 01/22/18 0449  •  HYDROmorphone (DILAUDID) injection 1 mg, 1 mg, Intravenous, Once, José Miguel Li MD  •  lactated ringers infusion, 100 mL/hr, Intravenous, Continuous, Mohan Santoyo MD  •  lactated ringers infusion, 125 mL/hr, Intravenous, Continuous, Terrence Montero MD, Stopped at 01/19/18 1251  •  lactobacillus acidophilus (RISAQUAD) capsule 1 capsule, 1 capsule, Oral, Daily, Camila Haley [facility], 1 capsule at 01/21/18 1232  •  Magnesium Sulfate 2 gram Bolus, followed by 8 gram infusion (total Mg dose 10 grams)- Mg less  than or equal to 1mg/dL, 2 g, Intravenous, PRN **OR** Magnesium Sulfate 6 gram Infusion (2 gm x 3) -Mg 1.1 -1.5 mg/dL, 2 g, Intravenous, PRN **OR** magnesium sulfate 4 gram infusion- Mg 1.6-1.9 mg/dL, 4 g, Intravenous, PRN, Samuel Duane Kreis, MD  •  magnesium sulfate 4 gram infusion- Mg 1.6-1.9 mg/dL, 4 g, Intravenous, Once, Samuel Duane Kreis, MD  •  metroNIDAZOLE (FLAGYL) IVPB 500 mg, 500 mg, Intravenous, Q8H, Misty Bae MD, Last Rate: 100 mL/hr at 01/20/18 0415, 500 mg at 01/22/18 0450  •  ondansetron (ZOFRAN) tablet 4 mg, 4 mg, Oral, Q6H PRN **OR** ondansetron ODT (ZOFRAN-ODT) disintegrating tablet 4 mg, 4 mg, Oral, Q6H PRN **OR** ondansetron (ZOFRAN) injection 4 mg, 4 mg, Intravenous, Q6H PRN, Samuel Duane Kreis, MD, 4 mg at 01/21/18 1506  •  oxyCODONE (ROXICODONE) immediate release tablet 5 mg, 5 mg, Oral, Q4H PRN, Samuel Duane Kreis, MD, 5 mg at 01/22/18 0150  •  potassium chloride (MICRO-K) CR capsule 40 mEq, 40 mEq, Oral, PRN **OR** potassium chloride (KLOR-CON) packet 40 mEq, 40 mEq, Oral, PRN **OR** potassium chloride 10 mEq in 100 mL IVPB, 10 mEq, Intravenous, Q1H PRN, Samuel Duane Kreis, MD  •  Insert peripheral IV, , , Once **AND** sodium chloride 0.9 % flush 10 mL, 10 mL, Intravenous, PRN, Jesus Carreon PA-C, 10 mL at 01/21/18 1506  •  vancomycin (VANCOCIN) 1,750 mg in sodium chloride 0.9 % 500 mL IVPB, 1,750 mg, Intravenous, Q12H, Misty Bae MD, 1,750 mg at 01/22/18 0150      Assessment/Plan   Probable Aspiration Pneumonia with Sepsis  Sacral Decubitus Ulcer   Decubitus ulceration of the hip  Hyperbilirubinemia (resolved)  Rhabdomyolysis (resolved)  Metabolic encephalopathy due to sepsis (resolved)  Multiple myeloma  Chronic opioid use  Protein Malnutrition  Fever (resolved)      Appreciate surgery and ID input     Continue with Cefepime + Flagyl + Vancomycin, await final wound culture    Oxycodone 5 mg q 4 hours prn pain     Tylenol prn fever     Continue with wound  "care     Heparin for DVT prophylaxis    O2 via NC to maintain O2 sats >90%    Continue with PT/OT, S/S following for discharge to SNF [facility]      Continue care hospital referral made for ongoing wound care    Continue K+/Mg replacement protocol, add MVI daily    JARETH Condon  01/22/18  7:02 AM                Electronically signed by JARETH Condon at 1/22/2018  7:11 AM      JOSÉ MIGUEL Love at 1/22/2018  9:23 AM  Version 1 of 1           I have personally seen and examined the patient today and discussed overnight interval progress and pertinent issues with nursing staff.    Subjective:    Sacral wound with sloughing and necrosis ,minimal bleeding and no odor.  Cultures in process with GNR so far.  Normal white count and almost normal CRP level.    Awaiting possible transfer to [facility].  Patient may need repeat debridement if transferred to [facility].    History taken from: patient chart family RN      Vital Signs    /81 (BP Location: Left arm, Patient Position: Lying)  Pulse 72  Temp 98.1 °F (36.7 °C) (Oral)   Resp 18  Ht 182 cm (71.65\")  Wt 76.4 kg (168 lb 6.9 oz)  SpO2 95%  BMI 23.06 kg/m2    Temp:  [98 °F (36.7 °C)-98.2 °F (36.8 °C)] 98.1 °F (36.7 °C)       Intake/Output Summary (Last 24 hours) at 01/22/18 0923  Last data filed at 01/22/18 0904   Gross per 24 hour   Intake              340 ml   Output             1650 ml   Net            -1310 ml     Intake & Output (last 3 days)       01/19 0701 - 01/20 0700 01/20 0701 - 01/21 0700 01/21 0701 - 01/22 0700 01/22 0701 - 01/23 0700    P.O. 120 1160 240 240    I.V. (mL/kg) 500 (6.5)       IV Piggyback 600 500 100     Total Intake(mL/kg) 1220 (16) 1660 (21.7) 340 (4.5) 240 (3.1)    Urine (mL/kg/hr) 750 (0.4) 1700 (0.9) 1350 (0.7) 300 (1.6)    Total Output 750 1700 1350 300    Net +470 -40 -1010 -60            Unmeasured Urine Occurrence 1 x  1 x         Physical Exam:       General Appearance:    Alert, cooperative, in no acute " distress, thin, frail, daughter at bedside    Head:    Normocephalic, without obvious abnormality, atraumatic   Eyes:            Lids and lashes normal, conjunctivae and sclerae normal, no   icterus, no pallor, corneas clear, PERRLA   Ears:    Ears appear intact with no abnormalities noted   Throat:   No oral lesions, no thrush, oral mucosa moist   Neck:   No adenopathy, supple, trachea midline, no thyromegaly, no   carotid bruit, no JVD   Back:     No tenderness to percussion or palpation, range of motion   normal   Lungs:    coarse breath sounds to right.    Heart:    Regular rhythm and normal rate, normal S1 and S2, no            murmur, no gallop, no rub, no click   Chest Wall:    No abnormalities observed   Abdomen:     Normal bowel sounds, no masses, no organomegaly, soft        non-tender, non-distended, no guarding, no rebound                tenderness   Rectal:     Deferred   Extremities:   Moves all extremities well, no edema, no cyanosis, no             redness   Pulses:   Pulses palpable and equal bilaterally   Skin:   Sacral decubitus ulcer and right hip ulcer    Lymph nodes:   No palpable adenopathy   Neurologic:   Awake, alert, Garbled speech        Results:      Results from last 7 days  Lab Units 01/22/18  0126 01/21/18  0053 01/20/18  0054 01/19/18  0121 01/18/18  0036 01/17/18  0454 01/16/18  0110   WBC 10*3/mm3 11.40 10.76 11.43 12.61* 9.73 10.08 8.14     Lab Results   Component Value Date    NEUTROABS 7.56 (H) 01/22/2018         Results from last 7 days  Lab Units 01/22/18  0126   CREATININE mg/dL 0.52         Results from last 7 days  Lab Units 01/22/18  0126 01/21/18  0053 01/19/18  0121   CRP mg/dL 1.20* 2.07* 4.83*       Imaging Results (last 24 hours)     ** No results found for the last 24 hours. **            Results Review:    I have personally reviewed laboratory data, culture results, radiology studies and antimicrobial therapy.    Hospital Medications (active)       Dose Frequency  Start End    acetaminophen (TYLENOL) tablet 650 mg 650 mg Every 6 Hours PRN 1/16/2018     Sig - Route: Take 2 tablets by mouth Every 6 (Six) Hours As Needed for Mild Pain . - Oral    Cosign for Ordering: Accepted by Samuel Duane Kreis, MD on 1/16/2018  1:17 PM    cefepime (MAXIPIME) 2 g/100 mL 0.9% NS (mbp) 2 g Once 1/16/2018 1/16/2018    Sig - Route: Infuse 100 mL into a venous catheter 1 (One) Time. - Intravenous    Cosign for Ordering: Required by Misty Bae MD    cefepime (MAXIPIME) 2 g/100 mL 0.9% NS (mbp) 2 g Every 12 Hours 1/16/2018 1/23/2018    Sig - Route: Infuse 100 mL into a venous catheter Every 12 (Twelve) Hours. - Intravenous    Cosign for Ordering: Required by Misty Bae MD    heparin (porcine) 5000 UNIT/ML injection 5,000 Units 5,000 Units Every 8 Hours Scheduled 1/16/2018     Sig - Route: Inject 1 mL under the skin Every 8 (Eight) Hours. - Subcutaneous    Cosign for Ordering: Accepted by Samuel Duane Kreis, MD on 1/16/2018  1:17 PM    HYDROmorphone (DILAUDID) injection 1 mg 1 mg Once 1/15/2018     Sig - Route: Infuse 1 mL into a venous catheter 1 (One) Time. - Intravenous    metroNIDAZOLE (FLAGYL) IVPB 500 mg 500 mg Every 8 Hours 1/16/2018 1/23/2018    Sig - Route: Infuse 100 mL into a venous catheter Every 8 (Eight) Hours. - Intravenous    Cosign for Ordering: Required by Misty Bae MD    oxyCODONE (ROXICODONE) immediate release tablet 5 mg 5 mg Every 4 Hours PRN 1/16/2018 1/26/2018    Sig - Route: Take 1 tablet by mouth Every 4 (Four) Hours As Needed for Moderate Pain . - Oral    Pharmacy to dose vancomycin  Continuous PRN 1/16/2018 1/23/2018    Sig - Route: Continuous As Needed for Consult. - Does not apply    Cosign for Ordering: Required by Misty Bae MD    sodium chloride 0.9 % flush 10 mL 10 mL As Needed 1/15/2018     Sig - Route: Infuse 10 mL into a venous catheter As Needed for Line Care. - Intravenous    Cosign for Ordering: Accepted by José Miguel Loredo  "MD Jen on 1/15/2018  1:21 PM    Linked Group 1:  \"And\" Linked Group Details        sodium chloride 0.9 % infusion 150 mL/hr Continuous 1/15/2018     Sig - Route: Infuse 150 mL/hr into a venous catheter Continuous. - Intravenous    vancomycin (VANCOCIN) 1,000 mg in sodium chloride 0.9 % 250 mL IVPB 1,000 mg Every 12 Hours 1/16/2018 1/23/2018    Sig - Route: Infuse 1,000 mg into a venous catheter Every 12 (Twelve) Hours. - Intravenous            Cultures:    Blood Culture   Date Value Ref Range Status   01/15/2018 No growth at 24 hours  Preliminary   01/15/2018 No growth at 24 hours  Preliminary     Urine Culture   Date Value Ref Range Status   01/15/2018 No growth at 24 hours  Preliminary           Assessment/Plan     ASSESSMENT:    1.  Severe sepsis with encephalopathy  2.  Aspiration pneumonia     PLAN:    Sacral wound with sloughing and necrosis ,minimal bleeding and no odor.  Cultures in process with GNR so far.  Normal white count and almost normal CRP level.    Awaiting possible transfer to [facility].  Patient may need repeat debridement if transferred to [facility].    Patient is at high risk for MRSA and pseudomonal infection and need for anaerobic coverage as well. Continue vancomycin per pharmacy dosing, cefepime 2 g IV every 12 hours and Flagyl 500 mg IV every 8 hours for aspiration pneumonia.     We'll continue to follow intraoperative wound cultures and sensitivities and make antibiotic adjustments if necessary.     CRP ordered for a.m.     Patient's findings and recommendations were discussed with patient, family and nursing staff    Code Status: Full Code     Scribed for JOSÉ MIGUEL Knott  01/22/18  9:23 AM           Electronically signed by JOSÉ MIGUEL Love at 1/22/2018  9:27 AM        Consult Notes (last 24 hours) (Notes from 1/21/2018 11:09 AM through 1/22/2018 11:09 AM)     No notes of this type exist for this encounter.        Physical Therapy Notes (last 24 hours) (Notes from " 1/21/2018 11:09 AM through 1/22/2018 11:09 AM)     No notes of this type exist for this encounter.        Occupational Therapy Notes (last 24 hours) (Notes from 1/21/2018 11:09 AM through 1/22/2018 11:09 AM)     No notes of this type exist for this encounter.        Respiratory Therapy Notes (last 24 hours) (Notes from 1/21/2018 11:09 AM through 1/22/2018 11:09 AM)     No notes of this type exist for this encounter.

## 2018-01-22 NOTE — PLAN OF CARE
Problem: Skin Integrity Impairment, Risk/Actual (Adult)  Goal: Skin Integrity/Wound Healing  Outcome: Ongoing (interventions implemented as appropriate)   01/21/18 2220   Skin Integrity Impairment, Risk/Actual (Adult)   Skin Integrity/Wound Healing making progress toward outcome       Problem: Pain, Chronic (Adult)  Goal: Identify Related Risk Factors and Signs and Symptoms  Outcome: Ongoing (interventions implemented as appropriate)   01/21/18 0725   Pain, Chronic   Related Risk Factors (Chronic Pain) disease process;coping ineffective;knowledge deficit;pain control inadequate;physical disability;prolonged healing;socioeconomic status   Signs and Symptoms (Chronic Pain) BADLs/IADLs reluctance/inability to perform;fatigue/weakness;guarding behavior/splinting/limp;self-focusing;verbalization of pain descriptors;verbalization of pain/discomfort for a prolonged time period     Goal: Acceptable Pain Control/Comfort Level  Outcome: Ongoing (interventions implemented as appropriate)   01/21/18 2220   Pain, Chronic (Adult)   Acceptable Pain Control/Comfort Level making progress toward outcome       Problem: Patient Care Overview (Adult)  Goal: Plan of Care Review  Outcome: Ongoing (interventions implemented as appropriate)   01/17/18 1051 01/21/18 2005   Coping/Psychosocial Response Interventions   Plan Of Care Reviewed With --  patient   Patient Care Overview   Progress progress toward functional goals as expected --      Goal: Discharge Needs Assessment  Outcome: Ongoing (interventions implemented as appropriate)   01/16/18 1607 01/17/18 1637 01/19/18 0901   Discharge Needs Assessment   Concerns To Be Addressed --  --  basic needs concerns;compliance issue concerns   Readmission Within The Last 30 Days --  --  no previous admission in last 30 days   Living Environment   Transportation Available car --  --    Self-Care   Equipment Currently Used at Home --  none --        Problem: Pressure Ulcer Risk (Brien Scale)  (Adult,Obstetrics,Pediatric)  Goal: Identify Related Risk Factors and Signs and Symptoms  Outcome: Ongoing (interventions implemented as appropriate)   01/19/18 0901   Pressure Ulcer Risk (Brien Scale)   Related Risk Factors (Pressure Ulcer Risk (Brien Scale)) fluid intake inadequate     Goal: Skin Integrity  Outcome: Ongoing (interventions implemented as appropriate)   01/21/18 2220   Pressure Ulcer Risk (Brien Scale) (Adult,Obstetrics,Pediatric)   Skin Integrity making progress toward outcome       Problem: Fall Risk (Adult)  Goal: Identify Related Risk Factors and Signs and Symptoms  Outcome: Ongoing (interventions implemented as appropriate)   01/20/18 0735   Fall Risk   Fall Risk: Related Risk Factors fatigue/slow reaction;gait/mobility problems;history of falls     Goal: Absence of Falls  Outcome: Ongoing (interventions implemented as appropriate)   01/21/18 2220   Fall Risk (Adult)   Absence of Falls making progress toward outcome

## 2018-01-23 LAB
ALBUMIN SERPL-MCNC: 2.3 G/DL (ref 3.4–4.8)
ALBUMIN/GLOB SERPL: 0.9 G/DL (ref 1.5–2.5)
ALP SERPL-CCNC: 106 U/L (ref 40–129)
ALT SERPL W P-5'-P-CCNC: 27 U/L (ref 10–44)
ANION GAP SERPL CALCULATED.3IONS-SCNC: 3 MMOL/L (ref 3.6–11.2)
ANISOCYTOSIS BLD QL: NORMAL
AST SERPL-CCNC: 50 U/L (ref 10–34)
BASOPHILS # BLD AUTO: 0.05 10*3/MM3 (ref 0–0.3)
BASOPHILS NFR BLD AUTO: 0.4 % (ref 0–2)
BILIRUB SERPL-MCNC: 0.6 MG/DL (ref 0.2–1.8)
BUN BLD-MCNC: 11 MG/DL (ref 7–21)
BUN/CREAT SERPL: 15.9 (ref 7–25)
CALCIUM SPEC-SCNC: 7.4 MG/DL (ref 7.7–10)
CHLORIDE SERPL-SCNC: 110 MMOL/L (ref 99–112)
CO2 SERPL-SCNC: 23 MMOL/L (ref 24.3–31.9)
CREAT BLD-MCNC: 0.69 MG/DL (ref 0.43–1.29)
DEPRECATED RDW RBC AUTO: 32.7 FL (ref 37–54)
EOSINOPHIL # BLD AUTO: 0.25 10*3/MM3 (ref 0–0.7)
EOSINOPHIL NFR BLD AUTO: 2 % (ref 0–5)
ERYTHROCYTE [DISTWIDTH] IN BLOOD BY AUTOMATED COUNT: 16.1 % (ref 11.5–14.5)
GFR SERPL CREATININE-BSD FRML MDRD: 116 ML/MIN/1.73
GLOBULIN UR ELPH-MCNC: 2.7 GM/DL
GLUCOSE BLD-MCNC: 133 MG/DL (ref 70–110)
HCT VFR BLD AUTO: 26.1 % (ref 42–52)
HGB BLD-MCNC: 8.6 G/DL (ref 14–18)
IMM GRANULOCYTES # BLD: 0.12 10*3/MM3 (ref 0–0.03)
IMM GRANULOCYTES NFR BLD: 1 % (ref 0–0.5)
LYMPHOCYTES # BLD AUTO: 2.31 10*3/MM3 (ref 1–3)
LYMPHOCYTES NFR BLD AUTO: 18.9 % (ref 21–51)
MAGNESIUM SERPL-MCNC: 1.9 MG/DL (ref 1.7–2.6)
MCH RBC QN AUTO: 20.1 PG (ref 27–33)
MCHC RBC AUTO-ENTMCNC: 33 G/DL (ref 33–37)
MCV RBC AUTO: 61.1 FL (ref 80–94)
MICROCYTES BLD QL: NORMAL
MONOCYTES # BLD AUTO: 1.34 10*3/MM3 (ref 0.1–0.9)
MONOCYTES NFR BLD AUTO: 11 % (ref 0–10)
NEUTROPHILS # BLD AUTO: 8.13 10*3/MM3 (ref 1.4–6.5)
NEUTROPHILS NFR BLD AUTO: 66.7 % (ref 30–70)
OSMOLALITY SERPL CALC.SUM OF ELEC: 273.3 MOSM/KG (ref 273–305)
OVALOCYTES BLD QL SMEAR: NORMAL
PLATELET # BLD AUTO: 550 10*3/MM3 (ref 130–400)
PMV BLD AUTO: 10.7 FL (ref 6–10)
POTASSIUM BLD-SCNC: 3.7 MMOL/L (ref 3.5–5.3)
PROT SERPL-MCNC: 5 G/DL (ref 6–8)
RBC # BLD AUTO: 4.27 10*6/MM3 (ref 4.7–6.1)
SMALL PLATELETS BLD QL SMEAR: NORMAL
SODIUM BLD-SCNC: 136 MMOL/L (ref 135–153)
WBC NRBC COR # BLD: 12.2 10*3/MM3 (ref 4.5–12.5)

## 2018-01-23 PROCEDURE — 80053 COMPREHEN METABOLIC PANEL: CPT | Performed by: PHYSICIAN ASSISTANT

## 2018-01-23 PROCEDURE — 94799 UNLISTED PULMONARY SVC/PX: CPT

## 2018-01-23 PROCEDURE — 83735 ASSAY OF MAGNESIUM: CPT | Performed by: FAMILY MEDICINE

## 2018-01-23 PROCEDURE — 85007 BL SMEAR W/DIFF WBC COUNT: CPT | Performed by: PHYSICIAN ASSISTANT

## 2018-01-23 PROCEDURE — 25010000002 VANCOMYCIN PER 500 MG: Performed by: INTERNAL MEDICINE

## 2018-01-23 PROCEDURE — 25010000002 HEPARIN (PORCINE) PER 1000 UNITS: Performed by: PHYSICIAN ASSISTANT

## 2018-01-23 PROCEDURE — 25010000002 CEFEPIME: Performed by: INTERNAL MEDICINE

## 2018-01-23 PROCEDURE — 85025 COMPLETE CBC W/AUTO DIFF WBC: CPT | Performed by: PHYSICIAN ASSISTANT

## 2018-01-23 RX ORDER — CEFDINIR 300 MG/1
300 CAPSULE ORAL EVERY 12 HOURS SCHEDULED
Status: DISCONTINUED | OUTPATIENT
Start: 2018-01-23 | End: 2018-01-26 | Stop reason: HOSPADM

## 2018-01-23 RX ADMIN — OXYCODONE HYDROCHLORIDE 5 MG: 5 TABLET ORAL at 19:49

## 2018-01-23 RX ADMIN — HEPARIN SODIUM 5000 UNITS: 5000 INJECTION, SOLUTION INTRAVENOUS; SUBCUTANEOUS at 21:35

## 2018-01-23 RX ADMIN — METRONIDAZOLE 500 MG: 500 INJECTION, SOLUTION INTRAVENOUS at 03:36

## 2018-01-23 RX ADMIN — HEPARIN SODIUM 5000 UNITS: 5000 INJECTION, SOLUTION INTRAVENOUS; SUBCUTANEOUS at 05:38

## 2018-01-23 RX ADMIN — Medication 1 CAPSULE: at 07:18

## 2018-01-23 RX ADMIN — OXYCODONE HYDROCHLORIDE 5 MG: 5 TABLET ORAL at 06:17

## 2018-01-23 RX ADMIN — Medication 1 TABLET: at 07:18

## 2018-01-23 RX ADMIN — OXYCODONE HYDROCHLORIDE 5 MG: 5 TABLET ORAL at 15:30

## 2018-01-23 RX ADMIN — ACETAMINOPHEN 650 MG: 325 TABLET ORAL at 01:01

## 2018-01-23 RX ADMIN — OXYCODONE HYDROCHLORIDE 5 MG: 5 TABLET ORAL at 10:21

## 2018-01-23 RX ADMIN — CEFEPIME 2 G: 2 INJECTION, POWDER, FOR SOLUTION INTRAVENOUS at 05:38

## 2018-01-23 RX ADMIN — VANCOMYCIN HYDROCHLORIDE 1750 MG: 5 INJECTION, POWDER, LYOPHILIZED, FOR SOLUTION INTRAVENOUS at 02:02

## 2018-01-23 RX ADMIN — OXYCODONE HYDROCHLORIDE 5 MG: 5 TABLET ORAL at 02:05

## 2018-01-23 RX ADMIN — CEFDINIR 300 MG: 300 CAPSULE ORAL at 11:24

## 2018-01-23 RX ADMIN — CEFDINIR 300 MG: 300 CAPSULE ORAL at 21:34

## 2018-01-23 RX ADMIN — HEPARIN SODIUM 5000 UNITS: 5000 INJECTION, SOLUTION INTRAVENOUS; SUBCUTANEOUS at 11:24

## 2018-01-23 NOTE — PROGRESS NOTES
Antimicrobial Length of Therapy:    Day 1 of 10 Omnicef    Day 7 total antibiotic therapy    Thank you.  Janelle Espino, Pharm.D.  1/23/2018  2:22 PM

## 2018-01-23 NOTE — PLAN OF CARE
Problem: Skin Integrity Impairment, Risk/Actual (Adult)  Goal: Skin Integrity/Wound Healing  Outcome: Ongoing (interventions implemented as appropriate)      Problem: Pain, Chronic (Adult)  Goal: Identify Related Risk Factors and Signs and Symptoms  Outcome: Ongoing (interventions implemented as appropriate)    Goal: Acceptable Pain Control/Comfort Level  Outcome: Ongoing (interventions implemented as appropriate)      Problem: Patient Care Overview (Adult)  Goal: Plan of Care Review  Outcome: Ongoing (interventions implemented as appropriate)    Goal: Adult Individualization and Mutuality  Outcome: Ongoing (interventions implemented as appropriate)    Goal: Discharge Needs Assessment  Outcome: Ongoing (interventions implemented as appropriate)      Problem: Pressure Ulcer Risk (Brien Scale) (Adult,Obstetrics,Pediatric)  Goal: Identify Related Risk Factors and Signs and Symptoms  Outcome: Ongoing (interventions implemented as appropriate)    Goal: Skin Integrity  Outcome: Ongoing (interventions implemented as appropriate)      Problem: Fall Risk (Adult)  Goal: Absence of Falls  Outcome: Ongoing (interventions implemented as appropriate)

## 2018-01-23 NOTE — PROGRESS NOTES
"     LOS: 8 days     Chief Complaint:  Rhabdomyolysis/Decubitus Ulcers with Sepsis    Subjective     Interval History:   He has no major changes over the last 24 hours.  He complains of ongoing pain in the sacral decubitus region consistent with previous surgical debridement however no progressive symptoms at this point.  Patient does have chronic low back pain. Preliminary wound culture consistent with Proteus Vulgaris and Escherichia/Citrobacter, ID following.  His energy level is improved, appetite improved.  Labs and vitals stable.  following for [facility] admission.    Objective     Vital Signs  /79 (BP Location: Left arm, Patient Position: Lying)  Pulse 60  Temp 98 °F (36.7 °C) (Oral)   Resp 18  Ht 182 cm (71.65\")  Wt 76.4 kg (168 lb 6.9 oz)  SpO2 92%  BMI 23.06 kg/m2  Intake & Output (last day)       01/22 0701 - 01/23 0700 01/23 0701 - 01/24 0700    P.O. 600     IV Piggyback 500     Total Intake(mL/kg) 1100 (14.4)     Urine (mL/kg/hr) 1325 (0.7)     Total Output 1325      Net -225                    Physical Exam:     General Appearance:    Alert, cooperative, in no acute distress   Head:    Normocephalic, without obvious abnormality, atraumatic   Eyes:            Lids and lashes normal, conjunctivae and sclerae normal, no   icterus, no pallor, corneas clear, PERRLA   Ears:    Ears appear intact with no abnormalities noted       Neck:   No adenopathy, supple, trachea midline, no thyromegaly, no   carotid bruit, no JVD   Lungs:     Scattered Coarse Crackles bilaterally R>>L ,respirations regular, even and                  unlabored    Heart:    Regular rhythm and normal rate, normal S1 and S2, no            murmur, no gallop, no rub, no click   Chest Wall:    No abnormalities observed   Abdomen:     Normal bowel sounds, no masses, no organomegaly, soft        non-tender, non-distended, no guarding, no rebound                tenderness   Extremities:   Moves all extremities well, " no edema, no cyanosis, no             redness   Pulses:   Pulses palpable and equal bilaterally   Skin:   No bleeding, bruising or rash, Sacral decubitus ulcer with dressing in place and right trochanteric ulcer with eschar   Lymph nodes:   No palpable adenopathy   Neurologic:   Cranial nerves 2 - 12 grossly intact, sensation intact, DTR       present and equal bilaterally        Results Review:    Lab Results   Component Value Date    WBC 12.20 01/23/2018    HGB 8.6 (L) 01/23/2018    HCT 26.1 (L) 01/23/2018    MCV 61.1 (L) 01/23/2018     (H) 01/23/2018       Lab Results   Component Value Date    GLUCOSE 133 (H) 01/23/2018    BUN 11 01/23/2018    CREATININE 0.69 01/23/2018    EGFRIFNONA 116 01/23/2018    BCR 15.9 01/23/2018     01/23/2018    K 3.7 01/23/2018     01/23/2018    CO2 23.0 (L) 01/23/2018    CALCIUM 7.4 (L) 01/23/2018    PROTENTOTREF 7.2 11/12/2015    ALBUMIN 2.30 (L) 01/23/2018    LABIL2 0.9 (L) 01/23/2018    AST 50 (H) 01/23/2018    ALT 27 01/23/2018     Lab Results   Component Value Date    INR 0.94 11/03/2015    INR <0.90 10/25/2015    INR <0.90 06/01/2015       No results found for: POCGLU       Medication Review:     Current Facility-Administered Medications:   •  acetaminophen (TYLENOL) tablet 650 mg, 650 mg, Oral, Q6H PRN, JARETH Condon, 650 mg at 01/23/18 0101  •  cefepime (MAXIPIME) 2 g/100 mL 0.9% NS (mbp), 2 g, Intravenous, Q12H, Misty Bae MD, Last Rate: 25 mL/hr at 01/20/18 0537, 2 g at 01/23/18 0538  •  heparin (porcine) 5000 UNIT/ML injection 5,000 Units, 5,000 Units, Subcutaneous, Q8H, JARETH Condon, 5,000 Units at 01/23/18 0538  •  HYDROmorphone (DILAUDID) injection 1 mg, 1 mg, Intravenous, Once, José Miguel Li MD  •  lactated ringers infusion, 100 mL/hr, Intravenous, Continuous, Mohan Santoyo MD  •  lactated ringers infusion, 125 mL/hr, Intravenous, Continuous, Terrence Montero MD, Stopped at 01/19/18 1251  •  lactobacillus acidophilus  (RISAQUAD) capsule 1 capsule, 1 capsule, Oral, Daily, Camila Haley, [facility], 1 capsule at 01/23/18 0718  •  Magnesium Sulfate 2 gram Bolus, followed by 8 gram infusion (total Mg dose 10 grams)- Mg less than or equal to 1mg/dL, 2 g, Intravenous, PRN **OR** Magnesium Sulfate 6 gram Infusion (2 gm x 3) -Mg 1.1 -1.5 mg/dL, 2 g, Intravenous, PRN **OR** magnesium sulfate 4 gram infusion- Mg 1.6-1.9 mg/dL, 4 g, Intravenous, PRN, Samuel Duane Kreis, MD  •  magnesium sulfate 4 gram infusion- Mg 1.6-1.9 mg/dL, 4 g, Intravenous, Once, Samuel Duane Kreis, MD, Stopped at 01/22/18 1240  •  metroNIDAZOLE (FLAGYL) IVPB 500 mg, 500 mg, Intravenous, Q8H, Misty Bae MD, Last Rate: 0 mL/hr at 01/22/18 1245, 500 mg at 01/23/18 0336  •  multivitamin (DAILY MINH) tablet 1 tablet, 1 tablet, Oral, Daily, JARETH Condon, 1 tablet at 01/23/18 0718  •  ondansetron (ZOFRAN) tablet 4 mg, 4 mg, Oral, Q6H PRN **OR** ondansetron ODT (ZOFRAN-ODT) disintegrating tablet 4 mg, 4 mg, Oral, Q6H PRN **OR** ondansetron (ZOFRAN) injection 4 mg, 4 mg, Intravenous, Q6H PRN, Samuel Duane Kreis, MD, 4 mg at 01/22/18 2126  •  oxyCODONE (ROXICODONE) immediate release tablet 5 mg, 5 mg, Oral, Q4H PRN, Samuel Duane Kreis, MD, 5 mg at 01/23/18 0617  •  potassium chloride (MICRO-K) CR capsule 40 mEq, 40 mEq, Oral, PRN **OR** potassium chloride (KLOR-CON) packet 40 mEq, 40 mEq, Oral, PRN **OR** potassium chloride 10 mEq in 100 mL IVPB, 10 mEq, Intravenous, Q1H PRN, Samuel Duane Kreis, MD  •  Insert peripheral IV, , , Once **AND** sodium chloride 0.9 % flush 10 mL, 10 mL, Intravenous, PRN, Jesus Carreon PA-C, 10 mL at 01/21/18 1506  •  vancomycin (VANCOCIN) 1,750 mg in sodium chloride 0.9 % 500 mL IVPB, 1,750 mg, Intravenous, Q12H, Misty Bae MD, Last Rate: 0 mL/hr at 01/22/18 1630, 1,750 mg at 01/23/18 0202      Assessment/Plan   Probable Aspiration Pneumonia with Sepsis  Sacral Decubitus Ulcer   Decubitus ulceration of the  hip  Hyperbilirubinemia (resolved)  Rhabdomyolysis (resolved)  Metabolic encephalopathy due to sepsis (resolved)  Multiple myeloma  Chronic opioid use  Protein Malnutrition  Fever (resolved)      Appreciate surgery and ID input     Continue with Cefepime + Flagyl + Vancomycin, await final wound culture, ID following    Oxycodone 5 mg q 4 hours prn pain     Tylenol prn fever     Continue with wound care     Heparin for DVT prophylaxis    O2 via NC to maintain O2 sats >90%    Continue with PT/OT, S/S following for discharge to [facility] vs [facility] referral made for ongoing wound care    Continue K+/Mg replacement protocol,  MVI daily    JARETH Condon  01/23/18  8:54 AM       This patient is seen this morning by me and I agree with the above note.  Tolerating antibiotics and wound care.  Awaiting input from [facility] to see if he will be approved.  Still requiring IV antibiotics.  Appreciate surgery and infectious disease input.  Continue PT    Samuel Duane Kreis, MD  01/23/18  9:10 AM

## 2018-01-23 NOTE — DISCHARGE PLACEMENT REQUEST
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 No acute  fracture or malalignment of the lumbar spine is identified.        Impression:       No acute fracture of the lumbar spine.      This report was finalized on 1/15/2018 1:16 PM by Dr. Carlos Jiméenz MD.             Assessment/Plan   Rhabdomyolysis  Fever  Sacral Decubitus Ulcer/Hip Ulcer  Hyperbilirubinemia  Altered Mental Status  HX Multiple Myeloma  Chronic Neck Pain/Low Back Pain    Consult Surgery for sacral decubitus/wound care    Consult ID for fever and antibiotic management    Hold Oxycodone and Valium secondary to altered mental status    Check RUQ U/S secondary to hyperbilirubinemia and urine myoglobin    Repeat LFT's this AM    Continue with IVF @ 150 ml/hour    Tylenol prn fever    Wound care consult    Lovenox for DVT prophylaxis    Start O2 via NC to maintain O2 sats >90%    CBC, CMP daily    JARETH Condon  01/16/18  6:39 AM    I have seen this patient today and agree with the above note.  This patient was admitted to my service to the emergency department where supposedly he is a patient of mine.  However, he has never been seen in my clinic.  He sees a Dr. Orozco in [place].  This patient has a rather complex history with history of myeloma.  He takes chronic  opioid medications for this.  He states that he has not been ambulatory for at least a week or 2 since falling out of the bed.  He states he hurt his lower back when this happened.  He was noted to have a CT of the lumbar spine revealing no acute fracture.  He was found to have a large sacral decubitus ulcer.  He was also noted to have acute rhabdomyolysis and was febrile overnight.  I have examined the patient and agree with the above note.  He is chronically ill in appearance.  Thin and frail.  He is able to answer questions but is a little bit somnolent.  Lung exam reveals clear to auscultation bilaterally cardiac exam reveals regular rate and regular rhythm no murmur, rub or gallop.  Abdominal exam is benign.  Extremities no cyanosis, clubbing or edema.  He is able to move both lower extremities.  He has a large decubitus ulceration and please refer to nursing documentation for further details.  Is noted to have a white count when he came in.    Impression:  Sepsis due to decubitus ulceration.  Decubitus ulceration of the hip  Hyperbilirubinemia  Rhabdomyolysis  Metabolic encephalopathy due to sepsis  Multiplemyeloma  Chronic opioid use      Plan:  ID consult.  Start cefepime, Flagyl and vancomycin    Surgical consultation and wound care consultation  IV fluid hydration for abdomen mild lysis.  Monitor renal function.  Monitor CPK to ensure the trend is downward    Subcutaneous heparin for DVT prophylaxis    Discontinue Valium and discontinue high-dose oxycodone.  Start oxycodone 5 mg every 4 hours when necessary for pain.    Monitor LFTs.  Probably elevated due to sepsis with hypotension    Oxygen to maintain sat greater than 90%    Xray bilateral hips / pelvis    Samuel Duane Kreis, MD  01/16/18  1:36 PM             Electronically signed by Samuel Duane Kreis, MD at 1/16/2018  1:37 PM        Vital Signs (last 24 hours)       01/22 0700  -  01/23 0659 01/23 0700  -  01/23 1403   Most Recent    Temp (°F) 98 -   98.2      98.1     98.1 (36.7)    Heart Rate 60 -  73      62     62    Resp 16 -  18      18     18    /73 -  136/79      125/76     125/76    SpO2 (%) (!)89 -  95    92 -  99     99          Prior to Admission Medications     Prescriptions Last Dose Informant Patient Reported? Taking?    diazePAM (VALIUM) 10 MG tablet 1/15/2018  Yes Yes    Take 10 mg by mouth 2 (Two) Times a Day As Needed for Anxiety.    oxyCODONE (ROXICODONE) 20 MG tablet 1/15/2018 Pharmacy Yes Yes    Take 20 mg by mouth Every 6 (Six) Hours As Needed for Moderate Pain .          Hospital Medications (active)       Dose Frequency Start End    acetaminophen (TYLENOL) tablet 650 mg 650 mg Every 6 Hours PRN 1/16/2018     Sig - Route: Take 2 tablets by mouth Every 6 (Six) Hours As Needed for Mild Pain . - Oral    Cosign for Ordering: Accepted by Samuel Duane Kreis, MD on 1/16/2018  1:17 PM    cefdinir (OMNICEF) capsule 300 mg 300 mg Every 12 Hours Scheduled 1/23/2018 2/2/2018    Sig - Route: Take 1 capsule by mouth Every 12 (Twelve) Hours. - Oral    heparin (porcine) 5000 UNIT/ML injection 5,000 Units 5,000 Units Every 8 Hours Scheduled 1/16/2018     Sig - Route: Inject 1 mL under the skin Every 8 (Eight) Hours. - Subcutaneous    Cosign for Ordering: Accepted by Samuel Duane Kreis, MD on 1/16/2018  1:17 PM    HYDROmorphone (DILAUDID) injection 1 mg 1 mg Once 1/15/2018     Sig - Route: Infuse 1 mL into a venous catheter 1 (One) Time. - Intravenous    lactated ringers infusion 100 mL/hr Continuous 1/19/2018     Sig - Route: Infuse 100 mL/hr into a venous catheter Continuous. - Intravenous    lactated ringers infusion 125 mL/hr Continuous 1/19/2018     Sig - Route: Infuse 125 mL/hr into a venous catheter Continuous. - Intravenous    lactobacillus acidophilus (RISAQUAD) capsule 1 capsule 1 capsule Daily 1/18/2018     Sig - Route: Take 1 capsule by mouth Daily. - Oral    Magnesium Sulfate 2 gram Bolus, followed by 8 gram infusion (total Mg dose 10  "grams)- Mg less than or equal to 1mg/dL 2 g As Needed 1/21/2018     Sig - Route: Infuse 50 mL into a venous catheter As Needed (Mg less than or equal to 1mg/dL). - Intravenous    Linked Group 1:  \"Or\" Linked Group Details        magnesium sulfate 4 gram infusion- Mg 1.6-1.9 mg/dL 4 g As Needed 1/21/2018     Sig - Route: Infuse 100 mL into a venous catheter As Needed (Mg 1.6-1.9 mg/dL). - Intravenous    Linked Group 1:  \"Or\" Linked Group Details        magnesium sulfate 4 gram infusion- Mg 1.6-1.9 mg/dL 4 g Once 1/22/2018     Sig - Route: Infuse 100 mL into a venous catheter 1 (One) Time. - Intravenous    Magnesium Sulfate 6 gram Infusion (2 gm x 3) -Mg 1.1 -1.5 mg/dL 2 g As Needed 1/21/2018     Sig - Route: Infuse 50 mL into a venous catheter As Needed (Mg 1.1 -1.5 mg/dL). - Intravenous    Linked Group 1:  \"Or\" Linked Group Details        multivitamin (DAILY MINH) tablet 1 tablet 1 tablet Daily 1/22/2018     Sig - Route: Take 1 tablet by mouth Daily. - Oral    Cosign for Ordering: Required by Samuel Duane Kreis, MD    ondansetron (ZOFRAN) injection 4 mg 4 mg Every 6 Hours PRN 1/21/2018     Sig - Route: Infuse 2 mL into a venous catheter Every 6 (Six) Hours As Needed for Nausea or Vomiting. - Intravenous    Linked Group 2:  \"Or\" Linked Group Details        ondansetron (ZOFRAN) tablet 4 mg 4 mg Every 6 Hours PRN 1/21/2018     Sig - Route: Take 1 tablet by mouth Every 6 (Six) Hours As Needed for Nausea or Vomiting. - Oral    Linked Group 2:  \"Or\" Linked Group Details        ondansetron ODT (ZOFRAN-ODT) disintegrating tablet 4 mg 4 mg Every 6 Hours PRN 1/21/2018     Sig - Route: Take 1 tablet by mouth Every 6 (Six) Hours As Needed for Nausea or Vomiting. - Oral    Linked Group 2:  \"Or\" Linked Group Details        oxyCODONE (ROXICODONE) immediate release tablet 5 mg 5 mg Every 4 Hours PRN 1/16/2018 1/26/2018    Sig - Route: Take 1 tablet by mouth Every 4 (Four) Hours As Needed for Moderate Pain . - Oral    potassium " "chloride (KLOR-CON) packet 40 mEq 40 mEq As Needed 1/21/2018     Sig - Route: Take 40 mEq by mouth As Needed (potassium replacement, see admin instructions). - Oral    Linked Group 3:  \"Or\" Linked Group Details        potassium chloride (MICRO-K) CR capsule 40 mEq 40 mEq As Needed 1/21/2018     Sig - Route: Take 4 capsules by mouth As Needed (potassium replacement.  see admin instructions). - Oral    Linked Group 3:  \"Or\" Linked Group Details        potassium chloride 10 mEq in 100 mL IVPB 10 mEq Every 1 Hour PRN 1/21/2018     Sig - Route: Infuse 100 mL into a venous catheter Every 1 (One) Hour As Needed (potassium protocol PERIPHERAL - see admin instructions). - Intravenous    Linked Group 3:  \"Or\" Linked Group Details        sodium chloride 0.9 % flush 10 mL 10 mL As Needed 1/15/2018     Sig - Route: Infuse 10 mL into a venous catheter As Needed for Line Care. - Intravenous    Cosign for Ordering: Accepted by José Miguel Li MD on 1/15/2018  1:21 PM    Linked Group 4:  \"And\" Linked Group Details        cefepime (MAXIPIME) 2 g/100 mL 0.9% NS (mbp) (Discontinued) 2 g Every 12 Hours 1/16/2018 1/23/2018    Sig - Route: Infuse 100 mL into a venous catheter Every 12 (Twelve) Hours. - Intravenous    Cosign for Ordering: Accepted by Misty Bae MD on 1/18/2018 11:24 AM    metroNIDAZOLE (FLAGYL) IVPB 500 mg (Discontinued) 500 mg Every 8 Hours 1/16/2018 1/23/2018    Sig - Route: Infuse 100 mL into a venous catheter Every 8 (Eight) Hours. - Intravenous    Cosign for Ordering: Accepted by Misty Bae MD on 1/18/2018 11:24 AM    vancomycin (VANCOCIN) 1,750 mg in sodium chloride 0.9 % 500 mL IVPB (Discontinued) 1,750 mg Every 12 Hours 1/21/2018 1/23/2018    Sig - Route: Infuse 1,750 mg into a venous catheter Every 12 (Twelve) Hours. - Intravenous            Lab Results (last 24 hours)     Procedure Component Value Units Date/Time    Comprehensive Metabolic Panel [[id number]]  (Abnormal) Collected:  01/23/18 " 0034    Specimen:  Blood Updated:  01/23/18 0209     Glucose 133 (H) mg/dL      BUN 11 mg/dL      Creatinine 0.69 mg/dL      Sodium 136 mmol/L      Potassium 3.7 mmol/L      Chloride 110 mmol/L      CO2 23.0 (L) mmol/L      Calcium 7.4 (L) mg/dL      Total Protein 5.0 (L) g/dL      Albumin 2.30 (L) g/dL      ALT (SGPT) 27 U/L      AST (SGOT) 50 (H) U/L      Alkaline Phosphatase 106 U/L       Note New Reference Ranges        Total Bilirubin 0.6 mg/dL      eGFR Non African Amer 116 mL/min/1.73      Globulin 2.7 gm/dL      A/G Ratio 0.9 (L) g/dL      BUN/Creatinine Ratio 15.9     Anion Gap 3.0 (L) mmol/L     Magnesium [[id number]]  (Normal) Collected:  01/23/18 0034    Specimen:  Blood Updated:  01/23/18 0209     Magnesium 1.9 mg/dL     Osmolality, Calculated [[id number]]  (Normal) Collected:  01/23/18 0034    Specimen:  Blood Updated:  01/23/18 0209     Osmolality Calc 273.3 mOsm/kg     CBC & Differential [[id number]] Collected:  01/23/18 0034    Specimen:  Blood Updated:  01/23/18 0228    Narrative:       The following orders were created for panel order CBC & Differential.  Procedure                               Abnormality         Status                     ---------                               -----------         ------                     Scan Slide[[id number]]                                       Final result               CBC Auto Differential[[id number]]        Abnormal            Final result                 Please view results for these tests on the individual orders.    CBC Auto Differential [[id number]]  (Abnormal) Collected:  01/23/18 0034    Specimen:  Blood Updated:  01/23/18 0228     WBC 12.20 10*3/mm3      RBC 4.27 (L) 10*6/mm3      Hemoglobin 8.6 (L) g/dL      Hematocrit 26.1 (L) %      MCV 61.1 (L) fL      MCH 20.1 (L) pg      MCHC 33.0 g/dL      RDW 16.1 (H) %      RDW-SD 32.7 (L) fl      MPV 10.7 (H) fL      Platelets 550 (H) 10*3/mm3      Neutrophil % 66.7 %      Lymphocyte % 18.9 (L) %       Monocyte % 11.0 (H) %      Eosinophil % 2.0 %      Basophil % 0.4 %      Immature Grans % 1.0 (H) %      Neutrophils, Absolute 8.13 (H) 10*3/mm3      Lymphocytes, Absolute 2.31 10*3/mm3      Monocytes, Absolute 1.34 (H) 10*3/mm3      Eosinophils, Absolute 0.25 10*3/mm3      Basophils, Absolute 0.05 10*3/mm3      Immature Grans, Absolute 0.12 (H) 10*3/mm3     Scan Slide [[id number]] Collected:  01/23/18 0034    Specimen:  Blood Updated:  01/23/18 0228     Anisocytosis Slight/1+     Microcytes Large/3+     Ovalocytes Slight/1+     Platelet Estimate Increased    Culture, Routine - Tissue, Buttock, Left [[id number]]  (Abnormal)  (Susceptibility) Collected:  01/19/18 1210    Specimen:  Tissue from Buttock, Left Updated:  01/23/18 0719     Culture --      Light growth (2+) Proteus vulgaris (A)      Light growth (2+) Gram Negative Bacilli, Morphology consistent with Escherichia / Citrobacter (A)     Gram Stain Result Rare (1+) WBCs seen      Rare (1+) Gram negative bacilli      Occasional Gram positive cocci in pairs      Occasional Gram positive bacilli    Susceptibility      Proteus vulgaris     MARK (Preliminary)     Amikacin <=16 ug/ml Susceptible     Amoxicillin + Clavulanate <=8/4 ug/ml Susceptible     Ampicillin <=8 ug/ml Resistant     Ampicillin + Sulbactam <=8/4 ug/ml Susceptible     Aztreonam <=8 ug/ml Susceptible     Cefazolin >16 ug/ml Resistant     Cefepime <=8 ug/ml Susceptible     Cefotaxime <=2 ug/ml Susceptible     Ceftazidime <=1 ug/ml Susceptible     Ceftriaxone <=8 ug/ml Susceptible     Cefuroxime sodium <=4 ug/ml Resistant     Ciprofloxacin <=1 ug/ml Susceptible     Doripenem <=0.5 ug/ml Susceptible     Ertapenem <=1 ug/ml Susceptible     Gentamicin <=4 ug/ml Susceptible     Imipenem <=1 ug/ml Susceptible     Levofloxacin <=2 ug/ml Susceptible     Piperacillin + Tazobactam <=16 ug/ml Susceptible     Tetracycline >8 ug/ml Resistant     Tobramycin <=4 ug/ml Susceptible     Trimethoprim + Sulfamethoxazole  <=2/38 ug/ml Susceptible                    Culture, Routine - Swab, Buttock, Left [[id number]]  (Abnormal)  (Susceptibility) Collected:  01/19/18 1211    Specimen:  Swab from Buttock, Left Updated:  01/23/18 0721     Culture --      Light growth (2+) Proteus vulgaris (A)      Light growth (2+) Gram Negative Bacilli, Morphology consistent with Escherichia / Citrobacter (A)     Gram Stain Result Rare (1+) WBCs seen      Rare (1+) Gram negative bacilli    Susceptibility      Proteus vulgaris     MARK (Preliminary)     Amikacin <=16 ug/ml Susceptible     Amoxicillin + Clavulanate <=8/4 ug/ml Susceptible     Ampicillin >16 ug/ml Resistant     Ampicillin + Sulbactam <=8/4 ug/ml Susceptible     Aztreonam <=8 ug/ml Susceptible     Cefazolin >16 ug/ml Resistant     Cefepime <=8 ug/ml Susceptible     Cefotaxime <=2 ug/ml Susceptible     Ceftazidime <=1 ug/ml Susceptible     Ceftriaxone <=8 ug/ml Susceptible     Cefuroxime sodium >16 ug/ml Resistant     Ciprofloxacin <=1 ug/ml Susceptible     Doripenem <=0.5 ug/ml Susceptible     Ertapenem <=1 ug/ml Susceptible     Gentamicin <=4 ug/ml Susceptible     Imipenem <=1 ug/ml Susceptible     Levofloxacin <=2 ug/ml Susceptible     Piperacillin + Tazobactam <=16 ug/ml Susceptible     Tetracycline >8 ug/ml Resistant     Tobramycin <=4 ug/ml Susceptible     Trimethoprim + Sulfamethoxazole <=2/38 ug/ml Susceptible                        Orders (last 24 hrs)     Start     Ordered    01/23/18 1200  cefdinir (OMNICEF) capsule 300 mg  Every 12 Hours Scheduled      01/23/18 1019    01/23/18 0820  Inpatient consult for Pneumonia Education (QA)  Once     Provider:  (Not yet assigned)    01/23/18 0819    01/23/18 0722  Inpatient Consult to Continue Care Hospital Referral  Once     Provider:  (Not yet assigned)    01/23/18 0722    01/23/18 0600  Magnesium  Morning Draw      01/22/18 1315    01/23/18 0600  CBC Auto Differential  PROCEDURE ONCE      01/23/18 0000    01/23/18 0210  Osmolality,  Calculated  Once      01/23/18 0209    01/23/18 0140  Scan Slide  Once      01/23/18 0139    01/22/18 1200  DIET MESSAGE High protein chocolate milk shake, patient has a very poor appetite.  Picks at his food and may eat about 20 to 30% at most.  Thanks, Gladys  Daily With Lunch & Dinner     Comments:  High protein chocolate milk shake, patient has a very poor appetite.  Picks at his food and may eat about 20 to 30% at most.  Thanks, Gladys    01/21/18 1905    01/22/18 0900  multivitamin (DAILY MINH) tablet 1 tablet  Daily      01/22/18 0709    01/22/18 0700  magnesium sulfate 4 gram infusion- Mg 1.6-1.9 mg/dL  Once      01/22/18 0616    01/21/18 0924  Magnesium Sulfate 2 gram Bolus, followed by 8 gram infusion (total Mg dose 10 grams)- Mg less than or equal to 1mg/dL  As Needed      01/21/18 0924    01/21/18 0924  Magnesium Sulfate 6 gram Infusion (2 gm x 3) -Mg 1.1 -1.5 mg/dL  As Needed      01/21/18 0924    01/21/18 0924  magnesium sulfate 4 gram infusion- Mg 1.6-1.9 mg/dL  As Needed      01/21/18 0924    01/21/18 0924  potassium chloride (MICRO-K) CR capsule 40 mEq  As Needed      01/21/18 0924    01/21/18 0924  potassium chloride (KLOR-CON) packet 40 mEq  As Needed      01/21/18 0924    01/21/18 0924  potassium chloride 10 mEq in 100 mL IVPB  Every 1 Hour PRN      01/21/18 0924    01/21/18 0923  ondansetron (ZOFRAN) tablet 4 mg  Every 6 Hours PRN      01/21/18 0923    01/21/18 0923  ondansetron ODT (ZOFRAN-ODT) disintegrating tablet 4 mg  Every 6 Hours PRN      01/21/18 0923    01/21/18 0923  ondansetron (ZOFRAN) injection 4 mg  Every 6 Hours PRN      01/21/18 0923    01/21/18 0200  vancomycin (VANCOCIN) 1,750 mg in sodium chloride 0.9 % 500 mL IVPB  Every 12 Hours,   Status:  Discontinued      01/20/18 1520    01/19/18 1045  lactated ringers infusion  Continuous      01/19/18 1000    01/19/18 1045  lactated ringers infusion  Continuous      01/19/18 1007    01/18/18 1600  lactobacillus acidophilus (RISAQUAD) capsule  1 capsule  Daily      01/18/18 1405    01/17/18 0600  CBC & Differential  Daily      01/16/18 0723    01/17/18 0600  Comprehensive Metabolic Panel  Daily      01/16/18 0723    01/16/18 1800  cefepime (MAXIPIME) 2 g/100 mL 0.9% NS (mbp)  Every 12 Hours,   Status:  Discontinued      01/16/18 1028    01/16/18 1400  heparin (porcine) 5000 UNIT/ML injection 5,000 Units  Every 8 Hours Scheduled      01/16/18 0816    01/16/18 1400  oxyCODONE (ROXICODONE) immediate release tablet 5 mg  Every 4 Hours PRN      01/16/18 1337    01/16/18 1200  metroNIDAZOLE (FLAGYL) IVPB 500 mg  Every 8 Hours,   Status:  Discontinued      01/16/18 1028    01/16/18 0718  acetaminophen (TYLENOL) tablet 650 mg  Every 6 Hours PRN      01/16/18 0723    01/15/18 1700  HYDROmorphone (DILAUDID) injection 1 mg  Once      01/15/18 1554    01/15/18 1226  sodium chloride 0.9 % flush 10 mL  As Needed      01/15/18 1227    Unscheduled  Magnesium  As Needed      01/21/18 0924    Unscheduled  Potassium  As Needed      01/21/18 0924    --  oxyCODONE (ROXICODONE) 20 MG tablet  Every 6 Hours PRN      01/15/18 1638          Operative/Procedure Notes (last 24 hours) (Notes from 1/22/2018  2:03 PM through 1/23/2018  2:03 PM)     No notes of this type exist for this encounter.           Physician Progress Notes (last 24 hours) (Notes from 1/22/2018  2:03 PM through 1/23/2018  2:03 PM)      Samuel Duane Kreis, MD at 1/23/2018  8:54 AM  Version 2 of 2              LOS: 8 days     Chief Complaint:  Rhabdomyolysis/Decubitus Ulcers with Sepsis    Subjective     Interval History:   He has no major changes over the last 24 hours.  He complains of ongoing pain in the sacral decubitus region consistent with previous surgical debridement however no progressive symptoms at this point.  Patient does have chronic low back pain. Preliminary wound culture consistent with Proteus Vulgaris and Escherichia/Citrobacter, ID following.  His energy level is improved, appetite improved.   "Labs and vitals stable.  following for [facility] admission.    Objective     Vital Signs  /79 (BP Location: Left arm, Patient Position: Lying)  Pulse 60  Temp 98 °F (36.7 °C) (Oral)   Resp 18  Ht 182 cm (71.65\")  Wt 76.4 kg (168 lb 6.9 oz)  SpO2 92%  BMI 23.06 kg/m2  Intake & Output (last day)       01/22 0701 - 01/23 0700 01/23 0701 - 01/24 0700    P.O. 600     IV Piggyback 500     Total Intake(mL/kg) 1100 (14.4)     Urine (mL/kg/hr) 1325 (0.7)     Total Output 1325      Net -225                    Physical Exam:     General Appearance:    Alert, cooperative, in no acute distress   Head:    Normocephalic, without obvious abnormality, atraumatic   Eyes:            Lids and lashes normal, conjunctivae and sclerae normal, no   icterus, no pallor, corneas clear, PERRLA   Ears:    Ears appear intact with no abnormalities noted       Neck:   No adenopathy, supple, trachea midline, no thyromegaly, no   carotid bruit, no JVD   Lungs:     Scattered Coarse Crackles bilaterally R>>L ,respirations regular, even and                  unlabored    Heart:    Regular rhythm and normal rate, normal S1 and S2, no            murmur, no gallop, no rub, no click   Chest Wall:    No abnormalities observed   Abdomen:     Normal bowel sounds, no masses, no organomegaly, soft        non-tender, non-distended, no guarding, no rebound                tenderness   Extremities:   Moves all extremities well, no edema, no cyanosis, no             redness   Pulses:   Pulses palpable and equal bilaterally   Skin:   No bleeding, bruising or rash, Sacral decubitus ulcer with dressing in place and right trochanteric ulcer with eschar   Lymph nodes:   No palpable adenopathy   Neurologic:   Cranial nerves 2 - 12 grossly intact, sensation intact, DTR       present and equal bilaterally        Results Review:    Lab Results   Component Value Date    WBC 12.20 01/23/2018    HGB 8.6 (L) 01/23/2018    HCT 26.1 (L) 01/23/2018    " MCV 61.1 (L) 01/23/2018     (H) 01/23/2018       Lab Results   Component Value Date    GLUCOSE 133 (H) 01/23/2018    BUN 11 01/23/2018    CREATININE 0.69 01/23/2018    EGFRIFNONA 116 01/23/2018    BCR 15.9 01/23/2018     01/23/2018    K 3.7 01/23/2018     01/23/2018    CO2 23.0 (L) 01/23/2018    CALCIUM 7.4 (L) 01/23/2018    PROTENTOTREF 7.2 11/12/2015    ALBUMIN 2.30 (L) 01/23/2018    LABIL2 0.9 (L) 01/23/2018    AST 50 (H) 01/23/2018    ALT 27 01/23/2018     Lab Results   Component Value Date    INR 0.94 11/03/2015    INR <0.90 10/25/2015    INR <0.90 06/01/2015       No results found for: POCGLU       Medication Review:     Current Facility-Administered Medications:   •  acetaminophen (TYLENOL) tablet 650 mg, 650 mg, Oral, Q6H PRN, JARETH Condon, 650 mg at 01/23/18 0101  •  cefepime (MAXIPIME) 2 g/100 mL 0.9% NS (mbp), 2 g, Intravenous, Q12H, Misty Bae MD, Last Rate: 25 mL/hr at 01/20/18 0537, 2 g at 01/23/18 0538  •  heparin (porcine) 5000 UNIT/ML injection 5,000 Units, 5,000 Units, Subcutaneous, Q8H, JARETH Condon, 5,000 Units at 01/23/18 0538  •  HYDROmorphone (DILAUDID) injection 1 mg, 1 mg, Intravenous, Once, José Miguel Li MD  •  lactated ringers infusion, 100 mL/hr, Intravenous, Continuous, Mohan Santoyo MD  •  lactated ringers infusion, 125 mL/hr, Intravenous, Continuous, Terrence Montero MD, Stopped at 01/19/18 1251  •  lactobacillus acidophilus (RISAQUAD) capsule 1 capsule, 1 capsule, Oral, Daily, Camila Haley, [facility], 1 capsule at 01/23/18 0718  •  Magnesium Sulfate 2 gram Bolus, followed by 8 gram infusion (total Mg dose 10 grams)- Mg less than or equal to 1mg/dL, 2 g, Intravenous, PRN **OR** Magnesium Sulfate 6 gram Infusion (2 gm x 3) -Mg 1.1 -1.5 mg/dL, 2 g, Intravenous, PRN **OR** magnesium sulfate 4 gram infusion- Mg 1.6-1.9 mg/dL, 4 g, Intravenous, PRN, Samuel Duane Kreis, MD  •  magnesium sulfate 4 gram infusion- Mg 1.6-1.9 mg/dL, 4 g,  Intravenous, Once, Samuel Duane Kreis, MD, Stopped at 01/22/18 1240  •  metroNIDAZOLE (FLAGYL) IVPB 500 mg, 500 mg, Intravenous, Q8H, Misty Bae MD, Last Rate: 0 mL/hr at 01/22/18 1245, 500 mg at 01/23/18 0336  •  multivitamin (DAILY MINH) tablet 1 tablet, 1 tablet, Oral, Daily, JARETH Condon, 1 tablet at 01/23/18 0718  •  ondansetron (ZOFRAN) tablet 4 mg, 4 mg, Oral, Q6H PRN **OR** ondansetron ODT (ZOFRAN-ODT) disintegrating tablet 4 mg, 4 mg, Oral, Q6H PRN **OR** ondansetron (ZOFRAN) injection 4 mg, 4 mg, Intravenous, Q6H PRN, Samuel Duane Kreis, MD, 4 mg at 01/22/18 2126  •  oxyCODONE (ROXICODONE) immediate release tablet 5 mg, 5 mg, Oral, Q4H PRN, Samuel Duane Kreis, MD, 5 mg at 01/23/18 0617  •  potassium chloride (MICRO-K) CR capsule 40 mEq, 40 mEq, Oral, PRN **OR** potassium chloride (KLOR-CON) packet 40 mEq, 40 mEq, Oral, PRN **OR** potassium chloride 10 mEq in 100 mL IVPB, 10 mEq, Intravenous, Q1H PRN, Samuel Duane Kreis, MD  •  Insert peripheral IV, , , Once **AND** sodium chloride 0.9 % flush 10 mL, 10 mL, Intravenous, PRN, Jesus Carreon PA-C, 10 mL at 01/21/18 1506  •  vancomycin (VANCOCIN) 1,750 mg in sodium chloride 0.9 % 500 mL IVPB, 1,750 mg, Intravenous, Q12H, Misty Bae MD, Last Rate: 0 mL/hr at 01/22/18 1630, 1,750 mg at 01/23/18 0202      Assessment/Plan   Probable Aspiration Pneumonia with Sepsis  Sacral Decubitus Ulcer   Decubitus ulceration of the hip  Hyperbilirubinemia (resolved)  Rhabdomyolysis (resolved)  Metabolic encephalopathy due to sepsis (resolved)  Multiple myeloma  Chronic opioid use  Protein Malnutrition  Fever (resolved)      Appreciate surgery and ID input     Continue with Cefepime + Flagyl + Vancomycin, await final wound culture, ID following    Oxycodone 5 mg q 4 hours prn pain     Tylenol prn fever     Continue with wound care     Heparin for DVT prophylaxis    O2 via NC to maintain O2 sats >90%    Continue with PT/OT, S/S following for discharge  "to SNF vs [facility] referral made for ongoing wound care    Continue K+/Mg replacement protocol,  MVI daily    JARETH Condon  01/23/18  8:54 AM       This patient is seen this morning by me and I agree with the above note.  Tolerating antibiotics and wound care.  Awaiting input from [facility] to see if he will be approved.  Still requiring IV antibiotics.  Appreciate surgery and infectious disease input.  Continue PT    Samuel Duane Kreis, MD  01/23/18  9:10 AM                 Electronically signed by Samuel Duane Kreis, MD at 1/23/2018  9:11 AM      JARETH Condon at 1/23/2018  8:54 AM  Version 1 of 2              LOS: 8 days     Chief Complaint:  Rhabdomyolysis/Decubitus Ulcers with Sepsis    Subjective     Interval History:   He has no major changes over the last 24 hours.  He complains of ongoing pain in the sacral decubitus region consistent with previous surgical debridement however no progressive symptoms at this point.  Patient does have chronic low back pain. Preliminary wound culture consistent with Proteus Vulgaris and Escherichia/Citrobacter, ID following.  His energy level is improved, appetite improved.  Labs and vitals stable.  following for [facility] admission.    Objective     Vital Signs  /79 (BP Location: Left arm, Patient Position: Lying)  Pulse 60  Temp 98 °F (36.7 °C) (Oral)   Resp 18  Ht 182 cm (71.65\")  Wt 76.4 kg (168 lb 6.9 oz)  SpO2 92%  BMI 23.06 kg/m2  Intake & Output (last day)       01/22 0701 - 01/23 0700 01/23 0701 - 01/24 0700    P.O. 600     IV Piggyback 500     Total Intake(mL/kg) 1100 (14.4)     Urine (mL/kg/hr) 1325 (0.7)     Total Output 1325      Net -225                    Physical Exam:     General Appearance:    Alert, cooperative, in no acute distress   Head:    Normocephalic, without obvious abnormality, atraumatic   Eyes:            Lids and lashes normal, conjunctivae and sclerae " normal, no   icterus, no pallor, corneas clear, PERRLA   Ears:    Ears appear intact with no abnormalities noted       Neck:   No adenopathy, supple, trachea midline, no thyromegaly, no   carotid bruit, no JVD   Lungs:     Scattered Coarse Crackles bilaterally R>>L ,respirations regular, even and                  unlabored    Heart:    Regular rhythm and normal rate, normal S1 and S2, no            murmur, no gallop, no rub, no click   Chest Wall:    No abnormalities observed   Abdomen:     Normal bowel sounds, no masses, no organomegaly, soft        non-tender, non-distended, no guarding, no rebound                tenderness   Extremities:   Moves all extremities well, no edema, no cyanosis, no             redness   Pulses:   Pulses palpable and equal bilaterally   Skin:   No bleeding, bruising or rash, Sacral decubitus ulcer with dressing in place and right trochanteric ulcer with eschar   Lymph nodes:   No palpable adenopathy   Neurologic:   Cranial nerves 2 - 12 grossly intact, sensation intact, DTR       present and equal bilaterally        Results Review:    Lab Results   Component Value Date    WBC 12.20 01/23/2018    HGB 8.6 (L) 01/23/2018    HCT 26.1 (L) 01/23/2018    MCV 61.1 (L) 01/23/2018     (H) 01/23/2018       Lab Results   Component Value Date    GLUCOSE 133 (H) 01/23/2018    BUN 11 01/23/2018    CREATININE 0.69 01/23/2018    EGFRIFNONA 116 01/23/2018    BCR 15.9 01/23/2018     01/23/2018    K 3.7 01/23/2018     01/23/2018    CO2 23.0 (L) 01/23/2018    CALCIUM 7.4 (L) 01/23/2018    PROTENTOTREF 7.2 11/12/2015    ALBUMIN 2.30 (L) 01/23/2018    LABIL2 0.9 (L) 01/23/2018    AST 50 (H) 01/23/2018    ALT 27 01/23/2018     Lab Results   Component Value Date    INR 0.94 11/03/2015    INR <0.90 10/25/2015    INR <0.90 06/01/2015       No results found for: POCGLU       Medication Review:     Current Facility-Administered Medications:   •  acetaminophen (TYLENOL) tablet 650 mg, 650 mg,  Oral, Q6H PRN, JARETH Condon, 650 mg at 01/23/18 0101  •  cefepime (MAXIPIME) 2 g/100 mL 0.9% NS (mbp), 2 g, Intravenous, Q12H, Misty Bae MD, Last Rate: 25 mL/hr at 01/20/18 0537, 2 g at 01/23/18 0538  •  heparin (porcine) 5000 UNIT/ML injection 5,000 Units, 5,000 Units, Subcutaneous, Q8H, JARETH Condon, 5,000 Units at 01/23/18 0538  •  HYDROmorphone (DILAUDID) injection 1 mg, 1 mg, Intravenous, Once, José Miguel Li MD  •  lactated ringers infusion, 100 mL/hr, Intravenous, Continuous, Mohan Santoyo MD  •  lactated ringers infusion, 125 mL/hr, Intravenous, Continuous, Terrence Montero MD, Stopped at 01/19/18 1251  •  lactobacillus acidophilus (RISAQUAD) capsule 1 capsule, 1 capsule, Oral, Daily, Camila Haley, [facility], 1 capsule at 01/23/18 0718  •  Magnesium Sulfate 2 gram Bolus, followed by 8 gram infusion (total Mg dose 10 grams)- Mg less than or equal to 1mg/dL, 2 g, Intravenous, PRN **OR** Magnesium Sulfate 6 gram Infusion (2 gm x 3) -Mg 1.1 -1.5 mg/dL, 2 g, Intravenous, PRN **OR** magnesium sulfate 4 gram infusion- Mg 1.6-1.9 mg/dL, 4 g, Intravenous, PRN, Samuel Duane Kreis, MD  •  magnesium sulfate 4 gram infusion- Mg 1.6-1.9 mg/dL, 4 g, Intravenous, Once, Samuel Duane Kreis, MD, Stopped at 01/22/18 1240  •  metroNIDAZOLE (FLAGYL) IVPB 500 mg, 500 mg, Intravenous, Q8H, Misty Bae MD, Last Rate: 0 mL/hr at 01/22/18 1245, 500 mg at 01/23/18 0336  •  multivitamin (DAILY MINH) tablet 1 tablet, 1 tablet, Oral, Daily, JARETH Condon, 1 tablet at 01/23/18 0718  •  ondansetron (ZOFRAN) tablet 4 mg, 4 mg, Oral, Q6H PRN **OR** ondansetron ODT (ZOFRAN-ODT) disintegrating tablet 4 mg, 4 mg, Oral, Q6H PRN **OR** ondansetron (ZOFRAN) injection 4 mg, 4 mg, Intravenous, Q6H PRN, Samuel Duane Kreis, MD, 4 mg at 01/22/18 2126  •  oxyCODONE (ROXICODONE) immediate release tablet 5 mg, 5 mg, Oral, Q4H PRN, Samuel Duane Kreis, MD, 5 mg at 01/23/18 0617  •  potassium chloride (MICRO-K) CR  capsule 40 mEq, 40 mEq, Oral, PRN **OR** potassium chloride (KLOR-CON) packet 40 mEq, 40 mEq, Oral, PRN **OR** potassium chloride 10 mEq in 100 mL IVPB, 10 mEq, Intravenous, Q1H PRN, Samuel Duane Kreis, MD  •  Insert peripheral IV, , , Once **AND** sodium chloride 0.9 % flush 10 mL, 10 mL, Intravenous, PRN, Jesus Carreon PA-C, 10 mL at 01/21/18 1506  •  vancomycin (VANCOCIN) 1,750 mg in sodium chloride 0.9 % 500 mL IVPB, 1,750 mg, Intravenous, Q12H, Misty Bae MD, Last Rate: 0 mL/hr at 01/22/18 1630, 1,750 mg at 01/23/18 0202      Assessment/Plan   Probable Aspiration Pneumonia with Sepsis  Sacral Decubitus Ulcer   Decubitus ulceration of the hip  Hyperbilirubinemia (resolved)  Rhabdomyolysis (resolved)  Metabolic encephalopathy due to sepsis (resolved)  Multiple myeloma  Chronic opioid use  Protein Malnutrition  Fever (resolved)      Appreciate surgery and ID input     Continue with Cefepime + Flagyl + Vancomycin, await final wound culture, ID following    Oxycodone 5 mg q 4 hours prn pain     Tylenol prn fever     Continue with wound care     Heparin for DVT prophylaxis    O2 via NC to maintain O2 sats >90%    Continue with PT/OT, S/S following [facility]      Continue care hospital referral made for ongoing wound care    Continue K+/Mg replacement protocol,  MVI daily    JARETH Condon  01/23/18  8:54 AM                    Electronically signed by JARETH Condon at 1/23/2018  8:57 AM      JOSÉ MIGUEL Love at 1/23/2018 12:16 PM  Version 1 of 1           I have personally seen and examined the patient today and discussed overnight interval progress and pertinent issues with nursing staff.    Subjective:    Overall patient shows significant clinical improvement.  Culture showing growth of Proteus with almost normal CRP level.     Awaiting possible transfer to [facility].  Patient may need repeat debridement if transferred to [facility].    History taken from: patient  "chart family RN      Vital Signs    /76 (BP Location: Left arm, Patient Position: Lying)  Pulse 62  Temp 98.1 °F (36.7 °C) (Oral)   Resp 18  Ht 182 cm (71.65\")  Wt 76.4 kg (168 lb 6.9 oz)  SpO2 99%  BMI 23.06 kg/m2    Temp:  [98 °F (36.7 °C)-98.2 °F (36.8 °C)] 98.1 °F (36.7 °C)      Intake/Output Summary (Last 24 hours) at 01/23/18 1216  Last data filed at 01/23/18 0320   Gross per 24 hour   Intake              860 ml   Output              950 ml   Net              -90 ml     Intake & Output (last 3 days)       01/20 0701 - 01/21 0700 01/21 0701 - 01/22 0700 01/22 0701 - 01/23 0700 01/23 0701 - 01/24 0700    P.O. 1160 240 600     I.V. (mL/kg)        IV Piggyback 500 100 500     Total Intake(mL/kg) 1660 (21.7) 340 (4.5) 1100 (14.4)     Urine (mL/kg/hr) 1700 (0.9) 1350 (0.7) 1325 (0.7)     Total Output 1700 1350 1325      Net -40 -1010 -225              Unmeasured Urine Occurrence  1 x          Physical Exam:       General Appearance:    Alert, cooperative, in no acute distress, thin, frail, daughter at bedside    Head:    Normocephalic, without obvious abnormality, atraumatic   Eyes:            Lids and lashes normal, conjunctivae and sclerae normal, no   icterus, no pallor, corneas clear, PERRLA   Ears:    Ears appear intact with no abnormalities noted   Throat:   No oral lesions, no thrush, oral mucosa moist   Neck:   No adenopathy, supple, trachea midline, no thyromegaly, no   carotid bruit, no JVD   Back:     No tenderness to percussion or palpation, range of motion   normal   Lungs:    coarse breath sounds to right.    Heart:    Regular rhythm and normal rate, normal S1 and S2, no            murmur, no gallop, no rub, no click   Chest Wall:    No abnormalities observed   Abdomen:     Normal bowel sounds, no masses, no organomegaly, soft        non-tender, non-distended, no guarding, no rebound                tenderness   Rectal:     Deferred   Extremities:   Moves all extremities well, no edema, no " cyanosis, no             redness   Pulses:   Pulses palpable and equal bilaterally   Skin:   Sacral decubitus ulcer and right hip ulcer    Lymph nodes:   No palpable adenopathy   Neurologic:   Awake, alert, Garbled speech        Results:      Results from last 7 days  Lab Units 01/23/18  0034 01/22/18  0126 01/21/18  0053 01/20/18  0054 01/19/18  0121 01/18/18  0036 01/17/18  0454   WBC 10*3/mm3 12.20 11.40 10.76 11.43 12.61* 9.73 10.08     Lab Results   Component Value Date    NEUTROABS 8.13 (H) 01/23/2018         Results from last 7 days  Lab Units 01/23/18  0034   CREATININE mg/dL 0.69         Results from last 7 days  Lab Units 01/22/18  0126 01/21/18  0053 01/19/18  0121   CRP mg/dL 1.20* 2.07* 4.83*       Imaging Results (last 24 hours)     ** No results found for the last 24 hours. **            Results Review:    I have personally reviewed laboratory data, culture results, radiology studies and antimicrobial therapy.    Hospital Medications (active)       Dose Frequency Start End    acetaminophen (TYLENOL) tablet 650 mg 650 mg Every 6 Hours PRN 1/16/2018     Sig - Route: Take 2 tablets by mouth Every 6 (Six) Hours As Needed for Mild Pain . - Oral    Cosign for Ordering: Accepted by Samuel Duane Kreis, MD on 1/16/2018  1:17 PM    cefepime (MAXIPIME) 2 g/100 mL 0.9% NS (mbp) 2 g Once 1/16/2018 1/16/2018    Sig - Route: Infuse 100 mL into a venous catheter 1 (One) Time. - Intravenous    Cosign for Ordering: Required by Misty Bae MD    cefepime (MAXIPIME) 2 g/100 mL 0.9% NS (mbp) 2 g Every 12 Hours 1/16/2018 1/23/2018    Sig - Route: Infuse 100 mL into a venous catheter Every 12 (Twelve) Hours. - Intravenous    Cosign for Ordering: Required by Misty Bae MD    heparin (porcine) 5000 UNIT/ML injection 5,000 Units 5,000 Units Every 8 Hours Scheduled 1/16/2018     Sig - Route: Inject 1 mL under the skin Every 8 (Eight) Hours. - Subcutaneous    Cosign for Ordering: Accepted by Samuel Duane Kreis,  "MD on 1/16/2018  1:17 PM    HYDROmorphone (DILAUDID) injection 1 mg 1 mg Once 1/15/2018     Sig - Route: Infuse 1 mL into a venous catheter 1 (One) Time. - Intravenous    metroNIDAZOLE (FLAGYL) IVPB 500 mg 500 mg Every 8 Hours 1/16/2018 1/23/2018    Sig - Route: Infuse 100 mL into a venous catheter Every 8 (Eight) Hours. - Intravenous    Cosign for Ordering: Required by Misty Bae MD    oxyCODONE (ROXICODONE) immediate release tablet 5 mg 5 mg Every 4 Hours PRN 1/16/2018 1/26/2018    Sig - Route: Take 1 tablet by mouth Every 4 (Four) Hours As Needed for Moderate Pain . - Oral    Pharmacy to dose vancomycin  Continuous PRN 1/16/2018 1/23/2018    Sig - Route: Continuous As Needed for Consult. - Does not apply    Cosign for Ordering: Required by Misty Bae MD    sodium chloride 0.9 % flush 10 mL 10 mL As Needed 1/15/2018     Sig - Route: Infuse 10 mL into a venous catheter As Needed for Line Care. - Intravenous    Cosign for Ordering: Accepted by José Miguel Li MD on 1/15/2018  1:21 PM    Linked Group 1:  \"And\" Linked Group Details        sodium chloride 0.9 % infusion 150 mL/hr Continuous 1/15/2018     Sig - Route: Infuse 150 mL/hr into a venous catheter Continuous. - Intravenous    vancomycin (VANCOCIN) 1,000 mg in sodium chloride 0.9 % 250 mL IVPB 1,000 mg Every 12 Hours 1/16/2018 1/23/2018    Sig - Route: Infuse 1,000 mg into a venous catheter Every 12 (Twelve) Hours. - Intravenous            Cultures:    Blood Culture   Date Value Ref Range Status   01/15/2018 No growth at 24 hours  Preliminary   01/15/2018 No growth at 24 hours  Preliminary     Urine Culture   Date Value Ref Range Status   01/15/2018 No growth at 24 hours  Preliminary           Assessment/Plan     ASSESSMENT:    1.  Severe sepsis with encephalopathy  2.  Aspiration pneumonia     PLAN:    Overall patient shows significant clinical improvement.  Culture showing growth of Proteus with almost normal CRP level.     Antibiotic " therapy was de-escalated to Omnicef 300 mg twice a day through 18.  Vancomycin, cefepime and Flagyl were discontinued.    Awaiting possible transfer to [facility].  Patient may need repeat debridement if transferred to [facility].     CRP ordered for a.m.     Patient's findings and recommendations were discussed with patient, family and nursing staff    Code Status: Full Code     Scribed for JOSÉ MIGUEL Knott  18  12:16 PM           Electronically signed by JOSÉ MIGUEL Love at 2018 12:18 PM        Consult Notes (last 24 hours) (Notes from 2018  2:03 PM through 2018  2:03 PM)     No notes of this type exist for this encounter.        Nutrition Notes (last 24 hours) (Notes from 2018  2:03 PM through 2018  2:03 PM)     No notes of this type exist for this encounter.           Physical Therapy Notes (last 24 hours) (Notes from 2018  2:03 PM through 2018  2:03 PM)      Saima Eugene PT at 2018  2:32 PM  Version 1 of 1         Acute Care - Physical Therapy Treatment Note  RENETTA Lees     Patient Name: Jhonny Washington  : 1955  MRN: [id number]  Today's Date: 2018  Onset of Illness/Injury or Date of Surgery Date: 01/15/18  Date of Referral to PT: 18  Referring Physician: Dr. Del Cid    Admit Date: 1/15/2018    Visit Dx:    ICD-10-CM ICD-9-CM   1. Decubitus ulcer of coccyx, unspecified pressure ulcer stage L89.159 707.03     707.20   2. Fall, initial encounter W19.XXXA E888.9   3. Non-traumatic rhabdomyolysis M62.82 728.88     Patient Active Problem List   Diagnosis   • Encounter for venous access device care   • Rhabdomyolysis   • Decubitus ulcer of coccyx, unspecified pressure ulcer stage               Adult Rehabilitation Note       18 1423          Rehab Assessment/Intervention    Discipline physical therapist  -CT      Document Type therapy note (daily note)  -CT      Subjective Information agree to therapy;complains  of;pain;weakness  -CT      Precautions/Limitations fall precautions;oxygen therapy device and L/min  -CT      Patient Response to Treatment Pt tolerated todays treatment session well with rest breaks provided as needed. Pt able to ambulate but unable to tolerate sitting up in chair on waffle cushion.   -CT      Recorded by [CT] Saima Eugene PT      Cognitive Assessment/Intervention    Current Cognitive/Communication Assessment functional  -CT      Orientation Status oriented to;person;situation  -CT      Personal Safety Interventions fall prevention program maintained;gait belt;muscle strengthening facilitated;nonskid shoes/slippers when out of bed  -CT      Recorded by [CT] Saima Eugene PT      Bed Mobility, Assessment/Treatment    Bed Mobility, Assistive Device bed rails  -CT      Bed Mobility, Roll Left, Garza moderate assist (50% patient effort);2 person assist required  -CT      Bed Mobility, Roll Right, Garza moderate assist (50% patient effort);2 person assist required  -CT      Bed Mobility, Scoot/Bridge, Garza not tested  -CT      Bed Mob, Supine to Sit, Garza moderate assist (50% patient effort);2 person assist required  -CT      Bed Mob, Sit to Supine, Garza moderate assist (50% patient effort);2 person assist required  -CT      Recorded by [CT] Saima Eugene PT      Transfer Assessment/Treatment    Transfers, Sit-Stand Garza moderate assist (50% patient effort);2 person assist required  -CT      Transfers, Stand-Sit Garza moderate assist (50% patient effort);2 person assist required  -CT      Transfers, Sit-Stand-Sit, Assist Device rolling walker  -CT      Recorded by [CT] Saima Eugene PT      Positioning and Restraints    Pre-Treatment Position in bed  -CT      Post Treatment Position bed  -CT      In Bed supine;call light within reach;encouraged to call for assist;side rails up x3;notified nsg  -CT      Recorded by [CT] Saima Eugene, PT         User Key  (r) = Recorded By, (t) = Taken By, (c) = Cosigned By    Initials Name Effective Dates    CT Saima Eugene, PT 03/14/16 -                 IP PT Goals       01/17/18 1652          Bed Mobility PT LTG    Bed Mobility PT LTG, Date Established 01/17/18  -BC      Bed Mobility PT LTG, Time to Achieve by discharge  -BC      Bed Mobility PT LTG, Activity Type all bed mobility  -BC      Bed Mobility PT LTG, [place] Level minimum assist (75% patient effort)  -BC      Bed Mobility PT Goal  LTG, Assist Device bed rails  -BC      Transfer Training PT LTG    Transfer Training PT LTG, Date Established 01/17/18  -BC      Transfer Training PT LTG, Time to Achieve by discharge  -BC      Transfer Training PT LTG, Activity Type all transfers  -BC      Transfer Training PT LTG, [place] Level minimum assist (75% patient effort)  -BC      Transfer Training PT LTG, Assist Device walker, rolling  -BC      Gait Training PT LTG    Gait Training Goal PT LTG, Date Established 01/17/18  -BC      Gait Training Goal PT LTG, Time to Achieve by discharge  -BC      Gait Training Goal PT LTG, [place] Level minimum assist (75% patient effort)  -BC      Gait Training Goal PT LTG, Assist Device walker, rolling  -BC      Gait Training Goal PT LTG, Distance to Achieve 40  -BC        User Key  (r) = Recorded By, (t) = Taken By, (c) = Cosigned By    Initials Name Provider Type    BC Tanna Nettles, PT Physical Therapist          Physical Therapy Education     Title: PT OT SLP Therapies (Done)     Topic: Physical Therapy (Done)     Point: Mobility training (Done)    Learning Progress Summary    Learner Readiness Method Response Comment Documented by Status   Patient Acceptance E VU  CT 01/22/18 1428 Done    Acceptance E VU,NR  AG 01/21/18 2221 Done    Acceptance E VU,NR  AG 01/21/18 0116 Done    Acceptance E VU  BB 01/20/18 0737 Done    Acceptance E VU  BB 01/19/18 0901 Done    Acceptance E VU,NR  RH 01/18/18 1911 Done     Acceptance E VU  BC 01/18/18 1532 Done    Acceptance E VU  BB 01/18/18 0951 Done    Acceptance E VU  SS 01/17/18 1945 Done    Acceptance E VU  BC 01/17/18 1652 Done   Family Acceptance E VU  BB 01/20/18 0737 Done    Acceptance E VU  BB 01/19/18 0901 Done               Point: Home exercise program (Done)    Learning Progress Summary    Learner Readiness Method Response Comment Documented by Status   Patient Acceptance E VU  CT 01/22/18 1428 Done    Acceptance E VU,NR   01/21/18 2221 Done    Acceptance E VU,NR   01/21/18 0116 Done    Acceptance E VU  BB 01/20/18 0737 Done    Acceptance E VU  BB 01/19/18 0901 Done    Acceptance E VU,NR   01/18/18 1911 Done    Acceptance E VU  BC 01/18/18 1532 Done    Acceptance E VU  BB 01/18/18 0951 Done    Acceptance E VU  SS 01/17/18 1945 Done    Acceptance E VU  BC 01/17/18 1652 Done   Family Acceptance E VU  BB 01/20/18 0737 Done    Acceptance E VU  BB 01/19/18 0901 Done               Point: Body mechanics (Done)    Learning Progress Summary    Learner Readiness Method Response Comment Documented by Status   Patient Acceptance E VU  CT 01/22/18 1428 Done    Acceptance E VU,NR   01/21/18 2221 Done    Acceptance E VU,NR   01/21/18 0116 Done    Acceptance E VU  BB 01/20/18 0737 Done    Acceptance E VU  BB 01/19/18 0901 Done    Acceptance E VU,NR   01/18/18 1911 Done    Acceptance E VU  BC 01/18/18 1532 Done    Acceptance E VU  BB 01/18/18 0951 Done    Acceptance E VU  SS 01/17/18 1945 Done    Acceptance E VU  BC 01/17/18 1652 Done   Family Acceptance E VU  BB 01/20/18 0737 Done    Acceptance E VU  BB 01/19/18 0901 Done               Point: Precautions (Done)    Learning Progress Summary    Learner Readiness Method Response Comment Documented by Status   Patient Acceptance E VU  CT 01/22/18 1428 Done    Acceptance E VU,NR   01/21/18 2221 Done    Acceptance E VU,NR   01/21/18 0116 Done    Acceptance E VU  BB 01/20/18 0737 Done    Acceptance E VU  BB 01/19/18 0901 Done     Acceptance E VU,NR   01/18/18 1911 Done    Acceptance E VU  BC 01/18/18 1532 Done    Acceptance E VU  BB 01/18/18 0951 Done    Acceptance E VU  SS 01/17/18 1945 Done    Acceptance E VU  BC 01/17/18 1652 Done   Family Acceptance E VU  BB 01/20/18 0737 Done    Acceptance E VU  BB 01/19/18 0901 Done                      User Key     Initials Effective Dates Name Provider Type Discipline    BC 03/14/16 -  Tanna Nettles, PT Physical Therapist PT    CT 03/14/16 -  Saima Eugene, PT Physical Therapist PT    AG 06/16/16 -  April Hannah De Jesus, RN Registered Nurse Nurse     11/18/16 -  Maribel Ornelas, RN Registered Nurse Nurse     01/21/17 -  Theodore Martinez, RN Registered Nurse Nurse     04/14/17 -  Sang Brown, RN Registered Nurse Nurse                    PT Recommendation and Plan  Anticipated Equipment Needs At Discharge: front wheeled walker  Planned Therapy Interventions: balance training, bed mobility training, gait training, home exercise program, patient/family education, strengthening, transfer training  PT Frequency: 3-5 times/wk, per priority policy             Outcome Measures       01/22/18 1400          How much help from another person do you currently need...    Turning from your back to your side while in flat bed without using bedrails? 2  -CT      Moving from lying on back to sitting on the side of a flat bed without bedrails? 2  -CT      Moving to and from a bed to a chair (including a wheelchair)? 2  -CT      Standing up from a chair using your arms (e.g., wheelchair, bedside chair)? 2  -CT      Climbing 3-5 steps with a railing? 1  -CT      To walk in hospital room? 2  -CT      AM-PAC 6 Clicks Score 11  -CT      Functional Assessment    Outcome Measure Options AM-PAC 6 Clicks Basic Mobility (PT)  -CT        User Key  (r) = Recorded By, (t) = Taken By, (c) = Cosigned By    Initials Name Provider Type    CT Saima Eugene, PT Physical Therapist           Time Calculation:          PT Charges       01/22/18 1431          Time Calculation    PT Received On 01/22/18  -CT      PT - Next Appointment 01/23/18  -CT      PT Goal Re-Cert Due Date 01/31/18  -CT      Time Calculation- PT    Total Timed Code Minutes- PT 25 minute(s)  -CT        User Key  (r) = Recorded By, (t) = Taken By, (c) = Cosigned By    Initials Name Provider Type    CT Saima Eugene PT Physical Therapist          Therapy Charges for Today     Code Description Service Date Service Provider Modifiers Qty    84397731776 HC GAIT TRAINING EA 15 MIN 1/22/2018 Saima Eugene, PT GP 1    54710197206 HC PT THERAPEUTIC ACT EA 15 MIN 1/22/2018 Saima Eugene, PT GP 1    [id number] HC PT THER SUPP EA 15 MIN 1/22/2018 Saima Eugene, PT GP 2          PT G-Codes  Outcome Measure Options: AM-PAC 6 Clicks Basic Mobility (PT)  Score: 12  Functional Limitation: Mobility: Walking and moving around  Mobility: Walking and Moving Around Current Status (): At least 60 percent but less than 80 percent impaired, limited or restricted  Mobility: Walking and Moving Around Goal Status (): At least 40 percent but less than 60 percent impaired, limited or restricted    Saima Eugene PT  1/22/2018          Electronically signed by Saima Eugene PT at 1/22/2018  2:32 PM

## 2018-01-23 NOTE — PLAN OF CARE
Problem: Skin Integrity Impairment, Risk/Actual (Adult)  Goal: Skin Integrity/Wound Healing  Outcome: Ongoing (interventions implemented as appropriate)   01/23/18 0029   Skin Integrity Impairment, Risk/Actual (Adult)   Skin Integrity/Wound Healing making progress toward outcome       Problem: Pain, Chronic (Adult)  Goal: Identify Related Risk Factors and Signs and Symptoms  Outcome: Ongoing (interventions implemented as appropriate)    Goal: Acceptable Pain Control/Comfort Level  Outcome: Ongoing (interventions implemented as appropriate)      Problem: Patient Care Overview (Adult)  Goal: Plan of Care Review  Outcome: Ongoing (interventions implemented as appropriate)    Goal: Adult Individualization and Mutuality  Outcome: Ongoing (interventions implemented as appropriate)      Problem: Pressure Ulcer Risk (Brien Scale) (Adult,Obstetrics,Pediatric)  Goal: Identify Related Risk Factors and Signs and Symptoms  Outcome: Ongoing (interventions implemented as appropriate)    Goal: Skin Integrity  Outcome: Ongoing (interventions implemented as appropriate)      Problem: Fall Risk (Adult)  Goal: Absence of Falls  Outcome: Ongoing (interventions implemented as appropriate)

## 2018-01-23 NOTE — DISCHARGE PLACEMENT REQUEST
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        Impression:       No acute fracture of the lumbar spine.      This report was finalized on 1/15/2018 1:16 PM by Dr. Carlos Jiménez MD.             Assessment/Plan   Rhabdomyolysis  Fever  Sacral Decubitus Ulcer/Hip Ulcer  Hyperbilirubinemia  Altered Mental Status  HX Multiple Myeloma  Chronic Neck Pain/Low Back Pain    Consult Surgery for sacral decubitus/wound care    Consult ID for fever and antibiotic management    Hold Oxycodone and Valium secondary to altered mental status    Check RUQ U/S secondary to hyperbilirubinemia and urine myoglobin    Repeat LFT's this AM    Continue with IVF @ 150 ml/hour    Tylenol prn fever    Wound care consult    Lovenox for DVT prophylaxis    Start O2 via NC to maintain O2 sats >90%    CBC, CMP daily    JARETH Condon  01/16/18  6:39 AM    I have seen this patient today and agree with the above note.  This patient was admitted to my service to the emergency department where supposedly he is a patient of mine.  However, he has never been seen in my clinic.  He sees a Dr. Orozco in [place].  This patient has a rather complex history with history of myeloma.  He takes chronic opioid medications for this.  He states that he has not been ambulatory  for at least a week or 2 since falling out of the bed.  He states he hurt his lower back when this happened.  He was noted to have a CT of the lumbar spine revealing no acute fracture.  He was found to have a large sacral decubitus ulcer.  He was also noted to have acute rhabdomyolysis and was febrile overnight.  I have examined the patient and agree with the above note.  He is chronically ill in appearance.  Thin and frail.  He is able to answer questions but is a little bit somnolent.  Lung exam reveals clear to auscultation bilaterally cardiac exam reveals regular rate and regular rhythm no murmur, rub or gallop.  Abdominal exam is benign.  Extremities no cyanosis, clubbing or edema.  He is able to move both lower extremities.  He has a large decubitus ulceration and please refer to nursing documentation for further details.  Is noted to have a white count when he came in.    Impression:  Sepsis due to decubitus ulceration.  Decubitus ulceration of the hip  Hyperbilirubinemia  Rhabdomyolysis  Metabolic encephalopathy due to sepsis  Multiplemyeloma  Chronic opioid use      Plan:  ID consult.  Start cefepime, Flagyl and vancomycin    Surgical consultation and wound care consultation  IV fluid hydration for abdomen mild lysis.  Monitor renal function.  Monitor CPK to ensure the trend is downward    Subcutaneous heparin for DVT prophylaxis    Discontinue Valium and discontinue high-dose oxycodone.  Start oxycodone 5 mg every 4 hours when necessary for pain.    Monitor LFTs.  Probably elevated due to sepsis with hypotension    Oxygen to maintain sat greater than 90%    Xray bilateral hips / pelvis    Samuel Duane Kreis, MD  01/16/18  1:36 PM             Electronically signed by Samuel Duane Kreis, MD at 1/16/2018  1:37 PM          Vital Signs (last 24 hours)       01/22 0700  -  01/23 0659 01/23 0700  -  01/23 1442   Most Recent    Temp (°F) 98 -  98.2      98.1     98.1 (36.7)    Heart Rate 60 -  73      62     62     Resp 16 - 18 18 18    /73 -  136/79      125/76     125/76    SpO2 (%) (!)89 -  95    92 -  99     99          Lines, Drains & Airways    Active LDAs     Name:   Placement date:   Placement time:   Site:   Days:    Peripheral IV Line - Single Lumen 01/20/18 0010 basilic vein (medial side of arm), right 20 gauge  01/20/18    0010      3                Prior to Admission Medications     Prescriptions Last Dose Informant Patient Reported? Taking?    diazePAM (VALIUM) 10 MG tablet 1/15/2018  Yes Yes    Take 10 mg by mouth 2 (Two) Times a Day As Needed for Anxiety.    oxyCODONE (ROXICODONE) 20 MG tablet 1/15/2018 Pharmacy Yes Yes    Take 20 mg by mouth Every 6 (Six) Hours As Needed for Moderate Pain .          Hospital Medications (active)       Dose Frequency Start End    acetaminophen (TYLENOL) tablet 650 mg 650 mg Every 6 Hours PRN 1/16/2018     Sig - Route: Take 2 tablets by mouth Every 6 (Six) Hours As Needed for Mild Pain . - Oral    Cosign for Ordering: Accepted by Samuel Duane Kreis, MD on 1/16/2018  1:17 PM    cefdinir (OMNICEF) capsule 300 mg 300 mg Every 12 Hours Scheduled 1/23/2018 2/2/2018    Sig - Route: Take 1 capsule by mouth Every 12 (Twelve) Hours. - Oral    heparin (porcine) 5000 UNIT/ML injection 5,000 Units 5,000 Units Every 8 Hours Scheduled 1/16/2018     Sig - Route: Inject 1 mL under the skin Every 8 (Eight) Hours. - Subcutaneous    Cosign for Ordering: Accepted by Samuel Duane Kreis, MD on 1/16/2018  1:17 PM    HYDROmorphone (DILAUDID) injection 1 mg 1 mg Once 1/15/2018     Sig - Route: Infuse 1 mL into a venous catheter 1 (One) Time. - Intravenous    lactated ringers infusion 100 mL/hr Continuous 1/19/2018     Sig - Route: Infuse 100 mL/hr into a venous catheter Continuous. - Intravenous    lactated ringers infusion 125 mL/hr Continuous 1/19/2018     Sig - Route: Infuse 125 mL/hr into a venous catheter Continuous. - Intravenous    lactobacillus acidophilus (RISAQUAD) capsule 1  "capsule 1 capsule Daily 1/18/2018     Sig - Route: Take 1 capsule by mouth Daily. - Oral    Magnesium Sulfate 2 gram Bolus, followed by 8 gram infusion (total Mg dose 10 grams)- Mg less than or equal to 1mg/dL 2 g As Needed 1/21/2018     Sig - Route: Infuse 50 mL into a venous catheter As Needed (Mg less than or equal to 1mg/dL). - Intravenous    Linked Group 1:  \"Or\" Linked Group Details        magnesium sulfate 4 gram infusion- Mg 1.6-1.9 mg/dL 4 g As Needed 1/21/2018     Sig - Route: Infuse 100 mL into a venous catheter As Needed (Mg 1.6-1.9 mg/dL). - Intravenous    Linked Group 1:  \"Or\" Linked Group Details        magnesium sulfate 4 gram infusion- Mg 1.6-1.9 mg/dL 4 g Once 1/22/2018     Sig - Route: Infuse 100 mL into a venous catheter 1 (One) Time. - Intravenous    Magnesium Sulfate 6 gram Infusion (2 gm x 3) -Mg 1.1 -1.5 mg/dL 2 g As Needed 1/21/2018     Sig - Route: Infuse 50 mL into a venous catheter As Needed (Mg 1.1 -1.5 mg/dL). - Intravenous    Linked Group 1:  \"Or\" Linked Group Details        multivitamin (DAILY MINH) tablet 1 tablet 1 tablet Daily 1/22/2018     Sig - Route: Take 1 tablet by mouth Daily. - Oral    Cosign for Ordering: Required by Samuel Duane Kreis, MD    ondansetron (ZOFRAN) injection 4 mg 4 mg Every 6 Hours PRN 1/21/2018     Sig - Route: Infuse 2 mL into a venous catheter Every 6 (Six) Hours As Needed for Nausea or Vomiting. - Intravenous    Linked Group 2:  \"Or\" Linked Group Details        ondansetron (ZOFRAN) tablet 4 mg 4 mg Every 6 Hours PRN 1/21/2018     Sig - Route: Take 1 tablet by mouth Every 6 (Six) Hours As Needed for Nausea or Vomiting. - Oral    Linked Group 2:  \"Or\" Linked Group Details        ondansetron ODT (ZOFRAN-ODT) disintegrating tablet 4 mg 4 mg Every 6 Hours PRN 1/21/2018     Sig - Route: Take 1 tablet by mouth Every 6 (Six) Hours As Needed for Nausea or Vomiting. - Oral    Linked Group 2:  \"Or\" Linked Group Details        oxyCODONE (ROXICODONE) immediate release " "tablet 5 mg 5 mg Every 4 Hours PRN 1/16/2018 1/26/2018    Sig - Route: Take 1 tablet by mouth Every 4 (Four) Hours As Needed for Moderate Pain . - Oral    potassium chloride (KLOR-CON) packet 40 mEq 40 mEq As Needed 1/21/2018     Sig - Route: Take 40 mEq by mouth As Needed (potassium replacement, see admin instructions). - Oral    Linked Group 3:  \"Or\" Linked Group Details        potassium chloride (MICRO-K) CR capsule 40 mEq 40 mEq As Needed 1/21/2018     Sig - Route: Take 4 capsules by mouth As Needed (potassium replacement.  see admin instructions). - Oral    Linked Group 3:  \"Or\" Linked Group Details        potassium chloride 10 mEq in 100 mL IVPB 10 mEq Every 1 Hour PRN 1/21/2018     Sig - Route: Infuse 100 mL into a venous catheter Every 1 (One) Hour As Needed (potassium protocol PERIPHERAL - see admin instructions). - Intravenous    Linked Group 3:  \"Or\" Linked Group Details        sodium chloride 0.9 % flush 10 mL 10 mL As Needed 1/15/2018     Sig - Route: Infuse 10 mL into a venous catheter As Needed for Line Care. - Intravenous    Cosign for Ordering: Accepted by José Miguel Li MD on 1/15/2018  1:21 PM    Linked Group 4:  \"And\" Linked Group Details        cefepime (MAXIPIME) 2 g/100 mL 0.9% NS (mbp) (Discontinued) 2 g Every 12 Hours 1/16/2018 1/23/2018    Sig - Route: Infuse 100 mL into a venous catheter Every 12 (Twelve) Hours. - Intravenous    Cosign for Ordering: Accepted by Misty Bae MD on 1/18/2018 11:24 AM    metroNIDAZOLE (FLAGYL) IVPB 500 mg (Discontinued) 500 mg Every 8 Hours 1/16/2018 1/23/2018    Sig - Route: Infuse 100 mL into a venous catheter Every 8 (Eight) Hours. - Intravenous    Cosign for Ordering: Accepted by Misty Bae MD on 1/18/2018 11:24 AM    vancomycin (VANCOCIN) 1,750 mg in sodium chloride 0.9 % 500 mL IVPB (Discontinued) 1,750 mg Every 12 Hours 1/21/2018 1/23/2018    Sig - Route: Infuse 1,750 mg into a venous catheter Every 12 (Twelve) Hours. - Intravenous "            Lab Results (last 24 hours)     Procedure Component Value Units Date/Time    Comprehensive Metabolic Panel [[id number]]  (Abnormal) Collected:  01/23/18 0034    Specimen:  Blood Updated:  01/23/18 0209     Glucose 133 (H) mg/dL      BUN 11 mg/dL      Creatinine 0.69 mg/dL      Sodium 136 mmol/L      Potassium 3.7 mmol/L      Chloride 110 mmol/L      CO2 23.0 (L) mmol/L      Calcium 7.4 (L) mg/dL      Total Protein 5.0 (L) g/dL      Albumin 2.30 (L) g/dL      ALT (SGPT) 27 U/L      AST (SGOT) 50 (H) U/L      Alkaline Phosphatase 106 U/L       Note New Reference Ranges        Total Bilirubin 0.6 mg/dL      eGFR Non African Amer 116 mL/min/1.73      Globulin 2.7 gm/dL      A/G Ratio 0.9 (L) g/dL      BUN/Creatinine Ratio 15.9     Anion Gap 3.0 (L) mmol/L     Magnesium [[id number]]  (Normal) Collected:  01/23/18 0034    Specimen:  Blood Updated:  01/23/18 0209     Magnesium 1.9 mg/dL     Osmolality, Calculated [[id number]]  (Normal) Collected:  01/23/18 0034    Specimen:  Blood Updated:  01/23/18 0209     Osmolality Calc 273.3 mOsm/kg     CBC & Differential [[id number]] Collected:  01/23/18 0034    Specimen:  Blood Updated:  01/23/18 0228    Narrative:       The following orders were created for panel order CBC & Differential.  Procedure                               Abnormality         Status                     ---------                               -----------         ------                     Scan Slide[[id number]]                                       Final result               CBC Auto Differential[[id number]]        Abnormal            Final result                 Please view results for these tests on the individual orders.    CBC Auto Differential [[id number]]  (Abnormal) Collected:  01/23/18 0034    Specimen:  Blood Updated:  01/23/18 0228     WBC 12.20 10*3/mm3      RBC 4.27 (L) 10*6/mm3      Hemoglobin 8.6 (L) g/dL      Hematocrit 26.1 (L) %      MCV 61.1 (L) fL      MCH 20.1 (L) pg      MCHC 33.0 g/dL       RDW 16.1 (H) %      RDW-SD 32.7 (L) fl      MPV 10.7 (H) fL      Platelets 550 (H) 10*3/mm3      Neutrophil % 66.7 %      Lymphocyte % 18.9 (L) %      Monocyte % 11.0 (H) %      Eosinophil % 2.0 %      Basophil % 0.4 %      Immature Grans % 1.0 (H) %      Neutrophils, Absolute 8.13 (H) 10*3/mm3      Lymphocytes, Absolute 2.31 10*3/mm3      Monocytes, Absolute 1.34 (H) 10*3/mm3      Eosinophils, Absolute 0.25 10*3/mm3      Basophils, Absolute 0.05 10*3/mm3      Immature Grans, Absolute 0.12 (H) 10*3/mm3     Scan Slide [[id number]] Collected:  01/23/18 0034    Specimen:  Blood Updated:  01/23/18 0228     Anisocytosis Slight/1+     Microcytes Large/3+     Ovalocytes Slight/1+     Platelet Estimate Increased    Culture, Routine - Tissue, Buttock, Left [[id number]]  (Abnormal)  (Susceptibility) Collected:  01/19/18 1210    Specimen:  Tissue from Buttock, Left Updated:  01/23/18 0719     Culture --      Light growth (2+) Proteus vulgaris (A)      Light growth (2+) Gram Negative Bacilli, Morphology consistent with Escherichia / Citrobacter (A)     Gram Stain Result Rare (1+) WBCs seen      Rare (1+) Gram negative bacilli      Occasional Gram positive cocci in pairs      Occasional Gram positive bacilli    Susceptibility      Proteus vulgaris     MARK (Preliminary)     Amikacin <=16 ug/ml Susceptible     Amoxicillin + Clavulanate <=8/4 ug/ml Susceptible     Ampicillin <=8 ug/ml Resistant     Ampicillin + Sulbactam <=8/4 ug/ml Susceptible     Aztreonam <=8 ug/ml Susceptible     Cefazolin >16 ug/ml Resistant     Cefepime <=8 ug/ml Susceptible     Cefotaxime <=2 ug/ml Susceptible     Ceftazidime <=1 ug/ml Susceptible     Ceftriaxone <=8 ug/ml Susceptible     Cefuroxime sodium <=4 ug/ml Resistant     Ciprofloxacin <=1 ug/ml Susceptible     Doripenem <=0.5 ug/ml Susceptible     Ertapenem <=1 ug/ml Susceptible     Gentamicin <=4 ug/ml Susceptible     Imipenem <=1 ug/ml Susceptible     Levofloxacin <=2 ug/ml Susceptible      Piperacillin + Tazobactam <=16 ug/ml Susceptible     Tetracycline >8 ug/ml Resistant     Tobramycin <=4 ug/ml Susceptible     Trimethoprim + Sulfamethoxazole <=2/38 ug/ml Susceptible                    Culture, Routine - Swab, Buttock, Left [[id number]]  (Abnormal)  (Susceptibility) Collected:  01/19/18 1211    Specimen:  Swab from Buttock, Left Updated:  01/23/18 0721     Culture --      Light growth (2+) Proteus vulgaris (A)      Light growth (2+) Gram Negative Bacilli, Morphology consistent with Escherichia / Citrobacter (A)     Gram Stain Result Rare (1+) WBCs seen      Rare (1+) Gram negative bacilli    Susceptibility      Proteus vulgaris     MARK (Preliminary)     Amikacin <=16 ug/ml Susceptible     Amoxicillin + Clavulanate <=8/4 ug/ml Susceptible     Ampicillin >16 ug/ml Resistant     Ampicillin + Sulbactam <=8/4 ug/ml Susceptible     Aztreonam <=8 ug/ml Susceptible     Cefazolin >16 ug/ml Resistant     Cefepime <=8 ug/ml Susceptible     Cefotaxime <=2 ug/ml Susceptible     Ceftazidime <=1 ug/ml Susceptible     Ceftriaxone <=8 ug/ml Susceptible     Cefuroxime sodium >16 ug/ml Resistant     Ciprofloxacin <=1 ug/ml Susceptible     Doripenem <=0.5 ug/ml Susceptible     Ertapenem <=1 ug/ml Susceptible     Gentamicin <=4 ug/ml Susceptible     Imipenem <=1 ug/ml Susceptible     Levofloxacin <=2 ug/ml Susceptible     Piperacillin + Tazobactam <=16 ug/ml Susceptible     Tetracycline >8 ug/ml Resistant     Tobramycin <=4 ug/ml Susceptible     Trimethoprim + Sulfamethoxazole <=2/38 ug/ml Susceptible                        Orders (last 24 hrs)     Start     Ordered    01/23/18 1200  cefdinir (OMNICEF) capsule 300 mg  Every 12 Hours Scheduled      01/23/18 1019    01/23/18 0820  Inpatient consult for Pneumonia Education (QA)  Once     Provider:  (Not yet assigned)    01/23/18 0819    01/23/18 0722  Inpatient Consult to Continue Care Hospital Referral  Once     Provider:  (Not yet assigned)    01/23/18 0722    01/23/18  0600  Magnesium  Morning Draw      01/22/18 1315    01/23/18 0600  CBC Auto Differential  PROCEDURE ONCE      01/23/18 0000    01/23/18 0210  Osmolality, Calculated  Once      01/23/18 0209    01/23/18 0140  Scan Slide  Once      01/23/18 0139    01/22/18 1200  DIET MESSAGE High protein chocolate milk shake, patient has a very poor appetite.  Picks at his food and may eat about 20 to 30% at most.  Thanks, Gladys  Daily With Lunch & Dinner     Comments:  High protein chocolate milk shake, patient has a very poor appetite.  Picks at his food and may eat about 20 to 30% at most.  Thanks, Gladys    01/21/18 1905    01/22/18 0900  multivitamin (DAILY MINH) tablet 1 tablet  Daily      01/22/18 0709    01/22/18 0700  magnesium sulfate 4 gram infusion- Mg 1.6-1.9 mg/dL  Once      01/22/18 0616    01/21/18 0924  Magnesium Sulfate 2 gram Bolus, followed by 8 gram infusion (total Mg dose 10 grams)- Mg less than or equal to 1mg/dL  As Needed      01/21/18 0924    01/21/18 0924  Magnesium Sulfate 6 gram Infusion (2 gm x 3) -Mg 1.1 -1.5 mg/dL  As Needed      01/21/18 0924    01/21/18 0924  magnesium sulfate 4 gram infusion- Mg 1.6-1.9 mg/dL  As Needed      01/21/18 0924    01/21/18 0924  potassium chloride (MICRO-K) CR capsule 40 mEq  As Needed      01/21/18 0924    01/21/18 0924  potassium chloride (KLOR-CON) packet 40 mEq  As Needed      01/21/18 0924    01/21/18 0924  potassium chloride 10 mEq in 100 mL IVPB  Every 1 Hour PRN      01/21/18 0924    01/21/18 0923  ondansetron (ZOFRAN) tablet 4 mg  Every 6 Hours PRN      01/21/18 0923    01/21/18 0923  ondansetron ODT (ZOFRAN-ODT) disintegrating tablet 4 mg  Every 6 Hours PRN      01/21/18 0923    01/21/18 0923  ondansetron (ZOFRAN) injection 4 mg  Every 6 Hours PRN      01/21/18 0923    01/21/18 0200  vancomycin (VANCOCIN) 1,750 mg in sodium chloride 0.9 % 500 mL IVPB  Every 12 Hours,   Status:  Discontinued      01/20/18 1520    01/19/18 1045  lactated ringers infusion  Continuous       01/19/18 1000    01/19/18 1045  lactated ringers infusion  Continuous      01/19/18 1007    01/18/18 1600  lactobacillus acidophilus (RISAQUAD) capsule 1 capsule  Daily      01/18/18 1405    01/17/18 0600  CBC & Differential  Daily      01/16/18 0723    01/17/18 0600  Comprehensive Metabolic Panel  Daily      01/16/18 0723    01/16/18 1800  cefepime (MAXIPIME) 2 g/100 mL 0.9% NS (mbp)  Every 12 Hours,   Status:  Discontinued      01/16/18 1028    01/16/18 1400  heparin (porcine) 5000 UNIT/ML injection 5,000 Units  Every 8 Hours Scheduled      01/16/18 0816    01/16/18 1400  oxyCODONE (ROXICODONE) immediate release tablet 5 mg  Every 4 Hours PRN      01/16/18 1337    01/16/18 1200  metroNIDAZOLE (FLAGYL) IVPB 500 mg  Every 8 Hours,   Status:  Discontinued      01/16/18 1028    01/16/18 0718  acetaminophen (TYLENOL) tablet 650 mg  Every 6 Hours PRN      01/16/18 0723    01/15/18 1700  HYDROmorphone (DILAUDID) injection 1 mg  Once      01/15/18 1554    01/15/18 1226  sodium chloride 0.9 % flush 10 mL  As Needed      01/15/18 1227    Unscheduled  Magnesium  As Needed      01/21/18 0924    Unscheduled  Potassium  As Needed      01/21/18 0924    --  oxyCODONE (ROXICODONE) 20 MG tablet  Every 6 Hours PRN      01/15/18 1638          Operative/Procedure Notes (last 24 hours) (Notes from 1/22/2018  2:42 PM through 1/23/2018  2:42 PM)     No notes of this type exist for this encounter.           Physician Progress Notes (last 24 hours) (Notes from 1/22/2018  2:42 PM through 1/23/2018  2:42 PM)      Samuel Duane Kreis, MD at 1/23/2018  8:54 AM  Version 2 of 2              LOS: 8 days     Chief Complaint:  Rhabdomyolysis/Decubitus Ulcers with Sepsis    Subjective     Interval History:   He has no major changes over the last 24 hours.  He complains of ongoing pain in the sacral decubitus region consistent with previous surgical debridement however no progressive symptoms at this point.  Patient does have chronic low back pain.  "Preliminary wound culture consistent with Proteus Vulgaris and Escherichia/Citrobacter, ID following.  His energy level is improved, appetite improved.  Labs and vitals stable.  following for [facility] admission.    Objective     Vital Signs  /79 (BP Location: Left arm, Patient Position: Lying)  Pulse 60  Temp 98 °F (36.7 °C) (Oral)   Resp 18  Ht 182 cm (71.65\")  Wt 76.4 kg (168 lb 6.9 oz)  SpO2 92%  BMI 23.06 kg/m2  Intake & Output (last day)       01/22 0701 - 01/23 0700 01/23 0701 - 01/24 0700    P.O. 600     IV Piggyback 500     Total Intake(mL/kg) 1100 (14.4)     Urine (mL/kg/hr) 1325 (0.7)     Total Output 1325      Net -225                    Physical Exam:     General Appearance:    Alert, cooperative, in no acute distress   Head:    Normocephalic, without obvious abnormality, atraumatic   Eyes:            Lids and lashes normal, conjunctivae and sclerae normal, no   icterus, no pallor, corneas clear, PERRLA   Ears:    Ears appear intact with no abnormalities noted       Neck:   No adenopathy, supple, trachea midline, no thyromegaly, no   carotid bruit, no JVD   Lungs:     Scattered Coarse Crackles bilaterally R>>L ,respirations regular, even and                  unlabored    Heart:    Regular rhythm and normal rate, normal S1 and S2, no            murmur, no gallop, no rub, no click   Chest Wall:    No abnormalities observed   Abdomen:     Normal bowel sounds, no masses, no organomegaly, soft        non-tender, non-distended, no guarding, no rebound                tenderness   Extremities:   Moves all extremities well, no edema, no cyanosis, no             redness   Pulses:   Pulses palpable and equal bilaterally   Skin:   No bleeding, bruising or rash, Sacral decubitus ulcer with dressing in place and right trochanteric ulcer with eschar   Lymph nodes:   No palpable adenopathy   Neurologic:   Cranial nerves 2 - 12 grossly intact, sensation intact, DTR       present and equal " bilaterally        Results Review:    Lab Results   Component Value Date    WBC 12.20 01/23/2018    HGB 8.6 (L) 01/23/2018    HCT 26.1 (L) 01/23/2018    MCV 61.1 (L) 01/23/2018     (H) 01/23/2018       Lab Results   Component Value Date    GLUCOSE 133 (H) 01/23/2018    BUN 11 01/23/2018    CREATININE 0.69 01/23/2018    EGFRIFNONA 116 01/23/2018    BCR 15.9 01/23/2018     01/23/2018    K 3.7 01/23/2018     01/23/2018    CO2 23.0 (L) 01/23/2018    CALCIUM 7.4 (L) 01/23/2018    PROTENTOTREF 7.2 11/12/2015    ALBUMIN 2.30 (L) 01/23/2018    LABIL2 0.9 (L) 01/23/2018    AST 50 (H) 01/23/2018    ALT 27 01/23/2018     Lab Results   Component Value Date    INR 0.94 11/03/2015    INR <0.90 10/25/2015    INR <0.90 06/01/2015       No results found for: POCGLU       Medication Review:     Current Facility-Administered Medications:   •  acetaminophen (TYLENOL) tablet 650 mg, 650 mg, Oral, Q6H PRN, JARETH Condon, 650 mg at 01/23/18 0101  •  cefepime (MAXIPIME) 2 g/100 mL 0.9% NS (mbp), 2 g, Intravenous, Q12H, Misty Bae MD, Last Rate: 25 mL/hr at 01/20/18 0537, 2 g at 01/23/18 0538  •  heparin (porcine) 5000 UNIT/ML injection 5,000 Units, 5,000 Units, Subcutaneous, Q8H, JARETH Condon, 5,000 Units at 01/23/18 0538  •  HYDROmorphone (DILAUDID) injection 1 mg, 1 mg, Intravenous, Once, José Miguel Li MD  •  lactated ringers infusion, 100 mL/hr, Intravenous, Continuous, Mohan Santoyo MD  •  lactated ringers infusion, 125 mL/hr, Intravenous, Continuous, Terrence Montero MD, Stopped at 01/19/18 1251  •  lactobacillus acidophilus (RISAQUAD) capsule 1 capsule, 1 capsule, Oral, Daily, Camila Haley, [facility], 1 capsule at 01/23/18 0718  •  Magnesium Sulfate 2 gram Bolus, followed by 8 gram infusion (total Mg dose 10 grams)- Mg less than or equal to 1mg/dL, 2 g, Intravenous, PRN **OR** Magnesium Sulfate 6 gram Infusion (2 gm x 3) -Mg 1.1 -1.5 mg/dL, 2 g, Intravenous, PRN **OR** magnesium sulfate 4  gram infusion- Mg 1.6-1.9 mg/dL, 4 g, Intravenous, PRN, Samuel Duane Kreis, MD  •  magnesium sulfate 4 gram infusion- Mg 1.6-1.9 mg/dL, 4 g, Intravenous, Once, Samuel Duane Kreis, MD, Stopped at 01/22/18 1240  •  metroNIDAZOLE (FLAGYL) IVPB 500 mg, 500 mg, Intravenous, Q8H, Misty Bae MD, Last Rate: 0 mL/hr at 01/22/18 1245, 500 mg at 01/23/18 0336  •  multivitamin (DAILY MINH) tablet 1 tablet, 1 tablet, Oral, Daily, JARETH Condon, 1 tablet at 01/23/18 0718  •  ondansetron (ZOFRAN) tablet 4 mg, 4 mg, Oral, Q6H PRN **OR** ondansetron ODT (ZOFRAN-ODT) disintegrating tablet 4 mg, 4 mg, Oral, Q6H PRN **OR** ondansetron (ZOFRAN) injection 4 mg, 4 mg, Intravenous, Q6H PRN, Samuel Duane Kreis, MD, 4 mg at 01/22/18 2126  •  oxyCODONE (ROXICODONE) immediate release tablet 5 mg, 5 mg, Oral, Q4H PRN, Samuel Duane Kreis, MD, 5 mg at 01/23/18 0617  •  potassium chloride (MICRO-K) CR capsule 40 mEq, 40 mEq, Oral, PRN **OR** potassium chloride (KLOR-CON) packet 40 mEq, 40 mEq, Oral, PRN **OR** potassium chloride 10 mEq in 100 mL IVPB, 10 mEq, Intravenous, Q1H PRN, Samuel Duane Kreis, MD  •  Insert peripheral IV, , , Once **AND** sodium chloride 0.9 % flush 10 mL, 10 mL, Intravenous, PRN, Jesus Carreon PA-C, 10 mL at 01/21/18 1506  •  vancomycin (VANCOCIN) 1,750 mg in sodium chloride 0.9 % 500 mL IVPB, 1,750 mg, Intravenous, Q12H, Misty Bae MD, Last Rate: 0 mL/hr at 01/22/18 1630, 1,750 mg at 01/23/18 0202      Assessment/Plan   Probable Aspiration Pneumonia with Sepsis  Sacral Decubitus Ulcer   Decubitus ulceration of the hip  Hyperbilirubinemia (resolved)  Rhabdomyolysis (resolved)  Metabolic encephalopathy due to sepsis (resolved)  Multiple myeloma  Chronic opioid use  Protein Malnutrition  Fever (resolved)      Appreciate surgery and ID input     Continue with Cefepime + Flagyl + Vancomycin, await final wound culture, ID following    Oxycodone 5 mg q 4 hours prn pain     Tylenol prn  "fever     Continue with wound care     Heparin for DVT prophylaxis    O2 via NC to maintain O2 sats >90%    Continue with PT/OT, S/S following for discharge to SNF vs [facility] referral made for ongoing wound care    Continue K+/Mg replacement protocol,  MVI daily    JARETH Condon  01/23/18  8:54 AM       This patient is seen this morning by me and I agree with the above note.  Tolerating antibiotics and wound care.  Awaiting input from [facility] to see if he will be approved.  Still requiring IV antibiotics.  Appreciate surgery and infectious disease input.  Continue PT    Samuel Duane Kreis, MD  01/23/18  9:10 AM                 Electronically signed by Samuel Duane Kreis, MD at 1/23/2018  9:11 AM      JARETH Condon at 1/23/2018  8:54 AM  Version 1 of 2              LOS: 8 days     Chief Complaint:  Rhabdomyolysis/Decubitus Ulcers with Sepsis    Subjective     Interval History:   He has no major changes over the last 24 hours.  He complains of ongoing pain in the sacral decubitus region consistent with previous surgical debridement however no progressive symptoms at this point.  Patient does have chronic low back pain. Preliminary wound culture consistent with Proteus Vulgaris and Escherichia/Citrobacter, ID following.  His energy level is improved, appetite improved.  Labs and vitals stable.  following for [facility] admission.    Objective     Vital Signs  /79 (BP Location: Left arm, Patient Position: Lying)  Pulse 60  Temp 98 °F (36.7 °C) (Oral)   Resp 18  Ht 182 cm (71.65\")  Wt 76.4 kg (168 lb 6.9 oz)  SpO2 92%  BMI 23.06 kg/m2  Intake & Output (last day)       01/22 0701 - 01/23 0700 01/23 0701 - 01/24 0700    P.O. 600     IV Piggyback 500     Total Intake(mL/kg) 1100 (14.4)     Urine (mL/kg/hr) 1325 (0.7)     Total Output 1325      Net -225                    Physical Exam:     General Appearance:    Alert, cooperative, " in no acute distress   Head:    Normocephalic, without obvious abnormality, atraumatic   Eyes:            Lids and lashes normal, conjunctivae and sclerae normal, no   icterus, no pallor, corneas clear, PERRLA   Ears:    Ears appear intact with no abnormalities noted       Neck:   No adenopathy, supple, trachea midline, no thyromegaly, no   carotid bruit, no JVD   Lungs:     Scattered Coarse Crackles bilaterally R>>L ,respirations regular, even and                  unlabored    Heart:    Regular rhythm and normal rate, normal S1 and S2, no            murmur, no gallop, no rub, no click   Chest Wall:    No abnormalities observed   Abdomen:     Normal bowel sounds, no masses, no organomegaly, soft        non-tender, non-distended, no guarding, no rebound                tenderness   Extremities:   Moves all extremities well, no edema, no cyanosis, no             redness   Pulses:   Pulses palpable and equal bilaterally   Skin:   No bleeding, bruising or rash, Sacral decubitus ulcer with dressing in place and right trochanteric ulcer with eschar   Lymph nodes:   No palpable adenopathy   Neurologic:   Cranial nerves 2 - 12 grossly intact, sensation intact, DTR       present and equal bilaterally        Results Review:    Lab Results   Component Value Date    WBC 12.20 01/23/2018    HGB 8.6 (L) 01/23/2018    HCT 26.1 (L) 01/23/2018    MCV 61.1 (L) 01/23/2018     (H) 01/23/2018       Lab Results   Component Value Date    GLUCOSE 133 (H) 01/23/2018    BUN 11 01/23/2018    CREATININE 0.69 01/23/2018    EGFRIFNONA 116 01/23/2018    BCR 15.9 01/23/2018     01/23/2018    K 3.7 01/23/2018     01/23/2018    CO2 23.0 (L) 01/23/2018    CALCIUM 7.4 (L) 01/23/2018    PROTENTOTREF 7.2 11/12/2015    ALBUMIN 2.30 (L) 01/23/2018    LABIL2 0.9 (L) 01/23/2018    AST 50 (H) 01/23/2018    ALT 27 01/23/2018     Lab Results   Component Value Date    INR 0.94 11/03/2015    INR <0.90 10/25/2015    INR <0.90 06/01/2015       No  results found for: POCGLU       Medication Review:     Current Facility-Administered Medications:   •  acetaminophen (TYLENOL) tablet 650 mg, 650 mg, Oral, Q6H PRN, JARETH Condon, 650 mg at 01/23/18 0101  •  cefepime (MAXIPIME) 2 g/100 mL 0.9% NS (mbp), 2 g, Intravenous, Q12H, Misty Bae MD, Last Rate: 25 mL/hr at 01/20/18 0537, 2 g at 01/23/18 0538  •  heparin (porcine) 5000 UNIT/ML injection 5,000 Units, 5,000 Units, Subcutaneous, Q8H, JARETH Condon, 5,000 Units at 01/23/18 0538  •  HYDROmorphone (DILAUDID) injection 1 mg, 1 mg, Intravenous, Once, José Miguel Li MD  •  lactated ringers infusion, 100 mL/hr, Intravenous, Continuous, Mohan Santoyo MD  •  lactated ringers infusion, 125 mL/hr, Intravenous, Continuous, Terrence Montero MD, Stopped at 01/19/18 1251  •  lactobacillus acidophilus (RISAQUAD) capsule 1 capsule, 1 capsule, Oral, Daily, Camila Haley, [facility], 1 capsule at 01/23/18 0718  •  Magnesium Sulfate 2 gram Bolus, followed by 8 gram infusion (total Mg dose 10 grams)- Mg less than or equal to 1mg/dL, 2 g, Intravenous, PRN **OR** Magnesium Sulfate 6 gram Infusion (2 gm x 3) -Mg 1.1 -1.5 mg/dL, 2 g, Intravenous, PRN **OR** magnesium sulfate 4 gram infusion- Mg 1.6-1.9 mg/dL, 4 g, Intravenous, PRN, Samuel Duane Kreis, MD  •  magnesium sulfate 4 gram infusion- Mg 1.6-1.9 mg/dL, 4 g, Intravenous, Once, Samuel Duane Kreis, MD, Stopped at 01/22/18 1240  •  metroNIDAZOLE (FLAGYL) IVPB 500 mg, 500 mg, Intravenous, Q8H, Misty Bae MD, Last Rate: 0 mL/hr at 01/22/18 1245, 500 mg at 01/23/18 0336  •  multivitamin (DAILY MINH) tablet 1 tablet, 1 tablet, Oral, Daily, JARETH Condon, 1 tablet at 01/23/18 0718  •  ondansetron (ZOFRAN) tablet 4 mg, 4 mg, Oral, Q6H PRN **OR** ondansetron ODT (ZOFRAN-ODT) disintegrating tablet 4 mg, 4 mg, Oral, Q6H PRN **OR** ondansetron (ZOFRAN) injection 4 mg, 4 mg, Intravenous, Q6H PRN, Samuel Duane Kreis, MD, 4 mg at 01/22/18 4375  •  oxyCODONE  (ROXICODONE) immediate release tablet 5 mg, 5 mg, Oral, Q4H PRN, Samuel Duane Kreis, MD, 5 mg at 01/23/18 0617  •  potassium chloride (MICRO-K) CR capsule 40 mEq, 40 mEq, Oral, PRN **OR** potassium chloride (KLOR-CON) packet 40 mEq, 40 mEq, Oral, PRN **OR** potassium chloride 10 mEq in 100 mL IVPB, 10 mEq, Intravenous, Q1H PRN, Samuel Duane Kreis, MD  •  Insert peripheral IV, , , Once **AND** sodium chloride 0.9 % flush 10 mL, 10 mL, Intravenous, PRN, Jesus Carreon PA-C, 10 mL at 01/21/18 1506  •  vancomycin (VANCOCIN) 1,750 mg in sodium chloride 0.9 % 500 mL IVPB, 1,750 mg, Intravenous, Q12H, Misty Bae MD, Last Rate: 0 mL/hr at 01/22/18 1630, 1,750 mg at 01/23/18 0202      Assessment/Plan   Probable Aspiration Pneumonia with Sepsis  Sacral Decubitus Ulcer   Decubitus ulceration of the hip  Hyperbilirubinemia (resolved)  Rhabdomyolysis (resolved)  Metabolic encephalopathy due to sepsis (resolved)  Multiple myeloma  Chronic opioid use  Protein Malnutrition  Fever (resolved)      Appreciate surgery and ID input     Continue with Cefepime + Flagyl + Vancomycin, await final wound culture, ID following    Oxycodone 5 mg q 4 hours prn pain     Tylenol prn fever     Continue with wound care     Heparin for DVT prophylaxis    O2 via NC to maintain O2 sats >90%    Continue with PT/OT, S/S following [facility]      Continue care hospital referral made for ongoing wound care    Continue K+/Mg replacement protocol,  MVI daily    JARETH Condon  01/23/18  8:54 AM                    Electronically signed by JARETH Condon at 1/23/2018  8:57 AM      JOSÉ MIGUEL Love at 1/23/2018 12:16 PM  Version 1 of 1           I have personally seen and examined the patient today and discussed overnight interval progress and pertinent issues with nursing staff.    Subjective:    Overall patient shows significant clinical improvement.  Culture showing growth of Proteus with almost  "normal CRP level.     Awaiting possible transfer to [facility].  Patient may need repeat debridement if transferred to [facility].    History taken from: patient chart family RN      Vital Signs    /76 (BP Location: Left arm, Patient Position: Lying)  Pulse 62  Temp 98.1 °F (36.7 °C) (Oral)   Resp 18  Ht 182 cm (71.65\")  Wt 76.4 kg (168 lb 6.9 oz)  SpO2 99%  BMI 23.06 kg/m2    Temp:  [98 °F (36.7 °C)-98.2 °F (36.8 °C)] 98.1 °F (36.7 °C)      Intake/Output Summary (Last 24 hours) at 01/23/18 1216  Last data filed at 01/23/18 0320   Gross per 24 hour   Intake              860 ml   Output              950 ml   Net              -90 ml     Intake & Output (last 3 days)       01/20 0701 - 01/21 0700 01/21 0701 - 01/22 0700 01/22 0701 - 01/23 0700 01/23 0701 - 01/24 0700    P.O. 1160 240 600     I.V. (mL/kg)        IV Piggyback 500 100 500     Total Intake(mL/kg) 1660 (21.7) 340 (4.5) 1100 (14.4)     Urine (mL/kg/hr) 1700 (0.9) 1350 (0.7) 1325 (0.7)     Total Output 1700 1350 1325      Net -40 -1010 -225              Unmeasured Urine Occurrence  1 x          Physical Exam:       General Appearance:    Alert, cooperative, in no acute distress, thin, frail, daughter at bedside    Head:    Normocephalic, without obvious abnormality, atraumatic   Eyes:            Lids and lashes normal, conjunctivae and sclerae normal, no   icterus, no pallor, corneas clear, PERRLA   Ears:    Ears appear intact with no abnormalities noted   Throat:   No oral lesions, no thrush, oral mucosa moist   Neck:   No adenopathy, supple, trachea midline, no thyromegaly, no   carotid bruit, no JVD   Back:     No tenderness to percussion or palpation, range of motion   normal   Lungs:    coarse breath sounds to right.    Heart:    Regular rhythm and normal rate, normal S1 and S2, no            murmur, no gallop, no rub, no click   Chest Wall:    No abnormalities observed   Abdomen:     Normal bowel sounds, no masses, no organomegaly, soft        " non-tender, non-distended, no guarding, no rebound                tenderness   Rectal:     Deferred   Extremities:   Moves all extremities well, no edema, no cyanosis, no             redness   Pulses:   Pulses palpable and equal bilaterally   Skin:   Sacral decubitus ulcer and right hip ulcer    Lymph nodes:   No palpable adenopathy   Neurologic:   Awake, alert, Garbled speech        Results:      Results from last 7 days  Lab Units 01/23/18  0034 01/22/18  0126 01/21/18  0053 01/20/18  0054 01/19/18  0121 01/18/18  0036 01/17/18  0454   WBC 10*3/mm3 12.20 11.40 10.76 11.43 12.61* 9.73 10.08     Lab Results   Component Value Date    NEUTROABS 8.13 (H) 01/23/2018         Results from last 7 days  Lab Units 01/23/18  0034   CREATININE mg/dL 0.69         Results from last 7 days  Lab Units 01/22/18  0126 01/21/18  0053 01/19/18  0121   CRP mg/dL 1.20* 2.07* 4.83*       Imaging Results (last 24 hours)     ** No results found for the last 24 hours. **            Results Review:    I have personally reviewed laboratory data, culture results, radiology studies and antimicrobial therapy.    Hospital Medications (active)       Dose Frequency Start End    acetaminophen (TYLENOL) tablet 650 mg 650 mg Every 6 Hours PRN 1/16/2018     Sig - Route: Take 2 tablets by mouth Every 6 (Six) Hours As Needed for Mild Pain . - Oral    Cosign for Ordering: Accepted by Samuel Duane Kreis, MD on 1/16/2018  1:17 PM    cefepime (MAXIPIME) 2 g/100 mL 0.9% NS (mbp) 2 g Once 1/16/2018 1/16/2018    Sig - Route: Infuse 100 mL into a venous catheter 1 (One) Time. - Intravenous    Cosign for Ordering: Required by Misty Bae MD    cefepime (MAXIPIME) 2 g/100 mL 0.9% NS (mbp) 2 g Every 12 Hours 1/16/2018 1/23/2018    Sig - Route: Infuse 100 mL into a venous catheter Every 12 (Twelve) Hours. - Intravenous    Cosign for Ordering: Required by Misty Bae MD    heparin (porcine) 5000 UNIT/ML injection 5,000 Units 5,000 Units Every 8 Hours  "Scheduled 1/16/2018     Sig - Route: Inject 1 mL under the skin Every 8 (Eight) Hours. - Subcutaneous    Cosign for Ordering: Accepted by Samuel Duane Kreis, MD on 1/16/2018  1:17 PM    HYDROmorphone (DILAUDID) injection 1 mg 1 mg Once 1/15/2018     Sig - Route: Infuse 1 mL into a venous catheter 1 (One) Time. - Intravenous    metroNIDAZOLE (FLAGYL) IVPB 500 mg 500 mg Every 8 Hours 1/16/2018 1/23/2018    Sig - Route: Infuse 100 mL into a venous catheter Every 8 (Eight) Hours. - Intravenous    Cosign for Ordering: Required by Misty Bae MD    oxyCODONE (ROXICODONE) immediate release tablet 5 mg 5 mg Every 4 Hours PRN 1/16/2018 1/26/2018    Sig - Route: Take 1 tablet by mouth Every 4 (Four) Hours As Needed for Moderate Pain . - Oral    Pharmacy to dose vancomycin  Continuous PRN 1/16/2018 1/23/2018    Sig - Route: Continuous As Needed for Consult. - Does not apply    Cosign for Ordering: Required by Misty Bae MD    sodium chloride 0.9 % flush 10 mL 10 mL As Needed 1/15/2018     Sig - Route: Infuse 10 mL into a venous catheter As Needed for Line Care. - Intravenous    Cosign for Ordering: Accepted by José Miguel Li MD on 1/15/2018  1:21 PM    Linked Group 1:  \"And\" Linked Group Details        sodium chloride 0.9 % infusion 150 mL/hr Continuous 1/15/2018     Sig - Route: Infuse 150 mL/hr into a venous catheter Continuous. - Intravenous    vancomycin (VANCOCIN) 1,000 mg in sodium chloride 0.9 % 250 mL IVPB 1,000 mg Every 12 Hours 1/16/2018 1/23/2018    Sig - Route: Infuse 1,000 mg into a venous catheter Every 12 (Twelve) Hours. - Intravenous            Cultures:    Blood Culture   Date Value Ref Range Status   01/15/2018 No growth at 24 hours  Preliminary   01/15/2018 No growth at 24 hours  Preliminary     Urine Culture   Date Value Ref Range Status   01/15/2018 No growth at 24 hours  Preliminary           Assessment/Plan     ASSESSMENT:    1.  Severe sepsis with encephalopathy  2.  Aspiration " pneumonia     PLAN:    Overall patient shows significant clinical improvement.  Culture showing growth of Proteus with almost normal CRP level.     Antibiotic therapy was de-escalated to Omnicef 300 mg twice a day through 2/1/18.  Vancomycin, cefepime and Flagyl were discontinued.    Awaiting possible transfer to [facility].  Patient may need repeat debridement if transferred to [facility].     CRP ordered for a.m.     Patient's findings and recommendations were discussed with patient, family and nursing staff    Code Status: Full Code     Scribed for JOSÉ MIGUEL Knott  01/23/18  12:16 PM           Electronically signed by JOSÉ MIGUEL Love at 1/23/2018 12:18 PM        Consult Notes (last 24 hours) (Notes from 1/22/2018  2:42 PM through 1/23/2018  2:42 PM)     No notes of this type exist for this encounter.        Physical Therapy Notes (last 24 hours) (Notes from 1/22/2018  2:42 PM through 1/23/2018  2:42 PM)     No notes of this type exist for this encounter.

## 2018-01-23 NOTE — PROGRESS NOTES
"  I have personally seen and examined the patient today and discussed overnight interval progress and pertinent issues with nursing staff.    Subjective:    Overall patient shows significant clinical improvement.  Culture showing growth of Proteus with almost normal CRP level.     Awaiting possible transfer to [facility].  Patient may need repeat debridement if transferred to [facility].    History taken from: patient chart family RN      Vital Signs    /76 (BP Location: Left arm, Patient Position: Lying)  Pulse 62  Temp 98.1 °F (36.7 °C) (Oral)   Resp 18  Ht 182 cm (71.65\")  Wt 76.4 kg (168 lb 6.9 oz)  SpO2 99%  BMI 23.06 kg/m2    Temp:  [98 °F (36.7 °C)-98.2 °F (36.8 °C)] 98.1 °F (36.7 °C)      Intake/Output Summary (Last 24 hours) at 01/23/18 1216  Last data filed at 01/23/18 0320   Gross per 24 hour   Intake              860 ml   Output              950 ml   Net              -90 ml     Intake & Output (last 3 days)       01/20 0701 - 01/21 0700 01/21 0701 - 01/22 0700 01/22 0701 - 01/23 0700 01/23 0701 - 01/24 0700    P.O. 1160 240 600     I.V. (mL/kg)        IV Piggyback 500 100 500     Total Intake(mL/kg) 1660 (21.7) 340 (4.5) 1100 (14.4)     Urine (mL/kg/hr) 1700 (0.9) 1350 (0.7) 1325 (0.7)     Total Output 1700 1350 1325      Net -40 -1010 -225              Unmeasured Urine Occurrence  1 x          Physical Exam:       General Appearance:    Alert, cooperative, in no acute distress, thin, frail, daughter at bedside    Head:    Normocephalic, without obvious abnormality, atraumatic   Eyes:            Lids and lashes normal, conjunctivae and sclerae normal, no   icterus, no pallor, corneas clear, PERRLA   Ears:    Ears appear intact with no abnormalities noted   Throat:   No oral lesions, no thrush, oral mucosa moist   Neck:   No adenopathy, supple, trachea midline, no thyromegaly, no   carotid bruit, no JVD   Back:     No tenderness to percussion or palpation, range of motion   normal   Lungs:    coarse breath " sounds to right.    Heart:    Regular rhythm and normal rate, normal S1 and S2, no            murmur, no gallop, no rub, no click   Chest Wall:    No abnormalities observed   Abdomen:     Normal bowel sounds, no masses, no organomegaly, soft        non-tender, non-distended, no guarding, no rebound                tenderness   Rectal:     Deferred   Extremities:   Moves all extremities well, no edema, no cyanosis, no             redness   Pulses:   Pulses palpable and equal bilaterally   Skin:   Sacral decubitus ulcer and right hip ulcer    Lymph nodes:   No palpable adenopathy   Neurologic:   Awake, alert, Garbled speech        Results:      Results from last 7 days  Lab Units 01/23/18  0034 01/22/18  0126 01/21/18  0053 01/20/18  0054 01/19/18  0121 01/18/18  0036 01/17/18  0454   WBC 10*3/mm3 12.20 11.40 10.76 11.43 12.61* 9.73 10.08     Lab Results   Component Value Date    NEUTROABS 8.13 (H) 01/23/2018         Results from last 7 days  Lab Units 01/23/18  0034   CREATININE mg/dL 0.69         Results from last 7 days  Lab Units 01/22/18  0126 01/21/18  0053 01/19/18  0121   CRP mg/dL 1.20* 2.07* 4.83*       Imaging Results (last 24 hours)     ** No results found for the last 24 hours. **            Results Review:    I have personally reviewed laboratory data, culture results, radiology studies and antimicrobial therapy.    Hospital Medications (active)       Dose Frequency Start End    acetaminophen (TYLENOL) tablet 650 mg 650 mg Every 6 Hours PRN 1/16/2018     Sig - Route: Take 2 tablets by mouth Every 6 (Six) Hours As Needed for Mild Pain . - Oral    Cosign for Ordering: Accepted by Samuel Duane Kreis, MD on 1/16/2018  1:17 PM    cefepime (MAXIPIME) 2 g/100 mL 0.9% NS (mbp) 2 g Once 1/16/2018 1/16/2018    Sig - Route: Infuse 100 mL into a venous catheter 1 (One) Time. - Intravenous    Cosign for Ordering: Required by Misty Bae MD    cefepime (MAXIPIME) 2 g/100 mL 0.9% NS (mbp) 2 g Every 12 Hours  "1/16/2018 1/23/2018    Sig - Route: Infuse 100 mL into a venous catheter Every 12 (Twelve) Hours. - Intravenous    Cosign for Ordering: Required by Misty Bae MD    heparin (porcine) 5000 UNIT/ML injection 5,000 Units 5,000 Units Every 8 Hours Scheduled 1/16/2018     Sig - Route: Inject 1 mL under the skin Every 8 (Eight) Hours. - Subcutaneous    Cosign for Ordering: Accepted by Samuel Duane Kreis, MD on 1/16/2018  1:17 PM    HYDROmorphone (DILAUDID) injection 1 mg 1 mg Once 1/15/2018     Sig - Route: Infuse 1 mL into a venous catheter 1 (One) Time. - Intravenous    metroNIDAZOLE (FLAGYL) IVPB 500 mg 500 mg Every 8 Hours 1/16/2018 1/23/2018    Sig - Route: Infuse 100 mL into a venous catheter Every 8 (Eight) Hours. - Intravenous    Cosign for Ordering: Required by Misty Bae MD    oxyCODONE (ROXICODONE) immediate release tablet 5 mg 5 mg Every 4 Hours PRN 1/16/2018 1/26/2018    Sig - Route: Take 1 tablet by mouth Every 4 (Four) Hours As Needed for Moderate Pain . - Oral    Pharmacy to dose vancomycin  Continuous PRN 1/16/2018 1/23/2018    Sig - Route: Continuous As Needed for Consult. - Does not apply    Cosign for Ordering: Required by Misty Bae MD    sodium chloride 0.9 % flush 10 mL 10 mL As Needed 1/15/2018     Sig - Route: Infuse 10 mL into a venous catheter As Needed for Line Care. - Intravenous    Cosign for Ordering: Accepted by José Miguel Li MD on 1/15/2018  1:21 PM    Linked Group 1:  \"And\" Linked Group Details        sodium chloride 0.9 % infusion 150 mL/hr Continuous 1/15/2018     Sig - Route: Infuse 150 mL/hr into a venous catheter Continuous. - Intravenous    vancomycin (VANCOCIN) 1,000 mg in sodium chloride 0.9 % 250 mL IVPB 1,000 mg Every 12 Hours 1/16/2018 1/23/2018    Sig - Route: Infuse 1,000 mg into a venous catheter Every 12 (Twelve) Hours. - Intravenous            Cultures:    Blood Culture   Date Value Ref Range Status   01/15/2018 No growth at 24 hours  " Preliminary   01/15/2018 No growth at 24 hours  Preliminary     Urine Culture   Date Value Ref Range Status   01/15/2018 No growth at 24 hours  Preliminary           Assessment/Plan     ASSESSMENT:    1.  Severe sepsis with encephalopathy  2.  Aspiration pneumonia     PLAN:    Overall patient shows significant clinical improvement.  Culture showing growth of Proteus with almost normal CRP level.     Antibiotic therapy was de-escalated to Omnicef 300 mg twice a day through 2/1/18.  Vancomycin, cefepime and Flagyl were discontinued.    Awaiting possible transfer to [facility].  Patient may need repeat debridement if transferred to [facility].     CRP ordered for a.m.     Patient's findings and recommendations were discussed with patient, family and nursing staff    Code Status: Full Code     Scribed for Dr. Misty Driscoll, JOSÉ MIGUEL  01/23/18  12:16 PM      Physician Attestation:    The documentation recorded by the scribe accurately reflects the service I personally performed and the decisions made by me.    Misyt Bae MD  Infectious Diseases  01/24/18  10:12 AM

## 2018-01-23 NOTE — NURSING NOTE
Contacted Gita and made aware of patient moving to room 320 from [address] and she stated she would let rest of family know

## 2018-01-23 NOTE — PROGRESS NOTES
Discharge Planning Assessment   Nabeel     Patient Name: Jhonny Washington  MRN: [id number]  Today's Date: 1/23/2018    Admit Date: 1/15/2018          Discharge Needs Assessment     None            Discharge Plan       01/23/18 1409    Case Management/Social Work Plan    Plan SS spoke with [place] with [facility] on this date.  Per [facility] can not accept pt due to admitting criteria changes and pt not having a 3 night stay in CCU.  Pt, family and Physician agreeable to outside [facility] in [address].  SS [facility]  SS will follow.    Patient/Family In Agreement With Plan yes        Discharge Placement     No information found        Expected Discharge Date and Time     Expected Discharge Date Expected Discharge Time    Jan 25, 2018               Demographic Summary     None            Functional Status     None            Psychosocial     None            Abuse/Neglect     None            Legal     None            Substance Abuse     None            Patient Forms     None          Amber Jimenes

## 2018-01-24 LAB
ALBUMIN SERPL-MCNC: 2.5 G/DL (ref 3.4–4.8)
ALBUMIN/GLOB SERPL: 0.9 G/DL (ref 1.5–2.5)
ALP SERPL-CCNC: 111 U/L (ref 40–129)
ALT SERPL W P-5'-P-CCNC: 23 U/L (ref 10–44)
ANION GAP SERPL CALCULATED.3IONS-SCNC: 3.7 MMOL/L (ref 3.6–11.2)
ANISOCYTOSIS BLD QL: NORMAL
AST SERPL-CCNC: 37 U/L (ref 10–34)
BACTERIA SPEC AEROBE CULT: ABNORMAL
BASOPHILS # BLD AUTO: 0.04 10*3/MM3 (ref 0–0.3)
BASOPHILS NFR BLD AUTO: 0.3 % (ref 0–2)
BILIRUB SERPL-MCNC: 0.6 MG/DL (ref 0.2–1.8)
BUN BLD-MCNC: 10 MG/DL (ref 7–21)
BUN/CREAT SERPL: 18.2 (ref 7–25)
CALCIUM SPEC-SCNC: 7.5 MG/DL (ref 7.7–10)
CHLORIDE SERPL-SCNC: 110 MMOL/L (ref 99–112)
CO2 SERPL-SCNC: 22.3 MMOL/L (ref 24.3–31.9)
CREAT BLD-MCNC: 0.55 MG/DL (ref 0.43–1.29)
DEPRECATED RDW RBC AUTO: 33 FL (ref 37–54)
EOSINOPHIL # BLD AUTO: 0.27 10*3/MM3 (ref 0–0.7)
EOSINOPHIL NFR BLD AUTO: 2 % (ref 0–5)
ERYTHROCYTE [DISTWIDTH] IN BLOOD BY AUTOMATED COUNT: 16.8 % (ref 11.5–14.5)
GFR SERPL CREATININE-BSD FRML MDRD: >150 ML/MIN/1.73
GLOBULIN UR ELPH-MCNC: 2.8 GM/DL
GLUCOSE BLD-MCNC: 141 MG/DL (ref 70–110)
GRAM STN SPEC: ABNORMAL
HCT VFR BLD AUTO: 26.7 % (ref 42–52)
HGB BLD-MCNC: 8.6 G/DL (ref 14–18)
HYPOCHROMIA BLD QL: NORMAL
IMM GRANULOCYTES # BLD: 0.13 10*3/MM3 (ref 0–0.03)
IMM GRANULOCYTES NFR BLD: 1 % (ref 0–0.5)
LYMPHOCYTES # BLD AUTO: 2.12 10*3/MM3 (ref 1–3)
LYMPHOCYTES NFR BLD AUTO: 16.1 % (ref 21–51)
MCH RBC QN AUTO: 19.7 PG (ref 27–33)
MCHC RBC AUTO-ENTMCNC: 32.2 G/DL (ref 33–37)
MCV RBC AUTO: 61.1 FL (ref 80–94)
MICROCYTES BLD QL: NORMAL
MONOCYTES # BLD AUTO: 1.27 10*3/MM3 (ref 0.1–0.9)
MONOCYTES NFR BLD AUTO: 9.6 % (ref 0–10)
NEUTROPHILS # BLD AUTO: 9.35 10*3/MM3 (ref 1.4–6.5)
NEUTROPHILS NFR BLD AUTO: 71 % (ref 30–70)
OSMOLALITY SERPL CALC.SUM OF ELEC: 273.4 MOSM/KG (ref 273–305)
PLATELET # BLD AUTO: 532 10*3/MM3 (ref 130–400)
PMV BLD AUTO: 10.6 FL (ref 6–10)
POTASSIUM BLD-SCNC: 3.7 MMOL/L (ref 3.5–5.3)
PROT SERPL-MCNC: 5.3 G/DL (ref 6–8)
RBC # BLD AUTO: 4.37 10*6/MM3 (ref 4.7–6.1)
SMALL PLATELETS BLD QL SMEAR: NORMAL
SODIUM BLD-SCNC: 136 MMOL/L (ref 135–153)
WBC NRBC COR # BLD: 13.18 10*3/MM3 (ref 4.5–12.5)

## 2018-01-24 PROCEDURE — 94799 UNLISTED PULMONARY SVC/PX: CPT

## 2018-01-24 PROCEDURE — 85007 BL SMEAR W/DIFF WBC COUNT: CPT | Performed by: PHYSICIAN ASSISTANT

## 2018-01-24 PROCEDURE — 80053 COMPREHEN METABOLIC PANEL: CPT | Performed by: PHYSICIAN ASSISTANT

## 2018-01-24 PROCEDURE — 25010000002 HEPARIN (PORCINE) PER 1000 UNITS: Performed by: PHYSICIAN ASSISTANT

## 2018-01-24 PROCEDURE — 85025 COMPLETE CBC W/AUTO DIFF WBC: CPT | Performed by: PHYSICIAN ASSISTANT

## 2018-01-24 RX ORDER — MAGNESIUM SULFATE HEPTAHYDRATE 40 MG/ML
4 INJECTION, SOLUTION INTRAVENOUS ONCE
Status: COMPLETED | OUTPATIENT
Start: 2018-01-24 | End: 2018-01-24

## 2018-01-24 RX ADMIN — OXYCODONE HYDROCHLORIDE 5 MG: 5 TABLET ORAL at 12:11

## 2018-01-24 RX ADMIN — Medication 1 TABLET: at 07:46

## 2018-01-24 RX ADMIN — HEPARIN SODIUM 5000 UNITS: 5000 INJECTION, SOLUTION INTRAVENOUS; SUBCUTANEOUS at 12:10

## 2018-01-24 RX ADMIN — CEFDINIR 300 MG: 300 CAPSULE ORAL at 07:46

## 2018-01-24 RX ADMIN — OXYCODONE HYDROCHLORIDE 5 MG: 5 TABLET ORAL at 21:40

## 2018-01-24 RX ADMIN — CEFDINIR 300 MG: 300 CAPSULE ORAL at 20:00

## 2018-01-24 RX ADMIN — OXYCODONE HYDROCHLORIDE 5 MG: 5 TABLET ORAL at 03:34

## 2018-01-24 RX ADMIN — Medication 1 CAPSULE: at 07:45

## 2018-01-24 RX ADMIN — OXYCODONE HYDROCHLORIDE 5 MG: 5 TABLET ORAL at 07:45

## 2018-01-24 RX ADMIN — MAGNESIUM SULFATE IN WATER 4 G: 40 INJECTION, SOLUTION INTRAVENOUS at 07:46

## 2018-01-24 RX ADMIN — ACETAMINOPHEN 650 MG: 325 TABLET ORAL at 06:38

## 2018-01-24 RX ADMIN — HEPARIN SODIUM 5000 UNITS: 5000 INJECTION, SOLUTION INTRAVENOUS; SUBCUTANEOUS at 20:00

## 2018-01-24 RX ADMIN — HEPARIN SODIUM 5000 UNITS: 5000 INJECTION, SOLUTION INTRAVENOUS; SUBCUTANEOUS at 05:35

## 2018-01-24 RX ADMIN — OXYCODONE HYDROCHLORIDE 5 MG: 5 TABLET ORAL at 16:33

## 2018-01-24 NOTE — PROGRESS NOTES
"     LOS: 9 days     Chief Complaint:  Rhabdomyolysis/Decubitus Ulcers with Sepsis    Subjective     Interval History:   He has no major changes over the last 24 hours.  He complains of ongoing pain in the sacral decubitus region consistent with previous surgical debridement however no progressive symptoms at this point.  Patient does have chronic low back pain. Preliminary wound culture consistent with Proteus Vulgaris and Escherichia/Citrobacter, ID following.  Labs and vitals stable.  following and he is not a candidate for [facility] admission.     Objective     Vital Signs  /78  Pulse 60  Temp 97.9 °F (36.6 °C) (Oral)   Resp 18  Ht 182 cm (71.65\")  Wt 76.4 kg (168 lb 6.9 oz)  SpO2 98%  BMI 23.06 kg/m2  Intake & Output (last day)       01/23 0701 - 01/24 0700 01/24 0701 - 01/25 0700    P.O.      IV Piggyback      Total Intake(mL/kg)      Urine (mL/kg/hr) 1200 (0.7)     Stool 500 (0.3)     Total Output 1700      Net -1700            Unmeasured Stool Occurrence 1 x             Physical Exam:     General Appearance:    Alert, cooperative, in no acute distress   Head:    Normocephalic, without obvious abnormality, atraumatic   Eyes:            Lids and lashes normal, conjunctivae and sclerae normal, no   icterus, no pallor, corneas clear, PERRLA   Ears:    Ears appear intact with no abnormalities noted       Neck:   No adenopathy, supple, trachea midline, no thyromegaly, no   carotid bruit, no JVD   Lungs:     Scattered Coarse Crackles bilaterally R>>L ,respirations regular, even and                  unlabored    Heart:    Regular rhythm and normal rate, normal S1 and S2, no            murmur, no gallop, no rub, no click   Chest Wall:    No abnormalities observed   Abdomen:     Normal bowel sounds, no masses, no organomegaly, soft        non-tender, non-distended, no guarding, no rebound                tenderness   Extremities:   Moves all extremities well, no edema, no cyanosis, no             " redness   Pulses:   Pulses palpable and equal bilaterally   Skin:   No bleeding, bruising or rash, Sacral decubitus ulcer with dressing in place and right trochanteric ulcer with eschar   Lymph nodes:   No palpable adenopathy   Neurologic:   Cranial nerves 2 - 12 grossly intact, sensation intact, DTR       present and equal bilaterally        Results Review:    Lab Results   Component Value Date    WBC 13.18 (H) 01/24/2018    HGB 8.6 (L) 01/24/2018    HCT 26.7 (L) 01/24/2018    MCV 61.1 (L) 01/24/2018     (H) 01/24/2018       Lab Results   Component Value Date    GLUCOSE 141 (H) 01/24/2018    BUN 10 01/24/2018    CREATININE 0.55 01/24/2018    EGFRIFNONA >150 01/24/2018    BCR 18.2 01/24/2018     01/24/2018    K 3.7 01/24/2018     01/24/2018    CO2 22.3 (L) 01/24/2018    CALCIUM 7.5 (L) 01/24/2018    PROTENTOTREF 7.2 11/12/2015    ALBUMIN 2.50 (L) 01/24/2018    LABIL2 0.9 (L) 01/24/2018    AST 37 (H) 01/24/2018    ALT 23 01/24/2018     Lab Results   Component Value Date    INR 0.94 11/03/2015    INR <0.90 10/25/2015    INR <0.90 06/01/2015       No results found for: POCGLU       Medication Review:     Current Facility-Administered Medications:   •  acetaminophen (TYLENOL) tablet 650 mg, 650 mg, Oral, Q6H PRN, JARETH Condon, 650 mg at 01/24/18 0638  •  cefdinir (OMNICEF) capsule 300 mg, 300 mg, Oral, Q12H, iMsty Bae MD, 300 mg at 01/23/18 2134  •  heparin (porcine) 5000 UNIT/ML injection 5,000 Units, 5,000 Units, Subcutaneous, Q8H, JARETH Condon, 5,000 Units at 01/24/18 0550  •  HYDROmorphone (DILAUDID) injection 1 mg, 1 mg, Intravenous, Once, José Miguel Li MD  •  lactated ringers infusion, 100 mL/hr, Intravenous, Continuous, Mohan Santoyo MD  •  lactated ringers infusion, 125 mL/hr, Intravenous, Continuous, Terrence Montero MD, Stopped at 01/19/18 1251  •  lactobacillus acidophilus (RISAQUAD) capsule 1 capsule, 1 capsule, Oral, Daily, Camila Haley, [facility], 1 capsule  at 01/23/18 0718  •  Magnesium Sulfate 2 gram Bolus, followed by 8 gram infusion (total Mg dose 10 grams)- Mg less than or equal to 1mg/dL, 2 g, Intravenous, PRN **OR** Magnesium Sulfate 6 gram Infusion (2 gm x 3) -Mg 1.1 -1.5 mg/dL, 2 g, Intravenous, PRN **OR** magnesium sulfate 4 gram infusion- Mg 1.6-1.9 mg/dL, 4 g, Intravenous, PRN, Samuel Duane Kreis, MD  •  magnesium sulfate 4 gram infusion- Mg 1.6-1.9 mg/dL, 4 g, Intravenous, Once, Samuel Duane Kreis, MD  •  multivitamin (DAILY MINH) tablet 1 tablet, 1 tablet, Oral, Daily, JARETH Condon, 1 tablet at 01/23/18 0718  •  ondansetron (ZOFRAN) tablet 4 mg, 4 mg, Oral, Q6H PRN **OR** ondansetron ODT (ZOFRAN-ODT) disintegrating tablet 4 mg, 4 mg, Oral, Q6H PRN **OR** ondansetron (ZOFRAN) injection 4 mg, 4 mg, Intravenous, Q6H PRN, Samuel Duane Kreis, MD, 4 mg at 01/22/18 2126  •  oxyCODONE (ROXICODONE) immediate release tablet 5 mg, 5 mg, Oral, Q4H PRN, Samuel Duane Kreis, MD, 5 mg at 01/24/18 0334  •  potassium chloride (MICRO-K) CR capsule 40 mEq, 40 mEq, Oral, PRN **OR** potassium chloride (KLOR-CON) packet 40 mEq, 40 mEq, Oral, PRN **OR** potassium chloride 10 mEq in 100 mL IVPB, 10 mEq, Intravenous, Q1H PRN, Samuel Duane Kreis, MD  •  Insert peripheral IV, , , Once **AND** sodium chloride 0.9 % flush 10 mL, 10 mL, Intravenous, PRN, Jesus Carreon PA-C, 10 mL at 01/21/18 1506      Assessment/Plan   Probable Aspiration Pneumonia with Sepsis  Sacral Decubitus Ulcer   Decubitus ulceration of the hip  Hyperbilirubinemia (resolved)  Rhabdomyolysis (resolved)  Metabolic encephalopathy due to sepsis (resolved)  Multiple myeloma  Chronic opioid use  Protein Malnutrition  Fever (resolved)      Appreciate surgery and ID input     Continue with Cefepime + Flagyl + Vancomycin, await final wound culture, ID following    Oxycodone 5 mg q 4 hours prn pain     Tylenol prn fever     Continue with wound care     Heparin for DVT prophylaxis    O2 via NC to maintain O2 sats  >90%    Continue with PT/OT, S/S following for discharge to LTAC for wound care/recovery    Continue K+/Mg replacement protocol,  MVI daily    JARETH Condon  01/24/18  7:44 AM       He is now on oral antibiotics.  Feeling a little better overall.  Strength is a little better.  He continues to hope to rehabilitate himself and ultimately return home.  He seems much more motivated to get better.  At this point his lungs are clear bilaterally.  Cardiac exam reveals regular rate and regular rhythm.  He continues to require wound care for his buttocks.  He will require either LTAC or SNF admission for rehabilitation and wound care.  Continue Omnicef.    Samuel Duane Kreis, MD  01/24/18  8:33 AM

## 2018-01-24 NOTE — PROGRESS NOTES
Discharge Planning Assessment   Nabeel     Patient Name: Jhonny Washington  MRN: [id number]  Today's Date: 1/24/2018    Admit Date: 1/15/2018          Discharge Needs Assessment     None            Discharge Plan       01/24/18 1138    Case Management/Social Work Plan    Plan SS spoke with Anjum at [facility] in [place] who stated pt can not be accepted due to being a blended pt and new Medicare guidelines.  SS spoke with Clare at [facility] in [place] who also stated pt can not be accepted to [facility]. SS faxed pt information to [facility] on this date for review for possible admit. SS will follow.    Patient/Family In Agreement With Plan yes        Discharge Placement     No information found        Expected Discharge Date and Time     Expected Discharge Date Expected Discharge Time    Jan 25, 2018               Demographic Summary     None            Functional Status     None            Psychosocial     None            Abuse/Neglect     None            Legal     None            Substance Abuse     None            Patient Forms     None          Amber Jimenes

## 2018-01-24 NOTE — SIGNIFICANT NOTE
01/24/18 1441   Rehab Treatment   Discipline physical therapist   Treatment Not Performed patient/family decline treatment, pt not feeling well  (pt declined PT in AM and PM x multiple attempts)

## 2018-01-24 NOTE — PLAN OF CARE
Problem: Pressure Ulcer Risk (Brien Scale) (Adult,Obstetrics,Pediatric)  Goal: Identify Related Risk Factors and Signs and Symptoms  Outcome: Ongoing (interventions implemented as appropriate)

## 2018-01-24 NOTE — DISCHARGE PLACEMENT REQUEST
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  fracture or malalignment of the lumbar spine is identified.        Impression:       No acute fracture of the lumbar spine.      This report was finalized on 1/15/2018 1:16 PM by Dr. Carlos Jiménez MD.             Assessment/Plan   Rhabdomyolysis  Fever  Sacral Decubitus Ulcer/Hip Ulcer  Hyperbilirubinemia  Altered Mental Status  HX Multiple Myeloma  Chronic Neck Pain/Low Back Pain    Consult Surgery for sacral decubitus/wound care    Consult ID for fever and antibiotic management    Hold Oxycodone and Valium secondary to altered mental status    Check RUQ U/S secondary to hyperbilirubinemia and urine myoglobin    Repeat LFT's this AM    Continue with IVF @ 150 ml/hour    Tylenol prn fever    Wound care consult    Lovenox for DVT prophylaxis    Start O2 via NC to maintain O2 sats >90%    CBC, CMP daily    JARETH Condon  01/16/18  6:39 AM    I have seen this patient today and agree with the above note.  This patient was admitted to my service to the emergency department where supposedly he is a patient of mine.  However, he has never been seen in my clinic.  He sees a Dr. Orozco in [place].  This patient has a rather complex history with history of myeloma.  He takes chronic  opioid medications for this.  He states that he has not been ambulatory for at least a week or 2 since falling out of the bed.  He states he hurt his lower back when this happened.  He was noted to have a CT of the lumbar spine revealing no acute fracture.  He was found to have a large sacral decubitus ulcer.  He was also noted to have acute rhabdomyolysis and was febrile overnight.  I have examined the patient and agree with the above note.  He is chronically ill in appearance.  Thin and frail.  He is able to answer questions but is a little bit somnolent.  Lung exam reveals clear to auscultation bilaterally cardiac exam reveals regular rate and regular rhythm no murmur, rub or gallop.  Abdominal exam is benign.  Extremities no cyanosis, clubbing or edema.  He is able to move both lower extremities.  He has a large decubitus ulceration and please refer to nursing documentation for further details.  Is noted to have a white count when he came in.    Impression:  Sepsis due to decubitus ulceration.  Decubitus ulceration of the hip  Hyperbilirubinemia  Rhabdomyolysis  Metabolic encephalopathy due to sepsis  Multiplemyeloma  Chronic opioid use      Plan:  ID consult.  Start cefepime, Flagyl and vancomycin    Surgical consultation and wound care consultation  IV fluid hydration for abdomen mild lysis.  Monitor renal function.  Monitor CPK to ensure the trend is downward    Subcutaneous heparin for DVT prophylaxis    Discontinue Valium and discontinue high-dose oxycodone.  Start oxycodone 5 mg every 4 hours when necessary for pain.    Monitor LFTs.  Probably elevated due to sepsis with hypotension    Oxygen to maintain sat greater than 90%    Xray bilateral hips / pelvis    Samuel Duane Kreis, MD  01/16/18  1:36 PM             Electronically signed by Samuel Duane Kreis, MD at 1/16/2018  1:37 PM        Vital Signs (last 24 hours)       01/23 0700  -  01/24 0659 01/24 0700  -  01/24 1116   Most Recent    Temp (°F) 97.9 -   98.1       97.9 (36.6)    Heart Rate 60 -  84       60    Resp 16 -  18       18    /78 -  140/97       108/78    SpO2 (%) 92 -  99      98     98          Prior to Admission Medications     Prescriptions Last Dose Informant Patient Reported? Taking?    diazePAM (VALIUM) 10 MG tablet 1/15/2018  Yes Yes    Take 10 mg by mouth 2 (Two) Times a Day As Needed for Anxiety.    oxyCODONE (ROXICODONE) 20 MG tablet 1/15/2018 Pharmacy Yes Yes    Take 20 mg by mouth Every 6 (Six) Hours As Needed for Moderate Pain .          Hospital Medications (active)       Dose Frequency Start End    acetaminophen (TYLENOL) tablet 650 mg 650 mg Every 6 Hours PRN 1/16/2018     Sig - Route: Take 2 tablets by mouth Every 6 (Six) Hours As Needed for Mild Pain . - Oral    Cosign for Ordering: Accepted by Samuel Duane Kreis, MD on 1/16/2018  1:17 PM    cefdinir (OMNICEF) capsule 300 mg 300 mg Every 12 Hours Scheduled 1/23/2018 2/2/2018    Sig - Route: Take 1 capsule by mouth Every 12 (Twelve) Hours. - Oral    heparin (porcine) 5000 UNIT/ML injection 5,000 Units 5,000 Units Every 8 Hours Scheduled 1/16/2018     Sig - Route: Inject 1 mL under the skin Every 8 (Eight) Hours. - Subcutaneous    Cosign for Ordering: Accepted by Samuel Duane Kreis, MD on 1/16/2018  1:17 PM    HYDROmorphone (DILAUDID) injection 1 mg 1 mg Once 1/15/2018     Sig - Route: Infuse 1 mL into a venous catheter 1 (One) Time. - Intravenous    lactated ringers infusion 100 mL/hr Continuous 1/19/2018     Sig - Route: Infuse 100 mL/hr into a venous catheter Continuous. - Intravenous    lactated ringers infusion 125 mL/hr Continuous 1/19/2018     Sig - Route: Infuse 125 mL/hr into a venous catheter Continuous. - Intravenous    lactobacillus acidophilus (RISAQUAD) capsule 1 capsule 1 capsule Daily 1/18/2018     Sig - Route: Take 1 capsule by mouth Daily. - Oral    Magnesium Sulfate 2 gram Bolus, followed by 8 gram infusion (total Mg dose 10 grams)- Mg less than or equal to 1mg/dL  "2 g As Needed 1/21/2018     Sig - Route: Infuse 50 mL into a venous catheter As Needed (Mg less than or equal to 1mg/dL). - Intravenous    Linked Group 1:  \"Or\" Linked Group Details        magnesium sulfate 4 gram infusion- Mg 1.6-1.9 mg/dL 4 g As Needed 1/21/2018     Sig - Route: Infuse 100 mL into a venous catheter As Needed (Mg 1.6-1.9 mg/dL). - Intravenous    Linked Group 1:  \"Or\" Linked Group Details        magnesium sulfate 4 gram infusion- Mg 1.6-1.9 mg/dL 4 g Once 1/24/2018     Sig - Route: Infuse 100 mL into a venous catheter 1 (One) Time. - Intravenous    Magnesium Sulfate 6 gram Infusion (2 gm x 3) -Mg 1.1 -1.5 mg/dL 2 g As Needed 1/21/2018     Sig - Route: Infuse 50 mL into a venous catheter As Needed (Mg 1.1 -1.5 mg/dL). - Intravenous    Linked Group 1:  \"Or\" Linked Group Details        multivitamin (DAILY MINH) tablet 1 tablet 1 tablet Daily 1/22/2018     Sig - Route: Take 1 tablet by mouth Daily. - Oral    Cosign for Ordering: Required by Samuel Duane Kreis, MD    ondansetron (ZOFRAN) injection 4 mg 4 mg Every 6 Hours PRN 1/21/2018     Sig - Route: Infuse 2 mL into a venous catheter Every 6 (Six) Hours As Needed for Nausea or Vomiting. - Intravenous    Linked Group 2:  \"Or\" Linked Group Details        ondansetron (ZOFRAN) tablet 4 mg 4 mg Every 6 Hours PRN 1/21/2018     Sig - Route: Take 1 tablet by mouth Every 6 (Six) Hours As Needed for Nausea or Vomiting. - Oral    Linked Group 2:  \"Or\" Linked Group Details        ondansetron ODT (ZOFRAN-ODT) disintegrating tablet 4 mg 4 mg Every 6 Hours PRN 1/21/2018     Sig - Route: Take 1 tablet by mouth Every 6 (Six) Hours As Needed for Nausea or Vomiting. - Oral    Linked Group 2:  \"Or\" Linked Group Details        oxyCODONE (ROXICODONE) immediate release tablet 5 mg 5 mg Every 4 Hours PRN 1/16/2018 1/26/2018    Sig - Route: Take 1 tablet by mouth Every 4 (Four) Hours As Needed for Moderate Pain . - Oral    potassium chloride (KLOR-CON) packet 40 mEq 40 mEq As " "Needed 1/21/2018     Sig - Route: Take 40 mEq by mouth As Needed (potassium replacement, see admin instructions). - Oral    Linked Group 3:  \"Or\" Linked Group Details        potassium chloride (MICRO-K) CR capsule 40 mEq 40 mEq As Needed 1/21/2018     Sig - Route: Take 4 capsules by mouth As Needed (potassium replacement.  see admin instructions). - Oral    Linked Group 3:  \"Or\" Linked Group Details        potassium chloride 10 mEq in 100 mL IVPB 10 mEq Every 1 Hour PRN 1/21/2018     Sig - Route: Infuse 100 mL into a venous catheter Every 1 (One) Hour As Needed (potassium protocol PERIPHERAL - see admin instructions). - Intravenous    Linked Group 3:  \"Or\" Linked Group Details        sodium chloride 0.9 % flush 10 mL 10 mL As Needed 1/15/2018     Sig - Route: Infuse 10 mL into a venous catheter As Needed for Line Care. - Intravenous    Cosign for Ordering: Accepted by José Miguel Li MD on 1/15/2018  1:21 PM    Linked Group 4:  \"And\" Linked Group Details        magnesium sulfate 4 gram infusion- Mg 1.6-1.9 mg/dL (Discontinued) 4 g Once 1/22/2018 1/24/2018    Sig - Route: Infuse 100 mL into a venous catheter 1 (One) Time. - Intravenous            Lab Results (last 24 hours)     Procedure Component Value Units Date/Time    Comprehensive Metabolic Panel [[id number]]  (Abnormal) Collected:  01/24/18 0032    Specimen:  Blood Updated:  01/24/18 0232     Glucose 141 (H) mg/dL      BUN 10 mg/dL      Creatinine 0.55 mg/dL      Sodium 136 mmol/L      Potassium 3.7 mmol/L      Chloride 110 mmol/L      CO2 22.3 (L) mmol/L      Calcium 7.5 (L) mg/dL      Total Protein 5.3 (L) g/dL      Albumin 2.50 (L) g/dL      ALT (SGPT) 23 U/L      AST (SGOT) 37 (H) U/L      Alkaline Phosphatase 111 U/L       Note New Reference Ranges        Total Bilirubin 0.6 mg/dL      eGFR Non African Amer >150 mL/min/1.73      Globulin 2.8 gm/dL      A/G Ratio 0.9 (L) g/dL      BUN/Creatinine Ratio 18.2     Anion Gap 3.7 mmol/L     Osmolality, " Calculated [[id number]]  (Normal) Collected:  01/24/18 0032    Specimen:  Blood Updated:  01/24/18 0232     Osmolality Calc 273.4 mOsm/kg     CBC & Differential [[id number]] Collected:  01/24/18 0032    Specimen:  Blood Updated:  01/24/18 0235    Narrative:       The following orders were created for panel order CBC & Differential.  Procedure                               Abnormality         Status                     ---------                               -----------         ------                     Scan Slide[[id number]]                                       Final result               CBC Auto Differential[[id number]]        Abnormal            Final result                 Please view results for these tests on the individual orders.    CBC Auto Differential [[id number]]  (Abnormal) Collected:  01/24/18 0032    Specimen:  Blood Updated:  01/24/18 0235     WBC 13.18 (H) 10*3/mm3      RBC 4.37 (L) 10*6/mm3      Hemoglobin 8.6 (L) g/dL      Hematocrit 26.7 (L) %      MCV 61.1 (L) fL      MCH 19.7 (L) pg      MCHC 32.2 (L) g/dL      RDW 16.8 (H) %      RDW-SD 33.0 (L) fl      MPV 10.6 (H) fL      Platelets 532 (H) 10*3/mm3      Neutrophil % 71.0 (H) %      Lymphocyte % 16.1 (L) %      Monocyte % 9.6 %      Eosinophil % 2.0 %      Basophil % 0.3 %      Immature Grans % 1.0 (H) %      Neutrophils, Absolute 9.35 (H) 10*3/mm3      Lymphocytes, Absolute 2.12 10*3/mm3      Monocytes, Absolute 1.27 (H) 10*3/mm3      Eosinophils, Absolute 0.27 10*3/mm3      Basophils, Absolute 0.04 10*3/mm3      Immature Grans, Absolute 0.13 (H) 10*3/mm3     Scan Slide [[id number]] Collected:  01/24/18 0032    Specimen:  Blood Updated:  01/24/18 0235     Anisocytosis Slight/1+     Hypochromia Mod/2+     Microcytes Large/3+     Platelet Estimate Increased        Operative/Procedure Notes (last 24 hours) (Notes from 1/23/2018 11:16 AM through 1/24/2018 11:16 AM)     No notes of this type exist for this encounter.           Physician Progress Notes  "(last 24 hours) (Notes from 1/23/2018 11:16 AM through 1/24/2018 11:16 AM)      Misty Bae MD at 1/23/2018 12:16 PM  Version 2 of 2           I have personally seen and examined the patient today and discussed overnight interval progress and pertinent issues with nursing staff.    Subjective:    Overall patient shows significant clinical improvement.  Culture showing growth of Proteus with almost normal CRP level.     Awaiting possible transfer to [facility].  Patient may need repeat debridement if transferred to [facility].    History taken from: patient chart family RN      Vital Signs    /76 (BP Location: Left arm, Patient Position: Lying)  Pulse 62  Temp 98.1 °F (36.7 °C) (Oral)   Resp 18  Ht 182 cm (71.65\")  Wt 76.4 kg (168 lb 6.9 oz)  SpO2 99%  BMI 23.06 kg/m2    Temp:  [98 °F (36.7 °C)-98.2 °F (36.8 °C)] 98.1 °F (36.7 °C)      Intake/Output Summary (Last 24 hours) at 01/23/18 1216  Last data filed at 01/23/18 0320   Gross per 24 hour   Intake              860 ml   Output              950 ml   Net              -90 ml     Intake & Output (last 3 days)       01/20 0701 - 01/21 0700 01/21 0701 - 01/22 0700 01/22 0701 - 01/23 0700 01/23 0701 - 01/24 0700    P.O. 1160 240 600     I.V. (mL/kg)        IV Piggyback 500 100 500     Total Intake(mL/kg) 1660 (21.7) 340 (4.5) 1100 (14.4)     Urine (mL/kg/hr) 1700 (0.9) 1350 (0.7) 1325 (0.7)     Total Output 1700 1350 1325      Net -40 -1010 -225              Unmeasured Urine Occurrence  1 x          Physical Exam:       General Appearance:    Alert, cooperative, in no acute distress, thin, frail, daughter at bedside    Head:    Normocephalic, without obvious abnormality, atraumatic   Eyes:            Lids and lashes normal, conjunctivae and sclerae normal, no   icterus, no pallor, corneas clear, PERRLA   Ears:    Ears appear intact with no abnormalities noted   Throat:   No oral lesions, no thrush, oral mucosa moist   Neck:   No adenopathy, supple, trachea " midline, no thyromegaly, no   carotid bruit, no JVD   Back:     No tenderness to percussion or palpation, range of motion   normal   Lungs:    coarse breath sounds to right.    Heart:    Regular rhythm and normal rate, normal S1 and S2, no            murmur, no gallop, no rub, no click   Chest Wall:    No abnormalities observed   Abdomen:     Normal bowel sounds, no masses, no organomegaly, soft        non-tender, non-distended, no guarding, no rebound                tenderness   Rectal:     Deferred   Extremities:   Moves all extremities well, no edema, no cyanosis, no             redness   Pulses:   Pulses palpable and equal bilaterally   Skin:   Sacral decubitus ulcer and right hip ulcer    Lymph nodes:   No palpable adenopathy   Neurologic:   Awake, alert, Garbled speech        Results:      Results from last 7 days  Lab Units 01/23/18  0034 01/22/18  0126 01/21/18  0053 01/20/18  0054 01/19/18  0121 01/18/18  0036 01/17/18  0454   WBC 10*3/mm3 12.20 11.40 10.76 11.43 12.61* 9.73 10.08     Lab Results   Component Value Date    NEUTROABS 8.13 (H) 01/23/2018         Results from last 7 days  Lab Units 01/23/18  0034   CREATININE mg/dL 0.69         Results from last 7 days  Lab Units 01/22/18  0126 01/21/18  0053 01/19/18  0121   CRP mg/dL 1.20* 2.07* 4.83*       Imaging Results (last 24 hours)     ** No results found for the last 24 hours. **            Results Review:    I have personally reviewed laboratory data, culture results, radiology studies and antimicrobial therapy.    Hospital Medications (active)       Dose Frequency Start End    acetaminophen (TYLENOL) tablet 650 mg 650 mg Every 6 Hours PRN 1/16/2018     Sig - Route: Take 2 tablets by mouth Every 6 (Six) Hours As Needed for Mild Pain . - Oral    Cosign for Ordering: Accepted by Samuel Duane Kreis, MD on 1/16/2018  1:17 PM    cefepime (MAXIPIME) 2 g/100 mL 0.9% NS (mbp) 2 g Once 1/16/2018 1/16/2018    Sig - Route: Infuse 100 mL into a venous catheter 1  "(One) Time. - Intravenous    Cosign for Ordering: Required by Misty Bae MD    cefepime (MAXIPIME) 2 g/100 mL 0.9% NS (mbp) 2 g Every 12 Hours 1/16/2018 1/23/2018    Sig - Route: Infuse 100 mL into a venous catheter Every 12 (Twelve) Hours. - Intravenous    Cosign for Ordering: Required by Misty Bae MD    heparin (porcine) 5000 UNIT/ML injection 5,000 Units 5,000 Units Every 8 Hours Scheduled 1/16/2018     Sig - Route: Inject 1 mL under the skin Every 8 (Eight) Hours. - Subcutaneous    Cosign for Ordering: Accepted by Samuel Duane Kreis, MD on 1/16/2018  1:17 PM    HYDROmorphone (DILAUDID) injection 1 mg 1 mg Once 1/15/2018     Sig - Route: Infuse 1 mL into a venous catheter 1 (One) Time. - Intravenous    metroNIDAZOLE (FLAGYL) IVPB 500 mg 500 mg Every 8 Hours 1/16/2018 1/23/2018    Sig - Route: Infuse 100 mL into a venous catheter Every 8 (Eight) Hours. - Intravenous    Cosign for Ordering: Required by Misty Bae MD    oxyCODONE (ROXICODONE) immediate release tablet 5 mg 5 mg Every 4 Hours PRN 1/16/2018 1/26/2018    Sig - Route: Take 1 tablet by mouth Every 4 (Four) Hours As Needed for Moderate Pain . - Oral    Pharmacy to dose vancomycin  Continuous PRN 1/16/2018 1/23/2018    Sig - Route: Continuous As Needed for Consult. - Does not apply    Cosign for Ordering: Required by Misty Bae MD    sodium chloride 0.9 % flush 10 mL 10 mL As Needed 1/15/2018     Sig - Route: Infuse 10 mL into a venous catheter As Needed for Line Care. - Intravenous    Cosign for Ordering: Accepted by José Miguel Li MD on 1/15/2018  1:21 PM    Linked Group 1:  \"And\" Linked Group Details        sodium chloride 0.9 % infusion 150 mL/hr Continuous 1/15/2018     Sig - Route: Infuse 150 mL/hr into a venous catheter Continuous. - Intravenous    vancomycin (VANCOCIN) 1,000 mg in sodium chloride 0.9 % 250 mL IVPB 1,000 mg Every 12 Hours 1/16/2018 1/23/2018    Sig - Route: Infuse 1,000 mg into a venous " catheter Every 12 (Twelve) Hours. - Intravenous            Cultures:    Blood Culture   Date Value Ref Range Status   01/15/2018 No growth at 24 hours  Preliminary   01/15/2018 No growth at 24 hours  Preliminary     Urine Culture   Date Value Ref Range Status   01/15/2018 No growth at 24 hours  Preliminary           Assessment/Plan     ASSESSMENT:    1.  Severe sepsis with encephalopathy  2.  Aspiration pneumonia     PLAN:    Overall patient shows significant clinical improvement.  Culture showing growth of Proteus with almost normal CRP level.     Antibiotic therapy was de-escalated to Omnicef 300 mg twice a day through 2/1/18.  Vancomycin, cefepime and Flagyl were discontinued.    Awaiting possible transfer to [facility].  Patient may need repeat debridement if transferred to [facility].     CRP ordered for a.m.     Patient's findings and recommendations were discussed with patient, family and nursing staff    Code Status: Full Code     Scribed for JOSÉ MIGUEL Knott  01/23/18  12:16 PM      Physician Attestation:    The documentation recorded by the scribe accurately reflects the service I personally performed and the decisions made by me.    Misty Bae MD  Infectious Diseases  01/24/18  10:12 AM       Electronically signed by Misty Bae MD at 1/24/2018 10:12 AM      JOSÉ MIGUEL Love at 1/23/2018 12:16 PM  Version 1 of 2           I have personally seen and examined the patient today and discussed overnight interval progress and pertinent issues with nursing staff.    Subjective:    Overall patient shows significant clinical improvement.  Culture showing growth of Proteus with almost normal CRP level.     Awaiting possible transfer to [facility].  Patient may need repeat debridement if transferred to [facility].    History taken from: patient chart family RN      Vital Signs    /76 (BP Location: Left arm, Patient Position: Lying)  Pulse 62  Temp 98.1 °F (36.7 °C) (Oral)   Resp 18  Ht 182 cm  "(71.65\")  Wt 76.4 kg (168 lb 6.9 oz)  SpO2 99%  BMI 23.06 kg/m2    Temp:  [98 °F (36.7 °C)-98.2 °F (36.8 °C)] 98.1 °F (36.7 °C)      Intake/Output Summary (Last 24 hours) at 01/23/18 1216  Last data filed at 01/23/18 0320   Gross per 24 hour   Intake              860 ml   Output              950 ml   Net              -90 ml     Intake & Output (last 3 days)       01/20 0701 - 01/21 0700 01/21 0701 - 01/22 0700 01/22 0701 - 01/23 0700 01/23 0701 - 01/24 0700    P.O. 1160 240 600     I.V. (mL/kg)        IV Piggyback 500 100 500     Total Intake(mL/kg) 1660 (21.7) 340 (4.5) 1100 (14.4)     Urine (mL/kg/hr) 1700 (0.9) 1350 (0.7) 1325 (0.7)     Total Output 1700 1350 1325      Net -40 -1010 -225              Unmeasured Urine Occurrence  1 x          Physical Exam:       General Appearance:    Alert, cooperative, in no acute distress, thin, frail, daughter at bedside    Head:    Normocephalic, without obvious abnormality, atraumatic   Eyes:            Lids and lashes normal, conjunctivae and sclerae normal, no   icterus, no pallor, corneas clear, PERRLA   Ears:    Ears appear intact with no abnormalities noted   Throat:   No oral lesions, no thrush, oral mucosa moist   Neck:   No adenopathy, supple, trachea midline, no thyromegaly, no   carotid bruit, no JVD   Back:     No tenderness to percussion or palpation, range of motion   normal   Lungs:    coarse breath sounds to right.    Heart:    Regular rhythm and normal rate, normal S1 and S2, no            murmur, no gallop, no rub, no click   Chest Wall:    No abnormalities observed   Abdomen:     Normal bowel sounds, no masses, no organomegaly, soft        non-tender, non-distended, no guarding, no rebound                tenderness   Rectal:     Deferred   Extremities:   Moves all extremities well, no edema, no cyanosis, no             redness   Pulses:   Pulses palpable and equal bilaterally   Skin:   Sacral decubitus ulcer and right hip ulcer    Lymph nodes:   No " palpable adenopathy   Neurologic:   Awake, alert, Garbled speech        Results:      Results from last 7 days  Lab Units 01/23/18  0034 01/22/18  0126 01/21/18  0053 01/20/18  0054 01/19/18  0121 01/18/18  0036 01/17/18  0454   WBC 10*3/mm3 12.20 11.40 10.76 11.43 12.61* 9.73 10.08     Lab Results   Component Value Date    NEUTROABS 8.13 (H) 01/23/2018         Results from last 7 days  Lab Units 01/23/18  0034   CREATININE mg/dL 0.69         Results from last 7 days  Lab Units 01/22/18  0126 01/21/18  0053 01/19/18  0121   CRP mg/dL 1.20* 2.07* 4.83*       Imaging Results (last 24 hours)     ** No results found for the last 24 hours. **            Results Review:    I have personally reviewed laboratory data, culture results, radiology studies and antimicrobial therapy.    Hospital Medications (active)       Dose Frequency Start End    acetaminophen (TYLENOL) tablet 650 mg 650 mg Every 6 Hours PRN 1/16/2018     Sig - Route: Take 2 tablets by mouth Every 6 (Six) Hours As Needed for Mild Pain . - Oral    Cosign for Ordering: Accepted by Samuel Duane Kreis, MD on 1/16/2018  1:17 PM    cefepime (MAXIPIME) 2 g/100 mL 0.9% NS (mbp) 2 g Once 1/16/2018 1/16/2018    Sig - Route: Infuse 100 mL into a venous catheter 1 (One) Time. - Intravenous    Cosign for Ordering: Required by Misty Bae MD    cefepime (MAXIPIME) 2 g/100 mL 0.9% NS (mbp) 2 g Every 12 Hours 1/16/2018 1/23/2018    Sig - Route: Infuse 100 mL into a venous catheter Every 12 (Twelve) Hours. - Intravenous    Cosign for Ordering: Required by Misty Bae MD    heparin (porcine) 5000 UNIT/ML injection 5,000 Units 5,000 Units Every 8 Hours Scheduled 1/16/2018     Sig - Route: Inject 1 mL under the skin Every 8 (Eight) Hours. - Subcutaneous    Cosign for Ordering: Accepted by Samuel Duane Kreis, MD on 1/16/2018  1:17 PM    HYDROmorphone (DILAUDID) injection 1 mg 1 mg Once 1/15/2018     Sig - Route: Infuse 1 mL into a venous catheter 1 (One) Time. -  "Intravenous    metroNIDAZOLE (FLAGYL) IVPB 500 mg 500 mg Every 8 Hours 1/16/2018 1/23/2018    Sig - Route: Infuse 100 mL into a venous catheter Every 8 (Eight) Hours. - Intravenous    Cosign for Ordering: Required by Misty Bae MD    oxyCODONE (ROXICODONE) immediate release tablet 5 mg 5 mg Every 4 Hours PRN 1/16/2018 1/26/2018    Sig - Route: Take 1 tablet by mouth Every 4 (Four) Hours As Needed for Moderate Pain . - Oral    Pharmacy to dose vancomycin  Continuous PRN 1/16/2018 1/23/2018    Sig - Route: Continuous As Needed for Consult. - Does not apply    Cosign for Ordering: Required by Misty Bae MD    sodium chloride 0.9 % flush 10 mL 10 mL As Needed 1/15/2018     Sig - Route: Infuse 10 mL into a venous catheter As Needed for Line Care. - Intravenous    Cosign for Ordering: Accepted by José Miguel Li MD on 1/15/2018  1:21 PM    Linked Group 1:  \"And\" Linked Group Details        sodium chloride 0.9 % infusion 150 mL/hr Continuous 1/15/2018     Sig - Route: Infuse 150 mL/hr into a venous catheter Continuous. - Intravenous    vancomycin (VANCOCIN) 1,000 mg in sodium chloride 0.9 % 250 mL IVPB 1,000 mg Every 12 Hours 1/16/2018 1/23/2018    Sig - Route: Infuse 1,000 mg into a venous catheter Every 12 (Twelve) Hours. - Intravenous            Cultures:    Blood Culture   Date Value Ref Range Status   01/15/2018 No growth at 24 hours  Preliminary   01/15/2018 No growth at 24 hours  Preliminary     Urine Culture   Date Value Ref Range Status   01/15/2018 No growth at 24 hours  Preliminary           Assessment/Plan     ASSESSMENT:    1.  Severe sepsis with encephalopathy  2.  Aspiration pneumonia     PLAN:    Overall patient shows significant clinical improvement.  Culture showing growth of Proteus with almost normal CRP level.     Antibiotic therapy was de-escalated to Omnicef 300 mg twice a day through 2/1/18.  Vancomycin, cefepime and Flagyl were discontinued.    Awaiting possible transfer to " "[facility].  Patient may need repeat debridement if transferred to [facility].     CRP ordered for a.m.     Patient's findings and recommendations were discussed with patient, family and nursing staff    Code Status: Full Code     Scribed for JOSÉ MIGUEL Knott  01/23/18  12:16 PM           Electronically signed by JOSÉ MIGUEL Love at 1/23/2018 12:18 PM      Samuel Duane Kreis, MD at 1/24/2018  7:44 AM  Version 2 of 2              LOS: 9 days     Chief Complaint:  Rhabdomyolysis/Decubitus Ulcers with Sepsis    Subjective     Interval History:   He has no major changes over the last 24 hours.  He complains of ongoing pain in the sacral decubitus region consistent with previous surgical debridement however no progressive symptoms at this point.  Patient does have chronic low back pain. Preliminary wound culture consistent with Proteus Vulgaris and Escherichia/Citrobacter, ID following.  Labs and vitals stable.  following and he is not a candidate for [facility] admission.     Objective     Vital Signs  /78  Pulse 60  Temp 97.9 °F (36.6 °C) (Oral)   Resp 18  Ht 182 cm (71.65\")  Wt 76.4 kg (168 lb 6.9 oz)  SpO2 98%  BMI 23.06 kg/m2  Intake & Output (last day)       01/23 0701 - 01/24 0700 01/24 0701 - 01/25 0700    P.O.      IV Piggyback      Total Intake(mL/kg)      Urine (mL/kg/hr) 1200 (0.7)     Stool 500 (0.3)     Total Output 1700      Net -1700            Unmeasured Stool Occurrence 1 x             Physical Exam:     General Appearance:    Alert, cooperative, in no acute distress   Head:    Normocephalic, without obvious abnormality, atraumatic   Eyes:            Lids and lashes normal, conjunctivae and sclerae normal, no   icterus, no pallor, corneas clear, PERRLA   Ears:    Ears appear intact with no abnormalities noted       Neck:   No adenopathy, supple, trachea midline, no thyromegaly, no   carotid bruit, no JVD   Lungs:     Scattered Coarse Crackles bilaterally R>>L " ,respirations regular, even and                  unlabored    Heart:    Regular rhythm and normal rate, normal S1 and S2, no            murmur, no gallop, no rub, no click   Chest Wall:    No abnormalities observed   Abdomen:     Normal bowel sounds, no masses, no organomegaly, soft        non-tender, non-distended, no guarding, no rebound                tenderness   Extremities:   Moves all extremities well, no edema, no cyanosis, no             redness   Pulses:   Pulses palpable and equal bilaterally   Skin:   No bleeding, bruising or rash, Sacral decubitus ulcer with dressing in place and right trochanteric ulcer with eschar   Lymph nodes:   No palpable adenopathy   Neurologic:   Cranial nerves 2 - 12 grossly intact, sensation intact, DTR       present and equal bilaterally        Results Review:    Lab Results   Component Value Date    WBC 13.18 (H) 01/24/2018    HGB 8.6 (L) 01/24/2018    HCT 26.7 (L) 01/24/2018    MCV 61.1 (L) 01/24/2018     (H) 01/24/2018       Lab Results   Component Value Date    GLUCOSE 141 (H) 01/24/2018    BUN 10 01/24/2018    CREATININE 0.55 01/24/2018    EGFRIFNONA >150 01/24/2018    BCR 18.2 01/24/2018     01/24/2018    K 3.7 01/24/2018     01/24/2018    CO2 22.3 (L) 01/24/2018    CALCIUM 7.5 (L) 01/24/2018    PROTENTOTREF 7.2 11/12/2015    ALBUMIN 2.50 (L) 01/24/2018    LABIL2 0.9 (L) 01/24/2018    AST 37 (H) 01/24/2018    ALT 23 01/24/2018     Lab Results   Component Value Date    INR 0.94 11/03/2015    INR <0.90 10/25/2015    INR <0.90 06/01/2015       No results found for: POCGLU       Medication Review:     Current Facility-Administered Medications:   •  acetaminophen (TYLENOL) tablet 650 mg, 650 mg, Oral, Q6H PRN, JARETH Condon, 650 mg at 01/24/18 0638  •  cefdinir (OMNICEF) capsule 300 mg, 300 mg, Oral, Q12H, Misty Bae MD, 300 mg at 01/23/18 7118  •  heparin (porcine) 5000 UNIT/ML injection 5,000 Units, 5,000 Units, Subcutaneous, Q8H, Olivier  JARETH Corrales, 5,000 Units at 01/24/18 0535  •  HYDROmorphone (DILAUDID) injection 1 mg, 1 mg, Intravenous, Once, José Miguel Li MD  •  lactated ringers infusion, 100 mL/hr, Intravenous, Continuous, Mohan Santoyo MD  •  lactated ringers infusion, 125 mL/hr, Intravenous, Continuous, Terrence Montero MD, Stopped at 01/19/18 1251  •  lactobacillus acidophilus (RISAQUAD) capsule 1 capsule, 1 capsule, Oral, Daily, Camila Haley, [facility], 1 capsule at 01/23/18 0718  •  Magnesium Sulfate 2 gram Bolus, followed by 8 gram infusion (total Mg dose 10 grams)- Mg less than or equal to 1mg/dL, 2 g, Intravenous, PRN **OR** Magnesium Sulfate 6 gram Infusion (2 gm x 3) -Mg 1.1 -1.5 mg/dL, 2 g, Intravenous, PRN **OR** magnesium sulfate 4 gram infusion- Mg 1.6-1.9 mg/dL, 4 g, Intravenous, PRN, Samuel Duane Kreis, MD  •  magnesium sulfate 4 gram infusion- Mg 1.6-1.9 mg/dL, 4 g, Intravenous, Once, Samuel Duane Kreis, MD  •  multivitamin (DAILY MINH) tablet 1 tablet, 1 tablet, Oral, Daily, JARETH Condon, 1 tablet at 01/23/18 0718  •  ondansetron (ZOFRAN) tablet 4 mg, 4 mg, Oral, Q6H PRN **OR** ondansetron ODT (ZOFRAN-ODT) disintegrating tablet 4 mg, 4 mg, Oral, Q6H PRN **OR** ondansetron (ZOFRAN) injection 4 mg, 4 mg, Intravenous, Q6H PRN, Samuel Duane Kreis, MD, 4 mg at 01/22/18 2126  •  oxyCODONE (ROXICODONE) immediate release tablet 5 mg, 5 mg, Oral, Q4H PRN, Samuel Duane Kreis, MD, 5 mg at 01/24/18 2134  •  potassium chloride (MICRO-K) CR capsule 40 mEq, 40 mEq, Oral, PRN **OR** potassium chloride (KLOR-CON) packet 40 mEq, 40 mEq, Oral, PRN **OR** potassium chloride 10 mEq in 100 mL IVPB, 10 mEq, Intravenous, Q1H PRN, Samuel Duane Kreis, MD  •  Insert peripheral IV, , , Once **AND** sodium chloride 0.9 % flush 10 mL, 10 mL, Intravenous, PRN, Jesus Carreon PA-C, 10 mL at 01/21/18 1506      Assessment/Plan   Probable Aspiration Pneumonia with Sepsis  Sacral Decubitus Ulcer   Decubitus ulceration of the  hip  Hyperbilirubinemia (resolved)  Rhabdomyolysis (resolved)  Metabolic encephalopathy due to sepsis (resolved)  Multiple myeloma  Chronic opioid use  Protein Malnutrition  Fever (resolved)      Appreciate surgery and ID input     Continue with Cefepime + Flagyl + Vancomycin, await final wound culture, ID following    Oxycodone 5 mg q 4 hours prn pain     Tylenol prn fever     Continue with wound care     Heparin for DVT prophylaxis    O2 via NC to maintain O2 sats >90%    Continue with PT/OT, S/S following for discharge to LTAC for wound care/recovery    Continue K+/Mg replacement protocol,  MVI daily    JARETH Condon  01/24/18  7:44 AM       He is now on oral antibiotics.  Feeling a little better overall.  Strength is a little better.  He continues to hope to rehabilitate himself and ultimately return home.  He seems much more motivated to get better.  At this point his lungs are clear bilaterally.  Cardiac exam reveals regular rate and regular rhythm.  He continues to require wound care for his buttocks.  He will require either LTAC or SNF admission for rehabilitation and wound care.  Continue Omnicef.    Samuel Duane Kreis, MD  01/24/18  8:33 AM                 Electronically signed by Samuel Duane Kreis, MD at 1/24/2018  8:34 AM      JARETH Condon at 1/24/2018  7:44 AM  Version 1 of 2              LOS: 9 days     Chief Complaint:  Rhabdomyolysis/Decubitus Ulcers with Sepsis    Subjective     Interval History:   He has no major changes over the last 24 hours.  He complains of ongoing pain in the sacral decubitus region consistent with previous surgical debridement however no progressive symptoms at this point.  Patient does have chronic low back pain. Preliminary wound culture consistent with Proteus Vulgaris and Escherichia/Citrobacter, ID following.  Labs and vitals stable.  following and he is not a candidate for [facility] admission.     Objective     Vital Signs  /78  Pulse 60  Temp  "97.9 °F (36.6 °C) (Oral)   Resp 18  Ht 182 cm (71.65\")  Wt 76.4 kg (168 lb 6.9 oz)  SpO2 98%  BMI 23.06 kg/m2  Intake & Output (last day)       01/23 0701 - 01/24 0700 01/24 0701 - 01/25 0700    P.O.      IV Piggyback      Total Intake(mL/kg)      Urine (mL/kg/hr) 1200 (0.7)     Stool 500 (0.3)     Total Output 1700      Net -1700            Unmeasured Stool Occurrence 1 x             Physical Exam:     General Appearance:    Alert, cooperative, in no acute distress   Head:    Normocephalic, without obvious abnormality, atraumatic   Eyes:            Lids and lashes normal, conjunctivae and sclerae normal, no   icterus, no pallor, corneas clear, PERRLA   Ears:    Ears appear intact with no abnormalities noted       Neck:   No adenopathy, supple, trachea midline, no thyromegaly, no   carotid bruit, no JVD   Lungs:     Scattered Coarse Crackles bilaterally R>>L ,respirations regular, even and                  unlabored    Heart:    Regular rhythm and normal rate, normal S1 and S2, no            murmur, no gallop, no rub, no click   Chest Wall:    No abnormalities observed   Abdomen:     Normal bowel sounds, no masses, no organomegaly, soft        non-tender, non-distended, no guarding, no rebound                tenderness   Extremities:   Moves all extremities well, no edema, no cyanosis, no             redness   Pulses:   Pulses palpable and equal bilaterally   Skin:   No bleeding, bruising or rash, Sacral decubitus ulcer with dressing in place and right trochanteric ulcer with eschar   Lymph nodes:   No palpable adenopathy   Neurologic:   Cranial nerves 2 - 12 grossly intact, sensation intact, DTR       present and equal bilaterally        Results Review:    Lab Results   Component Value Date    WBC 13.18 (H) 01/24/2018    HGB 8.6 (L) 01/24/2018    HCT 26.7 (L) 01/24/2018    MCV 61.1 (L) 01/24/2018     (H) 01/24/2018       Lab Results   Component Value Date    GLUCOSE 141 (H) 01/24/2018    BUN 10 " 01/24/2018    CREATININE 0.55 01/24/2018    EGFRIFNONA >150 01/24/2018    BCR 18.2 01/24/2018     01/24/2018    K 3.7 01/24/2018     01/24/2018    CO2 22.3 (L) 01/24/2018    CALCIUM 7.5 (L) 01/24/2018    PROTENTOTREF 7.2 11/12/2015    ALBUMIN 2.50 (L) 01/24/2018    LABIL2 0.9 (L) 01/24/2018    AST 37 (H) 01/24/2018    ALT 23 01/24/2018     Lab Results   Component Value Date    INR 0.94 11/03/2015    INR <0.90 10/25/2015    INR <0.90 06/01/2015       No results found for: POCGLU       Medication Review:     Current Facility-Administered Medications:   •  acetaminophen (TYLENOL) tablet 650 mg, 650 mg, Oral, Q6H PRN, JARETH Condon, 650 mg at 01/24/18 0638  •  cefdinir (OMNICEF) capsule 300 mg, 300 mg, Oral, Q12H, Misty Bae MD, 300 mg at 01/23/18 2134  •  heparin (porcine) 5000 UNIT/ML injection 5,000 Units, 5,000 Units, Subcutaneous, Q8H, JARETH Condon, 5,000 Units at 01/24/18 0535  •  HYDROmorphone (DILAUDID) injection 1 mg, 1 mg, Intravenous, Once, José Miguel Li MD  •  lactated ringers infusion, 100 mL/hr, Intravenous, Continuous, Mohan Santoyo MD  •  lactated ringers infusion, 125 mL/hr, Intravenous, Continuous, Terrence Montero MD, Stopped at 01/19/18 1251  •  lactobacillus acidophilus (RISAQUAD) capsule 1 capsule, 1 capsule, Oral, Daily, Camila Haley [facility], 1 capsule at 01/23/18 0718  •  Magnesium Sulfate 2 gram Bolus, followed by 8 gram infusion (total Mg dose 10 grams)- Mg less than or equal to 1mg/dL, 2 g, Intravenous, PRN **OR** Magnesium Sulfate 6 gram Infusion (2 gm x 3) -Mg 1.1 -1.5 mg/dL, 2 g, Intravenous, PRN **OR** magnesium sulfate 4 gram infusion- Mg 1.6-1.9 mg/dL, 4 g, Intravenous, PRN, Samuel Duane Kreis, MD  •  magnesium sulfate 4 gram infusion- Mg 1.6-1.9 mg/dL, 4 g, Intravenous, Once, Samuel Duane Kreis, MD  •  multivitamin (DAILY MINH) tablet 1 tablet, 1 tablet, Oral, Daily, JARETH Condon, 1 tablet at 01/23/18 0718  •  ondansetron (ZOFRAN) tablet 4  mg, 4 mg, Oral, Q6H PRN **OR** ondansetron ODT (ZOFRAN-ODT) disintegrating tablet 4 mg, 4 mg, Oral, Q6H PRN **OR** ondansetron (ZOFRAN) injection 4 mg, 4 mg, Intravenous, Q6H PRN, Samuel Duane Kreis, MD, 4 mg at 01/22/18 2126  •  oxyCODONE (ROXICODONE) immediate release tablet 5 mg, 5 mg, Oral, Q4H PRN, Samuel Duane Kreis, MD, 5 mg at 01/24/18 0334  •  potassium chloride (MICRO-K) CR capsule 40 mEq, 40 mEq, Oral, PRN **OR** potassium chloride (KLOR-CON) packet 40 mEq, 40 mEq, Oral, PRN **OR** potassium chloride 10 mEq in 100 mL IVPB, 10 mEq, Intravenous, Q1H PRN, Samuel Duane Kreis, MD  •  Insert peripheral IV, , , Once **AND** sodium chloride 0.9 % flush 10 mL, 10 mL, Intravenous, PRN, Jesus Carreon PA-C, 10 mL at 01/21/18 1506      Assessment/Plan   Probable Aspiration Pneumonia with Sepsis  Sacral Decubitus Ulcer   Decubitus ulceration of the hip  Hyperbilirubinemia (resolved)  Rhabdomyolysis (resolved)  Metabolic encephalopathy due to sepsis (resolved)  Multiple myeloma  Chronic opioid use  Protein Malnutrition  Fever (resolved)      Appreciate surgery and ID input     Continue with Cefepime + Flagyl + Vancomycin, await final wound culture, ID following    Oxycodone 5 mg q 4 hours prn pain     Tylenol prn fever     Continue with wound care     Heparin for DVT prophylaxis    O2 via NC to maintain O2 sats >90%    Continue with PT/OT, S/S following for discharge to LTAC for wound care/recovery    Continue K+/Mg replacement protocol,  MVI daily    JARETH Condon  01/24/18  7:44 AM                    Electronically signed by JARETH Condon at 1/24/2018  7:46 AM      Misty Bae MD at 1/24/2018 10:17 AM  Version 2 of 2           I have personally seen and examined the patient today and discussed overnight interval progress and pertinent issues with nursing staff.    Subjective:    Overall patient doing very well.  Continues with significant amount of pain.  He 1.2 no fever or diarrhea reported  "overnight.  Nurse reports wound looking better and taking less packing.    Awaiting possible transfer to [facility].  Patient may need repeat debridement if transferred to [facility].    History taken from: patient chart family RN      Vital Signs    /78  Pulse 60  Temp 97.9 °F (36.6 °C) (Oral)   Resp 18  Ht 182 cm (71.65\")  Wt 76.4 kg (168 lb 6.9 oz)  SpO2 98%  BMI 23.06 kg/m2    Temp:  [97.9 °F (36.6 °C)-98.1 °F (36.7 °C)] 97.9 °F (36.6 °C)      Intake/Output Summary (Last 24 hours) at 01/24/18 1017  Last data filed at 01/24/18 1012   Gross per 24 hour   Intake              240 ml   Output             1500 ml   Net            -1260 ml     Intake & Output (last 3 days)       01/21 0701 - 01/22 0700 01/22 0701 - 01/23 0700 01/23 0701 - 01/24 0700 01/24 0701 - 01/25 0700    P.O. 240 600  240    IV Piggyback 100 500      Total Intake(mL/kg) 340 (4.5) 1100 (14.4)  240 (3.1)    Urine (mL/kg/hr) 1350 (0.7) 1325 (0.7) 1200 (0.7) 250 (1)    Stool   500 (0.3)     Total Output 1350 1325 1700 250    Net -1010 -225 -1700 -10            Unmeasured Urine Occurrence 1 x       Unmeasured Stool Occurrence   1 x         Physical Exam:       General Appearance:    Alert, cooperative, in no acute distress, thin, frail, daughter at bedside    Head:    Normocephalic, without obvious abnormality, atraumatic   Eyes:            Lids and lashes normal, conjunctivae and sclerae normal, no   icterus, no pallor, corneas clear, PERRLA   Ears:    Ears appear intact with no abnormalities noted   Throat:   No oral lesions, no thrush, oral mucosa moist   Neck:   No adenopathy, supple, trachea midline, no thyromegaly, no   carotid bruit, no JVD   Back:     No tenderness to percussion or palpation, range of motion   normal   Lungs:    coarse breath sounds to right.    Heart:    Regular rhythm and normal rate, normal S1 and S2, no            murmur, no gallop, no rub, no click   Chest Wall:    No abnormalities observed   Abdomen:     Normal bowel " sounds, no masses, no organomegaly, soft        non-tender, non-distended, no guarding, no rebound                tenderness   Rectal:     Deferred   Extremities:   Moves all extremities well, no edema, no cyanosis, no             redness   Pulses:   Pulses palpable and equal bilaterally   Skin:   Sacral decubitus ulcer and right hip ulcer    Lymph nodes:   No palpable adenopathy   Neurologic:   Awake, alert, Garbled speech        Results:      Results from last 7 days  Lab Units 01/24/18  0032 01/23/18  0034 01/22/18  0126 01/21/18  0053 01/20/18  0054 01/19/18  0121 01/18/18  0036   WBC 10*3/mm3 13.18* 12.20 11.40 10.76 11.43 12.61* 9.73     Lab Results   Component Value Date    NEUTROABS 9.35 (H) 01/24/2018         Results from last 7 days  Lab Units 01/24/18  0032   CREATININE mg/dL 0.55         Results from last 7 days  Lab Units 01/22/18  0126 01/21/18  0053 01/19/18  0121   CRP mg/dL 1.20* 2.07* 4.83*       Imaging Results (last 24 hours)     ** No results found for the last 24 hours. **            Results Review:    I have personally reviewed laboratory data, culture results, radiology studies and antimicrobial therapy.    Hospital Medications (active)       Dose Frequency Start End    acetaminophen (TYLENOL) tablet 650 mg 650 mg Every 6 Hours PRN 1/16/2018     Sig - Route: Take 2 tablets by mouth Every 6 (Six) Hours As Needed for Mild Pain . - Oral    Cosign for Ordering: Accepted by Samuel Duane Kreis, MD on 1/16/2018  1:17 PM    cefepime (MAXIPIME) 2 g/100 mL 0.9% NS (mbp) 2 g Once 1/16/2018 1/16/2018    Sig - Route: Infuse 100 mL into a venous catheter 1 (One) Time. - Intravenous    Cosign for Ordering: Required by Misty Bae MD    cefepime (MAXIPIME) 2 g/100 mL 0.9% NS (mbp) 2 g Every 12 Hours 1/16/2018 1/23/2018    Sig - Route: Infuse 100 mL into a venous catheter Every 12 (Twelve) Hours. - Intravenous    Cosign for Ordering: Required by Misty Bae MD    heparin (porcine) 5000 UNIT/ML  "injection 5,000 Units 5,000 Units Every 8 Hours Scheduled 1/16/2018     Sig - Route: Inject 1 mL under the skin Every 8 (Eight) Hours. - Subcutaneous    Cosign for Ordering: Accepted by Samuel Duane Kreis, MD on 1/16/2018  1:17 PM    HYDROmorphone (DILAUDID) injection 1 mg 1 mg Once 1/15/2018     Sig - Route: Infuse 1 mL into a venous catheter 1 (One) Time. - Intravenous    metroNIDAZOLE (FLAGYL) IVPB 500 mg 500 mg Every 8 Hours 1/16/2018 1/23/2018    Sig - Route: Infuse 100 mL into a venous catheter Every 8 (Eight) Hours. - Intravenous    Cosign for Ordering: Required by Misty Bae MD    oxyCODONE (ROXICODONE) immediate release tablet 5 mg 5 mg Every 4 Hours PRN 1/16/2018 1/26/2018    Sig - Route: Take 1 tablet by mouth Every 4 (Four) Hours As Needed for Moderate Pain . - Oral    Pharmacy to dose vancomycin  Continuous PRN 1/16/2018 1/23/2018    Sig - Route: Continuous As Needed for Consult. - Does not apply    Cosign for Ordering: Required by Misty Bae MD    sodium chloride 0.9 % flush 10 mL 10 mL As Needed 1/15/2018     Sig - Route: Infuse 10 mL into a venous catheter As Needed for Line Care. - Intravenous    Cosign for Ordering: Accepted by José Miguel Li MD on 1/15/2018  1:21 PM    Linked Group 1:  \"And\" Linked Group Details        sodium chloride 0.9 % infusion 150 mL/hr Continuous 1/15/2018     Sig - Route: Infuse 150 mL/hr into a venous catheter Continuous. - Intravenous    vancomycin (VANCOCIN) 1,000 mg in sodium chloride 0.9 % 250 mL IVPB 1,000 mg Every 12 Hours 1/16/2018 1/23/2018    Sig - Route: Infuse 1,000 mg into a venous catheter Every 12 (Twelve) Hours. - Intravenous            Cultures:    Blood Culture   Date Value Ref Range Status   01/15/2018 No growth at 24 hours  Preliminary   01/15/2018 No growth at 24 hours  Preliminary     Urine Culture   Date Value Ref Range Status   01/15/2018 No growth at 24 hours  Preliminary           Assessment/Plan     ASSESSMENT:    1.  " "Severe sepsis with encephalopathy  2.  Aspiration pneumonia     PLAN:    Overall patient doing very well.  Continues with significant amount of pain.  He had no fever or diarrhea reported overnight.  Nurse reports wound looking better and taking less packing.    Antibiotic therapy was de-escalated to Omnicef 300 mg twice a day through 2/1/18.      Awaiting possible transfer to [facility].  Patient may need repeat debridement if transferred to [facility].     CRP ordered for a.m.     Patient's findings and recommendations were discussed with patient, family and nursing staff    Code Status: Full Code     JOSÉ MIGUEL Love  01/24/18  10:17 AM           Electronically signed by Misty Bae MD at 1/24/2018 10:23 AM      JOSÉ MIGUEL Love at 1/24/2018 10:17 AM  Version 1 of 2           I have personally seen and examined the patient today and discussed overnight interval progress and pertinent issues with nursing staff.    Subjective:    Overall patient doing very well.  Continues with significant amount of pain.  He 1.2 no fever or diarrhea reported overnight.  Nurse reports wound looking better and taking less packing.    Awaiting possible transfer to [facility].  Patient may need repeat debridement if transferred to [facility].    History taken from: patient chart family RN      Vital Signs    /78  Pulse 60  Temp 97.9 °F (36.6 °C) (Oral)   Resp 18  Ht 182 cm (71.65\")  Wt 76.4 kg (168 lb 6.9 oz)  SpO2 98%  BMI 23.06 kg/m2    Temp:  [97.9 °F (36.6 °C)-98.1 °F (36.7 °C)] 97.9 °F (36.6 °C)      Intake/Output Summary (Last 24 hours) at 01/24/18 1017  Last data filed at 01/24/18 1012   Gross per 24 hour   Intake              240 ml   Output             1500 ml   Net            -1260 ml     Intake & Output (last 3 days)       01/21 0701 - 01/22 0700 01/22 0701 - 01/23 0700 01/23 0701 - 01/24 0700 01/24 0701 - 01/25 0700    P.O. 240 600  240    IV Piggyback 100 500      Total Intake(mL/kg) 340 (4.5) 1100 (14.4)  240 (3.1)    " Urine (mL/kg/hr) 1350 (0.7) 1325 (0.7) 1200 (0.7) 250 (1)    Stool   500 (0.3)     Total Output 1350 1325 1700 250    Net -1010 -225 -1700 -10            Unmeasured Urine Occurrence 1 x       Unmeasured Stool Occurrence   1 x         Physical Exam:       General Appearance:    Alert, cooperative, in no acute distress, thin, frail, daughter at bedside    Head:    Normocephalic, without obvious abnormality, atraumatic   Eyes:            Lids and lashes normal, conjunctivae and sclerae normal, no   icterus, no pallor, corneas clear, PERRLA   Ears:    Ears appear intact with no abnormalities noted   Throat:   No oral lesions, no thrush, oral mucosa moist   Neck:   No adenopathy, supple, trachea midline, no thyromegaly, no   carotid bruit, no JVD   Back:     No tenderness to percussion or palpation, range of motion   normal   Lungs:    coarse breath sounds to right.    Heart:    Regular rhythm and normal rate, normal S1 and S2, no            murmur, no gallop, no rub, no click   Chest Wall:    No abnormalities observed   Abdomen:     Normal bowel sounds, no masses, no organomegaly, soft        non-tender, non-distended, no guarding, no rebound                tenderness   Rectal:     Deferred   Extremities:   Moves all extremities well, no edema, no cyanosis, no             redness   Pulses:   Pulses palpable and equal bilaterally   Skin:   Sacral decubitus ulcer and right hip ulcer    Lymph nodes:   No palpable adenopathy   Neurologic:   Awake, alert, Garbled speech        Results:      Results from last 7 days  Lab Units 01/24/18  0032 01/23/18  0034 01/22/18  0126 01/21/18  0053 01/20/18  0054 01/19/18  0121 01/18/18  0036   WBC 10*3/mm3 13.18* 12.20 11.40 10.76 11.43 12.61* 9.73     Lab Results   Component Value Date    NEUTROABS 9.35 (H) 01/24/2018         Results from last 7 days  Lab Units 01/24/18  0032   CREATININE mg/dL 0.55         Results from last 7 days  Lab Units 01/22/18  0126 01/21/18  0053 01/19/18  0121    CRP mg/dL 1.20* 2.07* 4.83*       Imaging Results (last 24 hours)     ** No results found for the last 24 hours. **            Results Review:    I have personally reviewed laboratory data, culture results, radiology studies and antimicrobial therapy.    Hospital Medications (active)       Dose Frequency Start End    acetaminophen (TYLENOL) tablet 650 mg 650 mg Every 6 Hours PRN 1/16/2018     Sig - Route: Take 2 tablets by mouth Every 6 (Six) Hours As Needed for Mild Pain . - Oral    Cosign for Ordering: Accepted by Samuel Duane Kreis, MD on 1/16/2018  1:17 PM    cefepime (MAXIPIME) 2 g/100 mL 0.9% NS (mbp) 2 g Once 1/16/2018 1/16/2018    Sig - Route: Infuse 100 mL into a venous catheter 1 (One) Time. - Intravenous    Cosign for Ordering: Required by Misty Bae MD    cefepime (MAXIPIME) 2 g/100 mL 0.9% NS (mbp) 2 g Every 12 Hours 1/16/2018 1/23/2018    Sig - Route: Infuse 100 mL into a venous catheter Every 12 (Twelve) Hours. - Intravenous    Cosign for Ordering: Required by Misty Bae MD    heparin (porcine) 5000 UNIT/ML injection 5,000 Units 5,000 Units Every 8 Hours Scheduled 1/16/2018     Sig - Route: Inject 1 mL under the skin Every 8 (Eight) Hours. - Subcutaneous    Cosign for Ordering: Accepted by Samuel Duane Kreis, MD on 1/16/2018  1:17 PM    HYDROmorphone (DILAUDID) injection 1 mg 1 mg Once 1/15/2018     Sig - Route: Infuse 1 mL into a venous catheter 1 (One) Time. - Intravenous    metroNIDAZOLE (FLAGYL) IVPB 500 mg 500 mg Every 8 Hours 1/16/2018 1/23/2018    Sig - Route: Infuse 100 mL into a venous catheter Every 8 (Eight) Hours. - Intravenous    Cosign for Ordering: Required by Misty Bae MD    oxyCODONE (ROXICODONE) immediate release tablet 5 mg 5 mg Every 4 Hours PRN 1/16/2018 1/26/2018    Sig - Route: Take 1 tablet by mouth Every 4 (Four) Hours As Needed for Moderate Pain . - Oral    Pharmacy to dose vancomycin  Continuous PRN 1/16/2018 1/23/2018    Sig - Route: Continuous  "As Needed for Consult. - Does not apply    Cosign for Ordering: Required by Misty Bae MD    sodium chloride 0.9 % flush 10 mL 10 mL As Needed 1/15/2018     Sig - Route: Infuse 10 mL into a venous catheter As Needed for Line Care. - Intravenous    Cosign for Ordering: Accepted by José Miguel Li MD on 1/15/2018  1:21 PM    Linked Group 1:  \"And\" Linked Group Details        sodium chloride 0.9 % infusion 150 mL/hr Continuous 1/15/2018     Sig - Route: Infuse 150 mL/hr into a venous catheter Continuous. - Intravenous    vancomycin (VANCOCIN) 1,000 mg in sodium chloride 0.9 % 250 mL IVPB 1,000 mg Every 12 Hours 1/16/2018 1/23/2018    Sig - Route: Infuse 1,000 mg into a venous catheter Every 12 (Twelve) Hours. - Intravenous            Cultures:    Blood Culture   Date Value Ref Range Status   01/15/2018 No growth at 24 hours  Preliminary   01/15/2018 No growth at 24 hours  Preliminary     Urine Culture   Date Value Ref Range Status   01/15/2018 No growth at 24 hours  Preliminary           Assessment/Plan     ASSESSMENT:    1.  Severe sepsis with encephalopathy  2.  Aspiration pneumonia     PLAN:    Overall patient doing very well.  Continues with significant amount of pain.  He 1.2 no fever or diarrhea reported overnight.  Nurse reports wound looking better and taking less packing.    Antibiotic therapy was de-escalated to Omnicef 300 mg twice a day through 2/1/18.      Awaiting possible transfer to [facility].  Patient may need repeat debridement if transferred to [facility].     CRP ordered for a.m.     Patient's findings and recommendations were discussed with patient, family and nursing staff    Code Status: Full Code     JOSÉ MIGUEL Love  01/24/18  10:17 AM           Electronically signed by JOSÉ MIGUEL Love at 1/24/2018 10:22 AM        Consult Notes (last 24 hours) (Notes from 1/23/2018 11:16 AM through 1/24/2018 11:16 AM)     No notes of this type exist for this encounter.        Physical Therapy Notes " (last 24 hours) (Notes from 1/23/2018 11:16 AM through 1/24/2018 11:16 AM)     No notes of this type exist for this encounter.

## 2018-01-24 NOTE — PROGRESS NOTES
"  I have personally seen and examined the patient today and discussed overnight interval progress and pertinent issues with nursing staff.    Subjective:    Overall patient doing very well.  Continues with significant amount of pain.  He 1.2 no fever or diarrhea reported overnight.  Nurse reports wound looking better and taking less packing.    Awaiting possible transfer to [facility].  Patient may need repeat debridement if transferred to [facility].    History taken from: patient chart family RN      Vital Signs    /78  Pulse 60  Temp 97.9 °F (36.6 °C) (Oral)   Resp 18  Ht 182 cm (71.65\")  Wt 76.4 kg (168 lb 6.9 oz)  SpO2 98%  BMI 23.06 kg/m2    Temp:  [97.9 °F (36.6 °C)-98.1 °F (36.7 °C)] 97.9 °F (36.6 °C)      Intake/Output Summary (Last 24 hours) at 01/24/18 1017  Last data filed at 01/24/18 1012   Gross per 24 hour   Intake              240 ml   Output             1500 ml   Net            -1260 ml     Intake & Output (last 3 days)       01/21 0701 - 01/22 0700 01/22 0701 - 01/23 0700 01/23 0701 - 01/24 0700 01/24 0701 - 01/25 0700    P.O. 240 600  240    IV Piggyback 100 500      Total Intake(mL/kg) 340 (4.5) 1100 (14.4)  240 (3.1)    Urine (mL/kg/hr) 1350 (0.7) 1325 (0.7) 1200 (0.7) 250 (1)    Stool   500 (0.3)     Total Output 1350 1325 1700 250    Net -1010 -225 -1700 -10            Unmeasured Urine Occurrence 1 x       Unmeasured Stool Occurrence   1 x         Physical Exam:       General Appearance:    Alert, cooperative, in no acute distress, thin, frail, daughter at bedside    Head:    Normocephalic, without obvious abnormality, atraumatic   Eyes:            Lids and lashes normal, conjunctivae and sclerae normal, no   icterus, no pallor, corneas clear, PERRLA   Ears:    Ears appear intact with no abnormalities noted   Throat:   No oral lesions, no thrush, oral mucosa moist   Neck:   No adenopathy, supple, trachea midline, no thyromegaly, no   carotid bruit, no JVD   Back:     No tenderness to percussion " or palpation, range of motion   normal   Lungs:    coarse breath sounds to right.    Heart:    Regular rhythm and normal rate, normal S1 and S2, no            murmur, no gallop, no rub, no click   Chest Wall:    No abnormalities observed   Abdomen:     Normal bowel sounds, no masses, no organomegaly, soft        non-tender, non-distended, no guarding, no rebound                tenderness   Rectal:     Deferred   Extremities:   Moves all extremities well, no edema, no cyanosis, no             redness   Pulses:   Pulses palpable and equal bilaterally   Skin:   Sacral decubitus ulcer and right hip ulcer    Lymph nodes:   No palpable adenopathy   Neurologic:   Awake, alert, Garbled speech        Results:      Results from last 7 days  Lab Units 01/24/18  0032 01/23/18  0034 01/22/18  0126 01/21/18  0053 01/20/18  0054 01/19/18  0121 01/18/18  0036   WBC 10*3/mm3 13.18* 12.20 11.40 10.76 11.43 12.61* 9.73     Lab Results   Component Value Date    NEUTROABS 9.35 (H) 01/24/2018         Results from last 7 days  Lab Units 01/24/18  0032   CREATININE mg/dL 0.55         Results from last 7 days  Lab Units 01/22/18  0126 01/21/18  0053 01/19/18  0121   CRP mg/dL 1.20* 2.07* 4.83*       Imaging Results (last 24 hours)     ** No results found for the last 24 hours. **            Results Review:    I have personally reviewed laboratory data, culture results, radiology studies and antimicrobial therapy.    Hospital Medications (active)       Dose Frequency Start End    acetaminophen (TYLENOL) tablet 650 mg 650 mg Every 6 Hours PRN 1/16/2018     Sig - Route: Take 2 tablets by mouth Every 6 (Six) Hours As Needed for Mild Pain . - Oral    Cosign for Ordering: Accepted by Samuel Duane Kreis, MD on 1/16/2018  1:17 PM    cefepime (MAXIPIME) 2 g/100 mL 0.9% NS (mbp) 2 g Once 1/16/2018 1/16/2018    Sig - Route: Infuse 100 mL into a venous catheter 1 (One) Time. - Intravenous    Cosign for Ordering: Required by Misty Bae MD     "cefepime (MAXIPIME) 2 g/100 mL 0.9% NS (mbp) 2 g Every 12 Hours 1/16/2018 1/23/2018    Sig - Route: Infuse 100 mL into a venous catheter Every 12 (Twelve) Hours. - Intravenous    Cosign for Ordering: Required by Misty Bae MD    heparin (porcine) 5000 UNIT/ML injection 5,000 Units 5,000 Units Every 8 Hours Scheduled 1/16/2018     Sig - Route: Inject 1 mL under the skin Every 8 (Eight) Hours. - Subcutaneous    Cosign for Ordering: Accepted by Samuel Duane Kreis, MD on 1/16/2018  1:17 PM    HYDROmorphone (DILAUDID) injection 1 mg 1 mg Once 1/15/2018     Sig - Route: Infuse 1 mL into a venous catheter 1 (One) Time. - Intravenous    metroNIDAZOLE (FLAGYL) IVPB 500 mg 500 mg Every 8 Hours 1/16/2018 1/23/2018    Sig - Route: Infuse 100 mL into a venous catheter Every 8 (Eight) Hours. - Intravenous    Cosign for Ordering: Required by Misty Bae MD    oxyCODONE (ROXICODONE) immediate release tablet 5 mg 5 mg Every 4 Hours PRN 1/16/2018 1/26/2018    Sig - Route: Take 1 tablet by mouth Every 4 (Four) Hours As Needed for Moderate Pain . - Oral    Pharmacy to dose vancomycin  Continuous PRN 1/16/2018 1/23/2018    Sig - Route: Continuous As Needed for Consult. - Does not apply    Cosign for Ordering: Required by Misty Bae MD    sodium chloride 0.9 % flush 10 mL 10 mL As Needed 1/15/2018     Sig - Route: Infuse 10 mL into a venous catheter As Needed for Line Care. - Intravenous    Cosign for Ordering: Accepted by José Miguel Li MD on 1/15/2018  1:21 PM    Linked Group 1:  \"And\" Linked Group Details        sodium chloride 0.9 % infusion 150 mL/hr Continuous 1/15/2018     Sig - Route: Infuse 150 mL/hr into a venous catheter Continuous. - Intravenous    vancomycin (VANCOCIN) 1,000 mg in sodium chloride 0.9 % 250 mL IVPB 1,000 mg Every 12 Hours 1/16/2018 1/23/2018    Sig - Route: Infuse 1,000 mg into a venous catheter Every 12 (Twelve) Hours. - Intravenous            Cultures:    Blood Culture   Date " Value Ref Range Status   01/15/2018 No growth at 24 hours  Preliminary   01/15/2018 No growth at 24 hours  Preliminary     Urine Culture   Date Value Ref Range Status   01/15/2018 No growth at 24 hours  Preliminary           Assessment/Plan     ASSESSMENT:    1.  Severe sepsis with encephalopathy  2.  Aspiration pneumonia     PLAN:    Overall patient doing very well.  Continues with significant amount of pain.  He had no fever or diarrhea reported overnight.  Nurse reports wound looking better and taking less packing.    Antibiotic therapy was de-escalated to Omnicef 300 mg twice a day through 2/1/18.      Awaiting possible transfer to [facility].  Patient may need repeat debridement if transferred to [facility].     CRP ordered for a.m.     Patient's findings and recommendations were discussed with patient, family and nursing staff    Code Status: Full Code     JOSÉ MIGUEL Love  01/24/18  10:17 AM

## 2018-01-24 NOTE — DISCHARGE PLACEMENT REQUEST
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  fracture or malalignment of the lumbar spine is identified.        Impression:       No acute fracture of the lumbar spine.      This report was finalized on 1/15/2018 1:16 PM by Dr. Carlos Jiménez MD.             Assessment/Plan   Rhabdomyolysis  Fever  Sacral Decubitus Ulcer/Hip Ulcer  Hyperbilirubinemia  Altered Mental Status  HX Multiple Myeloma  Chronic Neck Pain/Low Back Pain    Consult Surgery for sacral decubitus/wound care    Consult ID for fever and antibiotic management    Hold Oxycodone and Valium secondary to altered mental status    Check RUQ U/S secondary to hyperbilirubinemia and urine myoglobin    Repeat LFT's this AM    Continue with IVF @ 150 ml/hour    Tylenol prn fever    Wound care consult    Lovenox for DVT prophylaxis    Start O2 via NC to maintain O2 sats >90%    CBC, CMP daily    JARETH Condon  01/16/18  6:39 AM    I have seen this patient today and agree with the above note.  This patient was admitted to my service to the emergency department where supposedly he is a patient of mine.  However, he has never been seen in my clinic.  He sees a Dr. Orozco in [place].  This patient has a rather complex history with history of myeloma.  He takes chronic  opioid medications for this.  He states that he has not been ambulatory for at least a week or 2 since falling out of the bed.  He states he hurt his lower back when this happened.  He was noted to have a CT of the lumbar spine revealing no acute fracture.  He was found to have a large sacral decubitus ulcer.  He was also noted to have acute rhabdomyolysis and was febrile overnight.  I have examined the patient and agree with the above note.  He is chronically ill in appearance.  Thin and frail.  He is able to answer questions but is a little bit somnolent.  Lung exam reveals clear to auscultation bilaterally cardiac exam reveals regular rate and regular rhythm no murmur, rub or gallop.  Abdominal exam is benign.  Extremities no cyanosis, clubbing or edema.  He is able to move both lower extremities.  He has a large decubitus ulceration and please refer to nursing documentation for further details.  Is noted to have a white count when he came in.    Impression:  Sepsis due to decubitus ulceration.  Decubitus ulceration of the hip  Hyperbilirubinemia  Rhabdomyolysis  Metabolic encephalopathy due to sepsis  Multiplemyeloma  Chronic opioid use      Plan:  ID consult.  Start cefepime, Flagyl and vancomycin    Surgical consultation and wound care consultation  IV fluid hydration for abdomen mild lysis.  Monitor renal function.  Monitor CPK to ensure the trend is downward    Subcutaneous heparin for DVT prophylaxis    Discontinue Valium and discontinue high-dose oxycodone.  Start oxycodone 5 mg every 4 hours when necessary for pain.    Monitor LFTs.  Probably elevated due to sepsis with hypotension    Oxygen to maintain sat greater than 90%    Xray bilateral hips / pelvis    Samuel Duane Kreis, MD  01/16/18  1:36 PM             Electronically signed by Samuel Duane Kreis, MD at 1/16/2018  1:37 PM        Vital Signs (last 24 hours)       01/23 0700  -  01/24 0659 01/24 0700  -  01/24 1114   Most Recent    Temp (°F) 97.9 -   98.1       97.9 (36.6)    Heart Rate 60 -  84       60    Resp 16 -  18       18    /78 -  140/97       108/78    SpO2 (%) 92 -  99      98     98          Prior to Admission Medications     Prescriptions Last Dose Informant Patient Reported? Taking?    diazePAM (VALIUM) 10 MG tablet 1/15/2018  Yes Yes    Take 10 mg by mouth 2 (Two) Times a Day As Needed for Anxiety.    oxyCODONE (ROXICODONE) 20 MG tablet 1/15/2018 Pharmacy Yes Yes    Take 20 mg by mouth Every 6 (Six) Hours As Needed for Moderate Pain .          Hospital Medications (active)       Dose Frequency Start End    acetaminophen (TYLENOL) tablet 650 mg 650 mg Every 6 Hours PRN 1/16/2018     Sig - Route: Take 2 tablets by mouth Every 6 (Six) Hours As Needed for Mild Pain . - Oral    Cosign for Ordering: Accepted by Samuel Duane Kreis, MD on 1/16/2018  1:17 PM    cefdinir (OMNICEF) capsule 300 mg 300 mg Every 12 Hours Scheduled 1/23/2018 2/2/2018    Sig - Route: Take 1 capsule by mouth Every 12 (Twelve) Hours. - Oral    heparin (porcine) 5000 UNIT/ML injection 5,000 Units 5,000 Units Every 8 Hours Scheduled 1/16/2018     Sig - Route: Inject 1 mL under the skin Every 8 (Eight) Hours. - Subcutaneous    Cosign for Ordering: Accepted by Samuel Duane Kreis, MD on 1/16/2018  1:17 PM    HYDROmorphone (DILAUDID) injection 1 mg 1 mg Once 1/15/2018     Sig - Route: Infuse 1 mL into a venous catheter 1 (One) Time. - Intravenous    lactated ringers infusion 100 mL/hr Continuous 1/19/2018     Sig - Route: Infuse 100 mL/hr into a venous catheter Continuous. - Intravenous    lactated ringers infusion 125 mL/hr Continuous 1/19/2018     Sig - Route: Infuse 125 mL/hr into a venous catheter Continuous. - Intravenous    lactobacillus acidophilus (RISAQUAD) capsule 1 capsule 1 capsule Daily 1/18/2018     Sig - Route: Take 1 capsule by mouth Daily. - Oral    Magnesium Sulfate 2 gram Bolus, followed by 8 gram infusion (total Mg dose 10 grams)- Mg less than or equal to 1mg/dL  "2 g As Needed 1/21/2018     Sig - Route: Infuse 50 mL into a venous catheter As Needed (Mg less than or equal to 1mg/dL). - Intravenous    Linked Group 1:  \"Or\" Linked Group Details        magnesium sulfate 4 gram infusion- Mg 1.6-1.9 mg/dL 4 g As Needed 1/21/2018     Sig - Route: Infuse 100 mL into a venous catheter As Needed (Mg 1.6-1.9 mg/dL). - Intravenous    Linked Group 1:  \"Or\" Linked Group Details        magnesium sulfate 4 gram infusion- Mg 1.6-1.9 mg/dL 4 g Once 1/24/2018     Sig - Route: Infuse 100 mL into a venous catheter 1 (One) Time. - Intravenous    Magnesium Sulfate 6 gram Infusion (2 gm x 3) -Mg 1.1 -1.5 mg/dL 2 g As Needed 1/21/2018     Sig - Route: Infuse 50 mL into a venous catheter As Needed (Mg 1.1 -1.5 mg/dL). - Intravenous    Linked Group 1:  \"Or\" Linked Group Details        multivitamin (DAILY MINH) tablet 1 tablet 1 tablet Daily 1/22/2018     Sig - Route: Take 1 tablet by mouth Daily. - Oral    Cosign for Ordering: Required by Samuel Duane Kreis, MD    ondansetron (ZOFRAN) injection 4 mg 4 mg Every 6 Hours PRN 1/21/2018     Sig - Route: Infuse 2 mL into a venous catheter Every 6 (Six) Hours As Needed for Nausea or Vomiting. - Intravenous    Linked Group 2:  \"Or\" Linked Group Details        ondansetron (ZOFRAN) tablet 4 mg 4 mg Every 6 Hours PRN 1/21/2018     Sig - Route: Take 1 tablet by mouth Every 6 (Six) Hours As Needed for Nausea or Vomiting. - Oral    Linked Group 2:  \"Or\" Linked Group Details        ondansetron ODT (ZOFRAN-ODT) disintegrating tablet 4 mg 4 mg Every 6 Hours PRN 1/21/2018     Sig - Route: Take 1 tablet by mouth Every 6 (Six) Hours As Needed for Nausea or Vomiting. - Oral    Linked Group 2:  \"Or\" Linked Group Details        oxyCODONE (ROXICODONE) immediate release tablet 5 mg 5 mg Every 4 Hours PRN 1/16/2018 1/26/2018    Sig - Route: Take 1 tablet by mouth Every 4 (Four) Hours As Needed for Moderate Pain . - Oral    potassium chloride (KLOR-CON) packet 40 mEq 40 mEq As " "Needed 1/21/2018     Sig - Route: Take 40 mEq by mouth As Needed (potassium replacement, see admin instructions). - Oral    Linked Group 3:  \"Or\" Linked Group Details        potassium chloride (MICRO-K) CR capsule 40 mEq 40 mEq As Needed 1/21/2018     Sig - Route: Take 4 capsules by mouth As Needed (potassium replacement.  see admin instructions). - Oral    Linked Group 3:  \"Or\" Linked Group Details        potassium chloride 10 mEq in 100 mL IVPB 10 mEq Every 1 Hour PRN 1/21/2018     Sig - Route: Infuse 100 mL into a venous catheter Every 1 (One) Hour As Needed (potassium protocol PERIPHERAL - see admin instructions). - Intravenous    Linked Group 3:  \"Or\" Linked Group Details        sodium chloride 0.9 % flush 10 mL 10 mL As Needed 1/15/2018     Sig - Route: Infuse 10 mL into a venous catheter As Needed for Line Care. - Intravenous    Cosign for Ordering: Accepted by José Miguel Li MD on 1/15/2018  1:21 PM    Linked Group 4:  \"And\" Linked Group Details        magnesium sulfate 4 gram infusion- Mg 1.6-1.9 mg/dL (Discontinued) 4 g Once 1/22/2018 1/24/2018    Sig - Route: Infuse 100 mL into a venous catheter 1 (One) Time. - Intravenous            Lab Results (last 24 hours)     Procedure Component Value Units Date/Time    Comprehensive Metabolic Panel [[id number]]  (Abnormal) Collected:  01/24/18 0032    Specimen:  Blood Updated:  01/24/18 0232     Glucose 141 (H) mg/dL      BUN 10 mg/dL      Creatinine 0.55 mg/dL      Sodium 136 mmol/L      Potassium 3.7 mmol/L      Chloride 110 mmol/L      CO2 22.3 (L) mmol/L      Calcium 7.5 (L) mg/dL      Total Protein 5.3 (L) g/dL      Albumin 2.50 (L) g/dL      ALT (SGPT) 23 U/L      AST (SGOT) 37 (H) U/L      Alkaline Phosphatase 111 U/L       Note New Reference Ranges        Total Bilirubin 0.6 mg/dL      eGFR Non African Amer >150 mL/min/1.73      Globulin 2.8 gm/dL      A/G Ratio 0.9 (L) g/dL      BUN/Creatinine Ratio 18.2     Anion Gap 3.7 mmol/L     Osmolality, " Calculated [[id number]]  (Normal) Collected:  01/24/18 0032    Specimen:  Blood Updated:  01/24/18 0232     Osmolality Calc 273.4 mOsm/kg     CBC & Differential [[id number]] Collected:  01/24/18 0032    Specimen:  Blood Updated:  01/24/18 0235    Narrative:       The following orders were created for panel order CBC & Differential.  Procedure                               Abnormality         Status                     ---------                               -----------         ------                     Scan Slide[[id number]]                                       Final result               CBC Auto Differential[[id number]]        Abnormal            Final result                 Please view results for these tests on the individual orders.    CBC Auto Differential [[id number]]  (Abnormal) Collected:  01/24/18 0032    Specimen:  Blood Updated:  01/24/18 0235     WBC 13.18 (H) 10*3/mm3      RBC 4.37 (L) 10*6/mm3      Hemoglobin 8.6 (L) g/dL      Hematocrit 26.7 (L) %      MCV 61.1 (L) fL      MCH 19.7 (L) pg      MCHC 32.2 (L) g/dL      RDW 16.8 (H) %      RDW-SD 33.0 (L) fl      MPV 10.6 (H) fL      Platelets 532 (H) 10*3/mm3      Neutrophil % 71.0 (H) %      Lymphocyte % 16.1 (L) %      Monocyte % 9.6 %      Eosinophil % 2.0 %      Basophil % 0.3 %      Immature Grans % 1.0 (H) %      Neutrophils, Absolute 9.35 (H) 10*3/mm3      Lymphocytes, Absolute 2.12 10*3/mm3      Monocytes, Absolute 1.27 (H) 10*3/mm3      Eosinophils, Absolute 0.27 10*3/mm3      Basophils, Absolute 0.04 10*3/mm3      Immature Grans, Absolute 0.13 (H) 10*3/mm3     Scan Slide [[id number]] Collected:  01/24/18 0032    Specimen:  Blood Updated:  01/24/18 0235     Anisocytosis Slight/1+     Hypochromia Mod/2+     Microcytes Large/3+     Platelet Estimate Increased        Imaging Results (last 24 hours)     ** No results found for the last 24 hours. **        ECG/EMG Results (last 24 hours)     ** No results found for the last 24 hours. **        Orders  (last 24 hrs)     Start     Ordered    01/25/18 0600  C-reactive Protein  Morning Draw      01/24/18 1022    01/24/18 1029  Wound care  2 Times Daily      01/24/18 1028    01/24/18 0730  magnesium sulfate 4 gram infusion- Mg 1.6-1.9 mg/dL  Once      01/24/18 0529    01/24/18 0600  CBC Auto Differential  PROCEDURE ONCE      01/24/18 0001    01/24/18 0233  Osmolality, Calculated  Once      01/24/18 0232    01/24/18 0211  Scan Slide  Once      01/24/18 0210    01/23/18 1200  cefdinir (OMNICEF) capsule 300 mg  Every 12 Hours Scheduled      01/23/18 1019    01/22/18 1200  DIET MESSAGE High protein chocolate milk shake, patient has a very poor appetite.  Picks at his food and may eat about 20 to 30% at most.  Thanks, Gladys  Daily With Lunch & Dinner     Comments:  High protein chocolate milk shake, patient has a very poor appetite.  Picks at his food and may eat about 20 to 30% at most.  Thanks, Gladys    01/21/18 1905    01/22/18 0900  multivitamin (DAILY MINH) tablet 1 tablet  Daily      01/22/18 0709    01/22/18 0700  magnesium sulfate 4 gram infusion- Mg 1.6-1.9 mg/dL  Once,   Status:  Discontinued      01/22/18 0616    01/21/18 0924  Magnesium Sulfate 2 gram Bolus, followed by 8 gram infusion (total Mg dose 10 grams)- Mg less than or equal to 1mg/dL  As Needed      01/21/18 0924    01/21/18 0924  Magnesium Sulfate 6 gram Infusion (2 gm x 3) -Mg 1.1 -1.5 mg/dL  As Needed      01/21/18 0924    01/21/18 0924  magnesium sulfate 4 gram infusion- Mg 1.6-1.9 mg/dL  As Needed      01/21/18 0924    01/21/18 0924  potassium chloride (MICRO-K) CR capsule 40 mEq  As Needed      01/21/18 0924    01/21/18 0924  potassium chloride (KLOR-CON) packet 40 mEq  As Needed      01/21/18 0924    01/21/18 0924  potassium chloride 10 mEq in 100 mL IVPB  Every 1 Hour PRN      01/21/18 0924    01/21/18 0923  ondansetron (ZOFRAN) tablet 4 mg  Every 6 Hours PRN      01/21/18 0923    01/21/18 0923  ondansetron ODT (ZOFRAN-ODT) disintegrating tablet 4  mg  Every 6 Hours PRN      01/21/18 0923    01/21/18 0923  ondansetron (ZOFRAN) injection 4 mg  Every 6 Hours PRN      01/21/18 0923    01/19/18 1045  lactated ringers infusion  Continuous      01/19/18 1000    01/19/18 1045  lactated ringers infusion  Continuous      01/19/18 1007    01/18/18 1600  lactobacillus acidophilus (RISAQUAD) capsule 1 capsule  Daily      01/18/18 1405    01/17/18 0600  CBC & Differential  Daily      01/16/18 0723    01/17/18 0600  Comprehensive Metabolic Panel  Daily      01/16/18 0723    01/16/18 1400  heparin (porcine) 5000 UNIT/ML injection 5,000 Units  Every 8 Hours Scheduled      01/16/18 0816    01/16/18 1400  oxyCODONE (ROXICODONE) immediate release tablet 5 mg  Every 4 Hours PRN      01/16/18 1337    01/16/18 0718  acetaminophen (TYLENOL) tablet 650 mg  Every 6 Hours PRN      01/16/18 0723    01/15/18 1700  HYDROmorphone (DILAUDID) injection 1 mg  Once      01/15/18 1554    01/15/18 1226  sodium chloride 0.9 % flush 10 mL  As Needed      01/15/18 1227    Unscheduled  Magnesium  As Needed      01/21/18 0924    Unscheduled  Potassium  As Needed      01/21/18 0924    --  oxyCODONE (ROXICODONE) 20 MG tablet  Every 6 Hours PRN      01/15/18 1638          Operative/Procedure Notes (last 24 hours) (Notes from 1/23/2018 11:14 AM through 1/24/2018 11:14 AM)     No notes of this type exist for this encounter.           Physician Progress Notes (last 24 hours) (Notes from 1/23/2018 11:14 AM through 1/24/2018 11:14 AM)      Misty Bae MD at 1/23/2018 12:16 PM  Version 2 of 2           I have personally seen and examined the patient today and discussed overnight interval progress and pertinent issues with nursing staff.    Subjective:    Overall patient shows significant clinical improvement.  Culture showing growth of Proteus with almost normal CRP level.     Awaiting possible transfer to [facility].  Patient may need repeat debridement if transferred to [facility].    History taken from: patient  "chart family RN      Vital Signs    /76 (BP Location: Left arm, Patient Position: Lying)  Pulse 62  Temp 98.1 °F (36.7 °C) (Oral)   Resp 18  Ht 182 cm (71.65\")  Wt 76.4 kg (168 lb 6.9 oz)  SpO2 99%  BMI 23.06 kg/m2    Temp:  [98 °F (36.7 °C)-98.2 °F (36.8 °C)] 98.1 °F (36.7 °C)      Intake/Output Summary (Last 24 hours) at 01/23/18 1216  Last data filed at 01/23/18 0320   Gross per 24 hour   Intake              860 ml   Output              950 ml   Net              -90 ml     Intake & Output (last 3 days)       01/20 0701 - 01/21 0700 01/21 0701 - 01/22 0700 01/22 0701 - 01/23 0700 01/23 0701 - 01/24 0700    P.O. 1160 240 600     I.V. (mL/kg)        IV Piggyback 500 100 500     Total Intake(mL/kg) 1660 (21.7) 340 (4.5) 1100 (14.4)     Urine (mL/kg/hr) 1700 (0.9) 1350 (0.7) 1325 (0.7)     Total Output 1700 1350 1325      Net -40 -1010 -225              Unmeasured Urine Occurrence  1 x          Physical Exam:       General Appearance:    Alert, cooperative, in no acute distress, thin, frail, daughter at bedside    Head:    Normocephalic, without obvious abnormality, atraumatic   Eyes:            Lids and lashes normal, conjunctivae and sclerae normal, no   icterus, no pallor, corneas clear, PERRLA   Ears:    Ears appear intact with no abnormalities noted   Throat:   No oral lesions, no thrush, oral mucosa moist   Neck:   No adenopathy, supple, trachea midline, no thyromegaly, no   carotid bruit, no JVD   Back:     No tenderness to percussion or palpation, range of motion   normal   Lungs:    coarse breath sounds to right.    Heart:    Regular rhythm and normal rate, normal S1 and S2, no            murmur, no gallop, no rub, no click   Chest Wall:    No abnormalities observed   Abdomen:     Normal bowel sounds, no masses, no organomegaly, soft        non-tender, non-distended, no guarding, no rebound                tenderness   Rectal:     Deferred   Extremities:   Moves all extremities well, no edema, no " cyanosis, no             redness   Pulses:   Pulses palpable and equal bilaterally   Skin:   Sacral decubitus ulcer and right hip ulcer    Lymph nodes:   No palpable adenopathy   Neurologic:   Awake, alert, Garbled speech        Results:      Results from last 7 days  Lab Units 01/23/18  0034 01/22/18  0126 01/21/18  0053 01/20/18  0054 01/19/18  0121 01/18/18  0036 01/17/18  0454   WBC 10*3/mm3 12.20 11.40 10.76 11.43 12.61* 9.73 10.08     Lab Results   Component Value Date    NEUTROABS 8.13 (H) 01/23/2018         Results from last 7 days  Lab Units 01/23/18  0034   CREATININE mg/dL 0.69         Results from last 7 days  Lab Units 01/22/18  0126 01/21/18  0053 01/19/18  0121   CRP mg/dL 1.20* 2.07* 4.83*       Imaging Results (last 24 hours)     ** No results found for the last 24 hours. **            Results Review:    I have personally reviewed laboratory data, culture results, radiology studies and antimicrobial therapy.    Hospital Medications (active)       Dose Frequency Start End    acetaminophen (TYLENOL) tablet 650 mg 650 mg Every 6 Hours PRN 1/16/2018     Sig - Route: Take 2 tablets by mouth Every 6 (Six) Hours As Needed for Mild Pain . - Oral    Cosign for Ordering: Accepted by Samuel Duane Kreis, MD on 1/16/2018  1:17 PM    cefepime (MAXIPIME) 2 g/100 mL 0.9% NS (mbp) 2 g Once 1/16/2018 1/16/2018    Sig - Route: Infuse 100 mL into a venous catheter 1 (One) Time. - Intravenous    Cosign for Ordering: Required by Misty Bae MD    cefepime (MAXIPIME) 2 g/100 mL 0.9% NS (mbp) 2 g Every 12 Hours 1/16/2018 1/23/2018    Sig - Route: Infuse 100 mL into a venous catheter Every 12 (Twelve) Hours. - Intravenous    Cosign for Ordering: Required by Misty Bae MD    heparin (porcine) 5000 UNIT/ML injection 5,000 Units 5,000 Units Every 8 Hours Scheduled 1/16/2018     Sig - Route: Inject 1 mL under the skin Every 8 (Eight) Hours. - Subcutaneous    Cosign for Ordering: Accepted by Samuel Duane Kreis,  "MD on 1/16/2018  1:17 PM    HYDROmorphone (DILAUDID) injection 1 mg 1 mg Once 1/15/2018     Sig - Route: Infuse 1 mL into a venous catheter 1 (One) Time. - Intravenous    metroNIDAZOLE (FLAGYL) IVPB 500 mg 500 mg Every 8 Hours 1/16/2018 1/23/2018    Sig - Route: Infuse 100 mL into a venous catheter Every 8 (Eight) Hours. - Intravenous    Cosign for Ordering: Required by Misty Bae MD    oxyCODONE (ROXICODONE) immediate release tablet 5 mg 5 mg Every 4 Hours PRN 1/16/2018 1/26/2018    Sig - Route: Take 1 tablet by mouth Every 4 (Four) Hours As Needed for Moderate Pain . - Oral    Pharmacy to dose vancomycin  Continuous PRN 1/16/2018 1/23/2018    Sig - Route: Continuous As Needed for Consult. - Does not apply    Cosign for Ordering: Required by Misty Bae MD    sodium chloride 0.9 % flush 10 mL 10 mL As Needed 1/15/2018     Sig - Route: Infuse 10 mL into a venous catheter As Needed for Line Care. - Intravenous    Cosign for Ordering: Accepted by José Miguel Li MD on 1/15/2018  1:21 PM    Linked Group 1:  \"And\" Linked Group Details        sodium chloride 0.9 % infusion 150 mL/hr Continuous 1/15/2018     Sig - Route: Infuse 150 mL/hr into a venous catheter Continuous. - Intravenous    vancomycin (VANCOCIN) 1,000 mg in sodium chloride 0.9 % 250 mL IVPB 1,000 mg Every 12 Hours 1/16/2018 1/23/2018    Sig - Route: Infuse 1,000 mg into a venous catheter Every 12 (Twelve) Hours. - Intravenous            Cultures:    Blood Culture   Date Value Ref Range Status   01/15/2018 No growth at 24 hours  Preliminary   01/15/2018 No growth at 24 hours  Preliminary     Urine Culture   Date Value Ref Range Status   01/15/2018 No growth at 24 hours  Preliminary           Assessment/Plan     ASSESSMENT:    1.  Severe sepsis with encephalopathy  2.  Aspiration pneumonia     PLAN:    Overall patient shows significant clinical improvement.  Culture showing growth of Proteus with almost normal CRP level.     Antibiotic " "therapy was de-escalated to Omnicef 300 mg twice a day through 2/1/18.  Vancomycin, cefepime and Flagyl were discontinued.    Awaiting possible transfer to [facility].  Patient may need repeat debridement if transferred to [facility].     CRP ordered for a.m.     Patient's findings and recommendations were discussed with patient, family and nursing staff    Code Status: Full Code     Scribed for JOSÉ MIGUEL Knott  01/23/18  12:16 PM      Physician Attestation:    The documentation recorded by the scribe accurately reflects the service I personally performed and the decisions made by me.    Misty Bae MD  Infectious Diseases  01/24/18  10:12 AM       Electronically signed by Misty Bae MD at 1/24/2018 10:12 AM      JOSÉ MIGUEL Love at 1/23/2018 12:16 PM  Version 1 of 2           I have personally seen and examined the patient today and discussed overnight interval progress and pertinent issues with nursing staff.    Subjective:    Overall patient shows significant clinical improvement.  Culture showing growth of Proteus with almost normal CRP level.     Awaiting possible transfer to [facility].  Patient may need repeat debridement if transferred to [facility].    History taken from: patient chart family RN      Vital Signs    /76 (BP Location: Left arm, Patient Position: Lying)  Pulse 62  Temp 98.1 °F (36.7 °C) (Oral)   Resp 18  Ht 182 cm (71.65\")  Wt 76.4 kg (168 lb 6.9 oz)  SpO2 99%  BMI 23.06 kg/m2    Temp:  [98 °F (36.7 °C)-98.2 °F (36.8 °C)] 98.1 °F (36.7 °C)      Intake/Output Summary (Last 24 hours) at 01/23/18 1216  Last data filed at 01/23/18 0320   Gross per 24 hour   Intake              860 ml   Output              950 ml   Net              -90 ml     Intake & Output (last 3 days)       01/20 0701 - 01/21 0700 01/21 0701 - 01/22 0700 01/22 0701 - 01/23 0700 01/23 0701 - 01/24 0700    P.O. 1160 240 600     I.V. (mL/kg)        IV Piggyback 500 100 500     Total Intake(mL/kg) 1660 " (21.7) 340 (4.5) 1100 (14.4)     Urine (mL/kg/hr) 1700 (0.9) 1350 (0.7) 1325 (0.7)     Total Output 1700 1350 1325      Net -40 -1010 -225              Unmeasured Urine Occurrence  1 x          Physical Exam:       General Appearance:    Alert, cooperative, in no acute distress, thin, frail, daughter at bedside    Head:    Normocephalic, without obvious abnormality, atraumatic   Eyes:            Lids and lashes normal, conjunctivae and sclerae normal, no   icterus, no pallor, corneas clear, PERRLA   Ears:    Ears appear intact with no abnormalities noted   Throat:   No oral lesions, no thrush, oral mucosa moist   Neck:   No adenopathy, supple, trachea midline, no thyromegaly, no   carotid bruit, no JVD   Back:     No tenderness to percussion or palpation, range of motion   normal   Lungs:    coarse breath sounds to right.    Heart:    Regular rhythm and normal rate, normal S1 and S2, no            murmur, no gallop, no rub, no click   Chest Wall:    No abnormalities observed   Abdomen:     Normal bowel sounds, no masses, no organomegaly, soft        non-tender, non-distended, no guarding, no rebound                tenderness   Rectal:     Deferred   Extremities:   Moves all extremities well, no edema, no cyanosis, no             redness   Pulses:   Pulses palpable and equal bilaterally   Skin:   Sacral decubitus ulcer and right hip ulcer    Lymph nodes:   No palpable adenopathy   Neurologic:   Awake, alert, Garbled speech        Results:      Results from last 7 days  Lab Units 01/23/18  0034 01/22/18  0126 01/21/18  0053 01/20/18  0054 01/19/18  0121 01/18/18  0036 01/17/18  0454   WBC 10*3/mm3 12.20 11.40 10.76 11.43 12.61* 9.73 10.08     Lab Results   Component Value Date    NEUTROABS 8.13 (H) 01/23/2018         Results from last 7 days  Lab Units 01/23/18  0034   CREATININE mg/dL 0.69         Results from last 7 days  Lab Units 01/22/18  0126 01/21/18  0053 01/19/18  0121   CRP mg/dL 1.20* 2.07* 4.83*        Imaging Results (last 24 hours)     ** No results found for the last 24 hours. **            Results Review:    I have personally reviewed laboratory data, culture results, radiology studies and antimicrobial therapy.    Hospital Medications (active)       Dose Frequency Start End    acetaminophen (TYLENOL) tablet 650 mg 650 mg Every 6 Hours PRN 1/16/2018     Sig - Route: Take 2 tablets by mouth Every 6 (Six) Hours As Needed for Mild Pain . - Oral    Cosign for Ordering: Accepted by Samuel Duane Kreis, MD on 1/16/2018  1:17 PM    cefepime (MAXIPIME) 2 g/100 mL 0.9% NS (mbp) 2 g Once 1/16/2018 1/16/2018    Sig - Route: Infuse 100 mL into a venous catheter 1 (One) Time. - Intravenous    Cosign for Ordering: Required by Misty Bae MD    cefepime (MAXIPIME) 2 g/100 mL 0.9% NS (mbp) 2 g Every 12 Hours 1/16/2018 1/23/2018    Sig - Route: Infuse 100 mL into a venous catheter Every 12 (Twelve) Hours. - Intravenous    Cosign for Ordering: Required by Misty Bae MD    heparin (porcine) 5000 UNIT/ML injection 5,000 Units 5,000 Units Every 8 Hours Scheduled 1/16/2018     Sig - Route: Inject 1 mL under the skin Every 8 (Eight) Hours. - Subcutaneous    Cosign for Ordering: Accepted by Samuel Duane Kreis, MD on 1/16/2018  1:17 PM    HYDROmorphone (DILAUDID) injection 1 mg 1 mg Once 1/15/2018     Sig - Route: Infuse 1 mL into a venous catheter 1 (One) Time. - Intravenous    metroNIDAZOLE (FLAGYL) IVPB 500 mg 500 mg Every 8 Hours 1/16/2018 1/23/2018    Sig - Route: Infuse 100 mL into a venous catheter Every 8 (Eight) Hours. - Intravenous    Cosign for Ordering: Required by Misty Bea MD    oxyCODONE (ROXICODONE) immediate release tablet 5 mg 5 mg Every 4 Hours PRN 1/16/2018 1/26/2018    Sig - Route: Take 1 tablet by mouth Every 4 (Four) Hours As Needed for Moderate Pain . - Oral    Pharmacy to dose vancomycin  Continuous PRN 1/16/2018 1/23/2018    Sig - Route: Continuous As Needed for Consult. - Does  "not apply    Cosign for Ordering: Required by Misty Bae MD    sodium chloride 0.9 % flush 10 mL 10 mL As Needed 1/15/2018     Sig - Route: Infuse 10 mL into a venous catheter As Needed for Line Care. - Intravenous    Cosign for Ordering: Accepted by José Miguel Li MD on 1/15/2018  1:21 PM    Linked Group 1:  \"And\" Linked Group Details        sodium chloride 0.9 % infusion 150 mL/hr Continuous 1/15/2018     Sig - Route: Infuse 150 mL/hr into a venous catheter Continuous. - Intravenous    vancomycin (VANCOCIN) 1,000 mg in sodium chloride 0.9 % 250 mL IVPB 1,000 mg Every 12 Hours 1/16/2018 1/23/2018    Sig - Route: Infuse 1,000 mg into a venous catheter Every 12 (Twelve) Hours. - Intravenous            Cultures:    Blood Culture   Date Value Ref Range Status   01/15/2018 No growth at 24 hours  Preliminary   01/15/2018 No growth at 24 hours  Preliminary     Urine Culture   Date Value Ref Range Status   01/15/2018 No growth at 24 hours  Preliminary           Assessment/Plan     ASSESSMENT:    1.  Severe sepsis with encephalopathy  2.  Aspiration pneumonia     PLAN:    Overall patient shows significant clinical improvement.  Culture showing growth of Proteus with almost normal CRP level.     Antibiotic therapy was de-escalated to Omnicef 300 mg twice a day through 2/1/18.  Vancomycin, cefepime and Flagyl were discontinued.    Awaiting possible transfer to [facility].  Patient may need repeat debridement if transferred to [facility].     CRP ordered for a.m.     Patient's findings and recommendations were discussed with patient, family and nursing staff    Code Status: Full Code     Scribed for JOSÉ MIGUEL Knott  01/23/18  12:16 PM           Electronically signed by JOSÉ MIGUEL Love at 1/23/2018 12:18 PM      Samuel Duane Kreis, MD at 1/24/2018  7:44 AM  Version 2 of 2              LOS: 9 days     Chief Complaint:  Rhabdomyolysis/Decubitus Ulcers with Sepsis    Subjective     Interval " "History:   He has no major changes over the last 24 hours.  He complains of ongoing pain in the sacral decubitus region consistent with previous surgical debridement however no progressive symptoms at this point.  Patient does have chronic low back pain. Preliminary wound culture consistent with Proteus Vulgaris and Escherichia/Citrobacter, ID following.  Labs and vitals stable.  following and he is not a candidate for [facility] admission.     Objective     Vital Signs  /78  Pulse 60  Temp 97.9 °F (36.6 °C) (Oral)   Resp 18  Ht 182 cm (71.65\")  Wt 76.4 kg (168 lb 6.9 oz)  SpO2 98%  BMI 23.06 kg/m2  Intake & Output (last day)       01/23 0701 - 01/24 0700 01/24 0701 - 01/25 0700    P.O.      IV Piggyback      Total Intake(mL/kg)      Urine (mL/kg/hr) 1200 (0.7)     Stool 500 (0.3)     Total Output 1700      Net -1700            Unmeasured Stool Occurrence 1 x             Physical Exam:     General Appearance:    Alert, cooperative, in no acute distress   Head:    Normocephalic, without obvious abnormality, atraumatic   Eyes:            Lids and lashes normal, conjunctivae and sclerae normal, no   icterus, no pallor, corneas clear, PERRLA   Ears:    Ears appear intact with no abnormalities noted       Neck:   No adenopathy, supple, trachea midline, no thyromegaly, no   carotid bruit, no JVD   Lungs:     Scattered Coarse Crackles bilaterally R>>L ,respirations regular, even and                  unlabored    Heart:    Regular rhythm and normal rate, normal S1 and S2, no            murmur, no gallop, no rub, no click   Chest Wall:    No abnormalities observed   Abdomen:     Normal bowel sounds, no masses, no organomegaly, soft        non-tender, non-distended, no guarding, no rebound                tenderness   Extremities:   Moves all extremities well, no edema, no cyanosis, no             redness   Pulses:   Pulses palpable and equal bilaterally   Skin:   No bleeding, bruising or rash, Sacral " decubitus ulcer with dressing in place and right trochanteric ulcer with eschar   Lymph nodes:   No palpable adenopathy   Neurologic:   Cranial nerves 2 - 12 grossly intact, sensation intact, DTR       present and equal bilaterally        Results Review:    Lab Results   Component Value Date    WBC 13.18 (H) 01/24/2018    HGB 8.6 (L) 01/24/2018    HCT 26.7 (L) 01/24/2018    MCV 61.1 (L) 01/24/2018     (H) 01/24/2018       Lab Results   Component Value Date    GLUCOSE 141 (H) 01/24/2018    BUN 10 01/24/2018    CREATININE 0.55 01/24/2018    EGFRIFNONA >150 01/24/2018    BCR 18.2 01/24/2018     01/24/2018    K 3.7 01/24/2018     01/24/2018    CO2 22.3 (L) 01/24/2018    CALCIUM 7.5 (L) 01/24/2018    PROTENTOTREF 7.2 11/12/2015    ALBUMIN 2.50 (L) 01/24/2018    LABIL2 0.9 (L) 01/24/2018    AST 37 (H) 01/24/2018    ALT 23 01/24/2018     Lab Results   Component Value Date    INR 0.94 11/03/2015    INR <0.90 10/25/2015    INR <0.90 06/01/2015       No results found for: POCGLU       Medication Review:     Current Facility-Administered Medications:   •  acetaminophen (TYLENOL) tablet 650 mg, 650 mg, Oral, Q6H PRN, JARETH Condon, 650 mg at 01/24/18 0638  •  cefdinir (OMNICEF) capsule 300 mg, 300 mg, Oral, Q12H, Misty Bae MD, 300 mg at 01/23/18 2134  •  heparin (porcine) 5000 UNIT/ML injection 5,000 Units, 5,000 Units, Subcutaneous, Q8H, JARETH Condon, 5,000 Units at 01/24/18 0516  •  HYDROmorphone (DILAUDID) injection 1 mg, 1 mg, Intravenous, Once, José Miguel Li MD  •  lactated ringers infusion, 100 mL/hr, Intravenous, Continuous, Mohan Santoyo MD  •  lactated ringers infusion, 125 mL/hr, Intravenous, Continuous, Terrence Montero MD, Stopped at 01/19/18 1251  •  lactobacillus acidophilus (RISAQUAD) capsule 1 capsule, 1 capsule, Oral, Daily, Camila Haley [facility], 1 capsule at 01/23/18 0718  •  Magnesium Sulfate 2 gram Bolus, followed by 8 gram infusion (total Mg dose 10 grams)-  Mg less than or equal to 1mg/dL, 2 g, Intravenous, PRN **OR** Magnesium Sulfate 6 gram Infusion (2 gm x 3) -Mg 1.1 -1.5 mg/dL, 2 g, Intravenous, PRN **OR** magnesium sulfate 4 gram infusion- Mg 1.6-1.9 mg/dL, 4 g, Intravenous, PRN, Samuel Duane Kreis, MD  •  magnesium sulfate 4 gram infusion- Mg 1.6-1.9 mg/dL, 4 g, Intravenous, Once, Samuel Duane Kreis, MD  •  multivitamin (DAILY MINH) tablet 1 tablet, 1 tablet, Oral, Daily, JARETH Condon, 1 tablet at 01/23/18 0718  •  ondansetron (ZOFRAN) tablet 4 mg, 4 mg, Oral, Q6H PRN **OR** ondansetron ODT (ZOFRAN-ODT) disintegrating tablet 4 mg, 4 mg, Oral, Q6H PRN **OR** ondansetron (ZOFRAN) injection 4 mg, 4 mg, Intravenous, Q6H PRN, Samuel Duane Kreis, MD, 4 mg at 01/22/18 2126  •  oxyCODONE (ROXICODONE) immediate release tablet 5 mg, 5 mg, Oral, Q4H PRN, Samuel Duane Kreis, MD, 5 mg at 01/24/18 0334  •  potassium chloride (MICRO-K) CR capsule 40 mEq, 40 mEq, Oral, PRN **OR** potassium chloride (KLOR-CON) packet 40 mEq, 40 mEq, Oral, PRN **OR** potassium chloride 10 mEq in 100 mL IVPB, 10 mEq, Intravenous, Q1H PRN, Samuel Duane Kreis, MD  •  Insert peripheral IV, , , Once **AND** sodium chloride 0.9 % flush 10 mL, 10 mL, Intravenous, PRN, Jesus Carreon PA-C, 10 mL at 01/21/18 1506      Assessment/Plan   Probable Aspiration Pneumonia with Sepsis  Sacral Decubitus Ulcer   Decubitus ulceration of the hip  Hyperbilirubinemia (resolved)  Rhabdomyolysis (resolved)  Metabolic encephalopathy due to sepsis (resolved)  Multiple myeloma  Chronic opioid use  Protein Malnutrition  Fever (resolved)      Appreciate surgery and ID input     Continue with Cefepime + Flagyl + Vancomycin, await final wound culture, ID following    Oxycodone 5 mg q 4 hours prn pain     Tylenol prn fever     Continue with wound care     Heparin for DVT prophylaxis    O2 via NC to maintain O2 sats >90%    Continue with PT/OT, S/S following for discharge to LTAC for wound care/recovery    Continue K+/Mg  "replacement protocol,  MVI daily    JARETH Condon  01/24/18  7:44 AM       He is now on oral antibiotics.  Feeling a little better overall.  Strength is a little better.  He continues to hope to rehabilitate himself and ultimately return home.  He seems much more motivated to get better.  At this point his lungs are clear bilaterally.  Cardiac exam reveals regular rate and regular rhythm.  He continues to require wound care for his buttocks.  He will require either LTAC or SNF admission for rehabilitation and wound care.  Continue Omnicef.    Samuel Duane Kreis, MD  01/24/18  8:33 AM                 Electronically signed by Samuel Duane Kreis, MD at 1/24/2018  8:34 AM      JARETH Condon at 1/24/2018  7:44 AM  Version 1 of 2              LOS: 9 days     Chief Complaint:  Rhabdomyolysis/Decubitus Ulcers with Sepsis    Subjective     Interval History:   He has no major changes over the last 24 hours.  He complains of ongoing pain in the sacral decubitus region consistent with previous surgical debridement however no progressive symptoms at this point.  Patient does have chronic low back pain. Preliminary wound culture consistent with Proteus Vulgaris and Escherichia/Citrobacter, ID following.  Labs and vitals stable.  following and he is not a candidate for [facility] admission.     Objective     Vital Signs  /78  Pulse 60  Temp 97.9 °F (36.6 °C) (Oral)   Resp 18  Ht 182 cm (71.65\")  Wt 76.4 kg (168 lb 6.9 oz)  SpO2 98%  BMI 23.06 kg/m2  Intake & Output (last day)       01/23 0701 - 01/24 0700 01/24 0701 - 01/25 0700    P.O.      IV Piggyback      Total Intake(mL/kg)      Urine (mL/kg/hr) 1200 (0.7)     Stool 500 (0.3)     Total Output 1700      Net -1700            Unmeasured Stool Occurrence 1 x             Physical Exam:     General Appearance:    Alert, cooperative, in no acute distress   Head:    Normocephalic, without obvious abnormality, atraumatic   Eyes:            Lids and lashes " normal, conjunctivae and sclerae normal, no   icterus, no pallor, corneas clear, PERRLA   Ears:    Ears appear intact with no abnormalities noted       Neck:   No adenopathy, supple, trachea midline, no thyromegaly, no   carotid bruit, no JVD   Lungs:     Scattered Coarse Crackles bilaterally R>>L ,respirations regular, even and                  unlabored    Heart:    Regular rhythm and normal rate, normal S1 and S2, no            murmur, no gallop, no rub, no click   Chest Wall:    No abnormalities observed   Abdomen:     Normal bowel sounds, no masses, no organomegaly, soft        non-tender, non-distended, no guarding, no rebound                tenderness   Extremities:   Moves all extremities well, no edema, no cyanosis, no             redness   Pulses:   Pulses palpable and equal bilaterally   Skin:   No bleeding, bruising or rash, Sacral decubitus ulcer with dressing in place and right trochanteric ulcer with eschar   Lymph nodes:   No palpable adenopathy   Neurologic:   Cranial nerves 2 - 12 grossly intact, sensation intact, DTR       present and equal bilaterally        Results Review:    Lab Results   Component Value Date    WBC 13.18 (H) 01/24/2018    HGB 8.6 (L) 01/24/2018    HCT 26.7 (L) 01/24/2018    MCV 61.1 (L) 01/24/2018     (H) 01/24/2018       Lab Results   Component Value Date    GLUCOSE 141 (H) 01/24/2018    BUN 10 01/24/2018    CREATININE 0.55 01/24/2018    EGFRIFNONA >150 01/24/2018    BCR 18.2 01/24/2018     01/24/2018    K 3.7 01/24/2018     01/24/2018    CO2 22.3 (L) 01/24/2018    CALCIUM 7.5 (L) 01/24/2018    PROTENTOTREF 7.2 11/12/2015    ALBUMIN 2.50 (L) 01/24/2018    LABIL2 0.9 (L) 01/24/2018    AST 37 (H) 01/24/2018    ALT 23 01/24/2018     Lab Results   Component Value Date    INR 0.94 11/03/2015    INR <0.90 10/25/2015    INR <0.90 06/01/2015       No results found for: POCGLU       Medication Review:     Current Facility-Administered Medications:   •  acetaminophen  (TYLENOL) tablet 650 mg, 650 mg, Oral, Q6H PRN, JARETH Condon, 650 mg at 01/24/18 0638  •  cefdinir (OMNICEF) capsule 300 mg, 300 mg, Oral, Q12H, Misty Bae MD, 300 mg at 01/23/18 2134  •  heparin (porcine) 5000 UNIT/ML injection 5,000 Units, 5,000 Units, Subcutaneous, Q8H, JARETH Condon, 5,000 Units at 01/24/18 0535  •  HYDROmorphone (DILAUDID) injection 1 mg, 1 mg, Intravenous, Once, José Miguel Li MD  •  lactated ringers infusion, 100 mL/hr, Intravenous, Continuous, Mohan Santoyo MD  •  lactated ringers infusion, 125 mL/hr, Intravenous, Continuous, Terrence Montero MD, Stopped at 01/19/18 1251  •  lactobacillus acidophilus (RISAQUAD) capsule 1 capsule, 1 capsule, Oral, Daily, Camila Haley, [facility], 1 capsule at 01/23/18 0718  •  Magnesium Sulfate 2 gram Bolus, followed by 8 gram infusion (total Mg dose 10 grams)- Mg less than or equal to 1mg/dL, 2 g, Intravenous, PRN **OR** Magnesium Sulfate 6 gram Infusion (2 gm x 3) -Mg 1.1 -1.5 mg/dL, 2 g, Intravenous, PRN **OR** magnesium sulfate 4 gram infusion- Mg 1.6-1.9 mg/dL, 4 g, Intravenous, PRN, Samuel Duane Kreis, MD  •  magnesium sulfate 4 gram infusion- Mg 1.6-1.9 mg/dL, 4 g, Intravenous, Once, Samuel Duane Kreis, MD  •  multivitamin (DAILY MINH) tablet 1 tablet, 1 tablet, Oral, Daily, JARETH Condon, 1 tablet at 01/23/18 0718  •  ondansetron (ZOFRAN) tablet 4 mg, 4 mg, Oral, Q6H PRN **OR** ondansetron ODT (ZOFRAN-ODT) disintegrating tablet 4 mg, 4 mg, Oral, Q6H PRN **OR** ondansetron (ZOFRAN) injection 4 mg, 4 mg, Intravenous, Q6H PRN, Samuel Duane Kreis, MD, 4 mg at 01/22/18 2126  •  oxyCODONE (ROXICODONE) immediate release tablet 5 mg, 5 mg, Oral, Q4H PRN, Samuel Duane Kreis, MD, 5 mg at 01/24/18 0334  •  potassium chloride (MICRO-K) CR capsule 40 mEq, 40 mEq, Oral, PRN **OR** potassium chloride (KLOR-CON) packet 40 mEq, 40 mEq, Oral, PRN **OR** potassium chloride 10 mEq in 100 mL IVPB, 10 mEq, Intravenous, Q1H PRN, Samuel Duane  "MD Nehemias  •  Insert peripheral IV, , , Once **AND** sodium chloride 0.9 % flush 10 mL, 10 mL, Intravenous, PRN, Jesus Carreon PA-C, 10 mL at 01/21/18 1506      Assessment/Plan   Probable Aspiration Pneumonia with Sepsis  Sacral Decubitus Ulcer   Decubitus ulceration of the hip  Hyperbilirubinemia (resolved)  Rhabdomyolysis (resolved)  Metabolic encephalopathy due to sepsis (resolved)  Multiple myeloma  Chronic opioid use  Protein Malnutrition  Fever (resolved)      Appreciate surgery and ID input     Continue with Cefepime + Flagyl + Vancomycin, await final wound culture, ID following    Oxycodone 5 mg q 4 hours prn pain     Tylenol prn fever     Continue with wound care     Heparin for DVT prophylaxis    O2 via NC to maintain O2 sats >90%    Continue with PT/OT, S/S following for discharge to AC for wound care/recovery    Continue K+/Mg replacement protocol,  MVI daily    JARETH Condon  01/24/18  7:44 AM                    Electronically signed by JARETH Condon at 1/24/2018  7:46 AM      Misty Bae MD at 1/24/2018 10:17 AM  Version 2 of 2           I have personally seen and examined the patient today and discussed overnight interval progress and pertinent issues with nursing staff.    Subjective:    Overall patient doing very well.  Continues with significant amount of pain.  He 1.2 no fever or diarrhea reported overnight.  Nurse reports wound looking better and taking less packing.    Awaiting possible transfer to [facility].  Patient may need repeat debridement if transferred to [facility].    History taken from: patient chart family RN      Vital Signs    /78  Pulse 60  Temp 97.9 °F (36.6 °C) (Oral)   Resp 18  Ht 182 cm (71.65\")  Wt 76.4 kg (168 lb 6.9 oz)  SpO2 98%  BMI 23.06 kg/m2    Temp:  [97.9 °F (36.6 °C)-98.1 °F (36.7 °C)] 97.9 °F (36.6 °C)      Intake/Output Summary (Last 24 hours) at 01/24/18 1017  Last data filed at 01/24/18 1012   Gross per 24 hour   Intake              240 ml "   Output             1500 ml   Net            -1260 ml     Intake & Output (last 3 days)       01/21 0701 - 01/22 0700 01/22 0701 - 01/23 0700 01/23 0701 - 01/24 0700 01/24 0701 - 01/25 0700    P.O. 240 600  240    IV Piggyback 100 500      Total Intake(mL/kg) 340 (4.5) 1100 (14.4)  240 (3.1)    Urine (mL/kg/hr) 1350 (0.7) 1325 (0.7) 1200 (0.7) 250 (1)    Stool   500 (0.3)     Total Output 1350 1325 1700 250    Net -1010 -225 -1700 -10            Unmeasured Urine Occurrence 1 x       Unmeasured Stool Occurrence   1 x         Physical Exam:       General Appearance:    Alert, cooperative, in no acute distress, thin, frail, daughter at bedside    Head:    Normocephalic, without obvious abnormality, atraumatic   Eyes:            Lids and lashes normal, conjunctivae and sclerae normal, no   icterus, no pallor, corneas clear, PERRLA   Ears:    Ears appear intact with no abnormalities noted   Throat:   No oral lesions, no thrush, oral mucosa moist   Neck:   No adenopathy, supple, trachea midline, no thyromegaly, no   carotid bruit, no JVD   Back:     No tenderness to percussion or palpation, range of motion   normal   Lungs:    coarse breath sounds to right.    Heart:    Regular rhythm and normal rate, normal S1 and S2, no            murmur, no gallop, no rub, no click   Chest Wall:    No abnormalities observed   Abdomen:     Normal bowel sounds, no masses, no organomegaly, soft        non-tender, non-distended, no guarding, no rebound                tenderness   Rectal:     Deferred   Extremities:   Moves all extremities well, no edema, no cyanosis, no             redness   Pulses:   Pulses palpable and equal bilaterally   Skin:   Sacral decubitus ulcer and right hip ulcer    Lymph nodes:   No palpable adenopathy   Neurologic:   Awake, alert, Garbled speech        Results:      Results from last 7 days  Lab Units 01/24/18  0032 01/23/18  0034 01/22/18  0126 01/21/18  0053 01/20/18  0054 01/19/18  0121 01/18/18  0036    WBC 10*3/mm3 13.18* 12.20 11.40 10.76 11.43 12.61* 9.73     Lab Results   Component Value Date    NEUTROABS 9.35 (H) 01/24/2018         Results from last 7 days  Lab Units 01/24/18  0032   CREATININE mg/dL 0.55         Results from last 7 days  Lab Units 01/22/18  0126 01/21/18  0053 01/19/18  0121   CRP mg/dL 1.20* 2.07* 4.83*       Imaging Results (last 24 hours)     ** No results found for the last 24 hours. **            Results Review:    I have personally reviewed laboratory data, culture results, radiology studies and antimicrobial therapy.    Hospital Medications (active)       Dose Frequency Start End    acetaminophen (TYLENOL) tablet 650 mg 650 mg Every 6 Hours PRN 1/16/2018     Sig - Route: Take 2 tablets by mouth Every 6 (Six) Hours As Needed for Mild Pain . - Oral    Cosign for Ordering: Accepted by Samuel Duane Kreis, MD on 1/16/2018  1:17 PM    cefepime (MAXIPIME) 2 g/100 mL 0.9% NS (mbp) 2 g Once 1/16/2018 1/16/2018    Sig - Route: Infuse 100 mL into a venous catheter 1 (One) Time. - Intravenous    Cosign for Ordering: Required by Misty Bae MD    cefepime (MAXIPIME) 2 g/100 mL 0.9% NS (mbp) 2 g Every 12 Hours 1/16/2018 1/23/2018    Sig - Route: Infuse 100 mL into a venous catheter Every 12 (Twelve) Hours. - Intravenous    Cosign for Ordering: Required by Misty Bae MD    heparin (porcine) 5000 UNIT/ML injection 5,000 Units 5,000 Units Every 8 Hours Scheduled 1/16/2018     Sig - Route: Inject 1 mL under the skin Every 8 (Eight) Hours. - Subcutaneous    Cosign for Ordering: Accepted by Samuel Duane Kreis, MD on 1/16/2018  1:17 PM    HYDROmorphone (DILAUDID) injection 1 mg 1 mg Once 1/15/2018     Sig - Route: Infuse 1 mL into a venous catheter 1 (One) Time. - Intravenous    metroNIDAZOLE (FLAGYL) IVPB 500 mg 500 mg Every 8 Hours 1/16/2018 1/23/2018    Sig - Route: Infuse 100 mL into a venous catheter Every 8 (Eight) Hours. - Intravenous    Cosign for Ordering: Required by Misty  "John Bae MD    oxyCODONE (ROXICODONE) immediate release tablet 5 mg 5 mg Every 4 Hours PRN 1/16/2018 1/26/2018    Sig - Route: Take 1 tablet by mouth Every 4 (Four) Hours As Needed for Moderate Pain . - Oral    Pharmacy to dose vancomycin  Continuous PRN 1/16/2018 1/23/2018    Sig - Route: Continuous As Needed for Consult. - Does not apply    Cosign for Ordering: Required by Misty Bae MD    sodium chloride 0.9 % flush 10 mL 10 mL As Needed 1/15/2018     Sig - Route: Infuse 10 mL into a venous catheter As Needed for Line Care. - Intravenous    Cosign for Ordering: Accepted by José Miguel Li MD on 1/15/2018  1:21 PM    Linked Group 1:  \"And\" Linked Group Details        sodium chloride 0.9 % infusion 150 mL/hr Continuous 1/15/2018     Sig - Route: Infuse 150 mL/hr into a venous catheter Continuous. - Intravenous    vancomycin (VANCOCIN) 1,000 mg in sodium chloride 0.9 % 250 mL IVPB 1,000 mg Every 12 Hours 1/16/2018 1/23/2018    Sig - Route: Infuse 1,000 mg into a venous catheter Every 12 (Twelve) Hours. - Intravenous            Cultures:    Blood Culture   Date Value Ref Range Status   01/15/2018 No growth at 24 hours  Preliminary   01/15/2018 No growth at 24 hours  Preliminary     Urine Culture   Date Value Ref Range Status   01/15/2018 No growth at 24 hours  Preliminary           Assessment/Plan     ASSESSMENT:    1.  Severe sepsis with encephalopathy  2.  Aspiration pneumonia     PLAN:    Overall patient doing very well.  Continues with significant amount of pain.  He had no fever or diarrhea reported overnight.  Nurse reports wound looking better and taking less packing.    Antibiotic therapy was de-escalated to Omnicef 300 mg twice a day through 2/1/18.      Awaiting possible transfer to [facility].  Patient may need repeat debridement if transferred to [facility].     CRP ordered for a.m.     Patient's findings and recommendations were discussed with patient, family and nursing staff    Code Status: Full " "Code     JOSÉ MIGUEL Love  01/24/18  10:17 AM           Electronically signed by Misty Bae MD at 1/24/2018 10:23 AM      JOSÉ MIGUEL Love at 1/24/2018 10:17 AM  Version 1 of 2           I have personally seen and examined the patient today and discussed overnight interval progress and pertinent issues with nursing staff.    Subjective:    Overall patient doing very well.  Continues with significant amount of pain.  He 1.2 no fever or diarrhea reported overnight.  Nurse reports wound looking better and taking less packing.    Awaiting possible transfer to [facility].  Patient may need repeat debridement if transferred to [facility].    History taken from: patient chart family RN      Vital Signs    /78  Pulse 60  Temp 97.9 °F (36.6 °C) (Oral)   Resp 18  Ht 182 cm (71.65\")  Wt 76.4 kg (168 lb 6.9 oz)  SpO2 98%  BMI 23.06 kg/m2    Temp:  [97.9 °F (36.6 °C)-98.1 °F (36.7 °C)] 97.9 °F (36.6 °C)      Intake/Output Summary (Last 24 hours) at 01/24/18 1017  Last data filed at 01/24/18 1012   Gross per 24 hour   Intake              240 ml   Output             1500 ml   Net            -1260 ml     Intake & Output (last 3 days)       01/21 0701 - 01/22 0700 01/22 0701 - 01/23 0700 01/23 0701 - 01/24 0700 01/24 0701 - 01/25 0700    P.O. 240 600  240    IV Piggyback 100 500      Total Intake(mL/kg) 340 (4.5) 1100 (14.4)  240 (3.1)    Urine (mL/kg/hr) 1350 (0.7) 1325 (0.7) 1200 (0.7) 250 (1)    Stool   500 (0.3)     Total Output 1350 1325 1700 250    Net -1010 -225 -1700 -10            Unmeasured Urine Occurrence 1 x       Unmeasured Stool Occurrence   1 x         Physical Exam:       General Appearance:    Alert, cooperative, in no acute distress, thin, frail, daughter at bedside    Head:    Normocephalic, without obvious abnormality, atraumatic   Eyes:            Lids and lashes normal, conjunctivae and sclerae normal, no   icterus, no pallor, corneas clear, PERRLA   Ears:    Ears appear intact with no " abnormalities noted   Throat:   No oral lesions, no thrush, oral mucosa moist   Neck:   No adenopathy, supple, trachea midline, no thyromegaly, no   carotid bruit, no JVD   Back:     No tenderness to percussion or palpation, range of motion   normal   Lungs:    coarse breath sounds to right.    Heart:    Regular rhythm and normal rate, normal S1 and S2, no            murmur, no gallop, no rub, no click   Chest Wall:    No abnormalities observed   Abdomen:     Normal bowel sounds, no masses, no organomegaly, soft        non-tender, non-distended, no guarding, no rebound                tenderness   Rectal:     Deferred   Extremities:   Moves all extremities well, no edema, no cyanosis, no             redness   Pulses:   Pulses palpable and equal bilaterally   Skin:   Sacral decubitus ulcer and right hip ulcer    Lymph nodes:   No palpable adenopathy   Neurologic:   Awake, alert, Garbled speech        Results:      Results from last 7 days  Lab Units 01/24/18  0032 01/23/18  0034 01/22/18  0126 01/21/18  0053 01/20/18  0054 01/19/18  0121 01/18/18  0036   WBC 10*3/mm3 13.18* 12.20 11.40 10.76 11.43 12.61* 9.73     Lab Results   Component Value Date    NEUTROABS 9.35 (H) 01/24/2018         Results from last 7 days  Lab Units 01/24/18  0032   CREATININE mg/dL 0.55         Results from last 7 days  Lab Units 01/22/18  0126 01/21/18  0053 01/19/18  0121   CRP mg/dL 1.20* 2.07* 4.83*       Imaging Results (last 24 hours)     ** No results found for the last 24 hours. **            Results Review:    I have personally reviewed laboratory data, culture results, radiology studies and antimicrobial therapy.    Hospital Medications (active)       Dose Frequency Start End    acetaminophen (TYLENOL) tablet 650 mg 650 mg Every 6 Hours PRN 1/16/2018     Sig - Route: Take 2 tablets by mouth Every 6 (Six) Hours As Needed for Mild Pain . - Oral    Cosign for Ordering: Accepted by Samuel Duane Kreis, MD on 1/16/2018  1:17 PM    cefepime  "(MAXIPIME) 2 g/100 mL 0.9% NS (mbp) 2 g Once 1/16/2018 1/16/2018    Sig - Route: Infuse 100 mL into a venous catheter 1 (One) Time. - Intravenous    Cosign for Ordering: Required by Misty Bae MD    cefepime (MAXIPIME) 2 g/100 mL 0.9% NS (mbp) 2 g Every 12 Hours 1/16/2018 1/23/2018    Sig - Route: Infuse 100 mL into a venous catheter Every 12 (Twelve) Hours. - Intravenous    Cosign for Ordering: Required by Misty Bae MD    heparin (porcine) 5000 UNIT/ML injection 5,000 Units 5,000 Units Every 8 Hours Scheduled 1/16/2018     Sig - Route: Inject 1 mL under the skin Every 8 (Eight) Hours. - Subcutaneous    Cosign for Ordering: Accepted by Samuel Duane Kreis, MD on 1/16/2018  1:17 PM    HYDROmorphone (DILAUDID) injection 1 mg 1 mg Once 1/15/2018     Sig - Route: Infuse 1 mL into a venous catheter 1 (One) Time. - Intravenous    metroNIDAZOLE (FLAGYL) IVPB 500 mg 500 mg Every 8 Hours 1/16/2018 1/23/2018    Sig - Route: Infuse 100 mL into a venous catheter Every 8 (Eight) Hours. - Intravenous    Cosign for Ordering: Required by Misty Bae MD    oxyCODONE (ROXICODONE) immediate release tablet 5 mg 5 mg Every 4 Hours PRN 1/16/2018 1/26/2018    Sig - Route: Take 1 tablet by mouth Every 4 (Four) Hours As Needed for Moderate Pain . - Oral    Pharmacy to dose vancomycin  Continuous PRN 1/16/2018 1/23/2018    Sig - Route: Continuous As Needed for Consult. - Does not apply    Cosign for Ordering: Required by Misty Bae MD    sodium chloride 0.9 % flush 10 mL 10 mL As Needed 1/15/2018     Sig - Route: Infuse 10 mL into a venous catheter As Needed for Line Care. - Intravenous    Cosign for Ordering: Accepted by José Miguel Li MD on 1/15/2018  1:21 PM    Linked Group 1:  \"And\" Linked Group Details        sodium chloride 0.9 % infusion 150 mL/hr Continuous 1/15/2018     Sig - Route: Infuse 150 mL/hr into a venous catheter Continuous. - Intravenous    vancomycin (VANCOCIN) 1,000 mg in sodium " chloride 0.9 % 250 mL IVPB 1,000 mg Every 12 Hours 1/16/2018 1/23/2018    Sig - Route: Infuse 1,000 mg into a venous catheter Every 12 (Twelve) Hours. - Intravenous            Cultures:    Blood Culture   Date Value Ref Range Status   01/15/2018 No growth at 24 hours  Preliminary   01/15/2018 No growth at 24 hours  Preliminary     Urine Culture   Date Value Ref Range Status   01/15/2018 No growth at 24 hours  Preliminary           Assessment/Plan     ASSESSMENT:    1.  Severe sepsis with encephalopathy  2.  Aspiration pneumonia     PLAN:    Overall patient doing very well.  Continues with significant amount of pain.  He 1.2 no fever or diarrhea reported overnight.  Nurse reports wound looking better and taking less packing.    Antibiotic therapy was de-escalated to Omnicef 300 mg twice a day through 2/1/18.      Awaiting possible transfer to [facility].  Patient may need repeat debridement if transferred to [facility].     CRP ordered for a.m.     Patient's findings and recommendations were discussed with patient, family and nursing staff    Code Status: Full Code     JOSÉ MIGUEL Love  01/24/18  10:17 AM           Electronically signed by JOSÉ MIGUEL Love at 1/24/2018 10:22 AM        Consult Notes (last 24 hours) (Notes from 1/23/2018 11:14 AM through 1/24/2018 11:14 AM)     No notes of this type exist for this encounter.        Physical Therapy Notes (last 24 hours) (Notes from 1/23/2018 11:14 AM through 1/24/2018 11:14 AM)     No notes of this type exist for this encounter.

## 2018-01-24 NOTE — NURSING NOTE
Pressure injury has been debrided per Dr Santoyo  Now is full thickness   Orders for care in place per Dr Santoyo

## 2018-01-24 NOTE — PROGRESS NOTES
Discharge Planning Assessment  [facility]     Patient Name: Jhonny Washington  MRN: [id number]  Today's Date: 1/24/2018    Admit Date: 1/15/2018          Discharge Needs Assessment     None            Discharge Plan       01/24/18 1437    Case Management/Social Work Plan    Plan [facility] to evaluate pt on this date.  SS will continue to follow and assist with discharge needs.     Patient/Family In Agreement With Plan yes        Discharge Placement     No information found        Expected Discharge Date and Time     Expected Discharge Date Expected Discharge Time    Jan 25, 2018               Demographic Summary     None            Functional Status     None            Psychosocial     None            Abuse/Neglect     None            Legal     None            Substance Abuse     None            Patient Forms     None          Amber Jimenes

## 2018-01-25 LAB
ALBUMIN SERPL-MCNC: 2.6 G/DL (ref 3.4–4.8)
ALBUMIN/GLOB SERPL: 1 G/DL (ref 1.5–2.5)
ALP SERPL-CCNC: 102 U/L (ref 40–129)
ALT SERPL W P-5'-P-CCNC: 22 U/L (ref 10–44)
ANION GAP SERPL CALCULATED.3IONS-SCNC: 4.8 MMOL/L (ref 3.6–11.2)
ANISOCYTOSIS BLD QL: NORMAL
AST SERPL-CCNC: 33 U/L (ref 10–34)
BASOPHILS # BLD AUTO: 0.05 10*3/MM3 (ref 0–0.3)
BASOPHILS NFR BLD AUTO: 0.4 % (ref 0–2)
BILIRUB SERPL-MCNC: 0.6 MG/DL (ref 0.2–1.8)
BUN BLD-MCNC: 11 MG/DL (ref 7–21)
BUN/CREAT SERPL: 17.2 (ref 7–25)
CALCIUM SPEC-SCNC: 7.6 MG/DL (ref 7.7–10)
CHLORIDE SERPL-SCNC: 112 MMOL/L (ref 99–112)
CO2 SERPL-SCNC: 21.2 MMOL/L (ref 24.3–31.9)
CREAT BLD-MCNC: 0.64 MG/DL (ref 0.43–1.29)
CRP SERPL-MCNC: 0.54 MG/DL (ref 0–0.99)
DEPRECATED RDW RBC AUTO: 32.9 FL (ref 37–54)
ELLIPTOCYTES BLD QL SMEAR: NORMAL
EOSINOPHIL # BLD AUTO: 0.23 10*3/MM3 (ref 0–0.7)
EOSINOPHIL NFR BLD AUTO: 1.8 % (ref 0–5)
ERYTHROCYTE [DISTWIDTH] IN BLOOD BY AUTOMATED COUNT: 17.1 % (ref 11.5–14.5)
GFR SERPL CREATININE-BSD FRML MDRD: 127 ML/MIN/1.73
GLOBULIN UR ELPH-MCNC: 2.7 GM/DL
GLUCOSE BLD-MCNC: 168 MG/DL (ref 70–110)
HCT VFR BLD AUTO: 25.8 % (ref 42–52)
HGB BLD-MCNC: 8.3 G/DL (ref 14–18)
HYPOCHROMIA BLD QL: NORMAL
IMM GRANULOCYTES # BLD: 0.12 10*3/MM3 (ref 0–0.03)
IMM GRANULOCYTES NFR BLD: 0.9 % (ref 0–0.5)
LYMPHOCYTES # BLD AUTO: 2.25 10*3/MM3 (ref 1–3)
LYMPHOCYTES NFR BLD AUTO: 17.5 % (ref 21–51)
MCH RBC QN AUTO: 19.8 PG (ref 27–33)
MCHC RBC AUTO-ENTMCNC: 32.2 G/DL (ref 33–37)
MCV RBC AUTO: 61.4 FL (ref 80–94)
MICROCYTES BLD QL: NORMAL
MONOCYTES # BLD AUTO: 1.12 10*3/MM3 (ref 0.1–0.9)
MONOCYTES NFR BLD AUTO: 8.7 % (ref 0–10)
NEUTROPHILS # BLD AUTO: 9.1 10*3/MM3 (ref 1.4–6.5)
NEUTROPHILS NFR BLD AUTO: 70.7 % (ref 30–70)
OSMOLALITY SERPL CALC.SUM OF ELEC: 278.9 MOSM/KG (ref 273–305)
PLATELET # BLD AUTO: 554 10*3/MM3 (ref 130–400)
PMV BLD AUTO: 10.3 FL (ref 6–10)
POTASSIUM BLD-SCNC: 3.6 MMOL/L (ref 3.5–5.3)
PROT SERPL-MCNC: 5.3 G/DL (ref 6–8)
RBC # BLD AUTO: 4.2 10*6/MM3 (ref 4.7–6.1)
SMALL PLATELETS BLD QL SMEAR: NORMAL
SODIUM BLD-SCNC: 138 MMOL/L (ref 135–153)
TARGETS BLD QL SMEAR: NORMAL
WBC NRBC COR # BLD: 12.87 10*3/MM3 (ref 4.5–12.5)

## 2018-01-25 PROCEDURE — 85025 COMPLETE CBC W/AUTO DIFF WBC: CPT | Performed by: PHYSICIAN ASSISTANT

## 2018-01-25 PROCEDURE — 86140 C-REACTIVE PROTEIN: CPT | Performed by: NURSE PRACTITIONER

## 2018-01-25 PROCEDURE — 80053 COMPREHEN METABOLIC PANEL: CPT | Performed by: PHYSICIAN ASSISTANT

## 2018-01-25 PROCEDURE — 97530 THERAPEUTIC ACTIVITIES: CPT

## 2018-01-25 PROCEDURE — 94799 UNLISTED PULMONARY SVC/PX: CPT

## 2018-01-25 PROCEDURE — 85007 BL SMEAR W/DIFF WBC COUNT: CPT | Performed by: PHYSICIAN ASSISTANT

## 2018-01-25 PROCEDURE — 97116 GAIT TRAINING THERAPY: CPT

## 2018-01-25 PROCEDURE — 25010000002 HEPARIN (PORCINE) PER 1000 UNITS: Performed by: PHYSICIAN ASSISTANT

## 2018-01-25 RX ORDER — POTASSIUM CHLORIDE 20 MEQ/1
40 TABLET, EXTENDED RELEASE ORAL EVERY 4 HOURS
Status: COMPLETED | OUTPATIENT
Start: 2018-01-25 | End: 2018-01-25

## 2018-01-25 RX ADMIN — CEFDINIR 300 MG: 300 CAPSULE ORAL at 08:00

## 2018-01-25 RX ADMIN — POTASSIUM CHLORIDE 40 MEQ: 1500 TABLET, EXTENDED RELEASE ORAL at 04:02

## 2018-01-25 RX ADMIN — HEPARIN SODIUM 5000 UNITS: 5000 INJECTION, SOLUTION INTRAVENOUS; SUBCUTANEOUS at 04:02

## 2018-01-25 RX ADMIN — HEPARIN SODIUM 5000 UNITS: 5000 INJECTION, SOLUTION INTRAVENOUS; SUBCUTANEOUS at 20:28

## 2018-01-25 RX ADMIN — Medication 1 CAPSULE: at 08:00

## 2018-01-25 RX ADMIN — OXYCODONE HYDROCHLORIDE 5 MG: 5 TABLET ORAL at 12:06

## 2018-01-25 RX ADMIN — CEFDINIR 300 MG: 300 CAPSULE ORAL at 20:28

## 2018-01-25 RX ADMIN — OXYCODONE HYDROCHLORIDE 5 MG: 5 TABLET ORAL at 07:57

## 2018-01-25 RX ADMIN — OXYCODONE HYDROCHLORIDE 5 MG: 5 TABLET ORAL at 20:29

## 2018-01-25 RX ADMIN — OXYCODONE HYDROCHLORIDE 5 MG: 5 TABLET ORAL at 15:57

## 2018-01-25 RX ADMIN — OXYCODONE HYDROCHLORIDE 5 MG: 5 TABLET ORAL at 03:59

## 2018-01-25 RX ADMIN — POTASSIUM CHLORIDE 40 MEQ: 1500 TABLET, EXTENDED RELEASE ORAL at 08:00

## 2018-01-25 RX ADMIN — Medication 1 TABLET: at 08:00

## 2018-01-25 RX ADMIN — HEPARIN SODIUM 5000 UNITS: 5000 INJECTION, SOLUTION INTRAVENOUS; SUBCUTANEOUS at 14:16

## 2018-01-25 NOTE — PROGRESS NOTES
Discharge Planning Assessment   [facility]     Patient Name: Jhonny Washington  MRN: [id number]  Today's Date: 1/25/2018    Admit Date: 1/15/2018          Discharge Needs Assessment     None            Discharge Plan       01/25/18 1531    Case Management/Social Work Plan    Plan SS awaiting return call from [facility] in [place] regarding acceptance or denial.  SS will follow.    Patient/Family In Agreement With Plan yes        Discharge Placement     No information found        Expected Discharge Date and Time     Expected Discharge Date Expected Discharge Time    Jan 25, 2018               Demographic Summary     None            Functional Status     None            Psychosocial     None            Abuse/Neglect     None            Legal     None            Substance Abuse     None            Patient Forms     None          Amber Jimenes

## 2018-01-25 NOTE — PROGRESS NOTES
"     LOS: 10 days     Chief Complaint:  Rhabdomyolysis/Decubitus Ulcers with Sepsis    Subjective     Interval History:   He has no major changes over the last 24 hours.   Labs and vitals stable.  following for discharge disposition to SNF for ongoing recovery and wound care. He is doing well with oral omnicef.    Objective     Vital Signs  /76  Pulse 68  Temp 97.6 °F (36.4 °C) (Axillary)   Resp 18  Ht 182 cm (71.65\")  Wt 77.3 kg (170 lb 6.7 oz)  SpO2 94%  BMI 23.34 kg/m2  Intake & Output (last day)       01/24 0701 - 01/25 0700 01/25 0701 - 01/26 0700    P.O. 1020     Total Intake(mL/kg) 1020 (13.2)     Urine (mL/kg/hr) 900 (0.5)     Stool      Total Output 900      Net +120                    Physical Exam:     General Appearance:    Alert, cooperative, in no acute distress   Head:    Normocephalic, without obvious abnormality, atraumatic   Eyes:            Lids and lashes normal, conjunctivae and sclerae normal, no   icterus, no pallor, corneas clear, PERRLA   Ears:    Ears appear intact with no abnormalities noted       Neck:   No adenopathy, supple, trachea midline, no thyromegaly, no   carotid bruit, no JVD   Lungs:     Scattered Coarse Crackles bilaterally R>>L ,respirations regular, even and                  unlabored    Heart:    Regular rhythm and normal rate, normal S1 and S2, no            murmur, no gallop, no rub, no click   Chest Wall:    No abnormalities observed   Abdomen:     Normal bowel sounds, no masses, no organomegaly, soft        non-tender, non-distended, no guarding, no rebound                tenderness   Extremities:   Moves all extremities well, no edema, no cyanosis, no             redness   Pulses:   Pulses palpable and equal bilaterally   Skin:   No bleeding, bruising or rash, Sacral decubitus ulcer with dressing in place and right trochanteric ulcer with eschar   Lymph nodes:   No palpable adenopathy   Neurologic:   Cranial nerves 2 - 12 grossly intact, " sensation intact, DTR       present and equal bilaterally        Results Review:    Lab Results   Component Value Date    WBC 12.87 (H) 01/25/2018    HGB 8.3 (L) 01/25/2018    HCT 25.8 (L) 01/25/2018    MCV 61.4 (L) 01/25/2018     (H) 01/25/2018       Lab Results   Component Value Date    GLUCOSE 168 (H) 01/25/2018    BUN 11 01/25/2018    CREATININE 0.64 01/25/2018    EGFRIFNONA 127 01/25/2018    BCR 17.2 01/25/2018     01/25/2018    K 3.6 01/25/2018     01/25/2018    CO2 21.2 (L) 01/25/2018    CALCIUM 7.6 (L) 01/25/2018    PROTENTOTREF 7.2 11/12/2015    ALBUMIN 2.60 (L) 01/25/2018    LABIL2 1.0 (L) 01/25/2018    AST 33 01/25/2018    ALT 22 01/25/2018     Lab Results   Component Value Date    INR 0.94 11/03/2015    INR <0.90 10/25/2015    INR <0.90 06/01/2015       No results found for: POCGLU       Medication Review:     Current Facility-Administered Medications:   •  acetaminophen (TYLENOL) tablet 650 mg, 650 mg, Oral, Q6H PRN, JARETH Condon, 650 mg at 01/24/18 0638  •  cefdinir (OMNICEF) capsule 300 mg, 300 mg, Oral, Q12H, Misty Bae MD, 300 mg at 01/24/18 2000  •  heparin (porcine) 5000 UNIT/ML injection 5,000 Units, 5,000 Units, Subcutaneous, Q8H, JARETH Condon, 5,000 Units at 01/25/18 0402  •  HYDROmorphone (DILAUDID) injection 1 mg, 1 mg, Intravenous, Once, José Miguel Li MD  •  lactated ringers infusion, 100 mL/hr, Intravenous, Continuous, Mohan Santoyo MD  •  lactated ringers infusion, 125 mL/hr, Intravenous, Continuous, Terrence Montero MD, Stopped at 01/19/18 1251  •  lactobacillus acidophilus (RISAQUAD) capsule 1 capsule, 1 capsule, Oral, Daily, Camila Haley [facility], 1 capsule at 01/24/18 0745  •  Magnesium Sulfate 2 gram Bolus, followed by 8 gram infusion (total Mg dose 10 grams)- Mg less than or equal to 1mg/dL, 2 g, Intravenous, PRN **OR** Magnesium Sulfate 6 gram Infusion (2 gm x 3) -Mg 1.1 -1.5 mg/dL, 2 g, Intravenous, PRN **OR** magnesium sulfate 4  gram infusion- Mg 1.6-1.9 mg/dL, 4 g, Intravenous, PRN, Samuel Duane Kreis, MD  •  multivitamin (DAILY MINH) tablet 1 tablet, 1 tablet, Oral, Daily, JARETH Condon, 1 tablet at 01/24/18 0746  •  ondansetron (ZOFRAN) tablet 4 mg, 4 mg, Oral, Q6H PRN **OR** ondansetron ODT (ZOFRAN-ODT) disintegrating tablet 4 mg, 4 mg, Oral, Q6H PRN **OR** ondansetron (ZOFRAN) injection 4 mg, 4 mg, Intravenous, Q6H PRN, Samuel Duane Kreis, MD, 4 mg at 01/22/18 2126  •  oxyCODONE (ROXICODONE) immediate release tablet 5 mg, 5 mg, Oral, Q4H PRN, Samuel Duane Kreis, MD, 5 mg at 01/25/18 0359  •  potassium chloride (K-DUR,KLOR-CON) CR tablet 40 mEq, 40 mEq, Oral, Q4H, Samuel Duane Kreis, MD, 40 mEq at 01/25/18 0402  •  potassium chloride (MICRO-K) CR capsule 40 mEq, 40 mEq, Oral, PRN **OR** potassium chloride (KLOR-CON) packet 40 mEq, 40 mEq, Oral, PRN **OR** potassium chloride 10 mEq in 100 mL IVPB, 10 mEq, Intravenous, Q1H PRN, Samuel Duane Kreis, MD  •  Insert peripheral IV, , , Once **AND** sodium chloride 0.9 % flush 10 mL, 10 mL, Intravenous, PRN, Jesus Carreon PA-C, 10 mL at 01/21/18 1506      Assessment/Plan   Probable Aspiration Pneumonia with Sepsis  Sacral Decubitus Ulcer   Decubitus ulceration of the hip  Hyperbilirubinemia (resolved)  Rhabdomyolysis (resolved)  Metabolic encephalopathy due to sepsis (resolved)  Multiple myeloma  Chronic opioid use  Protein Malnutrition  Fever (resolved)      Appreciate surgery and ID input     Omnicef  BID (through 2-1-18), per ID    Oxycodone 5 mg q 4 hours prn pain     Tylenol prn fever     Continue with wound care     Heparin for DVT prophylaxis    O2 via NC to maintain O2 sats >90%    Continue with PT/OT, S/S following for discharge to SNF for wound care/recovery    Continue K+/Mg replacement protocol,  MVI daily    JARETH Condon  01/25/18  7:43 AM       This patient is seen this morning by me.  I agree with the above note.  We are awaiting skilled nursing facility placement.   He will require PT and ongoing wound care and OT treatment.  His pain has been well-controlled.      Samuel Duane Kreis, MD  01/25/18  10:14 AM

## 2018-01-25 NOTE — THERAPY TREATMENT NOTE
Acute Care - Physical Therapy Treatment Note   [place]     Patient Name: Jhonny Washington  : 1955  MRN: [id number]  Today's Date: 2018  Onset of Illness/Injury or Date of Surgery Date: 01/15/18  Date of Referral to PT: 18  Referring Physician: Dr. Del Cid    Admit Date: 1/15/2018    Visit Dx:    ICD-10-CM ICD-9-CM   1. Decubitus ulcer of coccyx, unspecified pressure ulcer stage L89.159 707.03     707.20   2. Fall, initial encounter W19.XXXA E888.9   3. Non-traumatic rhabdomyolysis M62.82 728.88     Patient Active Problem List   Diagnosis   • Encounter for venous access device care   • Rhabdomyolysis   • Decubitus ulcer of coccyx, unspecified pressure ulcer stage               Adult Rehabilitation Note       18 1105 18 1423       Rehab Assessment/Intervention    Discipline physical therapist  -CT physical therapist  -CT     Document Type therapy note (daily note)  -CT therapy note (daily note)  -CT     Subjective Information agree to therapy;complains of;pain;weakness  -CT agree to therapy;complains of;pain;weakness  -CT     Patient Effort, Rehab Treatment good  -CT      Precautions/Limitations fall precautions;oxygen therapy device and L/min  -CT fall precautions;oxygen therapy device and L/min  -CT     Patient Response to Treatment Pt tolerated todays treatment session well with rest breaks provided as needed. Pt c/o increased pain in bilateral gluteal region. Pt deferred sitting up in chair today.   -CT Pt tolerated todays treatment session well with rest breaks provided as needed. Pt able to ambulate but unable to tolerate sitting up in chair on waffle cushion.   -CT     Recorded by [CT] Saima Eugene, PT [CT] Saima Eugene PT     Pain Assessment    Pain Assessment 0-10  -CT      Ng-Garvin FACES Pain Rating 6  -CT      Pain Location Buttocks  -CT      Recorded by [CT] Saima Eugene PT      Cognitive Assessment/Intervention    Current Cognitive/Communication Assessment  functional  -CT functional  -CT     Orientation Status oriented to;person;situation  -CT oriented to;person;situation  -CT     Personal Safety Interventions fall prevention program maintained;gait belt;muscle strengthening facilitated;nonskid shoes/slippers when out of bed  -CT fall prevention program maintained;gait belt;muscle strengthening facilitated;nonskid shoes/slippers when out of bed  -CT     Recorded by [CT] Saima Eugene PT [CT] Saima Eugene PT     Bed Mobility, Assessment/Treatment    Bed Mobility, Assistive Device bed rails  -CT bed rails  -CT     Bed Mobility, Roll Left, [place] moderate assist (50% patient effort);2 person assist required  -CT moderate assist (50% patient effort);2 person assist required  -CT     Bed Mobility, Roll Right, [place] moderate assist (50% patient effort);2 person assist required  -CT moderate assist (50% patient effort);2 person assist required  -CT     Bed Mobility, Scoot/Bridge, [place] not tested  -CT not tested  -CT     Bed Mob, Supine to Sit, [place] moderate assist (50% patient effort);2 person assist required  -CT moderate assist (50% patient effort);2 person assist required  -CT     Bed Mob, Sit to Supine, [place] moderate assist (50% patient effort);2 person assist required  -CT moderate assist (50% patient effort);2 person assist required  -CT     Recorded by [CT] Saima Eugene, PT [CT] Saima Eugene PT     Transfer Assessment/Treatment    Transfers, Sit-Stand [place] moderate assist (50% patient effort);2 person assist required  -CT moderate assist (50% patient effort);2 person assist required  -CT     Transfers, Stand-Sit [place] moderate assist (50% patient effort);2 person assist required  -CT moderate assist (50% patient effort);2 person assist required  -CT     Transfers, Sit-Stand-Sit, Assist Device rolling walker  -CT rolling walker  -CT     Recorded by [CT] Saima Eugene, PT [CT] Saima Eugene PT     Gait  Assessment/Treatment    Gait, [place] Level moderate assist (50% patient effort);2 person assist required  -CT      Gait, Assistive Device rolling walker  -CT      Gait, Distance (Feet) 250  -CT      Gait, Gait Pattern Analysis swing-through gait  -CT      Recorded by [CT] Saima Eugene PT      Positioning and Restraints    Pre-Treatment Position in bed  -CT in bed  -CT     Post Treatment Position bed  -CT bed  -CT     In Bed supine;call light within reach;encouraged to call for assist;side rails up x3;notified nsg  -CT supine;call light within reach;encouraged to call for assist;side rails up x3;notified nsg  -CT     Recorded by [CT] Saima Eugene PT [CT] Saima Eugene PT       User Key  (r) = Recorded By, (t) = Taken By, (c) = Cosigned By    Initials Name Effective Dates    CT Saima Eugene, PT 03/14/16 -                 IP PT Goals       01/17/18 1652          Bed Mobility PT LTG    Bed Mobility PT LTG, Date Established 01/17/18  -BC      Bed Mobility PT LTG, Time to Achieve by discharge  -BC      Bed Mobility PT LTG, Activity Type all bed mobility  -BC      Bed Mobility PT LTG, [place] Level minimum assist (75% patient effort)  -BC      Bed Mobility PT Goal  LTG, Assist Device bed rails  -BC      Transfer Training PT LTG    Transfer Training PT LTG, Date Established 01/17/18  -BC      Transfer Training PT LTG, Time to Achieve by discharge  -BC      Transfer Training PT LTG, Activity Type all transfers  -BC      Transfer Training PT LTG, [place] Level minimum assist (75% patient effort)  -BC      Transfer Training PT LTG, Assist Device walker, rolling  -BC      Gait Training PT LTG    Gait Training Goal PT LTG, Date Established 01/17/18  -BC      Gait Training Goal PT LTG, Time to Achieve by discharge  -BC      Gait Training Goal PT LTG, [place] Level minimum assist (75% patient effort)  -BC      Gait Training Goal PT LTG, Assist Device walker, rolling  -BC      Gait Training Goal PT  LTG, Distance to Achieve 40  -BC        User Key  (r) = Recorded By, (t) = Taken By, (c) = Cosigned By    Initials Name Provider Type    HARRIETT Nettles PT Physical Therapist          Physical Therapy Education     Title: PT OT SLP Therapies (Done)     Topic: Physical Therapy (Done)     Point: Mobility training (Done)    Learning Progress Summary    Learner Readiness Method Response Comment Documented by Status   Patient Acceptance E VU  CT 01/25/18 1110 Done    Acceptance E VU  BB 01/25/18 0913 Done    Acceptance E VU,DU  KB 01/24/18 2111 Done    Acceptance E VU  BB 01/24/18 1051 Done    Acceptance E VU  BB 01/23/18 0904 Done    Acceptance E VU  CT 01/22/18 1428 Done    Acceptance E VU,NR  AG 01/21/18 2221 Done    Acceptance E VU,NR  AG 01/21/18 0116 Done    Acceptance E VU  BB 01/20/18 0737 Done    Acceptance E VU  BB 01/19/18 0901 Done    Acceptance E VU,NR   01/18/18 1911 Done    Acceptance E VU  BC 01/18/18 1532 Done    Acceptance E VU  BB 01/18/18 0951 Done    Acceptance E VU  SS 01/17/18 1945 Done    Acceptance E VU  BC 01/17/18 1652 Done   Family Acceptance E VU  BB 01/24/18 1051 Done    Acceptance E VU  BB 01/20/18 0737 Done    Acceptance E VU  BB 01/19/18 0901 Done               Point: Home exercise program (Done)    Learning Progress Summary    Learner Readiness Method Response Comment Documented by Status   Patient Acceptance E VU  CT 01/25/18 1110 Done    Acceptance E VU  BB 01/25/18 0913 Done    Acceptance E VU,DU  KB 01/24/18 2111 Done    Acceptance E VU  BB 01/24/18 1051 Done    Acceptance E VU  BB 01/23/18 0904 Done    Acceptance E VU  CT 01/22/18 1428 Done    Acceptance E VU,NR  AG 01/21/18 2221 Done    Acceptance E VU,NR  AG 01/21/18 0116 Done    Acceptance E VU  BB 01/20/18 0737 Done    Acceptance E VU  BB 01/19/18 0901 Done    Acceptance E VU,NR   01/18/18 1911 Done    Acceptance E VU  BC 01/18/18 1532 Done    Acceptance E VU  BB 01/18/18 0951 Done    Acceptance E VU  SS 01/17/18 1945  Done    Acceptance E VU  BC 01/17/18 1652 Done   Family Acceptance E VU  BB 01/24/18 1051 Done    Acceptance E VU  BB 01/20/18 0737 Done    Acceptance E VU  BB 01/19/18 0901 Done               Point: Body mechanics (Done)    Learning Progress Summary    Learner Readiness Method Response Comment Documented by Status   Patient Acceptance E VU  CT 01/25/18 1110 Done    Acceptance E VU  BB 01/25/18 0913 Done    Acceptance E VU,DU  KB 01/24/18 2111 Done    Acceptance E VU  BB 01/24/18 1051 Done    Acceptance E VU  BB 01/23/18 0904 Done    Acceptance E VU  CT 01/22/18 1428 Done    Acceptance E VU,NR  AG 01/21/18 2221 Done    Acceptance E VU,NR   01/21/18 0116 Done    Acceptance E VU  BB 01/20/18 0737 Done    Acceptance E VU  BB 01/19/18 0901 Done    Acceptance E VU,NR   01/18/18 1911 Done    Acceptance E VU  BC 01/18/18 1532 Done    Acceptance E VU  BB 01/18/18 0951 Done    Acceptance E VU  SS 01/17/18 1945 Done    Acceptance E VU  BC 01/17/18 1652 Done   Family Acceptance E VU  BB 01/24/18 1051 Done    Acceptance E VU  BB 01/20/18 0737 Done    Acceptance E VU  BB 01/19/18 0901 Done               Point: Precautions (Done)    Learning Progress Summary    Learner Readiness Method Response Comment Documented by Status   Patient Acceptance E VU  CT 01/25/18 1110 Done    Acceptance E VU  BB 01/25/18 0913 Done    Acceptance E VU,DU  KB 01/24/18 2111 Done    Acceptance E VU  BB 01/24/18 1051 Done    Acceptance E VU  BB 01/23/18 0904 Done    Acceptance E VU  CT 01/22/18 1428 Done    Acceptance E VU,NR   01/21/18 2221 Done    Acceptance E VU,NR   01/21/18 0116 Done    Acceptance E VU  BB 01/20/18 0737 Done    Acceptance E VU  BB 01/19/18 0901 Done    Acceptance E VU,NR   01/18/18 1911 Done    Acceptance E VU  BC 01/18/18 1532 Done    Acceptance E VU  BB 01/18/18 0951 Done    Acceptance E VU  SS 01/17/18 1945 Done    Acceptance E VU  BC 01/17/18 1652 Done   Family Acceptance E VU  BB 01/24/18 1051 Done    Acceptance E VU   BB 01/20/18 0737 Done    Acceptance E VU  BB 01/19/18 0901 Done                      User Key     Initials Effective Dates Name Provider Type Discipline    BC 03/14/16 -  Tanna Nettles, PT Physical Therapist PT    CT 03/14/16 -  Saima Eugene, PT Physical Therapist PT    KB 07/05/16 -  Jon Perez, RN Registered Nurse Nurse    AG 06/16/16 -  April Hannah De Jesus, RN Registered Nurse Nurse    SS 11/18/16 -  Maribel Ornelas, RN Registered Nurse Nurse    BB 01/21/17 -  Theodore Martinez, RN Registered Nurse Nurse     04/14/17 -  Sang Brown, RN Registered Nurse Nurse                    PT Recommendation and Plan  Anticipated Equipment Needs At Discharge: front wheeled walker  Planned Therapy Interventions: balance training, bed mobility training, gait training, home exercise program, patient/family education, strengthening, transfer training  PT Frequency: 3-5 times/wk, per priority policy             Outcome Measures       01/25/18 1100 01/22/18 1400       How much help from another person do you currently need...    Turning from your back to your side while in flat bed without using bedrails? 2  -CT 2  -CT     Moving from lying on back to sitting on the side of a flat bed without bedrails? 2  -CT 2  -CT     Moving to and from a bed to a chair (including a wheelchair)? 2  -CT 2  -CT     Standing up from a chair using your arms (e.g., wheelchair, bedside chair)? 2  -CT 2  -CT     Climbing 3-5 steps with a railing? 1  -CT 1  -CT     To walk in hospital room? 3  -CT 2  -CT     AM-PAC 6 Clicks Score 12  -CT 11  -CT     Functional Assessment    Outcome Measure Options AM-PAC 6 Clicks Basic Mobility (PT)  -CT AM-PAC 6 Clicks Basic Mobility (PT)  -CT       User Key  (r) = Recorded By, (t) = Taken By, (c) = Cosigned By    Initials Name Provider Type    CT Saima Eugene, PT Physical Therapist           Time Calculation:         PT Charges       01/25/18 1111          Time Calculation    PT Received On  01/25/18  -CT      PT - Next Appointment 01/26/18  -CT      PT Goal Re-Cert Due Date 01/31/18  -CT      Time Calculation- PT    Total Timed Code Minutes- PT 29 minute(s)  -CT        User Key  (r) = Recorded By, (t) = Taken By, (c) = Cosigned By    Initials Name Provider Type    CT Saima Eugene, PT Physical Therapist          Therapy Charges for Today     Code Description Service Date Service Provider Modifiers Qty    94521037799 HC GAIT TRAINING EA 15 MIN 1/25/2018 Saima Eugene, PT GP 1    28497668504 HC PT THERAPEUTIC ACT EA 15 MIN 1/25/2018 Saima Eugene, PT GP 1    49104404251 HC PT THER SUPP EA 15 MIN 1/25/2018 Saima Eugene, PT GP 2          PT G-Codes  Outcome Measure Options: AM-PAC 6 Clicks Basic Mobility (PT)  Score: 12  Functional Limitation: Mobility: Walking and moving around  Mobility: Walking and Moving Around Current Status (): At least 60 percent but less than 80 percent impaired, limited or restricted  Mobility: Walking and Moving Around Goal Status (): At least 40 percent but less than 60 percent impaired, limited or restricted    Saima Eugene PT  1/25/2018

## 2018-01-25 NOTE — PROGRESS NOTES
"  I have personally seen and examined the patient today and discussed overnight interval progress and pertinent issues with nursing staff.    Subjective:    Patient seems to be doing very well this morning and was getting up with physical therapy to walk.  Case discussed with the patient's nurse reports he has been doing very well overnight with no issues.  Leukocytosis showing improvement as well as normalized CRP level.    History taken from: patient chart RN      Vital Signs    /82  Pulse 70  Temp 98.1 °F (36.7 °C) (Oral)   Resp 18  Ht 182 cm (71.65\")  Wt 77.3 kg (170 lb 6.7 oz)  SpO2 96%  BMI 23.34 kg/m2    Temp:  [97.6 °F (36.4 °C)-98.1 °F (36.7 °C)] 98.1 °F (36.7 °C)      Intake/Output Summary (Last 24 hours) at 01/25/18 1122  Last data filed at 01/25/18 1039   Gross per 24 hour   Intake             1020 ml   Output             1350 ml   Net             -330 ml     Intake & Output (last 3 days)       01/22 0701 - 01/23 0700 01/23 0701 - 01/24 0700 01/24 0701 - 01/25 0700 01/25 0701 - 01/26 0700    P.O. 600  1020 240    IV Piggyback 500       Total Intake(mL/kg) 1100 (14.4)  1020 (13.2) 240 (3.1)    Urine (mL/kg/hr) 1325 (0.7) 1200 (0.7) 900 (0.5) 700 (2.1)    Stool  500 (0.3)      Total Output 1325 1700 900 700    Net -225 -1700 +120 -460            Unmeasured Stool Occurrence  1 x          Physical Exam:       General Appearance:    Alert, cooperative, in no acute distress, thin, frail   Head:    Normocephalic, without obvious abnormality, atraumatic   Eyes:            Lids and lashes normal, conjunctivae and sclerae normal, no   icterus, no pallor, corneas clear, PERRLA   Ears:    Ears appear intact with no abnormalities noted   Throat:   No oral lesions, no thrush, oral mucosa moist   Neck:   No adenopathy, supple, trachea midline, no thyromegaly, no   carotid bruit, no JVD   Back:     No tenderness to percussion or palpation, range of motion   normal   Lungs:    coarse breath sounds to right. "    Heart:    Regular rhythm and normal rate, normal S1 and S2, no            murmur, no gallop, no rub, no click   Chest Wall:    No abnormalities observed   Abdomen:     Normal bowel sounds, no masses, no organomegaly, soft        non-tender, non-distended, no guarding, no rebound                tenderness   Rectal:     Deferred   Extremities:   Moves all extremities well, no edema, no cyanosis, no             redness   Pulses:   Pulses palpable and equal bilaterally   Skin:   Sacral decubitus ulcer and right hip ulcer    Lymph nodes:   No palpable adenopathy   Neurologic:   Awake, alert, Garbled speech        Results:      Results from last 7 days  Lab Units 01/25/18  0055 01/24/18  0032 01/23/18  0034 01/22/18  0126 01/21/18  0053 01/20/18  0054 01/19/18  0121   WBC 10*3/mm3 12.87* 13.18* 12.20 11.40 10.76 11.43 12.61*     Lab Results   Component Value Date    NEUTROABS 9.10 (H) 01/25/2018         Results from last 7 days  Lab Units 01/25/18  0055   CREATININE mg/dL 0.64         Results from last 7 days  Lab Units 01/25/18  0055 01/22/18  0126 01/21/18  0053   CRP mg/dL 0.54 1.20* 2.07*       Results Review:    I have personally reviewed laboratory data, culture results, radiology studies and antimicrobial therapy.    Hospital Medications (active)       Dose Frequency Start End    acetaminophen (TYLENOL) tablet 650 mg 650 mg Every 6 Hours PRN 1/16/2018     Sig - Route: Take 2 tablets by mouth Every 6 (Six) Hours As Needed for Mild Pain . - Oral    Cosign for Ordering: Accepted by Samuel Duane Kreis, MD on 1/16/2018  1:17 PM    cefepime (MAXIPIME) 2 g/100 mL 0.9% NS (mbp) 2 g Once 1/16/2018 1/16/2018    Sig - Route: Infuse 100 mL into a venous catheter 1 (One) Time. - Intravenous    Cosign for Ordering: Required by Misty Bae MD    cefepime (MAXIPIME) 2 g/100 mL 0.9% NS (mbp) 2 g Every 12 Hours 1/16/2018 1/23/2018    Sig - Route: Infuse 100 mL into a venous catheter Every 12 (Twelve) Hours. - Intravenous  "   Cosign for Ordering: Required by Misty Bae MD    heparin (porcine) 5000 UNIT/ML injection 5,000 Units 5,000 Units Every 8 Hours Scheduled 1/16/2018     Sig - Route: Inject 1 mL under the skin Every 8 (Eight) Hours. - Subcutaneous    Cosign for Ordering: Accepted by Samuel Duane Kreis, MD on 1/16/2018  1:17 PM    HYDROmorphone (DILAUDID) injection 1 mg 1 mg Once 1/15/2018     Sig - Route: Infuse 1 mL into a venous catheter 1 (One) Time. - Intravenous    metroNIDAZOLE (FLAGYL) IVPB 500 mg 500 mg Every 8 Hours 1/16/2018 1/23/2018    Sig - Route: Infuse 100 mL into a venous catheter Every 8 (Eight) Hours. - Intravenous    Cosign for Ordering: Required by Misty Bae MD    oxyCODONE (ROXICODONE) immediate release tablet 5 mg 5 mg Every 4 Hours PRN 1/16/2018 1/26/2018    Sig - Route: Take 1 tablet by mouth Every 4 (Four) Hours As Needed for Moderate Pain . - Oral    Pharmacy to dose vancomycin  Continuous PRN 1/16/2018 1/23/2018    Sig - Route: Continuous As Needed for Consult. - Does not apply    Cosign for Ordering: Required by Misty Bae MD    sodium chloride 0.9 % flush 10 mL 10 mL As Needed 1/15/2018     Sig - Route: Infuse 10 mL into a venous catheter As Needed for Line Care. - Intravenous    Cosign for Ordering: Accepted by José Miguel Li MD on 1/15/2018  1:21 PM    Linked Group 1:  \"And\" Linked Group Details        sodium chloride 0.9 % infusion 150 mL/hr Continuous 1/15/2018     Sig - Route: Infuse 150 mL/hr into a venous catheter Continuous. - Intravenous    vancomycin (VANCOCIN) 1,000 mg in sodium chloride 0.9 % 250 mL IVPB 1,000 mg Every 12 Hours 1/16/2018 1/23/2018    Sig - Route: Infuse 1,000 mg into a venous catheter Every 12 (Twelve) Hours. - Intravenous            Cultures:    Blood Culture   Date Value Ref Range Status   01/15/2018 No growth at 24 hours  Preliminary   01/15/2018 No growth at 24 hours  Preliminary     Urine Culture   Date Value Ref Range Status "   01/15/2018 No growth at 24 hours  Preliminary           Assessment/Plan     ASSESSMENT:    1.  Severe sepsis with encephalopathy  2.  Aspiration pneumonia     PLAN:    Patient seems to be doing very well this morning and was getting up with physical therapy to walk.  Case discussed with the patient's nurse reports he has been doing very well overnight with no issues.  Leukocytosis showing improvement as well as normalized CRP level.  Would recommend to continue current antibiotic therapy with Omnicef 300 mg by mouth twice a day through 2/1/2018.    Awaiting possible transfer to [facility].  Patient may need repeat debridement if transferred to [facility].    Patient's findings and recommendations were discussed with patient and nursing staff    Code Status: Full Code     Shameka Galeana PA-C  01/25/18  11:22 AM

## 2018-01-26 VITALS
SYSTOLIC BLOOD PRESSURE: 126 MMHG | HEIGHT: 72 IN | DIASTOLIC BLOOD PRESSURE: 73 MMHG | OXYGEN SATURATION: 94 % | TEMPERATURE: 98 F | HEART RATE: 75 BPM | WEIGHT: 171.08 LBS | BODY MASS INDEX: 23.17 KG/M2 | RESPIRATION RATE: 18 BRPM

## 2018-01-26 LAB
ALBUMIN SERPL-MCNC: 2.8 G/DL (ref 3.4–4.8)
ALBUMIN/GLOB SERPL: 0.9 G/DL (ref 1.5–2.5)
ALP SERPL-CCNC: 100 U/L (ref 40–129)
ALT SERPL W P-5'-P-CCNC: 22 U/L (ref 10–44)
ANION GAP SERPL CALCULATED.3IONS-SCNC: 4.2 MMOL/L (ref 3.6–11.2)
ANISOCYTOSIS BLD QL: NORMAL
AST SERPL-CCNC: 38 U/L (ref 10–34)
BASOPHILS # BLD AUTO: 0.08 10*3/MM3 (ref 0–0.3)
BASOPHILS NFR BLD AUTO: 0.7 % (ref 0–2)
BILIRUB SERPL-MCNC: 0.7 MG/DL (ref 0.2–1.8)
BUN BLD-MCNC: 12 MG/DL (ref 7–21)
BUN/CREAT SERPL: 19.4 (ref 7–25)
CALCIUM SPEC-SCNC: 8 MG/DL (ref 7.7–10)
CHLORIDE SERPL-SCNC: 109 MMOL/L (ref 99–112)
CO2 SERPL-SCNC: 23.8 MMOL/L (ref 24.3–31.9)
CREAT BLD-MCNC: 0.62 MG/DL (ref 0.43–1.29)
DEPRECATED RDW RBC AUTO: 34.6 FL (ref 37–54)
ELLIPTOCYTES BLD QL SMEAR: NORMAL
EOSINOPHIL # BLD AUTO: 0.19 10*3/MM3 (ref 0–0.7)
EOSINOPHIL NFR BLD AUTO: 1.6 % (ref 0–5)
ERYTHROCYTE [DISTWIDTH] IN BLOOD BY AUTOMATED COUNT: 18 % (ref 11.5–14.5)
GFR SERPL CREATININE-BSD FRML MDRD: 131 ML/MIN/1.73
GLOBULIN UR ELPH-MCNC: 3 GM/DL
GLUCOSE BLD-MCNC: 104 MG/DL (ref 70–110)
HCT VFR BLD AUTO: 27.1 % (ref 42–52)
HGB BLD-MCNC: 8.7 G/DL (ref 14–18)
HYPOCHROMIA BLD QL: NORMAL
IMM GRANULOCYTES # BLD: 0.11 10*3/MM3 (ref 0–0.03)
IMM GRANULOCYTES NFR BLD: 0.9 % (ref 0–0.5)
LYMPHOCYTES # BLD AUTO: 2.43 10*3/MM3 (ref 1–3)
LYMPHOCYTES NFR BLD AUTO: 20.7 % (ref 21–51)
MCH RBC QN AUTO: 19.8 PG (ref 27–33)
MCHC RBC AUTO-ENTMCNC: 32.1 G/DL (ref 33–37)
MCV RBC AUTO: 61.6 FL (ref 80–94)
MICROCYTES BLD QL: NORMAL
MONOCYTES # BLD AUTO: 1.04 10*3/MM3 (ref 0.1–0.9)
MONOCYTES NFR BLD AUTO: 8.9 % (ref 0–10)
NEUTROPHILS # BLD AUTO: 7.89 10*3/MM3 (ref 1.4–6.5)
NEUTROPHILS NFR BLD AUTO: 67.2 % (ref 30–70)
OSMOLALITY SERPL CALC.SUM OF ELEC: 273.9 MOSM/KG (ref 273–305)
PLATELET # BLD AUTO: 531 10*3/MM3 (ref 130–400)
PMV BLD AUTO: 10.5 FL (ref 6–10)
POTASSIUM BLD-SCNC: 4.1 MMOL/L (ref 3.5–5.3)
PROT SERPL-MCNC: 5.8 G/DL (ref 6–8)
RBC # BLD AUTO: 4.4 10*6/MM3 (ref 4.7–6.1)
SMALL PLATELETS BLD QL SMEAR: NORMAL
SODIUM BLD-SCNC: 137 MMOL/L (ref 135–153)
TARGETS BLD QL SMEAR: NORMAL
WBC NRBC COR # BLD: 11.74 10*3/MM3 (ref 4.5–12.5)

## 2018-01-26 PROCEDURE — 25010000002 HEPARIN (PORCINE) PER 1000 UNITS: Performed by: PHYSICIAN ASSISTANT

## 2018-01-26 PROCEDURE — 80053 COMPREHEN METABOLIC PANEL: CPT | Performed by: PHYSICIAN ASSISTANT

## 2018-01-26 PROCEDURE — 85025 COMPLETE CBC W/AUTO DIFF WBC: CPT | Performed by: PHYSICIAN ASSISTANT

## 2018-01-26 PROCEDURE — 85007 BL SMEAR W/DIFF WBC COUNT: CPT | Performed by: PHYSICIAN ASSISTANT

## 2018-01-26 RX ORDER — OXYCODONE HYDROCHLORIDE 5 MG/1
5 TABLET ORAL EVERY 4 HOURS PRN
Qty: 100 TABLET | Refills: 0 | Status: ON HOLD | OUTPATIENT
Start: 2018-01-26 | End: 2018-05-18

## 2018-01-26 RX ORDER — CEFDINIR 300 MG/1
300 CAPSULE ORAL EVERY 12 HOURS SCHEDULED
Qty: 13 CAPSULE | Refills: 0
Start: 2018-01-26 | End: 2018-02-02

## 2018-01-26 RX ORDER — L.ACID,PARA/B.BIFIDUM/S.THERM 8B CELL
1 CAPSULE ORAL DAILY
Qty: 7 CAPSULE | Refills: 0
Start: 2018-01-27 | End: 2018-02-03

## 2018-01-26 RX ORDER — OXYCODONE HYDROCHLORIDE 5 MG/1
5 TABLET ORAL EVERY 4 HOURS PRN
Status: DISCONTINUED | OUTPATIENT
Start: 2018-01-26 | End: 2018-01-26 | Stop reason: HOSPADM

## 2018-01-26 RX ORDER — MULTIVITAMIN
1 TABLET ORAL DAILY
Status: ON HOLD
Start: 2018-01-27 | End: 2018-05-12

## 2018-01-26 RX ORDER — ACETAMINOPHEN 325 MG/1
650 TABLET ORAL EVERY 6 HOURS PRN
Status: ON HOLD
Start: 2018-01-26 | End: 2018-05-12

## 2018-01-26 RX ADMIN — Medication 1 CAPSULE: at 08:52

## 2018-01-26 RX ADMIN — Medication 1 TABLET: at 08:52

## 2018-01-26 RX ADMIN — OXYCODONE HYDROCHLORIDE 5 MG: 5 TABLET ORAL at 15:13

## 2018-01-26 RX ADMIN — ACETAMINOPHEN 650 MG: 325 TABLET ORAL at 18:04

## 2018-01-26 RX ADMIN — OXYCODONE HYDROCHLORIDE 5 MG: 5 TABLET ORAL at 06:07

## 2018-01-26 RX ADMIN — CEFDINIR 300 MG: 300 CAPSULE ORAL at 08:52

## 2018-01-26 RX ADMIN — ACETAMINOPHEN 650 MG: 325 TABLET ORAL at 11:20

## 2018-01-26 RX ADMIN — HEPARIN SODIUM 5000 UNITS: 5000 INJECTION, SOLUTION INTRAVENOUS; SUBCUTANEOUS at 05:57

## 2018-01-26 RX ADMIN — OXYCODONE HYDROCHLORIDE 5 MG: 5 TABLET ORAL at 10:11

## 2018-01-26 NOTE — PROGRESS NOTES
"     LOS: 11 days     Chief Complaint:  Rhabdomyolysis/Decubitus Ulcers with Sepsis    Subjective     Interval History:   He has no major changes over the last 24 hours.   Labs and vitals stable.  following for discharge disposition to SNF for ongoing recovery and wound care. He is doing well with oral omnicef.    Objective     Vital Signs  /85  Pulse 68  Temp 98 °F (36.7 °C)  Resp 16  Ht 182 cm (71.65\")  Wt 77.6 kg (171 lb 1.2 oz)  SpO2 97%  BMI 23.43 kg/m2  Intake & Output (last day)       01/25 0701 - 01/26 0700 01/26 0701 - 01/27 0700    P.O. 1380     Total Intake(mL/kg) 1380 (17.8)     Urine (mL/kg/hr) 2075 (1.1)     Total Output 2075      Net -695                    Physical Exam:     General Appearance:    Alert, cooperative, in no acute distress   Head:    Normocephalic, without obvious abnormality, atraumatic   Eyes:            Lids and lashes normal, conjunctivae and sclerae normal, no   icterus, no pallor, corneas clear, PERRLA   Ears:    Ears appear intact with no abnormalities noted       Neck:   No adenopathy, supple, trachea midline, no thyromegaly, no   carotid bruit, no JVD   Lungs:     Scattered Coarse Crackles bilaterally R>>L ,respirations regular, even and                  unlabored    Heart:    Regular rhythm and normal rate, normal S1 and S2, no            murmur, no gallop, no rub, no click   Chest Wall:    No abnormalities observed   Abdomen:     Normal bowel sounds, no masses, no organomegaly, soft        non-tender, non-distended, no guarding, no rebound                tenderness   Extremities:   Moves all extremities well, no edema, no cyanosis, no             redness   Pulses:   Pulses palpable and equal bilaterally   Skin:   No bleeding, bruising or rash, Sacral decubitus ulcer with dressing in place and right trochanteric ulcer with eschar   Lymph nodes:   No palpable adenopathy   Neurologic:   Cranial nerves 2 - 12 grossly intact, sensation intact, DTR      "  present and equal bilaterally        Results Review:    Lab Results   Component Value Date    WBC 11.74 01/26/2018    HGB 8.7 (L) 01/26/2018    HCT 27.1 (L) 01/26/2018    MCV 61.6 (L) 01/26/2018     (H) 01/26/2018       Lab Results   Component Value Date    GLUCOSE 104 01/26/2018    BUN 12 01/26/2018    CREATININE 0.62 01/26/2018    EGFRIFNONA 131 01/26/2018    BCR 19.4 01/26/2018     01/26/2018    K 4.1 01/26/2018     01/26/2018    CO2 23.8 (L) 01/26/2018    CALCIUM 8.0 01/26/2018    PROTENTOTREF 7.2 11/12/2015    ALBUMIN 2.80 (L) 01/26/2018    LABIL2 0.9 (L) 01/26/2018    AST 38 (H) 01/26/2018    ALT 22 01/26/2018     Lab Results   Component Value Date    INR 0.94 11/03/2015    INR <0.90 10/25/2015    INR <0.90 06/01/2015       No results found for: POCGLU       Medication Review:     Current Facility-Administered Medications:   •  acetaminophen (TYLENOL) tablet 650 mg, 650 mg, Oral, Q6H PRN, JARETH Condon, 650 mg at 01/24/18 0638  •  cefdinir (OMNICEF) capsule 300 mg, 300 mg, Oral, Q12H, Misty Bae MD, 300 mg at 01/25/18 2028  •  heparin (porcine) 5000 UNIT/ML injection 5,000 Units, 5,000 Units, Subcutaneous, Q8H, JARETH Condon, 5,000 Units at 01/26/18 0557  •  HYDROmorphone (DILAUDID) injection 1 mg, 1 mg, Intravenous, Once, José Miguel Li MD  •  lactated ringers infusion, 100 mL/hr, Intravenous, Continuous, Mohan Santoyo MD  •  lactated ringers infusion, 125 mL/hr, Intravenous, Continuous, Terrence Montero MD, Stopped at 01/19/18 1251  •  lactobacillus acidophilus (RISAQUAD) capsule 1 capsule, 1 capsule, Oral, Daily, Camila Haley, [facility], 1 capsule at 01/25/18 0800  •  Magnesium Sulfate 2 gram Bolus, followed by 8 gram infusion (total Mg dose 10 grams)- Mg less than or equal to 1mg/dL, 2 g, Intravenous, PRN **OR** Magnesium Sulfate 6 gram Infusion (2 gm x 3) -Mg 1.1 -1.5 mg/dL, 2 g, Intravenous, PRN **OR** magnesium sulfate 4 gram infusion- Mg 1.6-1.9 mg/dL, 4 g,  Intravenous, PRN, Samuel Duane Kreis, MD  •  multivitamin (DAILY MINH) tablet 1 tablet, 1 tablet, Oral, Daily, JARETH Condon, 1 tablet at 01/25/18 0800  •  ondansetron (ZOFRAN) tablet 4 mg, 4 mg, Oral, Q6H PRN **OR** ondansetron ODT (ZOFRAN-ODT) disintegrating tablet 4 mg, 4 mg, Oral, Q6H PRN **OR** ondansetron (ZOFRAN) injection 4 mg, 4 mg, Intravenous, Q6H PRN, Samuel Duane Kreis, MD, 4 mg at 01/22/18 2126  •  oxyCODONE (ROXICODONE) immediate release tablet 5 mg, 5 mg, Oral, Q4H PRN, Samuel Duane Kreis, MD, 5 mg at 01/26/18 0607  •  potassium chloride (MICRO-K) CR capsule 40 mEq, 40 mEq, Oral, PRN **OR** potassium chloride (KLOR-CON) packet 40 mEq, 40 mEq, Oral, PRN **OR** potassium chloride 10 mEq in 100 mL IVPB, 10 mEq, Intravenous, Q1H PRN, Samuel Duane Kreis, MD  •  Insert peripheral IV, , , Once **AND** sodium chloride 0.9 % flush 10 mL, 10 mL, Intravenous, PRN, Jesus Carreon PA-C, 10 mL at 01/21/18 1506      Assessment/Plan   Probable Aspiration Pneumonia with Sepsis  Sacral Decubitus Ulcer   Decubitus ulceration of the hip  Hyperbilirubinemia (resolved)  Rhabdomyolysis (resolved)  Metabolic encephalopathy due to sepsis (resolved)  Multiple myeloma  Chronic opioid use  Protein Malnutrition  Fever (resolved)      Appreciate surgery and ID input     Omnicef  BID (through 2-1-18), per ID    Oxycodone 5 mg q 4 hours prn pain     Tylenol prn fever     Continue with wound care     Heparin for DVT prophylaxis    O2 via NC to maintain O2 sats >90%    Continue with PT/OT, S/S following for discharge to SNF for wound care/recovery    Continue K+/Mg replacement protocol,  MVI daily    JARETH Condon  01/26/18  7:45 AM     This patient is seen this morning by me and I agree with the above note.  We continue to await nursing facility determination.    Samuel Duane Kreis, MD  01/26/18  10:01 AM

## 2018-01-26 NOTE — DISCHARGE PLACEMENT REQUEST
"FelixJhonny (63 y.o. Male)     Date of Birth Social Security Number Address Home Phone MRN    1955  [address] [address] [phone] [id number]    Roman Catholic Marital Status          Moravian        Admission Date Admission Type Admitting Provider Attending Provider Department, Room/Bed    1/15/18 Emergency Kreis, Samuel Duane, MD Kreis, Samuel Duane, MD [address], 3320/1P    Discharge Date Discharge Disposition Discharge Destination                      Attending Provider: Samuel Duane Kreis, MD     Allergies:  Sulfa Antibiotics    Isolation:  None   Infection:  None   Code Status:  FULL    Ht:  182 cm (71.65\")   Wt:  77.6 kg (171 lb 1.2 oz)    Admission Cmt:  None   Principal Problem:  None                Active Insurance as of 1/15/2018     Primary Coverage     Payor Plan Insurance Group Employer/Plan Group    MEDICARE MEDICARE A & B      Payor Plan Address Payor Plan Phone Number Effective From Effective To    [address] [phone] 3/1/1995     [place] [address]       Subscriber Name Subscriber Birth Date Member ID       JHONNY SOLIS 1955 [id number]                 Emergency Contacts      (Rel.) Home Phone Work Phone Mobile Phone    Gita Palmer (Other) [phone] -- [phone]            Emergency Contact Information     Name Relation Home Work Mobile    Gita Palmer Other [phone]          Insurance Information                MEDICARE/MEDICARE A & B Phone: [phone]    Subscriber: Jhonny Solis Subscriber#: [id number]    Group#:  Precert#:           Prior to Admission Medications     Prescriptions Last Dose Informant Patient Reported? Taking?    diazePAM (VALIUM) 10 MG tablet 1/15/2018  Yes Yes    Take 10 mg by mouth 2 (Two) Times a Day As Needed for Anxiety.    oxyCODONE (ROXICODONE) 20 MG tablet 1/15/2018 Pharmacy Yes Yes    Take 20 mg by mouth Every 6 (Six) Hours As Needed for " "Moderate Pain .          Hospital Medications (active)       Dose Frequency Start End    acetaminophen (TYLENOL) tablet 650 mg 650 mg Every 6 Hours PRN 1/16/2018     Sig - Route: Take 2 tablets by mouth Every 6 (Six) Hours As Needed for Mild Pain . - Oral    Cosign for Ordering: Accepted by Samuel Duane Kreis, MD on 1/16/2018  1:17 PM    cefdinir (OMNICEF) capsule 300 mg 300 mg Every 12 Hours Scheduled 1/23/2018 2/2/2018    Sig - Route: Take 1 capsule by mouth Every 12 (Twelve) Hours. - Oral    heparin (porcine) 5000 UNIT/ML injection 5,000 Units 5,000 Units Every 8 Hours Scheduled 1/16/2018     Sig - Route: Inject 1 mL under the skin Every 8 (Eight) Hours. - Subcutaneous    Cosign for Ordering: Accepted by Samuel Duane Kreis, MD on 1/16/2018  1:17 PM    HYDROmorphone (DILAUDID) injection 1 mg 1 mg Once 1/15/2018     Sig - Route: Infuse 1 mL into a venous catheter 1 (One) Time. - Intravenous    lactated ringers infusion 100 mL/hr Continuous 1/19/2018     Sig - Route: Infuse 100 mL/hr into a venous catheter Continuous. - Intravenous    lactated ringers infusion 125 mL/hr Continuous 1/19/2018     Sig - Route: Infuse 125 mL/hr into a venous catheter Continuous. - Intravenous    lactobacillus acidophilus (RISAQUAD) capsule 1 capsule 1 capsule Daily 1/18/2018     Sig - Route: Take 1 capsule by mouth Daily. - Oral    Magnesium Sulfate 2 gram Bolus, followed by 8 gram infusion (total Mg dose 10 grams)- Mg less than or equal to 1mg/dL 2 g As Needed 1/21/2018     Sig - Route: Infuse 50 mL into a venous catheter As Needed (Mg less than or equal to 1mg/dL). - Intravenous    Linked Group 1:  \"Or\" Linked Group Details        magnesium sulfate 4 gram infusion- Mg 1.6-1.9 mg/dL 4 g As Needed 1/21/2018     Sig - Route: Infuse 100 mL into a venous catheter As Needed (Mg 1.6-1.9 mg/dL). - Intravenous    Linked Group 1:  \"Or\" Linked Group Details        Magnesium Sulfate 6 gram Infusion (2 gm x 3) -Mg 1.1 -1.5 mg/dL 2 g As Needed " "1/21/2018     Sig - Route: Infuse 50 mL into a venous catheter As Needed (Mg 1.1 -1.5 mg/dL). - Intravenous    Linked Group 1:  \"Or\" Linked Group Details        multivitamin (DAILY MINH) tablet 1 tablet 1 tablet Daily 1/22/2018     Sig - Route: Take 1 tablet by mouth Daily. - Oral    Cosign for Ordering: Accepted by Samuel Duane Kreis, MD on 1/25/2018 11:24 AM    ondansetron (ZOFRAN) injection 4 mg 4 mg Every 6 Hours PRN 1/21/2018     Sig - Route: Infuse 2 mL into a venous catheter Every 6 (Six) Hours As Needed for Nausea or Vomiting. - Intravenous    Linked Group 2:  \"Or\" Linked Group Details        ondansetron (ZOFRAN) tablet 4 mg 4 mg Every 6 Hours PRN 1/21/2018     Sig - Route: Take 1 tablet by mouth Every 6 (Six) Hours As Needed for Nausea or Vomiting. - Oral    Linked Group 2:  \"Or\" Linked Group Details        ondansetron ODT (ZOFRAN-ODT) disintegrating tablet 4 mg 4 mg Every 6 Hours PRN 1/21/2018     Sig - Route: Take 1 tablet by mouth Every 6 (Six) Hours As Needed for Nausea or Vomiting. - Oral    Linked Group 2:  \"Or\" Linked Group Details        oxyCODONE (ROXICODONE) immediate release tablet 5 mg 5 mg Every 4 Hours PRN 1/16/2018 1/26/2018    Sig - Route: Take 1 tablet by mouth Every 4 (Four) Hours As Needed for Moderate Pain . - Oral    potassium chloride (KLOR-CON) packet 40 mEq 40 mEq As Needed 1/21/2018     Sig - Route: Take 40 mEq by mouth As Needed (potassium replacement, see admin instructions). - Oral    Linked Group 3:  \"Or\" Linked Group Details        potassium chloride (MICRO-K) CR capsule 40 mEq 40 mEq As Needed 1/21/2018     Sig - Route: Take 4 capsules by mouth As Needed (potassium replacement.  see admin instructions). - Oral    Linked Group 3:  \"Or\" Linked Group Details        potassium chloride 10 mEq in 100 mL IVPB 10 mEq Every 1 Hour PRN 1/21/2018     Sig - Route: Infuse 100 mL into a venous catheter Every 1 (One) Hour As Needed (potassium protocol PERIPHERAL - see admin instructions). - " "Intravenous    Linked Group 3:  \"Or\" Linked Group Details        sodium chloride 0.9 % flush 10 mL 10 mL As Needed 1/15/2018     Sig - Route: Infuse 10 mL into a venous catheter As Needed for Line Care. - Intravenous    Cosign for Ordering: Accepted by José Miguel Li MD on 1/15/2018  1:21 PM    Linked Group 4:  \"And\" Linked Group Details                 Physician Progress Notes (last 24 hours) (Notes from 1/25/2018  9:53 AM through 1/26/2018  9:53 AM)      Shameka Galeana PA-C at 1/25/2018 11:22 AM  Version 1 of 1    Attestation signed by Misty Bae MD at 1/26/2018  3:21 AM        I have reviewed the documentation above and agree.                                   I have personally seen and examined the patient today and discussed overnight interval progress and pertinent issues with nursing staff.    Subjective:    Patient seems to be doing very well this morning and was getting up with physical therapy to walk.  Case discussed with the patient's nurse reports he has been doing very well overnight with no issues.  Leukocytosis showing improvement as well as normalized CRP level.    History taken from: patient chart RN      Vital Signs    /82  Pulse 70  Temp 98.1 °F (36.7 °C) (Oral)   Resp 18  Ht 182 cm (71.65\")  Wt 77.3 kg (170 lb 6.7 oz)  SpO2 96%  BMI 23.34 kg/m2    Temp:  [97.6 °F (36.4 °C)-98.1 °F (36.7 °C)] 98.1 °F (36.7 °C)      Intake/Output Summary (Last 24 hours) at 01/25/18 1122  Last data filed at 01/25/18 1039   Gross per 24 hour   Intake             1020 ml   Output             1350 ml   Net             -330 ml     Intake & Output (last 3 days)       01/22 0701 - 01/23 0700 01/23 0701 - 01/24 0700 01/24 0701 - 01/25 0700 01/25 0701 - 01/26 0700    P.O. 600  1020 240    IV Piggyback 500       Total Intake(mL/kg) 1100 (14.4)  1020 (13.2) 240 (3.1)    Urine (mL/kg/hr) 1325 (0.7) 1200 (0.7) 900 (0.5) 700 (2.1)    Stool  500 (0.3)      Total Output 1325 1700 900 700    Net -225 " -1700 +120 -460            Unmeasured Stool Occurrence  1 x          Physical Exam:       General Appearance:    Alert, cooperative, in no acute distress, thin, frail   Head:    Normocephalic, without obvious abnormality, atraumatic   Eyes:            Lids and lashes normal, conjunctivae and sclerae normal, no   icterus, no pallor, corneas clear, PERRLA   Ears:    Ears appear intact with no abnormalities noted   Throat:   No oral lesions, no thrush, oral mucosa moist   Neck:   No adenopathy, supple, trachea midline, no thyromegaly, no   carotid bruit, no JVD   Back:     No tenderness to percussion or palpation, range of motion   normal   Lungs:    coarse breath sounds to right.    Heart:    Regular rhythm and normal rate, normal S1 and S2, no            murmur, no gallop, no rub, no click   Chest Wall:    No abnormalities observed   Abdomen:     Normal bowel sounds, no masses, no organomegaly, soft        non-tender, non-distended, no guarding, no rebound                tenderness   Rectal:     Deferred   Extremities:   Moves all extremities well, no edema, no cyanosis, no             redness   Pulses:   Pulses palpable and equal bilaterally   Skin:   Sacral decubitus ulcer and right hip ulcer    Lymph nodes:   No palpable adenopathy   Neurologic:   Awake, alert, Garbled speech        Results:      Results from last 7 days  Lab Units 01/25/18  0055 01/24/18  0032 01/23/18  0034 01/22/18  0126 01/21/18  0053 01/20/18  0054 01/19/18  0121   WBC 10*3/mm3 12.87* 13.18* 12.20 11.40 10.76 11.43 12.61*     Lab Results   Component Value Date    NEUTROABS 9.10 (H) 01/25/2018         Results from last 7 days  Lab Units 01/25/18  0055   CREATININE mg/dL 0.64         Results from last 7 days  Lab Units 01/25/18  0055 01/22/18  0126 01/21/18  0053   CRP mg/dL 0.54 1.20* 2.07*       Results Review:    I have personally reviewed laboratory data, culture results, radiology studies and antimicrobial therapy.    Hospital Medications  (active)       Dose Frequency Start End    acetaminophen (TYLENOL) tablet 650 mg 650 mg Every 6 Hours PRN 1/16/2018     Sig - Route: Take 2 tablets by mouth Every 6 (Six) Hours As Needed for Mild Pain . - Oral    Cosign for Ordering: Accepted by Samuel Duane Kreis, MD on 1/16/2018  1:17 PM    cefepime (MAXIPIME) 2 g/100 mL 0.9% NS (mbp) 2 g Once 1/16/2018 1/16/2018    Sig - Route: Infuse 100 mL into a venous catheter 1 (One) Time. - Intravenous    Cosign for Ordering: Required by Misty Bae MD    cefepime (MAXIPIME) 2 g/100 mL 0.9% NS (mbp) 2 g Every 12 Hours 1/16/2018 1/23/2018    Sig - Route: Infuse 100 mL into a venous catheter Every 12 (Twelve) Hours. - Intravenous    Cosign for Ordering: Required by Misty Bae MD    heparin (porcine) 5000 UNIT/ML injection 5,000 Units 5,000 Units Every 8 Hours Scheduled 1/16/2018     Sig - Route: Inject 1 mL under the skin Every 8 (Eight) Hours. - Subcutaneous    Cosign for Ordering: Accepted by Samuel Duane Kreis, MD on 1/16/2018  1:17 PM    HYDROmorphone (DILAUDID) injection 1 mg 1 mg Once 1/15/2018     Sig - Route: Infuse 1 mL into a venous catheter 1 (One) Time. - Intravenous    metroNIDAZOLE (FLAGYL) IVPB 500 mg 500 mg Every 8 Hours 1/16/2018 1/23/2018    Sig - Route: Infuse 100 mL into a venous catheter Every 8 (Eight) Hours. - Intravenous    Cosign for Ordering: Required by Misty Bae MD    oxyCODONE (ROXICODONE) immediate release tablet 5 mg 5 mg Every 4 Hours PRN 1/16/2018 1/26/2018    Sig - Route: Take 1 tablet by mouth Every 4 (Four) Hours As Needed for Moderate Pain . - Oral    Pharmacy to dose vancomycin  Continuous PRN 1/16/2018 1/23/2018    Sig - Route: Continuous As Needed for Consult. - Does not apply    Cosign for Ordering: Required by Misty Bae MD    sodium chloride 0.9 % flush 10 mL 10 mL As Needed 1/15/2018     Sig - Route: Infuse 10 mL into a venous catheter As Needed for Line Care. - Intravenous    Cosign for Ordering:  "Accepted by José Miguel Li MD on 1/15/2018  1:21 PM    Linked Group 1:  \"And\" Linked Group Details        sodium chloride 0.9 % infusion 150 mL/hr Continuous 1/15/2018     Sig - Route: Infuse 150 mL/hr into a venous catheter Continuous. - Intravenous    vancomycin (VANCOCIN) 1,000 mg in sodium chloride 0.9 % 250 mL IVPB 1,000 mg Every 12 Hours 1/16/2018 1/23/2018    Sig - Route: Infuse 1,000 mg into a venous catheter Every 12 (Twelve) Hours. - Intravenous            Cultures:    Blood Culture   Date Value Ref Range Status   01/15/2018 No growth at 24 hours  Preliminary   01/15/2018 No growth at 24 hours  Preliminary     Urine Culture   Date Value Ref Range Status   01/15/2018 No growth at 24 hours  Preliminary           Assessment/Plan     ASSESSMENT:    1.  Severe sepsis with encephalopathy  2.  Aspiration pneumonia     PLAN:    Patient seems to be doing very well this morning and was getting up with physical therapy to walk.  Case discussed with the patient's nurse reports he has been doing very well overnight with no issues.  Leukocytosis showing improvement as well as normalized CRP level.  Would recommend to continue current antibiotic therapy with Omnicef 300 mg by mouth twice a day through 2/1/2018.    Awaiting possible transfer to [facility].  Patient may need repeat debridement if transferred to [facility].    Patient's findings and recommendations were discussed with patient and nursing staff    Code Status: Full Code     Shameka Galeana PA-C  01/25/18  11:22 AM           Electronically signed by Misty Bae MD at 1/26/2018  3:21 AM      JARETH Condon at 1/26/2018  7:45 AM  Version 1 of 1              LOS: 11 days     Chief Complaint:  Rhabdomyolysis/Decubitus Ulcers with Sepsis    Subjective     Interval History:   He has no major changes over the last 24 hours.   Labs and vitals stable.  following for discharge disposition to SNF for ongoing recovery and wound care. He is doing well " "with oral omnicef.    Objective     Vital Signs  /85  Pulse 68  Temp 98 °F (36.7 °C)  Resp 16  Ht 182 cm (71.65\")  Wt 77.6 kg (171 lb 1.2 oz)  SpO2 97%  BMI 23.43 kg/m2  Intake & Output (last day)       01/25 0701 - 01/26 0700 01/26 0701 - 01/27 0700    P.O. 1380     Total Intake(mL/kg) 1380 (17.8)     Urine (mL/kg/hr) 2075 (1.1)     Total Output 2075      Net -695                    Physical Exam:     General Appearance:    Alert, cooperative, in no acute distress   Head:    Normocephalic, without obvious abnormality, atraumatic   Eyes:            Lids and lashes normal, conjunctivae and sclerae normal, no   icterus, no pallor, corneas clear, PERRLA   Ears:    Ears appear intact with no abnormalities noted       Neck:   No adenopathy, supple, trachea midline, no thyromegaly, no   carotid bruit, no JVD   Lungs:     Scattered Coarse Crackles bilaterally R>>L ,respirations regular, even and                  unlabored    Heart:    Regular rhythm and normal rate, normal S1 and S2, no            murmur, no gallop, no rub, no click   Chest Wall:    No abnormalities observed   Abdomen:     Normal bowel sounds, no masses, no organomegaly, soft        non-tender, non-distended, no guarding, no rebound                tenderness   Extremities:   Moves all extremities well, no edema, no cyanosis, no             redness   Pulses:   Pulses palpable and equal bilaterally   Skin:   No bleeding, bruising or rash, Sacral decubitus ulcer with dressing in place and right trochanteric ulcer with eschar   Lymph nodes:   No palpable adenopathy   Neurologic:   Cranial nerves 2 - 12 grossly intact, sensation intact, DTR       present and equal bilaterally        Results Review:    Lab Results   Component Value Date    WBC 11.74 01/26/2018    HGB 8.7 (L) 01/26/2018    HCT 27.1 (L) 01/26/2018    MCV 61.6 (L) 01/26/2018     (H) 01/26/2018       Lab Results   Component Value Date    GLUCOSE 104 01/26/2018    BUN 12 " 01/26/2018    CREATININE 0.62 01/26/2018    EGFRIFNONA 131 01/26/2018    BCR 19.4 01/26/2018     01/26/2018    K 4.1 01/26/2018     01/26/2018    CO2 23.8 (L) 01/26/2018    CALCIUM 8.0 01/26/2018    PROTENTOTREF 7.2 11/12/2015    ALBUMIN 2.80 (L) 01/26/2018    LABIL2 0.9 (L) 01/26/2018    AST 38 (H) 01/26/2018    ALT 22 01/26/2018     Lab Results   Component Value Date    INR 0.94 11/03/2015    INR <0.90 10/25/2015    INR <0.90 06/01/2015       No results found for: POCGLU       Medication Review:     Current Facility-Administered Medications:   •  acetaminophen (TYLENOL) tablet 650 mg, 650 mg, Oral, Q6H PRN, JARETH Condon, 650 mg at 01/24/18 0638  •  cefdinir (OMNICEF) capsule 300 mg, 300 mg, Oral, Q12H, Misty Bae MD, 300 mg at 01/25/18 2028  •  heparin (porcine) 5000 UNIT/ML injection 5,000 Units, 5,000 Units, Subcutaneous, Q8H, JARETH Condon, 5,000 Units at 01/26/18 0557  •  HYDROmorphone (DILAUDID) injection 1 mg, 1 mg, Intravenous, Once, José Miguel Li MD  •  lactated ringers infusion, 100 mL/hr, Intravenous, Continuous, Mohan Santoyo MD  •  lactated ringers infusion, 125 mL/hr, Intravenous, Continuous, Terrence Montero MD, Stopped at 01/19/18 1251  •  lactobacillus acidophilus (RISAQUAD) capsule 1 capsule, 1 capsule, Oral, Daily, Camila Haley, [facility], 1 capsule at 01/25/18 0800  •  Magnesium Sulfate 2 gram Bolus, followed by 8 gram infusion (total Mg dose 10 grams)- Mg less than or equal to 1mg/dL, 2 g, Intravenous, PRN **OR** Magnesium Sulfate 6 gram Infusion (2 gm x 3) -Mg 1.1 -1.5 mg/dL, 2 g, Intravenous, PRN **OR** magnesium sulfate 4 gram infusion- Mg 1.6-1.9 mg/dL, 4 g, Intravenous, PRN, Samuel Duane Kreis, MD  •  multivitamin (DAILY MINH) tablet 1 tablet, 1 tablet, Oral, Daily, JARETH Condon, 1 tablet at 01/25/18 0800  •  ondansetron (ZOFRAN) tablet 4 mg, 4 mg, Oral, Q6H PRN **OR** ondansetron ODT (ZOFRAN-ODT) disintegrating tablet 4 mg, 4 mg, Oral, Q6H PRN  **OR** ondansetron (ZOFRAN) injection 4 mg, 4 mg, Intravenous, Q6H PRN, Samuel Duane Kreis, MD, 4 mg at 01/22/18 2126  •  oxyCODONE (ROXICODONE) immediate release tablet 5 mg, 5 mg, Oral, Q4H PRN, Samuel Duane Kreis, MD, 5 mg at 01/26/18 0607  •  potassium chloride (MICRO-K) CR capsule 40 mEq, 40 mEq, Oral, PRN **OR** potassium chloride (KLOR-CON) packet 40 mEq, 40 mEq, Oral, PRN **OR** potassium chloride 10 mEq in 100 mL IVPB, 10 mEq, Intravenous, Q1H PRN, Samuel Duane Kreis, MD  •  Insert peripheral IV, , , Once **AND** sodium chloride 0.9 % flush 10 mL, 10 mL, Intravenous, PRN, Jesus Carreon PA-C, 10 mL at 01/21/18 1506      Assessment/Plan   Probable Aspiration Pneumonia with Sepsis  Sacral Decubitus Ulcer   Decubitus ulceration of the hip  Hyperbilirubinemia (resolved)  Rhabdomyolysis (resolved)  Metabolic encephalopathy due to sepsis (resolved)  Multiple myeloma  Chronic opioid use  Protein Malnutrition  Fever (resolved)      Appreciate surgery and ID input     Omnicef  BID (through 2-1-18), per ID    Oxycodone 5 mg q 4 hours prn pain     Tylenol prn fever     Continue with wound care     Heparin for DVT prophylaxis    O2 via NC to maintain O2 sats >90%    Continue with PT/OT, S/S following for discharge to SNF for wound care/recovery    Continue K+/Mg replacement protocol,  MVI daily    JARETH Condon  01/26/18  7:45 AM                Electronically signed by JARETH Condon at 1/26/2018  7:47 AM        Consult Notes (last 24 hours) (Notes from 1/25/2018  9:53 AM through 1/26/2018  9:53 AM)     No notes of this type exist for this encounter.

## 2018-01-26 NOTE — PROGRESS NOTES
"  I have personally seen and examined the patient today and discussed overnight interval progress and pertinent issues with nursing staff.    Subjective:    Sacral wound continues to show significant tissue loss but no odor or purulent drainage.  Awaiting transfer to [facility].  Normal white count and normal CRP level.    History taken from: patient chart RN      Vital Signs    /85  Pulse 68  Temp 98 °F (36.7 °C)  Resp 16  Ht 182 cm (71.65\")  Wt 77.6 kg (171 lb 1.2 oz)  SpO2 97%  BMI 23.43 kg/m2    Temp:  [98 °F (36.7 °C)-98.2 °F (36.8 °C)] 98 °F (36.7 °C)      Intake/Output Summary (Last 24 hours) at 01/26/18 0959  Last data filed at 01/26/18 0842   Gross per 24 hour   Intake             1500 ml   Output             2050 ml   Net             -550 ml     Intake & Output (last 3 days)       01/23 0701 - 01/24 0700 01/24 0701 - 01/25 0700 01/25 0701 - 01/26 0700 01/26 0701 - 01/27 0700    P.O.  1020 1380 360    IV Piggyback        Total Intake(mL/kg)  1020 (13.2) 1380 (17.8) 360 (4.6)    Urine (mL/kg/hr) 1200 (0.7) 900 (0.5) 2075 (1.1) 325 (1.4)    Stool 500 (0.3)       Total Output 2389 695 1262 325    Net -1700 +120 -695 +35            Unmeasured Stool Occurrence 1 x           Physical Exam:       General Appearance:    Alert, cooperative, in no acute distress, thin, frail   Head:    Normocephalic, without obvious abnormality, atraumatic   Eyes:            Lids and lashes normal, conjunctivae and sclerae normal, no   icterus, no pallor, corneas clear, PERRLA   Ears:    Ears appear intact with no abnormalities noted   Throat:   No oral lesions, no thrush, oral mucosa moist   Neck:   No adenopathy, supple, trachea midline, no thyromegaly, no   carotid bruit, no JVD   Back:     No tenderness to percussion or palpation, range of motion   normal   Lungs:    coarse breath sounds to right.    Heart:    Regular rhythm and normal rate, normal S1 and S2, no            murmur, no gallop, no rub, no click "   Chest Wall:    No abnormalities observed   Abdomen:     Normal bowel sounds, no masses, no organomegaly, soft        non-tender, non-distended, no guarding, no rebound                tenderness   Rectal:     Deferred   Extremities:   Moves all extremities well, no edema, no cyanosis, no             redness   Pulses:   Pulses palpable and equal bilaterally   Skin:   Sacral wound continues to show significant tissue loss but no odor or purulent drainage   Lymph nodes:   No palpable adenopathy   Neurologic:   Awake, alert, Garbled speech        Results:      Results from last 7 days  Lab Units 01/26/18  0405 01/25/18  0055 01/24/18  0032 01/23/18  0034 01/22/18  0126 01/21/18  0053 01/20/18  0054   WBC 10*3/mm3 11.74 12.87* 13.18* 12.20 11.40 10.76 11.43     Lab Results   Component Value Date    NEUTROABS 7.89 (H) 01/26/2018         Results from last 7 days  Lab Units 01/26/18 0405   CREATININE mg/dL 0.62         Results from last 7 days  Lab Units 01/25/18  0055 01/22/18  0126 01/21/18  0053   CRP mg/dL 0.54 1.20* 2.07*       Results Review:    I have personally reviewed laboratory data, culture results, radiology studies and antimicrobial therapy.    Hospital Medications (active)       Dose Frequency Start End    acetaminophen (TYLENOL) tablet 650 mg 650 mg Every 6 Hours PRN 1/16/2018     Sig - Route: Take 2 tablets by mouth Every 6 (Six) Hours As Needed for Mild Pain . - Oral    Cosign for Ordering: Accepted by Samuel Duane Kreis, MD on 1/16/2018  1:17 PM    cefepime (MAXIPIME) 2 g/100 mL 0.9% NS (mbp) 2 g Once 1/16/2018 1/16/2018    Sig - Route: Infuse 100 mL into a venous catheter 1 (One) Time. - Intravenous    Cosign for Ordering: Required by Misty Bae MD    cefepime (MAXIPIME) 2 g/100 mL 0.9% NS (mbp) 2 g Every 12 Hours 1/16/2018 1/23/2018    Sig - Route: Infuse 100 mL into a venous catheter Every 12 (Twelve) Hours. - Intravenous    Cosign for Ordering: Required by Misty Bae MD    heparin  "(porcine) 5000 UNIT/ML injection 5,000 Units 5,000 Units Every 8 Hours Scheduled 1/16/2018     Sig - Route: Inject 1 mL under the skin Every 8 (Eight) Hours. - Subcutaneous    Cosign for Ordering: Accepted by Samuel Duane Kreis, MD on 1/16/2018  1:17 PM    HYDROmorphone (DILAUDID) injection 1 mg 1 mg Once 1/15/2018     Sig - Route: Infuse 1 mL into a venous catheter 1 (One) Time. - Intravenous    metroNIDAZOLE (FLAGYL) IVPB 500 mg 500 mg Every 8 Hours 1/16/2018 1/23/2018    Sig - Route: Infuse 100 mL into a venous catheter Every 8 (Eight) Hours. - Intravenous    Cosign for Ordering: Required by Misty Bae MD    oxyCODONE (ROXICODONE) immediate release tablet 5 mg 5 mg Every 4 Hours PRN 1/16/2018 1/26/2018    Sig - Route: Take 1 tablet by mouth Every 4 (Four) Hours As Needed for Moderate Pain . - Oral    Pharmacy to dose vancomycin  Continuous PRN 1/16/2018 1/23/2018    Sig - Route: Continuous As Needed for Consult. - Does not apply    Cosign for Ordering: Required by Misty Bae MD    sodium chloride 0.9 % flush 10 mL 10 mL As Needed 1/15/2018     Sig - Route: Infuse 10 mL into a venous catheter As Needed for Line Care. - Intravenous    Cosign for Ordering: Accepted by José Miguel iL MD on 1/15/2018  1:21 PM    Linked Group 1:  \"And\" Linked Group Details        sodium chloride 0.9 % infusion 150 mL/hr Continuous 1/15/2018     Sig - Route: Infuse 150 mL/hr into a venous catheter Continuous. - Intravenous    vancomycin (VANCOCIN) 1,000 mg in sodium chloride 0.9 % 250 mL IVPB 1,000 mg Every 12 Hours 1/16/2018 1/23/2018    Sig - Route: Infuse 1,000 mg into a venous catheter Every 12 (Twelve) Hours. - Intravenous            Cultures:    Blood Culture   Date Value Ref Range Status   01/15/2018 No growth at 24 hours  Preliminary   01/15/2018 No growth at 24 hours  Preliminary     Urine Culture   Date Value Ref Range Status   01/15/2018 No growth at 24 hours  Preliminary           Assessment/Plan "     ASSESSMENT:    1.  Severe sepsis with encephalopathy  2.  Aspiration pneumonia     PLAN:    Sacral wound continues to show significant tissue loss but no odor or purulent drainage.  Awaiting transfer to [facility].  Normal white count and normal CRP level.    Would recommend to continue current antibiotic therapy with Omnicef 300 mg by mouth twice a day through 2/1/2018.    Patient's findings and recommendations were discussed with patient and nursing staff    Code Status: Full Code     Scribed for Misty Bae M.D. by Shameka Galeana PA-C.       Shameka Galeana PA-C  01/26/18  9:59 AM

## 2018-01-26 NOTE — DISCHARGE SUMMARY
Date of Admission: 1/15/2018  Date of Discharge:  1/26/2018    Discharge Diagnosis:   Rhabdomyolysis, resolved  Sepsis secondary to aspiration pneumonia  History of multiple myeloma  Decubitus ulceration of the coccyx status post debridement  Metabolic encephalopathy due to sepsis, resolved  Protein calorie malnutrition  Hyperbilirubinemia, resolved, due to sepsis  Decubitus ulceration of the hip      Hospital Course  Patient is a 63 y.o. male presented with diagnoses as listed above.  During hospitalization patient participated with his treatment.  He was initially on broad-spectrum antibiotics.  Evaluated by infectious disease and surgery.  Ultimately underwent debridement of decubitus ulceration.  He tolerated this well.  Now transitioned to oral antibiotics.  Doing well overall and showing significant improvement with PT and wound care     Procedures Performed  Procedure(s):  DEBRIDEMENT OF RIGHT HIP ULCER/BUTTOCKS WOUND       Consults:   Consults     Date and Time Order Name Status Description    1/17/2018 1047 Inpatient Consult to Psychiatrist Completed     1/16/2018 0723 Inpatient Consult to Infectious Diseases Completed     1/16/2018 0723 Inpatient Consult to General Surgery Completed     1/15/2018 1457 Family Practice (on-call MD unless specified)            Pertinent Test Results:   Lab Results   Component Value Date    WBC 11.74 01/26/2018    HGB 8.7 (L) 01/26/2018    HCT 27.1 (L) 01/26/2018    MCV 61.6 (L) 01/26/2018     (H) 01/26/2018     Lab Results   Component Value Date    GLUCOSE 104 01/26/2018    CALCIUM 8.0 01/26/2018     01/26/2018    K 4.1 01/26/2018    CO2 23.8 (L) 01/26/2018     01/26/2018    BUN 12 01/26/2018    CREATININE 0.62 01/26/2018    EGFRIFNONA 131 01/26/2018    BCR 19.4 01/26/2018    ANIONGAP 4.2 01/26/2018     Lab Results   Component Value Date    ALT 22 01/26/2018    AST 38 (H) 01/26/2018       Lab Results   Component Value Date    INR 0.94 11/03/2015    INR  <0.90 10/25/2015    INR <0.90 06/01/2015                                            Physical Exam:     General Appearance:    Alert, cooperative, in no acute distress.  Thin and frail    Head:    Normocephalic, without obvious abnormality, atraumatic   Eyes:            Lids and lashes normal, conjunctivae and sclerae normal, no   icterus, no pallor, corneas clear, PERRLA   Ears:    Ears appear intact with no abnormalities noted   Throat:   No oral lesions, no thrush, oral mucosa moist   Neck:   No adenopathy, supple, trachea midline, no thyromegaly, no   carotid bruit, no JVD   Back:     No kyphosis present, no scoliosis present, no skin lesions,      erythema or scars, no tenderness to percussion or                   palpation,   range of motion normal   Lungs:     Clear to auscultation,respirations regular, even and                  unlabored    Heart:    Regular rhythm and normal rate, normal S1 and S2, no            murmur, no gallop, no rub, no click   Chest Wall:    No abnormalities observed   Abdomen:     Normal bowel sounds, no masses, no organomegaly, soft        non-tender, non-distended, no guarding, no rebound                tenderness   Rectal:   Deferred   Extremities:   Moves all extremities well, no edema, no cyanosis, no             redness   Pulses:   Pulses palpable and equal bilaterally   Skin:   Dressing applied over her buttocks wounds    Lymph nodes:   No palpable adenopathy   Neurologic:   Cranial nerves 2 - 12 grossly intact, sensation intact, DTR       present and equal bilaterally       Discharge Disposition  Skilled Nursing Facility (DC - External) [facility]    Discharge Medications   Jhonny Washington   Home Medication Instructions MARIA D:[id number]    Printed on:01/26/18 1336   Medication Information                      acetaminophen (TYLENOL) 325 MG tablet  Take 2 tablets by mouth Every 6 (Six) Hours As Needed for Mild Pain .             cefdinir (OMNICEF) 300 MG capsule  Take 1  capsule by mouth Every 12 (Twelve) Hours for 13 doses. Indications: Infection of the Skin and/or Related Soft Tissue             lactobacillus acidophilus (RISAQUAD) capsule capsule  Take 1 capsule by mouth Daily for 7 days.             multivitamin (DAILY MINH) tablet tablet  Take 1 tablet by mouth Daily.             oxyCODONE (ROXICODONE) 5 MG immediate release tablet  Take 1 tablet by mouth Every 4 (Four) Hours As Needed for Moderate Pain .                   Discharge Diet:    Diet Orders (active)     Start     Ordered    01/22/18 1200  DIET MESSAGE High protein chocolate milk shake, patient has a very poor appetite.  Picks at his food and may eat about 20 to 30% at most.  Gladys Jaimes  Daily With Lunch & Dinner     Comments:  High protein chocolate milk shake, patient has a very poor appetite.  Picks at his food and may eat about 20 to 30% at most.  Gladys Jaimes    01/21/18 1905    01/19/18 1341  Diet Regular  Diet Effective Now      01/19/18 1340          Follow-up Appointments    Additional Instructions for the Follow-ups that You Need to Schedule     Discharge Follow-up with Specified Provider: House; 2 Weeks    As directed    To:  House    Follow Up:  2 Weeks                   Total time of the discharge has been 40 minutes     Samuel Duane Kreis, MD  01/26/18  1:39 PM

## 2018-01-26 NOTE — PROGRESS NOTES
Discharge Planning Assessment   [place]     Patient Name: Jhonny Washington  MRN: [id number]  Today's Date: 1/26/2018    Admit Date: 1/15/2018          Discharge Needs Assessment     None            Discharge Plan       01/26/18 1352    Final Note    Final Note Pt accepted to [facility] in [place] on this date.  Pt to be discharged per Physician.  Pt and family aware and agreeable.  Pt and family aware and agreeable.  Pt to be transported via EMS.  [facility] RN to call report to [facility].  No further intervention needed.        Discharge Placement     No information found        Expected Discharge Date and Time     Expected Discharge Date Expected Discharge Time    Jan 26, 2018               Demographic Summary     None            Functional Status     None            Psychosocial     None            Abuse/Neglect     None            Legal     None            Substance Abuse     None            Patient Forms     None          Amber Jimenes

## 2018-01-26 NOTE — PROGRESS NOTES
Discharge Planning Assessment   [place]     Patient Name: Jhonny Washington  MRN: [id number]  Today's Date: 1/26/2018    Admit Date: 1/15/2018          Discharge Needs Assessment     None            Discharge Plan       01/26/18 1023    Case Management/Social Work Plan    Plan [facility] per [facility] call requesting updated Med list and wound care.  SS faxed updated information to "[employer] on this date.  SS awaiting acceptance or denial.  SS will follow.        Discharge Placement     No information found        Expected Discharge Date and Time     Expected Discharge Date Expected Discharge Time    Jan 25, 2018               Demographic Summary     None            Functional Status     None            Psychosocial     None            Abuse/Neglect     None            Legal     None            Substance Abuse     None            Patient Forms     None          Amber Jimenes

## 2018-03-25 ENCOUNTER — HOSPITAL ENCOUNTER (EMERGENCY)
Facility: HOSPITAL | Age: 63
Discharge: HOME | End: 2018-03-25
Attending: EMERGENCY MEDICINE | Admitting: EMERGENCY MEDICINE

## 2018-03-25 VITALS
WEIGHT: 165 LBS | BODY MASS INDEX: 22.35 KG/M2 | RESPIRATION RATE: 18 BRPM | SYSTOLIC BLOOD PRESSURE: 144 MMHG | TEMPERATURE: 97.3 F | OXYGEN SATURATION: 99 % | HEIGHT: 72 IN | DIASTOLIC BLOOD PRESSURE: 88 MMHG | HEART RATE: 91 BPM

## 2018-03-25 DIAGNOSIS — G89.4 CHRONIC PAIN DISORDER: Primary | ICD-10-CM

## 2018-03-25 PROCEDURE — 99283 EMERGENCY DEPT VISIT LOW MDM: CPT

## 2018-03-25 RX ORDER — OXYCODONE HYDROCHLORIDE AND ACETAMINOPHEN 5; 325 MG/1; MG/1
1 TABLET ORAL ONCE
Status: DISCONTINUED | OUTPATIENT
Start: 2018-03-25 | End: 2018-03-25 | Stop reason: HOSPADM

## 2018-04-03 ENCOUNTER — LAB REQUISITION (OUTPATIENT)
Dept: LAB | Facility: HOSPITAL | Age: 63
End: 2018-04-03

## 2018-04-03 DIAGNOSIS — I10 ESSENTIAL (PRIMARY) HYPERTENSION: ICD-10-CM

## 2018-04-03 LAB
ALBUMIN SERPL-MCNC: 4 G/DL (ref 3.4–4.8)
ALBUMIN/GLOB SERPL: 1.2 G/DL (ref 1.5–2.5)
ALP SERPL-CCNC: 125 U/L (ref 40–129)
ALT SERPL W P-5'-P-CCNC: 12 U/L (ref 10–44)
ANION GAP SERPL CALCULATED.3IONS-SCNC: 3.8 MMOL/L (ref 3.6–11.2)
ANISOCYTOSIS BLD QL: NORMAL
AST SERPL-CCNC: 25 U/L (ref 10–34)
BASOPHILS # BLD AUTO: 0.04 10*3/MM3 (ref 0–0.3)
BASOPHILS NFR BLD AUTO: 0.4 % (ref 0–2)
BILIRUB SERPL-MCNC: 0.7 MG/DL (ref 0.2–1.8)
BUN BLD-MCNC: 6 MG/DL (ref 7–21)
BUN/CREAT SERPL: 8.1 (ref 7–25)
CALCIUM SPEC-SCNC: 9.2 MG/DL (ref 7.7–10)
CHLORIDE SERPL-SCNC: 108 MMOL/L (ref 99–112)
CO2 SERPL-SCNC: 26.2 MMOL/L (ref 24.3–31.9)
CREAT BLD-MCNC: 0.74 MG/DL (ref 0.43–1.29)
DEPRECATED RDW RBC AUTO: 37 FL (ref 37–54)
ELLIPTOCYTES BLD QL SMEAR: NORMAL
EOSINOPHIL # BLD AUTO: 0.37 10*3/MM3 (ref 0–0.7)
EOSINOPHIL NFR BLD AUTO: 3.8 % (ref 0–5)
ERYTHROCYTE [DISTWIDTH] IN BLOOD BY AUTOMATED COUNT: 16.8 % (ref 11.5–14.5)
GFR SERPL CREATININE-BSD FRML MDRD: 107 ML/MIN/1.73
GLOBULIN UR ELPH-MCNC: 3.4 GM/DL
GLUCOSE BLD-MCNC: 82 MG/DL (ref 70–110)
HCT VFR BLD AUTO: 33.5 % (ref 42–52)
HGB BLD-MCNC: 10.5 G/DL (ref 14–18)
HYPOCHROMIA BLD QL: NORMAL
IMM GRANULOCYTES # BLD: 0.03 10*3/MM3 (ref 0–0.03)
IMM GRANULOCYTES NFR BLD: 0.3 % (ref 0–0.5)
LYMPHOCYTES # BLD AUTO: 1.7 10*3/MM3 (ref 1–3)
LYMPHOCYTES NFR BLD AUTO: 17.6 % (ref 21–51)
MCH RBC QN AUTO: 19.6 PG (ref 27–33)
MCHC RBC AUTO-ENTMCNC: 31.3 G/DL (ref 33–37)
MCV RBC AUTO: 62.4 FL (ref 80–94)
MICROCYTES BLD QL: NORMAL
MONOCYTES # BLD AUTO: 0.79 10*3/MM3 (ref 0.1–0.9)
MONOCYTES NFR BLD AUTO: 8.2 % (ref 0–10)
NEUTROPHILS # BLD AUTO: 6.71 10*3/MM3 (ref 1.4–6.5)
NEUTROPHILS NFR BLD AUTO: 69.7 % (ref 30–70)
OSMOLALITY SERPL CALC.SUM OF ELEC: 272.4 MOSM/KG (ref 273–305)
PLAT MORPH BLD: NORMAL
PLATELET # BLD AUTO: 373 10*3/MM3 (ref 130–400)
PMV BLD AUTO: 9.6 FL (ref 6–10)
POTASSIUM BLD-SCNC: 4.8 MMOL/L (ref 3.5–5.3)
PROT SERPL-MCNC: 7.4 G/DL (ref 6–8)
RBC # BLD AUTO: 5.37 10*6/MM3 (ref 4.7–6.1)
SODIUM BLD-SCNC: 138 MMOL/L (ref 135–153)
WBC NRBC COR # BLD: 9.64 10*3/MM3 (ref 4.5–12.5)

## 2018-04-03 PROCEDURE — 80053 COMPREHEN METABOLIC PANEL: CPT | Performed by: NURSE PRACTITIONER

## 2018-04-03 PROCEDURE — 85007 BL SMEAR W/DIFF WBC COUNT: CPT | Performed by: NURSE PRACTITIONER

## 2018-04-03 PROCEDURE — 85025 COMPLETE CBC W/AUTO DIFF WBC: CPT | Performed by: NURSE PRACTITIONER

## 2018-05-12 ENCOUNTER — HOSPITAL ENCOUNTER (INPATIENT)
Facility: HOSPITAL | Age: 63
LOS: 5 days | Discharge: LEFT BEFORE DISCHARGE | End: 2018-05-17
Attending: INTERNAL MEDICINE | Admitting: INTERNAL MEDICINE

## 2018-05-12 ENCOUNTER — APPOINTMENT (OUTPATIENT)
Dept: GENERAL RADIOLOGY | Facility: HOSPITAL | Age: 63
End: 2018-05-12

## 2018-05-12 ENCOUNTER — APPOINTMENT (OUTPATIENT)
Dept: CT IMAGING | Facility: HOSPITAL | Age: 63
End: 2018-05-12

## 2018-05-12 DIAGNOSIS — R07.9 CHEST PAIN, UNSPECIFIED TYPE: Primary | ICD-10-CM

## 2018-05-12 LAB
6-ACETYL MORPHINE: NEGATIVE
ALBUMIN SERPL-MCNC: 3.6 G/DL (ref 3.4–4.8)
ALBUMIN/GLOB SERPL: 1 G/DL (ref 1.5–2.5)
ALP SERPL-CCNC: 78 U/L (ref 40–129)
ALT SERPL W P-5'-P-CCNC: 13 U/L (ref 10–44)
AMPHET+METHAMPHET UR QL: NEGATIVE
ANION GAP SERPL CALCULATED.3IONS-SCNC: 3.6 MMOL/L (ref 3.6–11.2)
ANISOCYTOSIS BLD QL: NORMAL
AST SERPL-CCNC: 21 U/L (ref 10–34)
BARBITURATES UR QL SCN: NEGATIVE
BASOPHILS # BLD AUTO: 0.05 10*3/MM3 (ref 0–0.3)
BASOPHILS NFR BLD AUTO: 0.5 % (ref 0–2)
BENZODIAZ UR QL SCN: NEGATIVE
BILIRUB SERPL-MCNC: 0.4 MG/DL (ref 0.2–1.8)
BILIRUB UR QL STRIP: NEGATIVE
BUN BLD-MCNC: 17 MG/DL (ref 7–21)
BUN/CREAT SERPL: 22.7 (ref 7–25)
BUPRENORPHINE SERPL-MCNC: NEGATIVE NG/ML
CALCIUM SPEC-SCNC: 9.2 MG/DL (ref 7.7–10)
CANNABINOIDS SERPL QL: NEGATIVE
CHLORIDE SERPL-SCNC: 112 MMOL/L (ref 99–112)
CLARITY UR: CLEAR
CO2 SERPL-SCNC: 25.4 MMOL/L (ref 24.3–31.9)
COCAINE UR QL: NEGATIVE
COLOR UR: ABNORMAL
CREAT BLD-MCNC: 0.75 MG/DL (ref 0.43–1.29)
DEPRECATED RDW RBC AUTO: 36.1 FL (ref 37–54)
EOSINOPHIL # BLD AUTO: 0.2 10*3/MM3 (ref 0–0.7)
EOSINOPHIL NFR BLD AUTO: 2.1 % (ref 0–5)
ERYTHROCYTE [DISTWIDTH] IN BLOOD BY AUTOMATED COUNT: 16.6 % (ref 11.5–14.5)
GFR SERPL CREATININE-BSD FRML MDRD: 106 ML/MIN/1.73
GLOBULIN UR ELPH-MCNC: 3.5 GM/DL
GLUCOSE BLD-MCNC: 110 MG/DL (ref 70–110)
GLUCOSE UR STRIP-MCNC: NEGATIVE MG/DL
HCT VFR BLD AUTO: 31 % (ref 42–52)
HGB BLD-MCNC: 9.7 G/DL (ref 14–18)
HGB UR QL STRIP.AUTO: NEGATIVE
HYPOCHROMIA BLD QL: NORMAL
IMM GRANULOCYTES # BLD: 0.02 10*3/MM3 (ref 0–0.03)
IMM GRANULOCYTES NFR BLD: 0.2 % (ref 0–0.5)
KETONES UR QL STRIP: ABNORMAL
LEUKOCYTE ESTERASE UR QL STRIP.AUTO: NEGATIVE
LYMPHOCYTES # BLD AUTO: 2.59 10*3/MM3 (ref 1–3)
LYMPHOCYTES NFR BLD AUTO: 27.3 % (ref 21–51)
MCH RBC QN AUTO: 19.2 PG (ref 27–33)
MCHC RBC AUTO-ENTMCNC: 31.3 G/DL (ref 33–37)
MCV RBC AUTO: 61.4 FL (ref 80–94)
METHADONE UR QL SCN: NEGATIVE
MICROCYTES BLD QL: NORMAL
MONOCYTES # BLD AUTO: 0.77 10*3/MM3 (ref 0.1–0.9)
MONOCYTES NFR BLD AUTO: 8.1 % (ref 0–10)
NEUTROPHILS # BLD AUTO: 5.85 10*3/MM3 (ref 1.4–6.5)
NEUTROPHILS NFR BLD AUTO: 61.8 % (ref 30–70)
NITRITE UR QL STRIP: NEGATIVE
OPIATES UR QL: POSITIVE
OSMOLALITY SERPL CALC.SUM OF ELEC: 283.4 MOSM/KG (ref 273–305)
OXYCODONE UR QL SCN: POSITIVE
PCP UR QL SCN: NEGATIVE
PH UR STRIP.AUTO: 5.5 [PH] (ref 5–8)
PLAT MORPH BLD: NORMAL
PLATELET # BLD AUTO: 421 10*3/MM3 (ref 130–400)
PMV BLD AUTO: 10.1 FL (ref 6–10)
POIKILOCYTOSIS BLD QL SMEAR: NORMAL
POTASSIUM BLD-SCNC: 3.6 MMOL/L (ref 3.5–5.3)
PROT SERPL-MCNC: 7.1 G/DL (ref 6–8)
PROT UR QL STRIP: NEGATIVE
RBC # BLD AUTO: 5.05 10*6/MM3 (ref 4.7–6.1)
SODIUM BLD-SCNC: 141 MMOL/L (ref 135–153)
SP GR UR STRIP: >1.03 (ref 1–1.03)
TROPONIN I SERPL-MCNC: <0.006 NG/ML
UROBILINOGEN UR QL STRIP: ABNORMAL
WBC NRBC COR # BLD: 9.48 10*3/MM3 (ref 4.5–12.5)

## 2018-05-12 PROCEDURE — 80307 DRUG TEST PRSMV CHEM ANLYZR: CPT | Performed by: EMERGENCY MEDICINE

## 2018-05-12 PROCEDURE — 25010000002 HEPARIN (PORCINE) PER 1000 UNITS: Performed by: INTERNAL MEDICINE

## 2018-05-12 PROCEDURE — G0378 HOSPITAL OBSERVATION PER HR: HCPCS

## 2018-05-12 PROCEDURE — 85007 BL SMEAR W/DIFF WBC COUNT: CPT | Performed by: EMERGENCY MEDICINE

## 2018-05-12 PROCEDURE — 85025 COMPLETE CBC W/AUTO DIFF WBC: CPT | Performed by: EMERGENCY MEDICINE

## 2018-05-12 PROCEDURE — 71045 X-RAY EXAM CHEST 1 VIEW: CPT | Performed by: RADIOLOGY

## 2018-05-12 PROCEDURE — 80053 COMPREHEN METABOLIC PANEL: CPT | Performed by: EMERGENCY MEDICINE

## 2018-05-12 PROCEDURE — 71275 CT ANGIOGRAPHY CHEST: CPT | Performed by: RADIOLOGY

## 2018-05-12 PROCEDURE — 71275 CT ANGIOGRAPHY CHEST: CPT

## 2018-05-12 PROCEDURE — 84484 ASSAY OF TROPONIN QUANT: CPT | Performed by: EMERGENCY MEDICINE

## 2018-05-12 PROCEDURE — 71045 X-RAY EXAM CHEST 1 VIEW: CPT

## 2018-05-12 PROCEDURE — 99285 EMERGENCY DEPT VISIT HI MDM: CPT

## 2018-05-12 PROCEDURE — 81003 URINALYSIS AUTO W/O SCOPE: CPT | Performed by: EMERGENCY MEDICINE

## 2018-05-12 PROCEDURE — 93005 ELECTROCARDIOGRAM TRACING: CPT | Performed by: EMERGENCY MEDICINE

## 2018-05-12 PROCEDURE — 99223 1ST HOSP IP/OBS HIGH 75: CPT | Performed by: INTERNAL MEDICINE

## 2018-05-12 PROCEDURE — 0 IOPAMIDOL PER 1 ML: Performed by: EMERGENCY MEDICINE

## 2018-05-12 RX ORDER — DIAZEPAM 5 MG/1
5 TABLET ORAL NIGHTLY PRN
COMMUNITY

## 2018-05-12 RX ORDER — POTASSIUM CHLORIDE 7.45 MG/ML
10 INJECTION INTRAVENOUS
Status: DISCONTINUED | OUTPATIENT
Start: 2018-05-12 | End: 2018-05-17 | Stop reason: HOSPADM

## 2018-05-12 RX ORDER — MAGNESIUM SULFATE HEPTAHYDRATE 40 MG/ML
4 INJECTION, SOLUTION INTRAVENOUS AS NEEDED
Status: DISCONTINUED | OUTPATIENT
Start: 2018-05-12 | End: 2018-05-17 | Stop reason: HOSPADM

## 2018-05-12 RX ORDER — DIAZEPAM 5 MG/1
5 TABLET ORAL DAILY PRN
Status: DISCONTINUED | OUTPATIENT
Start: 2018-05-12 | End: 2018-05-17 | Stop reason: HOSPADM

## 2018-05-12 RX ORDER — DIAZEPAM 5 MG/1
5 TABLET ORAL NIGHTLY PRN
Status: CANCELLED | OUTPATIENT
Start: 2018-05-12

## 2018-05-12 RX ORDER — OXYCODONE HYDROCHLORIDE 5 MG/1
5 TABLET ORAL EVERY 4 HOURS PRN
Status: CANCELLED | OUTPATIENT
Start: 2018-05-12

## 2018-05-12 RX ORDER — SODIUM CHLORIDE 9 MG/ML
75 INJECTION, SOLUTION INTRAVENOUS CONTINUOUS
Status: DISCONTINUED | OUTPATIENT
Start: 2018-05-12 | End: 2018-05-14

## 2018-05-12 RX ORDER — GABAPENTIN 800 MG/1
800 TABLET ORAL 3 TIMES DAILY
Status: ON HOLD | COMMUNITY
End: 2018-05-12

## 2018-05-12 RX ORDER — HEPARIN SODIUM 5000 [USP'U]/ML
5000 INJECTION, SOLUTION INTRAVENOUS; SUBCUTANEOUS EVERY 12 HOURS SCHEDULED
Status: DISCONTINUED | OUTPATIENT
Start: 2018-05-12 | End: 2018-05-17 | Stop reason: HOSPADM

## 2018-05-12 RX ORDER — MAGNESIUM SULFATE HEPTAHYDRATE 40 MG/ML
2 INJECTION, SOLUTION INTRAVENOUS AS NEEDED
Status: DISCONTINUED | OUTPATIENT
Start: 2018-05-12 | End: 2018-05-17 | Stop reason: HOSPADM

## 2018-05-12 RX ORDER — POTASSIUM CHLORIDE 1.5 G/1.77G
40 POWDER, FOR SOLUTION ORAL AS NEEDED
Status: DISCONTINUED | OUTPATIENT
Start: 2018-05-12 | End: 2018-05-17 | Stop reason: HOSPADM

## 2018-05-12 RX ORDER — SODIUM CHLORIDE 0.9 % (FLUSH) 0.9 %
1-10 SYRINGE (ML) INJECTION AS NEEDED
Status: DISCONTINUED | OUTPATIENT
Start: 2018-05-12 | End: 2018-05-17 | Stop reason: HOSPADM

## 2018-05-12 RX ORDER — POTASSIUM CHLORIDE 20 MEQ/1
40 TABLET, EXTENDED RELEASE ORAL EVERY 4 HOURS
Status: COMPLETED | OUTPATIENT
Start: 2018-05-12 | End: 2018-05-12

## 2018-05-12 RX ORDER — POTASSIUM CHLORIDE 750 MG/1
40 CAPSULE, EXTENDED RELEASE ORAL AS NEEDED
Status: DISCONTINUED | OUTPATIENT
Start: 2018-05-12 | End: 2018-05-17 | Stop reason: HOSPADM

## 2018-05-12 RX ADMIN — POTASSIUM CHLORIDE 40 MEQ: 1500 TABLET, EXTENDED RELEASE ORAL at 23:50

## 2018-05-12 RX ADMIN — DIAZEPAM 5 MG: 5 TABLET ORAL at 23:49

## 2018-05-12 RX ADMIN — OXYCODONE HYDROCHLORIDE 20 MG: 5 TABLET ORAL at 20:29

## 2018-05-12 RX ADMIN — HEPARIN SODIUM 5000 UNITS: 5000 INJECTION, SOLUTION INTRAVENOUS; SUBCUTANEOUS at 20:18

## 2018-05-12 RX ADMIN — POTASSIUM CHLORIDE 40 MEQ: 1500 TABLET, EXTENDED RELEASE ORAL at 20:18

## 2018-05-12 RX ADMIN — SODIUM CHLORIDE 125 ML/HR: 9 INJECTION, SOLUTION INTRAVENOUS at 14:36

## 2018-05-12 RX ADMIN — SODIUM CHLORIDE 1000 ML: 9 INJECTION, SOLUTION INTRAVENOUS at 14:35

## 2018-05-12 RX ADMIN — IOPAMIDOL 100 ML: 755 INJECTION, SOLUTION INTRAVENOUS at 15:29

## 2018-05-12 NOTE — ED NOTES
Pt is sitting up on bed eating supper tray per ok with Dr. Li.  Pt eating with no difficulty.      Kanwal Horner RN  05/12/18 5097

## 2018-05-12 NOTE — ED NOTES
"Pt reports he has a mass in his right lung and is having increasing pain. Pt states \"I took my last percocet yesterday and this pain is killing me.\"       Kanwal Horner RN  05/12/18 3170    "

## 2018-05-12 NOTE — ED NOTES
Pt given sputum collection container to cough up sputum in.  Pt states he will try to provide specimen.     Kanwal Horner RN  05/12/18 2096

## 2018-05-12 NOTE — ED NOTES
"Pt remains alert/oriented and complaining of \"lung\" pain. No acute distress noted.  Pt awaiting results of ct scan.      Kanwla Horner RN  05/12/18 7874    "

## 2018-05-12 NOTE — ED PROVIDER NOTES
"Subjective   Patient is a 63-year-old white male that I used to see here in our ED quite frequently.  He does have a history of multiple myeloma.  He presents today with chief complaint of chest pain and hemoptysis.  Patient reports chronic right-sided chest pain that he states is due to a 6-1/2 cm malignant mass in his right lung.  He states that he was out for a walk this afternoon when his sharp, stabbing right hemithorax pain increased, becoming worse.  He states that he had a coughing fit and coughed up \"a big chunk of blood\"; he states that it was so large that it would feel his hand completely.  With further questioning he clarifies that this was a large chunk of phlegm that had blood intermixed.  Patient denies fever, chills, any other sort of chest pain, being more short of breath than his baseline, diaphoresis, abdominal pain, nausea, vomiting, hematemesis, hematochezia or melena, syncope or near syncope, focal numbness or weakness, any other symptoms or other complaints.  Patient does continue to smoke cigarettes.  Unfortunately, my previous experience with this patient is that he lies and manipulates in order to obtain narcotics, and that his emergency department visits seem to coincide with him running out of his medication.  Patient reports that he took his last oxycodone yesterday.  [facility] report is obtained, #[id number], which shows that he filled a 21 day supply of oxycodone 20 mg 6 times daily on April 27.            Review of Systems   Constitutional: Negative for chills, diaphoresis and fever.   HENT: Negative for ear pain, sore throat and trouble swallowing.    Eyes: Negative for photophobia and pain.   Respiratory: Negative for wheezing and stridor.    Cardiovascular: Negative for palpitations and leg swelling.   Gastrointestinal: Negative for abdominal distention, abdominal pain, blood in stool, diarrhea, nausea and vomiting.   Endocrine: Negative for polydipsia and polyphagia.   Genitourinary: " Negative for difficulty urinating and flank pain.   Musculoskeletal: Negative for neck pain and neck stiffness.   Skin: Negative for color change and pallor.   Neurological: Negative for seizures, syncope and speech difficulty.   Psychiatric/Behavioral: Negative for confusion.   All other systems reviewed and are negative.      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 No pertinent family history.    Social History     Social History   • Marital status:      Social History Main Topics   • Smoking status: Current Every Day Smoker     Packs/day: 0.50     Types: Cigarettes   • Smokeless tobacco: Never Used   • Alcohol use No   • Drug use: No      Comment: oxycodone 20 mg tabs QID and valium 10 mg q 12 hours    • Sexual activity: Defer     Other Topics Concern   • Not on file           Objective   Physical Exam   Constitutional: He is oriented to person, place, and time. He appears well-developed and well-nourished. No distress.   HENT:   Head: Normocephalic and atraumatic.   Eyes: EOM are normal. Pupils are equal, round, and reactive to light. No scleral icterus.   Neck: Normal range of motion. Neck supple. No no neck rigidity. No tracheal deviation present.   Cardiovascular:  Normal rate, regular rhythm and intact distal pulses.    Pulmonary/Chest: Effort normal and breath sounds normal. No respiratory distress. He exhibits tenderness (There is diffuse tenderness to palpation throughout the right anterior chest and at the right sternal border costochondral junctions.  This seems to be reproductive of his pain.).   Abdominal: Soft. Bowel sounds are normal. There is no tenderness. There is no rebound and no guarding.   Musculoskeletal: Normal range of motion. He exhibits no tenderness.   Neurological: He is alert and oriented to person, place, and time. He has normal strength. No sensory deficit. He exhibits normal muscle tone. Coordination normal. GCS eye subscore is 4. GCS verbal subscore is 5. GCS motor subscore is 6.   Skin: Skin is warm and dry. Capillary refill takes less than 2 seconds. No cyanosis. No pallor.   Psychiatric: He has a normal mood and affect. His behavior is normal.   Vitals reviewed.      Procedures   EKG shows sinus rhythm with a rate of 88.  No apparent acute ischemia.  CT Chest Pulmonary Embolism With Contrast   ED Interpretation   Per virtual radiology minimal hypoattenuating filling defect within pulmonary arteries to the left lower lobe, exhibiting configuration that might reflect old, incompletely resolved thrombosis.  However, an acute abnormality must be considered.  No complications at thrombosis appreciated.  Advancing metastatic disease, most conspicuously involving medial right hemithoracic apex involving at least the pleural space and adjacent osseous elements.      XR Chest 1 View    (Results Pending)     Results for orders placed or performed during the hospital encounter of 05/12/18   Comprehensive Metabolic Panel   Result Value Ref Range    Glucose 110 70 - 110 mg/dL    BUN 17 7 - 21 mg/dL    Creatinine 0.75 0.43 - 1.29 mg/dL    Sodium 141 135 - 153 mmol/L    Potassium 3.6 3.5 - 5.3 mmol/L    Chloride 112 99 - 112 mmol/L    CO2 25.4 24.3 - 31.9  mmol/L    Calcium 9.2 7.7 - 10.0 mg/dL    Total Protein 7.1 6.0 - 8.0 g/dL    Albumin 3.60 3.40 - 4.80 g/dL    ALT (SGPT) 13 10 - 44 U/L    AST (SGOT) 21 10 - 34 U/L    Alkaline Phosphatase 78 40 - 129 U/L    Total Bilirubin 0.4 0.2 - 1.8 mg/dL    eGFR Non African Amer 106 >60 mL/min/1.73    Globulin 3.5 gm/dL    A/G Ratio 1.0 (L) 1.5 - 2.5 g/dL    BUN/Creatinine Ratio 22.7 7.0 - 25.0    Anion Gap 3.6 3.6 - 11.2 mmol/L   Troponin   Result Value Ref Range    Troponin I <0.006 <=0.040 ng/mL   Urinalysis With / Culture If Indicated - Urine, Clean Catch   Result Value Ref Range    Color, UA Dark Yellow (A) Yellow, Straw    Appearance, UA Clear Clear    pH, UA 5.5 5.0 - 8.0    Specific Gravity, UA >1.030 (H) 1.005 - 1.030    Glucose, UA Negative Negative    Ketones, UA Trace (A) Negative    Bilirubin, UA Negative Negative    Blood, UA Negative Negative    Protein, UA Negative Negative    Leuk Esterase, UA Negative Negative    Nitrite, UA Negative Negative    Urobilinogen, UA 1.0 E.U./dL 0.2 - 1.0 E.U./dL   Urine Drug Screen - Urine, Clean Catch   Result Value Ref Range    Amphetamine Screen, Urine Negative Negative    Barbiturates Screen, Urine Negative Negative    Benzodiazepine Screen, Urine Negative Negative    Cocaine Screen, Urine Negative Negative    Methadone Screen, Urine Negative Negative    Opiate Screen Positive (A) Negative    Phencyclidine (PCP), Urine Negative Negative    THC, Screen, Urine Negative Negative    6-ACETYL MORPHINE Negative Negative    Buprenorphine, Screen, Urine Negative Negative    Oxycodone Screen, Urine Positive (A) Negative   CBC Auto Differential   Result Value Ref Range    WBC 9.48 4.50 - 12.50 10*3/mm3    RBC 5.05 4.70 - 6.10 10*6/mm3    Hemoglobin 9.7 (L) 14.0 - 18.0 g/dL    Hematocrit 31.0 (L) 42.0 - 52.0 %    MCV 61.4 (L) 80.0 - 94.0 fL    MCH 19.2 (L) 27.0 - 33.0 pg    MCHC 31.3 (L) 33.0 - 37.0 g/dL    RDW 16.6 (H) 11.5 - 14.5 %    RDW-SD 36.1 (L) 37.0 - 54.0 fl    MPV 10.1 (H)  6.0 - 10.0 fL    Platelets 421 (H) 130 - 400 10*3/mm3    Neutrophil % 61.8 30.0 - 70.0 %    Lymphocyte % 27.3 21.0 - 51.0 %    Monocyte % 8.1 0.0 - 10.0 %    Eosinophil % 2.1 0.0 - 5.0 %    Basophil % 0.5 0.0 - 2.0 %    Immature Grans % 0.2 0.0 - 0.5 %    Neutrophils, Absolute 5.85 1.40 - 6.50 10*3/mm3    Lymphocytes, Absolute 2.59 1.00 - 3.00 10*3/mm3    Monocytes, Absolute 0.77 0.10 - 0.90 10*3/mm3    Eosinophils, Absolute 0.20 0.00 - 0.70 10*3/mm3    Basophils, Absolute 0.05 0.00 - 0.30 10*3/mm3    Immature Grans, Absolute 0.02 0.00 - 0.03 10*3/mm3   Scan Slide   Result Value Ref Range    Anisocytosis Slight/1+ None Seen    Hypochromia Mod/2+ None Seen    Microcytes Mod/2+ None Seen    Poikilocytes Slight/1+ None Seen    Platelet Morphology Normal Normal   Osmolality, Calculated   Result Value Ref Range    Osmolality Calc 283.4 273.0 - 305.0 mOsm/kg                ED Course  ED Course   Comment By Time   Patient's emergency department stay has been entirely uneventful.  Never has he shown any signs of distress.  We gave him a sputum cup and asked for a sample, but he has been unable to cough anything up.  Patient appears comfortable and not the least bit acutely ill.  Currently he is eating dinner.  I discussed the case with Dr. Chapman, and he is admitting the patient to telemetry observation for further care. José Miguel Li MD 05/12 1732                  [facility]      Final diagnoses:   Chest pain, unspecified type             Please note that portions of this note were completed with a voice recognition program. Efforts were made to edit the dictations, but occasionally words are mistranscribed.       José Miguel Li MD  05/12/18 2621

## 2018-05-13 LAB
ANION GAP SERPL CALCULATED.3IONS-SCNC: 2.2 MMOL/L (ref 3.6–11.2)
ANISOCYTOSIS BLD QL: NORMAL
BASOPHILS # BLD AUTO: 0.11 10*3/MM3 (ref 0–0.3)
BASOPHILS NFR BLD AUTO: 1.1 % (ref 0–2)
BUN BLD-MCNC: 17 MG/DL (ref 7–21)
BUN/CREAT SERPL: 21.3 (ref 7–25)
CALCIUM SPEC-SCNC: 8.4 MG/DL (ref 7.7–10)
CHLORIDE SERPL-SCNC: 114 MMOL/L (ref 99–112)
CO2 SERPL-SCNC: 23.8 MMOL/L (ref 24.3–31.9)
CREAT BLD-MCNC: 0.8 MG/DL (ref 0.43–1.29)
DEPRECATED RDW RBC AUTO: 35.9 FL (ref 37–54)
EOSINOPHIL # BLD AUTO: 0.51 10*3/MM3 (ref 0–0.7)
EOSINOPHIL NFR BLD AUTO: 5 % (ref 0–5)
ERYTHROCYTE [DISTWIDTH] IN BLOOD BY AUTOMATED COUNT: 16.9 % (ref 11.5–14.5)
GFR SERPL CREATININE-BSD FRML MDRD: 98 ML/MIN/1.73
GLUCOSE BLD-MCNC: 92 MG/DL (ref 70–110)
HCT VFR BLD AUTO: 29.2 % (ref 42–52)
HGB BLD-MCNC: 9.2 G/DL (ref 14–18)
HYPOCHROMIA BLD QL: NORMAL
IMM GRANULOCYTES # BLD: 0.02 10*3/MM3 (ref 0–0.03)
IMM GRANULOCYTES NFR BLD: 0.2 % (ref 0–0.5)
IRON 24H UR-MRATE: 60 MCG/DL (ref 53–167)
IRON SATN MFR SERPL: 24 %
LYMPHOCYTES # BLD AUTO: 3.1 10*3/MM3 (ref 1–3)
LYMPHOCYTES NFR BLD AUTO: 30.7 % (ref 21–51)
MAGNESIUM SERPL-MCNC: 2.1 MG/DL (ref 1.7–2.6)
MCH RBC QN AUTO: 19.5 PG (ref 27–33)
MCHC RBC AUTO-ENTMCNC: 31.5 G/DL (ref 33–37)
MCV RBC AUTO: 61.9 FL (ref 80–94)
MICROCYTES BLD QL: NORMAL
MONOCYTES # BLD AUTO: 0.88 10*3/MM3 (ref 0.1–0.9)
MONOCYTES NFR BLD AUTO: 8.7 % (ref 0–10)
NEUTROPHILS # BLD AUTO: 5.49 10*3/MM3 (ref 1.4–6.5)
NEUTROPHILS NFR BLD AUTO: 54.3 % (ref 30–70)
OSMOLALITY SERPL CALC.SUM OF ELEC: 280.6 MOSM/KG (ref 273–305)
PLAT MORPH BLD: NORMAL
PLATELET # BLD AUTO: 406 10*3/MM3 (ref 130–400)
PMV BLD AUTO: 10.6 FL (ref 6–10)
POIKILOCYTOSIS BLD QL SMEAR: NORMAL
POTASSIUM BLD-SCNC: 4.8 MMOL/L (ref 3.5–5.3)
RBC # BLD AUTO: 4.72 10*6/MM3 (ref 4.7–6.1)
SODIUM BLD-SCNC: 140 MMOL/L (ref 135–153)
TIBC SERPL-MCNC: 247 MCG/DL (ref 241–421)
WBC NRBC COR # BLD: 10.11 10*3/MM3 (ref 4.5–12.5)

## 2018-05-13 PROCEDURE — 99232 SBSQ HOSP IP/OBS MODERATE 35: CPT | Performed by: INTERNAL MEDICINE

## 2018-05-13 PROCEDURE — 85007 BL SMEAR W/DIFF WBC COUNT: CPT | Performed by: INTERNAL MEDICINE

## 2018-05-13 PROCEDURE — 80048 BASIC METABOLIC PNL TOTAL CA: CPT | Performed by: INTERNAL MEDICINE

## 2018-05-13 PROCEDURE — 99222 1ST HOSP IP/OBS MODERATE 55: CPT | Performed by: INTERNAL MEDICINE

## 2018-05-13 PROCEDURE — 25010000002 DEXAMETHASONE PER 1 MG: Performed by: INTERNAL MEDICINE

## 2018-05-13 PROCEDURE — 85025 COMPLETE CBC W/AUTO DIFF WBC: CPT | Performed by: INTERNAL MEDICINE

## 2018-05-13 PROCEDURE — 25010000002 HEPARIN (PORCINE) PER 1000 UNITS: Performed by: INTERNAL MEDICINE

## 2018-05-13 PROCEDURE — 83540 ASSAY OF IRON: CPT | Performed by: INTERNAL MEDICINE

## 2018-05-13 PROCEDURE — 94799 UNLISTED PULMONARY SVC/PX: CPT

## 2018-05-13 PROCEDURE — 83550 IRON BINDING TEST: CPT | Performed by: INTERNAL MEDICINE

## 2018-05-13 PROCEDURE — 83735 ASSAY OF MAGNESIUM: CPT | Performed by: INTERNAL MEDICINE

## 2018-05-13 RX ORDER — DEXAMETHASONE SODIUM PHOSPHATE 4 MG/ML
8 INJECTION, SOLUTION INTRA-ARTICULAR; INTRALESIONAL; INTRAMUSCULAR; INTRAVENOUS; SOFT TISSUE EVERY 12 HOURS
Status: DISCONTINUED | OUTPATIENT
Start: 2018-05-13 | End: 2018-05-14 | Stop reason: ALTCHOICE

## 2018-05-13 RX ORDER — OXYCODONE HCL 20 MG/1
20 TABLET, FILM COATED, EXTENDED RELEASE ORAL EVERY 12 HOURS SCHEDULED
Status: DISCONTINUED | OUTPATIENT
Start: 2018-05-13 | End: 2018-05-14

## 2018-05-13 RX ORDER — OXYCODONE HYDROCHLORIDE 5 MG/1
10 TABLET ORAL EVERY 6 HOURS PRN
Status: DISCONTINUED | OUTPATIENT
Start: 2018-05-13 | End: 2018-05-14

## 2018-05-13 RX ADMIN — DEXAMETHASONE SODIUM PHOSPHATE 8 MG: 4 INJECTION, SOLUTION INTRAMUSCULAR; INTRAVENOUS at 15:08

## 2018-05-13 RX ADMIN — OXYCODONE HYDROCHLORIDE 20 MG: 5 TABLET ORAL at 02:26

## 2018-05-13 RX ADMIN — HEPARIN SODIUM 5000 UNITS: 5000 INJECTION, SOLUTION INTRAVENOUS; SUBCUTANEOUS at 20:26

## 2018-05-13 RX ADMIN — OXYCODONE HYDROCHLORIDE 20 MG: 20 TABLET, FILM COATED, EXTENDED RELEASE ORAL at 20:26

## 2018-05-13 RX ADMIN — OXYCODONE HYDROCHLORIDE 10 MG: 5 TABLET ORAL at 14:20

## 2018-05-13 RX ADMIN — SODIUM CHLORIDE 75 ML/HR: 9 INJECTION, SOLUTION INTRAVENOUS at 04:36

## 2018-05-13 RX ADMIN — OXYCODONE HYDROCHLORIDE 20 MG: 5 TABLET ORAL at 08:36

## 2018-05-13 RX ADMIN — OXYCODONE HYDROCHLORIDE 10 MG: 5 TABLET ORAL at 21:35

## 2018-05-13 RX ADMIN — HEPARIN SODIUM 5000 UNITS: 5000 INJECTION, SOLUTION INTRAVENOUS; SUBCUTANEOUS at 08:36

## 2018-05-13 RX ADMIN — OXYCODONE HYDROCHLORIDE 20 MG: 20 TABLET, FILM COATED, EXTENDED RELEASE ORAL at 12:34

## 2018-05-13 RX ADMIN — SODIUM CHLORIDE 75 ML/HR: 9 INJECTION, SOLUTION INTRAVENOUS at 18:01

## 2018-05-13 NOTE — PLAN OF CARE
Problem: Patient Care Overview  Goal: Plan of Care Review  Outcome: Ongoing (interventions implemented as appropriate)      Problem: Fall Risk (Adult)  Goal: Identify Related Risk Factors and Signs and Symptoms  Outcome: Ongoing (interventions implemented as appropriate)      Problem: Skin Injury Risk (Adult)  Goal: Identify Related Risk Factors and Signs and Symptoms  Outcome: Ongoing (interventions implemented as appropriate)

## 2018-05-13 NOTE — PROGRESS NOTES
[facility] HOSPITALIST PROGRESS NOTE     Patient Identification:  Name:  Jhonny Washington  Age:  63 y.o.  Sex:  male  :  1955  MRN:  [id number]  Visit Number:  [id number]  Primary Care Provider:  Juan Manuel Orozco MD    Length of stay:  0      Subjective: This is a follow up visit.  Mr. Felix Barry is a 63 year old male with history of multiple myeloma and lung cancer who presented to the emergency room complaining of right-sided chest pain that has become progressively worse associated with shortness of breath.  CT of the chest shows he has a mass extending into the rib and the vertebrae.  Pulmonology consult has been placed.  He is currently on oxycodone.  When seen this morning he continues to complain of 10/10 pain.  According to him he has not seen his oncologist for more than 6 months.  He will definitely need outpatient oncology follow up after control of his pain  ----------------------------------------------------------------------------------------------------------------------  Current Hospital Meds:    heparin (porcine) 5,000 Units Subcutaneous Q12H   oxyCODONE 20 mg Oral Q12H       sodium chloride 75 mL/hr Last Rate: 75 mL/hr (18 0436)     ----------------------------------------------------------------------------------------------------------------------  Vital Signs:  Temp:  [97.6 °F (36.4 °C)-98.4 °F (36.9 °C)] 98.2 °F (36.8 °C)  Heart Rate:  [68-87] 70  Resp:  [18-20] 20  BP: (110-157)/(77-92) 135/86  1    18  1406 18  1837 18  0330   Weight: 72.6 kg (160 lb) 75.2 kg (165 lb 11.2 oz) 75.3 kg (165 lb 14.4 oz)     Body mass index is 22.5 kg/m².    Intake/Output Summary (Last 24 hours) at 18 1252  Last data filed at 18 0436   Gross per 24 hour   Intake             2340 ml   Output              850 ml   Net             1490 ml     Diet  Regular  ----------------------------------------------------------------------------------------------------------------------  Physical exam:  Constitutional:  No respiratory distress.      HENT:  Head:  Normocephalic and atraumatic.  Mouth:  Moist mucous membranes.    Eyes:  Conjunctivae and EOM are normal.  Pupils are equal, round, and reactive to light.  No scleral icterus.    Neck:  Neck supple.  No JVD present.    Cardiovascular:  Normal rate, regular rhythm and normal heart sounds with no murmur.  Pulmonary/Chest:  No respiratory distress, no wheezes, no crackles, with normal breath sounds and good air movement.  Abdominal:  Soft.  Bowel sounds are normal.  No distension and no tenderness.   Musculoskeletal:  No edema, no tenderness, and no deformity.  No red or swollen joints anywhere.    Neurological:  Alert and oriented to person, place, and time.  No cranial nerve deficit.  No tongue deviation.  No facial droop.  No slurred speech.   Skin:  Skin is warm and dry. No rash noted. No pallor.  Dressing in place over the sacral wound.  Peripheral vascular:  Strong pulses in all 4 extremities with no clubbing, no cyanosis, no edema.  Genitourinary:  ----------------------------------------------------------------------------------------------------------------------  Tele:    ----------------------------------------------------------------------------------------------------------------------    Results from last 7 days  Lab Units 05/12/18  1430   TROPONIN I ng/mL <0.006       Results from last 7 days  Lab Units 05/13/18  0107 05/12/18  1430   WBC 10*3/mm3 10.11 9.48   HEMOGLOBIN g/dL 9.2* 9.7*   HEMATOCRIT % 29.2* 31.0*   MCV fL 61.9* 61.4*   MCHC g/dL 31.5* 31.3*   PLATELETS 10*3/mm3 406* 421*           Results from last 7 days  Lab Units 05/13/18  0107 05/12/18  1430   SODIUM mmol/L 140 141   POTASSIUM mmol/L 4.8 3.6   MAGNESIUM mg/dL 2.1  --    CHLORIDE mmol/L 114* 112   CO2 mmol/L 23.8* 25.4   BUN mg/dL 17  17   CREATININE mg/dL 0.80 0.75   EGFR IF NONAFRICN AM mL/min/1.73 98 106   CALCIUM mg/dL 8.4 9.2   GLUCOSE mg/dL 92 110   ALBUMIN g/dL  --  3.60   BILIRUBIN mg/dL  --  0.4   ALK PHOS U/L  --  78   AST (SGOT) U/L  --  21   ALT (SGPT) U/L  --  13   Estimated Creatinine Clearance: 102 mL/min (by C-G formula based on SCr of 0.8 mg/dL).    No results found for: AMMONIA                    I have personally looked at the labs and they are summarized above.  ----------------------------------------------------------------------------------------------------------------------  Imaging Results (last 24 hours)     Procedure Component Value Units Date/Time    XR Chest 1 View [[id number]] Collected:  05/13/18 0911     Updated:  05/13/18 0917    Narrative:       XR CHEST 1 VIEWS-     CLINICAL INDICATION: Chest pain/shortness of air/hemoptysis.          COMPARISON: 01/17/2018      TECHNIQUE: Single frontal view of the chest.     FINDINGS:  Right apical soft tissue mass again noted.  The cardiac silhouette is normal. The pulmonary vasculature is  unremarkable.  There is no evidence of an acute osseous abnormality.          Impression:       Little overall change.           This report was finalized on 5/13/2018 9:15 AM by Dr. Nasim Lovett MD.       CT Chest Pulmonary Embolism With Contrast [[id number]] Collected:  05/13/18 0910     Updated:  05/13/18 0916    Narrative:       CT CHEST PULMONARY EMBOLISM WITH CONTRAST-     CLINICAL INDICATION: Patient states lung cancer/hemoptysis/shortness of  air.          COMPARISON: 03/21/2016      PROCEDURE: Thin cut axial images were acquired through the pulmonary  vessels during the rapid infusion of IV contrast.     Additional 3-D reformatted images obtained via post-processing for  improved diagnostic accuracy and procedural planning.     Radiation dose reduction techniques were utilized per ALARA protocol.  Automated exposure control was initiated through either or CareDose or  DoseRight  software packages by  protocol.           FINDINGS: Today's study demonstrates opacification of the central  pulmonary vessels.   There are no filling defects.   There is no truncation.     No evidence of a pulmonary embolus.     Abnormal soft tissue mass in the right apex larger than on the previous  exam. This does appear to involve the adjacent vertebral body.     Abnormal expansile metastatic lesion in anterior right rib.     There is no mediastinal lymph node enlargement.     No pericardial or pleural effusion.          Impression:       1. No convincing evidence of a pulmonary embolus.  2. Increasing right apical soft tissue mass with extension into the  adjacent rib and vertebral body.     This report was finalized on 5/13/2018 9:14 AM by Dr. Nasim Lovett MD.           ----------------------------------------------------------------------------------------------------------------------  Assessment and Plan:  Right side chest pains, atypical for angina; probably mass/cancer related like pain  Will add extended release OxyContin.  Continue  with immediate release  Right apical mass noted on CT chest.  Outpatient follow up with his oncologist  History of lung cancer.  Mild hemoptysis.  Pulmonology consult has been placed.  Multiple myeloma   Sacral decubitus ulcer status post previous debridement in 01/2018  Continue with wound care  Microcytic anemia   Iron saturation and TIBC within normal limits  Tobacco abuse  Counseling  Prolonged QTc at 488ms   Avoid QT prolonging medications.  Generalized anxiety disorder      Harry Scott MD  05/13/18  12:52 PM

## 2018-05-13 NOTE — CONSULTS
Referring Provider: Dr. Boogie  Reason for Consultation: Lung mass      Chief complaint: Right-sided chest pain, shortness of breath      History of present illness:    This 63 y.o. male  presented to the emergency room with progressive shortness of breath and increasing right-sided chest pain for 3 days or so.  Patient had a past medical history significant for multiple myeloma and history of lung cancer diagnosed in October 2015.  He had bone marrow biopsy as well as a transthoracic needle biopsy of the lung mass.  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  He had chemotherapy and radiation treatment for that in 2015.  He tells me that this treatment did not work and later on, according to him, no further treatment was planned.  He follows with our oncology colleagues in [place], last visit was approximately 6 months ago.      According to him  the cancer was spreading, it already involved multiple vertebrae and he was told at one point that there is a potential for further deterioration leading to paralysis or paraplegia.  He also has chronic complaints of chest pains since he was diagnosed in 2015. He takes chronic oxycodone at home for this pain. He states that he receives his pain medications from a pain clinic in [place], [place] but he ran out of his pain mediation recently. He states that about 3 days ago, his right sided chest pain began to increase and he became more short of breath. He denies any radiation into the central or left chest, arm, neck, or jaw. It does radiate into the back on the right side. The pain does increase with deep inspiration, but is also present without breathing in. He states it also hurts to cough so he tries not to.     He denies sitting for long periods or any recent travel. No leg edema or calf pain. He does report an episode of hemoptysis  which occurred earlier today. He reports blood streaked sputum with no large amount of blood produced or clots. This had occurred on one previous occasion about a week ago and was also a small amount at that time. Due to the worsening chest pain, the patient presented to the emergency department of [facility] for further evaluation and therapy.      In the emergency department, a CT scan of the chest was done to rule out PE, no PE was found but the right upper lobe medially located mass was still visible, slightly prominent than the prior CT done in approximately one year ago.  His troponin is negative so far and EKG reveals a sinus rhythm with an incomplete RBBB and prolonged QTc at 488ms. CMP is unremarkable. CBC reveals a microcytic, hypochromic anemia and mild thrombocytosis.     He was admitted to regular bed and our consultation was requested to optimize his pulmonary care.    Review of Systems  Constitutional: Positive for  unexpected weight change (45lb weight loss over 2 years.). Negative for fever.   HENT: Negative for congestion and nosebleeds.    Respiratory: Positive for cough and shortness of breath. Negative for choking and wheezing.    Cardiovascular: Positive for chest pain. Negative for palpitations and leg swelling.   Gastrointestinal: Positive for  Intermittent diarrhea . Negative for abdominal pain, anal bleeding, blood in stool, constipation, nausea and vomiting.   Genitourinary: Negative for difficulty urinating, hematuria and urgency.   Musculoskeletal: Positive for back pain. Negative for myalgias.   Skin: Positive for wound  from Sacral ulcer.. Negative for color change and rash.   Neurological: Positive for dizziness . Negative for syncope and weakness.   Psychiatric/Behavioral: Negative for agitation and confusion.     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 No pertinent family history., Social History   Substance Use Topics   • Smoking status: Current Every Day Smoker     Packs/day: 0.50     Types: Cigarettes   • Smokeless tobacco: Never Used   • Alcohol use No   , Prescriptions Prior to Admission   Medication Sig Dispense Refill Last Dose   • diazePAM (VALIUM) 5 MG tablet Take 5 mg by mouth At Night As Needed for Anxiety.   5/11/2018 at Unknown time   • oxyCODONE (ROXICODONE) 5 MG immediate release tablet Take 1 tablet by mouth Every 4 (Four) Hours As Needed for Moderate Pain . 100 tablet 0 5/11/2018 at Unknown time   , Scheduled Meds:    heparin (porcine) 5,000 Units Subcutaneous Q12H   oxyCODONE 20 mg Oral Q12H   , Continuous Infusions:    sodium chloride 75 mL/hr Last Rate: 75 mL/hr (05/13/18 0436)    and Allergies:  Sulfa antibiotics    Objective     Vital Signs   Temp:  [97.6 °F (36.4 °C)-98.4 °F (36.9 °C)] 98.2 °F (36.8 °C)  Heart Rate:  [68-87] 70  Resp:  [18-20] 20  BP: (110-157)/(77-92) 135/86    Physical Exam:     General Appearance:    Alert, cooperative, in no acute distress   Head:    Normocephalic, without obvious abnormality, atraumatic   Eyes:            Lids and lashes normal, conjunctivae and sclerae normal, no   icterus, no pallor, corneas clear, PERRLA   Ears:    Ears appear intact with no abnormalities noted    Throat:   No oral lesions, no thrush, oral mucosa moist   Neck:   No adenopathy, supple, trachea midline, no thyromegaly, no   carotid bruit, no JVD   Back:     No kyphosis present, no scoliosis present, no skin lesions,      erythema or scars, tenderness to percussion noted over the thoracic vertebra from T2 to T6 level,   range of motion normal   Lungs:     Clear to auscultation,respirations regular, even and                  unlabored    Heart:    Regular rhythm and normal rate, normal S1 and S2, no            murmur, no gallop, no rub, no click   Chest Wall:    No abnormalities observed   Abdomen:     Normal bowel sounds, no masses, no organomegaly, soft        non-tender, non-distended, no guarding, no rebound                tenderness   Rectal:     Deferred   Extremities:   Moves all extremities well, no edema, no cyanosis, no             redness   Pulses:   Pulses palpable and equal bilaterally   Skin:   No bleeding, bruising or rash   Lymph nodes:   No palpable adenopathy   Neurologic:   nonfocal       Results Review:    LABS:    Lab Results   Component Value Date    GLUCOSE 92 05/13/2018    BUN 17 05/13/2018    CREATININE 0.80 05/13/2018    EGFRIFNONA 98 05/13/2018    BCR 21.3 05/13/2018    CO2 23.8 (L) 05/13/2018    CALCIUM 8.4 05/13/2018    PROTENTOTREF 7.2 11/12/2015    ALBUMIN 3.60 05/12/2018    LABIL2 1.0 (L) 05/12/2018    AST 21 05/12/2018    ALT 13 05/12/2018    WBC 10.11 05/13/2018    HGB 9.2 (L) 05/13/2018    HCT 29.2 (L) 05/13/2018    MCV 61.9 (L) 05/13/2018     (H) 05/13/2018     05/13/2018    K 4.8 05/13/2018     (H) 05/13/2018    ANIONGAP 2.2 (L) 05/13/2018       Lab Results   Component Value Date    INR 0.94 11/03/2015    INR <0.90 10/25/2015    INR <0.90 06/01/2015    PROTIME 9.8 11/03/2015    PROTIME 9.9 10/25/2015    PROTIME 9.8 06/01/2015     Imaging     CT CHEST PULMONARY EMBOLISM WITH CONTRAST-     CLINICAL INDICATION: Patient states lung cancer/hemoptysis/shortness  of  air.          COMPARISON: 03/21/2016      PROCEDURE: Thin cut axial images were acquired through the pulmonary  vessels during the rapid infusion of IV contrast.     Additional 3-D reformatted images obtained via post-processing for  improved diagnostic accuracy and procedural planning.     Radiation dose reduction techniques were utilized per ALARA protocol.  Automated exposure control was initiated through either or [employer] or  DoseRight software packages by  protocol.           FINDINGS: Today's study demonstrates opacification of the central  pulmonary vessels.   There are no filling defects.   There is no truncation.     No evidence of a pulmonary embolus.     Abnormal soft tissue mass in the right apex larger than on the previous  exam. This does appear to involve the adjacent vertebral body.     Abnormal expansile metastatic lesion in anterior right rib.     There is no mediastinal lymph node enlargement.     No pericardial or pleural effusion.                IMPRESSION:  1. No convincing evidence of a pulmonary embolus.  2. Increasing right apical soft tissue mass with extension into the  adjacent rib and vertebral body.     This report was finalized on 5/13/2018 9:14 AM by Dr. Nasim Lovett MD.                       I reviewed the patient's new clinical results.  I reviewed the patient's new imaging results and agree with the interpretation.      Assessment/Plan    1.  Right-sided chest pain related to lung mass, which in turn was related to multiple myeloma noted in a previous biopsy in 2015.  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  From the current CT scan I also see involvement of the vertebrae which possibly is responsible for the radicular pain that      patient is describing.         Plan: Agree with aggressive pain control.  Suggest to put him on Decadron 8 mg every 12 hours ×3 doses to reduce the inflammatory  burden     2.  Bony involvement from multiple myeloma.  If this is the prime cause of his pain.         Plan: Suggest to call our oncology colleagues on the case for further recommendation.  I'm wondering if a local palliative radiation treatment would be helpful or not.    3.  COPD         Plan: Continue current management with nebulized bronchodilator      Patient Active Problem List   Diagnosis Code   • Encounter for venous access device care Z45.2   • Rhabdomyolysis M62.82   • Decubitus ulcer of coccyx, unspecified pressure ulcer stage L89.159   • Chest pain R07.9           MMaria R Corrales MD  05/13/18  12:36 PM

## 2018-05-13 NOTE — PLAN OF CARE
Problem: Skin Injury Risk (Adult)  Goal: Skin Health and Integrity  Outcome: Ongoing (interventions implemented as appropriate)   05/13/18 0110   Skin Injury Risk (Adult)   Skin Health and Integrity making progress toward outcome

## 2018-05-13 NOTE — PLAN OF CARE
Problem: Pain, Acute (Adult)  Goal: Acceptable Pain Control/Comfort Level  Outcome: Ongoing (interventions implemented as appropriate)   05/13/18 0110   Pain, Acute (Adult)   Acceptable Pain Control/Comfort Level making progress toward outcome

## 2018-05-13 NOTE — H&P
"    [facility]    Patient Identification:  Name:  Jhonny Washington  Age:  63 y.o.  Sex:  male  :  1955  MRN:  [id number]   Visit Number:  [id number]  Room number:  3306/1S  Primary Care Physician:  Juan Manuel Orozco MD     Chief complaint:    Chief Complaint   Patient presents with   • Chest Pain     History of presenting illness:  63 y.o. male with a past medical history significant for multiple myeloma, reported history of lung cancer which was diagnosed in 2015 with subsequent chemotherapy and radiation. He states the cancer is \"eating through my lung into my back.\" He has chronic complaints of chest pains since he was diagnosed in . He takes chronic oxycodone at home for this pain. He states that about 3 days ago, his right sided chest pain began to increase and he became more short of breath. He denies any radiation into the central or left chest, arm, neck, or jaw. It does radiate into the back on the right side. The pain does increase with deep inspiration, but is also present without breathing in. He states it also hurts to cough so he tries not to. He ran out of his pain mediation yesterday. He states that he receives his pain medications from a pain clinic in [place], [place]. He denies any history of cardiac issues and states he has always had a \"strong heart.\" No known history of DVT or PE. He previously had surgical debridement of a decubitus ulcer in 2018 and this was his most recent surgery. After the procedure, he states he went to a rehabilitation facility in [place], [place] and was very active while there and has continued to be active at home.     He denies sitting for long periods or any recent travel. No leg edema or calf pain. He does report an episode of hemoptysis which occurred earlier today. He reports blood streaked sputum with no large amount of blood produced or clots. This had occurred on one previous occasion about a " "week ago and was also a small amount at that time. Due to the worsening chest pain, the patient presented to the emergency department of [facility] for further evaluation and therapy.     In the emergency department, vital signs were /85, P-84, RR-20, SpO2-98% and temp 97.6F. He had a CT chest with PE protocol and per [facility], could not exclude a pulmonary embolism. He has a right apical mass which has been noted on previous CT chest at our facility. Our radiologist's read and comparison is pending. Troponin is negative X1 so far. EKG reveals a sinus rhythm with an incomplete RBBB and prolonged QTc at 488ms. CMP is unremarkable. CBC reveals a microcytic, hypochromic anemia and mild thrombocytosis. He was admitted to the telemetry unit of [facility] for further evaluation and therapy.     [place]:  During my evaluation, the patient states that he remains with right sided chest pain but states that it is slightly improved. He states that Dr. Templeton gave him 6 months to live approximately 6 months ago. He states that he is not ready to die because he wants to spend more time with his kids and grandkids but also states that he has not sought a second oncology opinion. He previously was seen by Dr. Seferino Luna until they had a \"falling out.\"    Present during visit: JEANA Lara  ---------------------------------------------------------------------------------------------------------------------   Review of Systems   Constitutional: Positive for chills and unexpected weight change (45lb weight loss over 2 years.). Negative for fever.   HENT: Negative for congestion and nosebleeds.    Respiratory: Positive for cough and shortness of breath. Negative for choking and wheezing.    Cardiovascular: Positive for chest pain. Negative for palpitations and leg swelling.   Gastrointestinal: Positive for diarrhea (Occasional intermittent. Not frequent recently. ). Negative for abdominal pain, anal " bleeding, blood in stool, constipation, nausea and vomiting.   Genitourinary: Negative for difficulty urinating, hematuria and urgency.   Musculoskeletal: Positive for back pain. Negative for myalgias.   Skin: Positive for wound (Sacral ulcer.). Negative for color change and rash.   Neurological: Positive for dizziness (Yesterday with increased pain. No near syncope or actual syncope. ). Negative for syncope and weakness.   Psychiatric/Behavioral: Negative for agitation and confusion.     ---------------------------------------------------------------------------------------------------------------------   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, Right [place] of Lung with T2 vertebral, and second Rib infiltration, S/P radiation/Chemotherapy   • Polysubstance dependency    • TIA (transient ischemic attack)     2014     Past Surgical History:   Procedure Laterality Date   • APPENDECTOMY     • BACK SURGERY     • DEBRIDEMENT OF ISCHEAL ULCER/BUTTOCKS WOUND N/A 1/19/2018    Procedure: DEBRIDEMENT OF RIGHT HIP ULCER/BUTTOCKS WOUND;  Surgeon: Mohan Santoyo MD;  Location: [facility];  Service:    • LUNG BIOPSY  2015     History reviewed. No pertinent family history.  Social History     Social History   • Marital status:      Social History Main Topics   • Smoking status: Current Every Day Smoker     Packs/day: 0.50     Types: Cigarettes   • Smokeless tobacco: Never Used   • Alcohol use No   • Drug use: No      Comment: oxycodone 20 mg tabs QID and valium 10 mg q 12 hours    • Sexual activity: Defer     Other Topics Concern   • Not on file         ---------------------------------------------------------------------------------------------------------------------   Allergies:  Sulfa  antibiotics  ---------------------------------------------------------------------------------------------------------------------   Prior to Admission Medications     Prescriptions Last Dose Informant Patient Reported? Taking?    diazePAM (VALIUM) 5 MG tablet 5/11/2018 [facility] Yes Yes    Take 5 mg by mouth At Night As Needed for Anxiety.    oxyCODONE (ROXICODONE) 5 MG immediate release tablet 5/11/2018 [facility] No Yes    Take 1 tablet by mouth Every 4 (Four) Hours As Needed for Moderate Pain .      ---------------------------------------------------------------------------------------------------------------------   Vital Signs:  Temp:  [97.6 °F (36.4 °C)-98.2 °F (36.8 °C)] 98.2 °F (36.8 °C)  Heart Rate:  [70-87] 78  Resp:  [18-20] 20  BP: (110-140)/(85-92) 140/90    Mean Arterial Pressure (Non-Invasive) for the past 24 hrs (Last 3 readings):   Noninvasive MAP (mmHg)   05/12/18 1837 107   05/12/18 1541 108   05/12/18 1405 91     SpO2:  [92 %-100 %] 99 %  on   ;   Device (Oxygen Therapy): room air  Body mass index is 22.47 kg/m².    Wt Readings from Last 1 Encounters:   05/12/18 1837 75.2 kg (165 lb 11.2 oz)   05/12/18 1406 72.6 kg (160 lb)     Wt Readings from Last 3 Encounters:   05/12/18 75.2 kg (165 lb 11.2 oz)   03/25/18 74.8 kg (165 lb)   01/26/18 77.6 kg (171 lb 1.2 oz)               ---------------------------------------------------------------------------------------------------------------------   Physical Exam:  Constitutional:  Well-developed and well-nourished.  No respiratory distress.      HENT:  Head: Normocephalic and atraumatic.  Mouth:  Moist mucous membranes. Poor dentition.    Eyes:  Conjunctivae and EOM are normal.  Pupils are equal, round, and reactive to light.  No scleral icterus.  Neck:  Neck supple.  No JVD present.    Cardiovascular:  Normal rate, regular rhythm and normal heart sounds with no murmur.  Pulmonary/Chest:  No respiratory distress, no wheezes, no crackles, with normal breath  sounds and good air movement.   Abdominal:  Soft.  Bowel sounds are normal.  No distension and no tenderness.   Musculoskeletal:  No edema, no tenderness, and no deformity.  No red or swollen joints anywhere. Right lateral chest is tender to palpation and light pressure with stethoscope. No rash or lesions in the area.   Neurological:  Alert and oriented to person, place, and time.  No cranial nerve deficit.  No tongue deviation.  No facial droop.  No slurred speech.   Skin:  Skin is warm and dry.  No rash noted.  No pallor. Sacral wound present with some light purulent drainage on bandage. No edema or significant erythema.     Peripheral vascular:  No edema and strong pulses on all 4 extremities.  Genitourinary: No ballard catheter is present.   ---------------------------------------------------------------------------------------------------------------------  EKG:  Sinus rhythm with an incomplete RBBB, low voltage in limb leads, and prolonged QTc at 488ms.     I have personally reviewed the EKG.  --------------------------------------------------------------------------------------------------------------------  Results from last 7 days  Lab Units 05/12/18  1430   TROPONIN I ng/mL <0.006     Results from last 7 days  Lab Units 05/12/18  1430   WBC 10*3/mm3 9.48   HEMOGLOBIN g/dL 9.7*   HEMATOCRIT % 31.0*   MCV fL 61.4*   MCHC g/dL 31.3*   PLATELETS 10*3/mm3 421*     Results from last 7 days  Lab Units 05/12/18  1430   SODIUM mmol/L 141   POTASSIUM mmol/L 3.6   CHLORIDE mmol/L 112   CO2 mmol/L 25.4   BUN mg/dL 17   CREATININE mg/dL 0.75   EGFR IF NONAFRICN AM mL/min/1.73 106   CALCIUM mg/dL 9.2   GLUCOSE mg/dL 110   ALBUMIN g/dL 3.60   BILIRUBIN mg/dL 0.4   ALK PHOS U/L 78   AST (SGOT) U/L 21   ALT (SGPT) U/L 13   Estimated Creatinine Clearance: 108.6 mL/min (by C-G formula based on SCr of 0.75 mg/dL).    Results from last 7 days  Lab Units 05/12/18  1439   NITRITE UA  Negative     I have personally looked at the  labs and they are summarized above.  ----------------------------------------------------------------------------------------------------------------------  Imaging Results (last 24 hours)     Procedure Component Value Units Date/Time    CT Chest Pulmonary Embolism With Contrast [[id number]] Resulted:  05/12/18 1725     Updated:  05/12/18 1726    XR Chest 1 View [[id number]] Updated:  05/12/18 1526        I have personally reviewed the radiology images. Final report pending. I did not appreciate any filling defects. Mass like lesion noted in right apex.   ----------------------------------------------------------------------------------------------------------------------  Assessment and Plan:  -Right side chest pains, atypical for angina; probably mass/cancer related like pain  -Unable to rule out pulmonary embolism on CT chest per [facility]   -Mild hemoptysis with blood streaked sputum   -History of lung cancer/multiple myeloma with right apical mass noted on CT chest (previously noted)   -Sacral decubitus ulcer status post previous debridement in 01/2018  -Microcytic, hypochromic anemia   -Tobacco abuse  -Prolonged QTc at 488ms   -Generalized anxiety disorder    Patient was admitted to the telemetry unit for further evaluation and therapy. Will trend cardiac isoenzymes and adjust therapy as needed. Check iron profile. Avoid QT prolonging agents. Check magnesium level and replace as needed. At this point, patient is not tachycardic and oxygen saturations are 99% on room air. Final radiology report is pending. Will hold on full dose anticoagulation given complaints of hemoptysis and low suspicion for pulmonary embolism. Patient's [facility] report reviewed - according to my review, it appears that he likely just ran out of Oxycodone (20mg) and Diazepam (5mg).    Instructed patient that he needs to discuss with his family if he would like to pursue a second oncology opinion as he most recently was seen by Dr. Templeton at  least 6 months ago.    Patient instructed to report any further episodes of hemoptysis to nursing staff.     CODE: FULL    DVT prophylaxis: Subcutaneous heparin.    JARETH Hong acting as scribe for Dr. Merle Boogie DO.   05/12/18  8:08 PM

## 2018-05-13 NOTE — PLAN OF CARE
Problem: Fall Risk (Adult)  Goal: Absence of Fall  Outcome: Ongoing (interventions implemented as appropriate)   05/13/18 0111   Fall Risk (Adult)   Absence of Fall making progress toward outcome

## 2018-05-14 PROCEDURE — 25010000002 DEXAMETHASONE PER 1 MG: Performed by: INTERNAL MEDICINE

## 2018-05-14 PROCEDURE — 25010000002 HEPARIN (PORCINE) PER 1000 UNITS: Performed by: INTERNAL MEDICINE

## 2018-05-14 PROCEDURE — 63710000001 DEXAMETHASONE PER 0.25 MG: Performed by: INTERNAL MEDICINE

## 2018-05-14 PROCEDURE — 99233 SBSQ HOSP IP/OBS HIGH 50: CPT | Performed by: INTERNAL MEDICINE

## 2018-05-14 PROCEDURE — 99231 SBSQ HOSP IP/OBS SF/LOW 25: CPT | Performed by: INTERNAL MEDICINE

## 2018-05-14 PROCEDURE — 94799 UNLISTED PULMONARY SVC/PX: CPT

## 2018-05-14 RX ORDER — IPRATROPIUM BROMIDE AND ALBUTEROL SULFATE 2.5; .5 MG/3ML; MG/3ML
3 SOLUTION RESPIRATORY (INHALATION) EVERY 6 HOURS PRN
Status: DISCONTINUED | OUTPATIENT
Start: 2018-05-14 | End: 2018-05-17 | Stop reason: HOSPADM

## 2018-05-14 RX ORDER — OXYCODONE HYDROCHLORIDE 15 MG/1
15 TABLET ORAL EVERY 6 HOURS PRN
Status: DISCONTINUED | OUTPATIENT
Start: 2018-05-14 | End: 2018-05-15

## 2018-05-14 RX ORDER — DEXAMETHASONE SODIUM PHOSPHATE 4 MG/ML
4 INJECTION, SOLUTION INTRA-ARTICULAR; INTRALESIONAL; INTRAMUSCULAR; INTRAVENOUS; SOFT TISSUE EVERY 6 HOURS
Status: DISCONTINUED | OUTPATIENT
Start: 2018-05-15 | End: 2018-05-14

## 2018-05-14 RX ORDER — OXYCODONE HCL 20 MG/1
20 TABLET, FILM COATED, EXTENDED RELEASE ORAL EVERY 12 HOURS SCHEDULED
Status: DISCONTINUED | OUTPATIENT
Start: 2018-05-14 | End: 2018-05-15

## 2018-05-14 RX ORDER — DEXAMETHASONE 4 MG/1
4 TABLET ORAL EVERY 6 HOURS SCHEDULED
Status: DISCONTINUED | OUTPATIENT
Start: 2018-05-14 | End: 2018-05-15

## 2018-05-14 RX ADMIN — SODIUM CHLORIDE 75 ML/HR: 9 INJECTION, SOLUTION INTRAVENOUS at 03:24

## 2018-05-14 RX ADMIN — OXYCODONE HYDROCHLORIDE 20 MG: 20 TABLET, FILM COATED, EXTENDED RELEASE ORAL at 21:00

## 2018-05-14 RX ADMIN — OXYCODONE HYDROCHLORIDE 15 MG: 15 TABLET ORAL at 22:14

## 2018-05-14 RX ADMIN — OXYCODONE HYDROCHLORIDE 10 MG: 5 TABLET ORAL at 09:40

## 2018-05-14 RX ADMIN — OXYCODONE HYDROCHLORIDE 15 MG: 15 TABLET ORAL at 15:44

## 2018-05-14 RX ADMIN — DEXAMETHASONE SODIUM PHOSPHATE 8 MG: 4 INJECTION, SOLUTION INTRAMUSCULAR; INTRAVENOUS at 03:23

## 2018-05-14 RX ADMIN — DEXAMETHASONE 4 MG: 4 TABLET ORAL at 21:00

## 2018-05-14 RX ADMIN — OXYCODONE HYDROCHLORIDE 10 MG: 5 TABLET ORAL at 03:23

## 2018-05-14 RX ADMIN — DEXAMETHASONE SODIUM PHOSPHATE 8 MG: 4 INJECTION, SOLUTION INTRAMUSCULAR; INTRAVENOUS at 15:46

## 2018-05-14 RX ADMIN — OXYCODONE HYDROCHLORIDE 20 MG: 20 TABLET, FILM COATED, EXTENDED RELEASE ORAL at 08:51

## 2018-05-14 RX ADMIN — DIAZEPAM 5 MG: 5 TABLET ORAL at 06:35

## 2018-05-14 RX ADMIN — HEPARIN SODIUM 5000 UNITS: 5000 INJECTION, SOLUTION INTRAVENOUS; SUBCUTANEOUS at 08:51

## 2018-05-14 RX ADMIN — HEPARIN SODIUM 5000 UNITS: 5000 INJECTION, SOLUTION INTRAVENOUS; SUBCUTANEOUS at 20:59

## 2018-05-14 NOTE — PROGRESS NOTES
Discharge Planning Assessment   Nabeel     Patient Name: Jhonny Washington  MRN: [id number]  Today's Date: 5/14/2018    Admit Date: 5/12/2018          Discharge Needs Assessment     Row Name 05/14/18 1158       Living Environment    Lives With alone    Current Living Arrangements home/apartment/condo    Primary Care Provided by self    Provides Primary Care For no one            Discharge Plan     Row Name 05/14/18 1159       Plan    Plan Pt admitted on 5/12/18.  SS received consult per [facility] for discharge planning.  SS spoke with pt on this date.  Pt lives at home alone and plans to return home at discharge.  Pt currently does not utilize home health or DME.  Pt's PCP is Pamela Morales.  Pt utilizes [facility].  SS will follow and assist with discharge needs.     Patient/Family in Agreement with Plan yes        Destination     No service coordination in this encounter.      Durable Medical Equipment     No service coordination in this encounter.      Dialysis/Infusion     No service coordination in this encounter.      Home Medical Care     No service coordination in this encounter.      Social Care     No service coordination in this encounter.                Demographic Summary     Row Name 05/14/18 1155       General Information    Admission Type inpatient    Referral Source nursing            Functional Status    No documentation.           Psychosocial    No documentation.           Abuse/Neglect    No documentation.           Legal    No documentation.           Substance Abuse    No documentation.           Patient Forms    No documentation.         Amber Jimenes

## 2018-05-14 NOTE — PLAN OF CARE
We were consulted regarding Mr. Washington's multiple myeloma.  It appears that recently he has been seeing Dr. Canada.  He previously saw Dr. Luna, but he was actually discharged from our practice on 2-2-16.  D/w floor and Dr. Luna.

## 2018-05-14 NOTE — PLAN OF CARE
Problem: Fall Risk (Adult)  Goal: Absence of Fall  Outcome: Ongoing (interventions implemented as appropriate)   05/13/18 7679   Fall Risk (Adult)   Absence of Fall making progress toward outcome

## 2018-05-14 NOTE — PROGRESS NOTES
LOS: 1 day     Chief Complaint:  Pulmonology is following for shortness of breath    Subjective     Interval History: Mr. Washington is resting in bed. He states that his breathing is good today.  No acute distress noted.    History taken from: patient chart RN    Review of Systems:   Review of Systems - History obtained from chart review and the patient  General ROS: negative for - chills, fatigue or fever  Psychological ROS: negative for - anxiety or depression  ENT ROS: negative for - headaches, visual changes or vocal changes  Allergy and Immunology ROS: negative for - nasal congestion, postnasal drip or seasonal allergies  Endocrine ROS: negative for - polydipsia/polyuria  Respiratory ROS: no cough, shortness of breath, or wheezing  Cardiovascular ROS: no chest pain or dyspnea on exertion  Genito-Urinary ROS: no dysuria, trouble voiding, or hematuria  Musculoskeletal ROS: positive for - generalized pain                    Objective     Vital Signs  Temp:  [98 °F (36.7 °C)-98.3 °F (36.8 °C)] 98.3 °F (36.8 °C)  Heart Rate:  [61-84] 65  Resp:  [20] 20  BP: (128-137)/(77-83) 132/77  Body mass index is 22.49 kg/m².    Intake/Output Summary (Last 24 hours) at 05/14/18 1329  Last data filed at 05/14/18 0325   Gross per 24 hour   Intake             2419 ml   Output              900 ml   Net             1519 ml     No intake/output data recorded.    Physical Exam:  GENERAL APPEARANCE: Well developed, well nourished, alert and cooperative, and appears to be in no acute distress.    HEAD: normocephalic.    EYES: PERRL    NECK: Neck supple.     CARDIAC: Normal S1 and S2. No S3, S4 or murmurs. Rhythm is regular. There is no peripheral edema, cyanosis or pallor. Extremities are warm and well perfused. Capillary refill is less than 2 seconds. No carotid bruits.    RESPIRATORY: Clear to auscultation and percussion without rales, rhonchi, wheezing or diminished breath sounds.    GI: Positive bowel sounds. Soft, nondistended,  nontender.     MUSCULOSKELTAL: No significant deformity or joint abnormality. No edema. Peripheral pulses intact.     NEUROLOGICAL: Strength and sensation symmetric and intact throughout.     PSYCHIATRIC: The mental examination revealed the patient was oriented to person, place, and time.                 Results Review:                I reviewed the patient's new clinical results.  I reviewed the patient's new imaging results and agree with the interpretation.    Results from last 7 days  Lab Units 05/13/18  0107 05/12/18  1430   WBC 10*3/mm3 10.11 9.48   HEMOGLOBIN g/dL 9.2* 9.7*   PLATELETS 10*3/mm3 406* 421*       Results from last 7 days  Lab Units 05/13/18  0107 05/12/18  1430   SODIUM mmol/L 140 141   POTASSIUM mmol/L 4.8 3.6   CHLORIDE mmol/L 114* 112   CO2 mmol/L 23.8* 25.4   BUN mg/dL 17 17   CREATININE mg/dL 0.80 0.75   CALCIUM mg/dL 8.4 9.2   GLUCOSE mg/dL 92 110   MAGNESIUM mg/dL 2.1  --      Lab Results   Component Value Date    INR 0.94 11/03/2015    INR <0.90 10/25/2015    INR <0.90 06/01/2015    PROTIME 9.8 11/03/2015    PROTIME 9.9 10/25/2015    PROTIME 9.8 06/01/2015       Results from last 7 days  Lab Units 05/12/18  1430   ALK PHOS U/L 78   BILIRUBIN mg/dL 0.4   ALT (SGPT) U/L 13   AST (SGOT) U/L 21         Imaging Results (last 24 hours)     ** No results found for the last 24 hours. **             Medication Review:   Scheduled Medications:    dexamethasone 8 mg Intravenous Q12H   heparin (porcine) 5,000 Units Subcutaneous Q12H   oxyCODONE 20 mg Oral Q12H     Continuous infusions:    sodium chloride 75 mL/hr Last Rate: 75 mL/hr (05/14/18 0324)       Assessment/Plan      Right-sided chest pain related to lung mass, which in turn was related to multiple myeloma noted in a previous biopsy in 2015.  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  From the current CT scan I also see involvement of the vertebrae which possibly is  responsible for the radicular pain that patient is describing.     Continue with pain management and steroids. Hematology/ Oncology have been consulted and will see patient.       COPD:  Will continue current steroids and PRN neb treatments.           Patient Active Problem List   Diagnosis Code   • Encounter for venous access device care Z45.2   • Rhabdomyolysis M62.82   • Decubitus ulcer of coccyx, unspecified pressure ulcer stage L89.159   • Chest pain R07.9             JOSÉ MIGUEL Horne  05/14/18  1:29 PM        Attestation: Scribed for Dr. Arevalo, by JOSÉ MIGUEL Moss    I, Augustin Arevalo M.D. attest that the above note accurately reflects the work and decisions made  by me.  Patient was seen and evaluated by Dr. Arevalo, including history of present illness, physical exam, assessment, and treatment plan.  The above note was reviewed and edited by Dr. Arevalo.

## 2018-05-14 NOTE — PLAN OF CARE
Problem: Patient Care Overview  Goal: Plan of Care Review  Outcome: Ongoing (interventions implemented as appropriate)      Problem: Fall Risk (Adult)  Goal: Identify Related Risk Factors and Signs and Symptoms  Outcome: Ongoing (interventions implemented as appropriate)      Problem: Pain, Acute (Adult)  Goal: Identify Related Risk Factors and Signs and Symptoms  Outcome: Ongoing (interventions implemented as appropriate)      Problem: Skin Injury Risk (Adult)  Goal: Identify Related Risk Factors and Signs and Symptoms  Outcome: Ongoing (interventions implemented as appropriate)

## 2018-05-14 NOTE — NURSING NOTE
05/14/18 0950   Wound 05/12/18 1700 Other (See comments) upper coccyx pressure injury   Date first assessed/Time first assessed: 05/12/18 1700   Present On Admission : yes  Side: (c) Other (See comments)  Orientation: upper  Location: coccyx  Type: pressure injury  Stage, Pressure Injury: unstageable   Dressing Appearance dry;intact   Base moist;slough;yellow   Yellow (%), Wound Tissue Color 100   Wound length (cm) 2 cm   Wound width (cm) 4 cm   Drainage Characteristics/Odor creamy   Drainage Amount scant

## 2018-05-14 NOTE — PLAN OF CARE
Problem: Pain, Acute (Adult)  Goal: Acceptable Pain Control/Comfort Level  Outcome: Ongoing (interventions implemented as appropriate)   05/13/18 6566   Pain, Acute (Adult)   Acceptable Pain Control/Comfort Level making progress toward outcome

## 2018-05-14 NOTE — PROGRESS NOTES
I have seen the patient in conjunction with Jenny HILLS    History of presenting illness:  Patient states he still in significant painwith any attempted movement such as raising or lowering of his waist this pain is in his right upper chest area sharp in nature and worsened about 3 days to 4 days ago.  He has known multiple myeloma and a tumor in this area.  He states he was told 6 months ago by his primary care oncologist Dr. LIN that he only had 6 months to live.  He states that he is eating but he is still losing weight.  He is occasionally going out but he states he is not smoking much here.  He had also because of questionable vertebral invasion of this tumor started on Decadron by Dr Corrales  Vital Signs  Temp:  [98 °F (36.7 °C)-98.3 °F (36.8 °C)] 98.1 °F (36.7 °C)  Heart Rate:  [61-84] 75  Resp:  [20] 20  BP: (122-137)/(75-79) 122/75  Body mass index is 22.49 kg/m².      Intake/Output Summary (Last 24 hours) at 05/14/18 1538  Last data filed at 05/14/18 0325   Gross per 24 hour   Intake             2419 ml   Output              900 ml   Net             1519 ml     Intake & Output (last 3 days)       05/11 0701 - 05/12 0700 05/12 0701 - 05/13 0700 05/13 0701 - 05/14 0700 05/14 0701 - 05/15 0700    P.O.  340 420     I.V. (mL/kg)  1000 (13.3) 1999 (26.6)     IV Piggyback  1000      Total Intake(mL/kg)  2340 (31.1) 2419 (32.2)     Urine (mL/kg/hr)  850 1400 (0.8)     Total Output   850 1400      Net   +1490 +1019                    Physical exam:  Physical Exam   Constitutional: Well-developed, well-nourished.  Pleasant.  No distressExcept when I did go to raise him up from a lying position he seemed like he was in significant pain.  This again was in his right upper chest area.  Head: Normocephalic and atraumatic.  Hearing is intact, mucous membranes are moist.   Eyes:  Pupils are equal, round, and reactive to light. No scleral icterus.   Cardiovascular: Normal rate, regular rhythm and normal heart sounds.  No  murmur rub or gallop  Pulmonary/Chest: Bilateral breath sounds no rhonchus rales or wheezing heard  Abdominal: Soft, flat, bowel sounds are active, nondistended nontender.  No peritoneal signs   Musculoskeletal: Strength is symmetric, no evidence of focal findings except subjectively had decreased sensation fifth finger right hand  Neurological: Nonfocal, alert.   Skin: Skin is warm and dry.   Psychiatric:  Normal mood and affect.     EKG: Normal on admision  Telemetry: sinus    Results Review:  Lab Results   Component Value Date    WBC 10.11 05/13/2018    HGB 9.2 (L) 05/13/2018    HCT 29.2 (L) 05/13/2018    MCV 61.9 (L) 05/13/2018     (H) 05/13/2018     Lab Results   Component Value Date    GLUCOSE 92 05/13/2018    BUN 17 05/13/2018    CREATININE 0.80 05/13/2018    EGFRIFNONA 98 05/13/2018    BCR 21.3 05/13/2018    CO2 23.8 (L) 05/13/2018    CALCIUM 8.4 05/13/2018    PROTENTOTREF 7.2 11/12/2015    ALBUMIN 3.60 05/12/2018    LABIL2 1.0 (L) 05/12/2018    AST 21 05/12/2018    ALT 13 05/12/2018       Imaging Results (last 72 hours)     Procedure Component Value Units Date/Time    XR Chest 1 View [[id number]] Collected:  05/13/18 0911     Updated:  05/13/18 0917    Narrative:       XR CHEST 1 VIEWS-     CLINICAL INDICATION: Chest pain/shortness of air/hemoptysis.          COMPARISON: 01/17/2018      TECHNIQUE: Single frontal view of the chest.     FINDINGS:  Right apical soft tissue mass again noted.  The cardiac silhouette is normal. The pulmonary vasculature is  unremarkable.  There is no evidence of an acute osseous abnormality.          Impression:       Little overall change.           This report was finalized on 5/13/2018 9:15 AM by Dr. Nasim Lovett MD.       CT Chest Pulmonary Embolism With Contrast [[id number]] Collected:  05/13/18 0910     Updated:  05/13/18 0916    Narrative:       CT CHEST PULMONARY EMBOLISM WITH CONTRAST-     CLINICAL INDICATION: Patient states lung cancer/hemoptysis/shortness  of  air.          COMPARISON: 03/21/2016      PROCEDURE: Thin cut axial images were acquired through the pulmonary  vessels during the rapid infusion of IV contrast.     Additional 3-D reformatted images obtained via post-processing for  improved diagnostic accuracy and procedural planning.     Radiation dose reduction techniques were utilized per ALARA protocol.  Automated exposure control was initiated through either or [place] or  DoseRight software packages by  protocol.           FINDINGS: Today's study demonstrates opacification of the central  pulmonary vessels.   There are no filling defects.   There is no truncation.     No evidence of a pulmonary embolus.     Abnormal soft tissue mass in the right apex larger than on the previous  exam. This does appear to involve the adjacent vertebral body.     Abnormal expansile metastatic lesion in anterior right rib.     There is no mediastinal lymph node enlargement.     No pericardial or pleural effusion.          Impression:       1. No convincing evidence of a pulmonary embolus.  2. Increasing right apical soft tissue mass with extension into the  adjacent rib and vertebral body.     This report was finalized on 5/13/2018 9:14 AM by Dr. Nasim Lovett MD.            CT images reviewed    Discussed with Dr. 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  Patient has been restarted on Decadron, I have adjusted his narcotic analgesia further and consulted palliative care.  I don't know if patient would be a candidate for further radiation or chemotherapy and I have consulted oncology. Dr Cerrato in the recent past in the absence of Dr. LIN has requested that we have the [facility] group see the patient's when they come to the hospital here.  I discussed this with our administration Mr Martinez who did not see a problem with our group seeing them as well.  Will await their input.  PE protocol was  negative.    Tobacco abuse, I suggested he quit however considering his overall prognosis at least from the above history do not think this will have a significant impact in his health.    DVT prophylaxis, subcutaneous heparin    Pain, as above, when necessary oxycodone increased, continue current oxycodone long-acting, palliative care consult.    High risk due to requirement for increasing pain medication.    No new labs today, recheck in a.m.    Microcytic anemia, previously iron studies were acceptable, this be rechecked in the a.m.      Jose Luna MD  05/14/18  3:38 PM

## 2018-05-14 NOTE — PROGRESS NOTES
N[facility]    Patient Name:  Jhonny Washington  YOB: 1955  MRN: [id number]  Admit Date:  5/12/2018  Rec MVI - Suman supplement BID started to promote wound healing    Electronically signed by:  Vibha Negron RD  05/14/18 3:48 PM

## 2018-05-15 LAB
ALBUMIN SERPL-MCNC: 3.7 G/DL (ref 3.4–4.8)
ALBUMIN/GLOB SERPL: 1.2 G/DL (ref 1.5–2.5)
ALP SERPL-CCNC: 72 U/L (ref 40–129)
ALT SERPL W P-5'-P-CCNC: 14 U/L (ref 10–44)
ANION GAP SERPL CALCULATED.3IONS-SCNC: 3.1 MMOL/L (ref 3.6–11.2)
ANISOCYTOSIS BLD QL: NORMAL
AST SERPL-CCNC: 18 U/L (ref 10–34)
BASOPHILS # BLD AUTO: 0 10*3/MM3 (ref 0–0.3)
BASOPHILS NFR BLD AUTO: 0 % (ref 0–2)
BILIRUB SERPL-MCNC: 0.3 MG/DL (ref 0.2–1.8)
BUN BLD-MCNC: 27 MG/DL (ref 7–21)
BUN/CREAT SERPL: 34.6 (ref 7–25)
CALCIUM SPEC-SCNC: 8.8 MG/DL (ref 7.7–10)
CHLORIDE SERPL-SCNC: 109 MMOL/L (ref 99–112)
CO2 SERPL-SCNC: 23.9 MMOL/L (ref 24.3–31.9)
CREAT BLD-MCNC: 0.78 MG/DL (ref 0.43–1.29)
DACRYOCYTES BLD QL SMEAR: NORMAL
DEPRECATED RDW RBC AUTO: 36.1 FL (ref 37–54)
EOSINOPHIL # BLD AUTO: 0 10*3/MM3 (ref 0–0.7)
EOSINOPHIL NFR BLD AUTO: 0 % (ref 0–5)
ERYTHROCYTE [DISTWIDTH] IN BLOOD BY AUTOMATED COUNT: 16.8 % (ref 11.5–14.5)
FERRITIN SERPL-MCNC: 138 NG/ML (ref 21.9–321.7)
FOLATE SERPL-MCNC: 14.67 NG/ML (ref 5.4–20)
GFR SERPL CREATININE-BSD FRML MDRD: 101 ML/MIN/1.73
GLOBULIN UR ELPH-MCNC: 3 GM/DL
GLUCOSE BLD-MCNC: 157 MG/DL (ref 70–110)
HCT VFR BLD AUTO: 29.9 % (ref 42–52)
HGB BLD-MCNC: 9.4 G/DL (ref 14–18)
HYPOCHROMIA BLD QL: NORMAL
IMM GRANULOCYTES # BLD: 0.05 10*3/MM3 (ref 0–0.03)
IMM GRANULOCYTES NFR BLD: 0.4 % (ref 0–0.5)
IRON 24H UR-MRATE: 102 MCG/DL (ref 53–167)
IRON SATN MFR SERPL: 37 % (ref 20–50)
LYMPHOCYTES # BLD AUTO: 1.23 10*3/MM3 (ref 1–3)
LYMPHOCYTES NFR BLD AUTO: 9.5 % (ref 21–51)
MCH RBC QN AUTO: 19.3 PG (ref 27–33)
MCHC RBC AUTO-ENTMCNC: 31.4 G/DL (ref 33–37)
MCV RBC AUTO: 61.3 FL (ref 80–94)
MICROCYTES BLD QL: NORMAL
MONOCYTES # BLD AUTO: 0.51 10*3/MM3 (ref 0.1–0.9)
MONOCYTES NFR BLD AUTO: 3.9 % (ref 0–10)
NEUTROPHILS # BLD AUTO: 11.21 10*3/MM3 (ref 1.4–6.5)
NEUTROPHILS NFR BLD AUTO: 86.2 % (ref 30–70)
OSMOLALITY SERPL CALC.SUM OF ELEC: 280.3 MOSM/KG (ref 273–305)
OVALOCYTES BLD QL SMEAR: NORMAL
PLATELET # BLD AUTO: 398 10*3/MM3 (ref 130–400)
PMV BLD AUTO: 10.3 FL (ref 6–10)
POTASSIUM BLD-SCNC: 4.5 MMOL/L (ref 3.5–5.3)
PROT SERPL-MCNC: 6.7 G/DL (ref 6–8)
RBC # BLD AUTO: 4.88 10*6/MM3 (ref 4.7–6.1)
RETICS #: 0.09 10*6/MM3 (ref 0.02–0.13)
RETICS/RBC NFR AUTO: 1.93 % (ref 0.5–2)
SMALL PLATELETS BLD QL SMEAR: NORMAL
SODIUM BLD-SCNC: 136 MMOL/L (ref 135–153)
TIBC SERPL-MCNC: 274 MCG/DL (ref 241–421)
VIT B12 BLD-MCNC: 369 PG/ML (ref 211–911)
WBC NRBC COR # BLD: 13 10*3/MM3 (ref 4.5–12.5)

## 2018-05-15 PROCEDURE — 83540 ASSAY OF IRON: CPT | Performed by: INTERNAL MEDICINE

## 2018-05-15 PROCEDURE — 82607 VITAMIN B-12: CPT | Performed by: INTERNAL MEDICINE

## 2018-05-15 PROCEDURE — 63710000001 DEXAMETHASONE PER 0.25 MG: Performed by: INTERNAL MEDICINE

## 2018-05-15 PROCEDURE — 83550 IRON BINDING TEST: CPT | Performed by: INTERNAL MEDICINE

## 2018-05-15 PROCEDURE — 94799 UNLISTED PULMONARY SVC/PX: CPT

## 2018-05-15 PROCEDURE — 85025 COMPLETE CBC W/AUTO DIFF WBC: CPT | Performed by: INTERNAL MEDICINE

## 2018-05-15 PROCEDURE — 82728 ASSAY OF FERRITIN: CPT | Performed by: INTERNAL MEDICINE

## 2018-05-15 PROCEDURE — 99233 SBSQ HOSP IP/OBS HIGH 50: CPT | Performed by: INTERNAL MEDICINE

## 2018-05-15 PROCEDURE — 85007 BL SMEAR W/DIFF WBC COUNT: CPT | Performed by: INTERNAL MEDICINE

## 2018-05-15 PROCEDURE — 85045 AUTOMATED RETICULOCYTE COUNT: CPT | Performed by: INTERNAL MEDICINE

## 2018-05-15 PROCEDURE — 25010000002 KETOROLAC TROMETHAMINE PER 15 MG: Performed by: INTERNAL MEDICINE

## 2018-05-15 PROCEDURE — 25010000002 HEPARIN (PORCINE) PER 1000 UNITS: Performed by: INTERNAL MEDICINE

## 2018-05-15 PROCEDURE — 82746 ASSAY OF FOLIC ACID SERUM: CPT | Performed by: INTERNAL MEDICINE

## 2018-05-15 PROCEDURE — 80053 COMPREHEN METABOLIC PANEL: CPT | Performed by: INTERNAL MEDICINE

## 2018-05-15 RX ORDER — DEXAMETHASONE 4 MG/1
6 TABLET ORAL
Status: DISCONTINUED | OUTPATIENT
Start: 2018-05-16 | End: 2018-05-17 | Stop reason: HOSPADM

## 2018-05-15 RX ORDER — GABAPENTIN 400 MG/1
800 CAPSULE ORAL EVERY 8 HOURS SCHEDULED
Status: DISCONTINUED | OUTPATIENT
Start: 2018-05-15 | End: 2018-05-17 | Stop reason: HOSPADM

## 2018-05-15 RX ORDER — NAPROXEN 250 MG/1
500 TABLET ORAL 2 TIMES DAILY WITH MEALS
Status: DISCONTINUED | OUTPATIENT
Start: 2018-05-16 | End: 2018-05-17 | Stop reason: HOSPADM

## 2018-05-15 RX ORDER — PANTOPRAZOLE SODIUM 40 MG/1
40 TABLET, DELAYED RELEASE ORAL
Status: DISCONTINUED | OUTPATIENT
Start: 2018-05-15 | End: 2018-05-17 | Stop reason: HOSPADM

## 2018-05-15 RX ORDER — KETOROLAC TROMETHAMINE 30 MG/ML
15 INJECTION, SOLUTION INTRAMUSCULAR; INTRAVENOUS EVERY 6 HOURS
Status: COMPLETED | OUTPATIENT
Start: 2018-05-15 | End: 2018-05-16

## 2018-05-15 RX ORDER — OXYCODONE HYDROCHLORIDE 15 MG/1
15 TABLET ORAL EVERY 6 HOURS
Status: DISCONTINUED | OUTPATIENT
Start: 2018-05-16 | End: 2018-05-16

## 2018-05-15 RX ADMIN — OXYCODONE HYDROCHLORIDE 15 MG: 15 TABLET ORAL at 23:56

## 2018-05-15 RX ADMIN — HEPARIN SODIUM 5000 UNITS: 5000 INJECTION, SOLUTION INTRAVENOUS; SUBCUTANEOUS at 09:08

## 2018-05-15 RX ADMIN — HYDROMORPHONE HYDROCHLORIDE 6 MG: 2 TABLET ORAL at 21:04

## 2018-05-15 RX ADMIN — OXYCODONE HYDROCHLORIDE 15 MG: 15 TABLET ORAL at 15:16

## 2018-05-15 RX ADMIN — OXYCODONE HYDROCHLORIDE 15 MG: 15 TABLET ORAL at 10:59

## 2018-05-15 RX ADMIN — DEXAMETHASONE 4 MG: 4 TABLET ORAL at 16:51

## 2018-05-15 RX ADMIN — DEXAMETHASONE 4 MG: 4 TABLET ORAL at 05:17

## 2018-05-15 RX ADMIN — KETOROLAC TROMETHAMINE 15 MG: 30 INJECTION, SOLUTION INTRAMUSCULAR; INTRAVENOUS at 21:05

## 2018-05-15 RX ADMIN — GABAPENTIN 800 MG: 400 CAPSULE ORAL at 21:04

## 2018-05-15 RX ADMIN — DIAZEPAM 5 MG: 5 TABLET ORAL at 09:21

## 2018-05-15 RX ADMIN — DEXAMETHASONE 4 MG: 4 TABLET ORAL at 10:59

## 2018-05-15 RX ADMIN — OXYCODONE HYDROCHLORIDE 20 MG: 20 TABLET, FILM COATED, EXTENDED RELEASE ORAL at 09:08

## 2018-05-15 RX ADMIN — OXYCODONE HYDROCHLORIDE 15 MG: 15 TABLET ORAL at 04:03
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
## 2018-05-15 NOTE — PROGRESS NOTES
Discharge Planning Assessment   [place]     Patient Name: Jhonny Washington  MRN: [id number]  Today's Date: 5/15/2018    Admit Date: 5/12/2018          Discharge Needs Assessment    No documentation.           Discharge Plan     Row Name 05/15/18 1002       Plan    Plan SS received call per [facility] Palliative Care Team SS that pt was a pt of [facility].  SS will continue to follow and assist with discharge needs.         Destination     No service coordination in this encounter.      Durable Medical Equipment     No service coordination in this encounter.      Dialysis/Infusion     No service coordination in this encounter.      Home Medical Care     No service coordination in this encounter.      Social Care     No service coordination in this encounter.                Demographic Summary    No documentation.           Functional Status    No documentation.           Psychosocial    No documentation.           Abuse/Neglect    No documentation.           Legal    No documentation.           Substance Abuse    No documentation.           Patient Forms    No documentation.         Amber Jimenes

## 2018-05-15 NOTE — PLAN OF CARE
Problem: Patient Care Overview  Goal: Plan of Care Review  Outcome: Ongoing (interventions implemented as appropriate)  Pt. Denies any chest pain     Problem: Fall Risk (Adult)  Goal: Identify Related Risk Factors and Signs and Symptoms  Outcome: Outcome(s) achieved Date Met: 05/15/18    Goal: Absence of Fall  Outcome: Ongoing (interventions implemented as appropriate)      Problem: Pain, Acute (Adult)  Goal: Identify Related Risk Factors and Signs and Symptoms  Outcome: Outcome(s) achieved Date Met: 05/15/18    Goal: Acceptable Pain Control/Comfort Level  Outcome: Ongoing (interventions implemented as appropriate)      Problem: Skin Injury Risk (Adult)  Goal: Skin Health and Integrity  Outcome: Ongoing (interventions implemented as appropriate)

## 2018-05-15 NOTE — PROGRESS NOTES
I have seen the patient in conjunction with Wendy SMITH RN    History of presenting illness:  Patient continues to complain of significant pain.  I've learned patient was apparently discharged from the [facility] oncology practice.  Patient is a patient under hospitalist, Dr. Null is going to see him today he also sees him apparently from hospice.  Patient's pain is still in the right upper chest area worsened with certain movements.  He has stated that the oxycodone leaves him somewhat with a funny feeling in his head and Dr. Null is going to try him on Dilaudid plus some insulin.  Patient has still been going outside and ambulating, still continues to smoke.    Vital Signs  Temp:  [97.5 °F (36.4 °C)-98.4 °F (36.9 °C)] 98.3 °F (36.8 °C)  Heart Rate:  [64-80] 76  Resp:  [16-20] 16  BP: (117-137)/(42-85) 117/42  Body mass index is 22.54 kg/m².      Intake/Output Summary (Last 24 hours) at 05/15/18 1830  Last data filed at 05/15/18 0809   Gross per 24 hour   Intake             2230 ml   Output             1300 ml   Net              930 ml     Intake & Output (last 3 days)       05/12 0701 - 05/13 0700 05/13 0701 - 05/14 0700 05/14 0701 - 05/15 0700 05/15 0701 - 05/16 0700    P.O.  300    I.V. (mL/kg) 1000 (13.3) 1999 (26.6)      IV Piggyback 1000       Total Intake(mL/kg) 2340 (31.1) 2419 (32.2) 1930 (25.6) 300 (4)    Urine (mL/kg/hr) 850 1400 (0.8) 1300 (0.7)     Total Output 850 1400 1300      Net +1490 +1019 +630 +300                  Physical exam:  Physical Exam   Constitutional: Well-developed, well-nourished.  Pleasant.  No distress      Head: Normocephalic and atraumatic.  Hearing is intact, mucous membranes are moist.   Eyes:  Pupils are equal, round, and reactive to light. No scleral icterus.   Cardiovascular: Normal rate, regular rhythm and normal heart sounds.  No murmur rub or gallop  Pulmonary/Chest: Bilateral breath sounds no rhonchus rales or wheezing heard  Abdominal: Soft, flat, bowel sounds  are active, nondistended nontender.  No peritoneal signs   Musculoskeletal: Strength is symmetric  Neurological: Nonfocal, alert.   Skin: Skin is warm and dry.   Psychiatric:  Normal mood and affect.     Telemetry: sinus, reviewed     Results Review:  Lab Results   Component Value Date    WBC 13.00 (H) 05/15/2018    HGB 9.4 (L) 05/15/2018    HCT 29.9 (L) 05/15/2018    MCV 61.3 (L) 05/15/2018     05/15/2018     Lab Results   Component Value Date    GLUCOSE 157 (H) 05/15/2018    BUN 27 (H) 05/15/2018    CREATININE 0.78 05/15/2018    EGFRIFNONA 101 05/15/2018    BCR 34.6 (H) 05/15/2018    CO2 23.9 (L) 05/15/2018    CALCIUM 8.8 05/15/2018    PROTENTOTREF 7.2 11/12/2015    ALBUMIN 3.70 05/15/2018    LABIL2 1.2 (L) 05/15/2018    AST 18 05/15/2018    ALT 14 05/15/2018       Imaging Results (last 72 hours)     Procedure Component Value Units Date/Time    XR Chest 1 View [[id number]] Collected:  05/13/18 0911     Updated:  05/13/18 0917    Narrative:       XR CHEST 1 VIEWS-     CLINICAL INDICATION: Chest pain/shortness of air/hemoptysis.          COMPARISON: 01/17/2018      TECHNIQUE: Single frontal view of the chest.     FINDINGS:  Right apical soft tissue mass again noted.  The cardiac silhouette is normal. The pulmonary vasculature is  unremarkable.  There is no evidence of an acute osseous abnormality.          Impression:       Little overall change.           This report was finalized on 5/13/2018 9:15 AM by Dr. Nasim Lovett MD.       CT Chest Pulmonary Embolism With Contrast [[id number]] Collected:  05/13/18 0910     Updated:  05/13/18 0916    Narrative:       CT CHEST PULMONARY EMBOLISM WITH CONTRAST-     CLINICAL INDICATION: Patient states lung cancer/hemoptysis/shortness of  air.          COMPARISON: 03/21/2016      PROCEDURE: Thin cut axial images were acquired through the pulmonary  vessels during the rapid infusion of IV contrast.     Additional 3-D reformatted images obtained via post-processing  for  improved diagnostic accuracy and procedural planning.     Radiation dose reduction techniques were utilized per ALARA protocol.  Automated exposure control was initiated through either or [employer] or  DoseRight software packages by  protocol.           FINDINGS: Today's study demonstrates opacification of the central  pulmonary vessels.   There are no filling defects.   There is no truncation.     No evidence of a pulmonary embolus.     Abnormal soft tissue mass in the right apex larger than on the previous  exam. This does appear to involve the adjacent vertebral body.     Abnormal expansile metastatic lesion in anterior right rib.     There is no mediastinal lymph node enlargement.     No pericardial or pleural effusion.          Impression:       1. No convincing evidence of a pulmonary embolus.  2. Increasing right apical soft tissue mass with extension into the  adjacent rib and vertebral body.     This report was finalized on 5/13/2018 9:14 AM by Dr. Nasim Lovett MD.            CT images reviewed And discussed with Dr. 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 Discussed with Dr. Wyatt, his last radiation was 2 years ago.  He feels there may be some potential spinal cord beginning impingement.  He recommended an MRI which has been ordered.  He has stated that the patient possibly could have more radiation, he agreed that this has grown since his last study.  There is pain medication has been adjusted by palliative care.  Further recommendations once MRI done.  [facility] did not think there was much to add from a medical oncology at this time.  Continue him on Decadron    Tobacco abuse, I suggested he quit however considering his overall prognosis at least from the above history do not think this will have a significant impact in his health.    DVT prophylaxis, subcutaneous heparin    High risk  due to advanced cancer    Microcytic anemia, suggest from studies chronic disease, he potentially could even have some form of thalassemia.  Hemoglobin is stable.      Jose Luna MD  05/15/18  6:30 PM

## 2018-05-16 ENCOUNTER — APPOINTMENT (OUTPATIENT)
Dept: MRI IMAGING | Facility: HOSPITAL | Age: 63
End: 2018-05-16

## 2018-05-16 LAB
ALBUMIN SERPL-MCNC: 3.3 G/DL (ref 3.4–4.8)
ALBUMIN/GLOB SERPL: 1.1 G/DL (ref 1.5–2.5)
ALP SERPL-CCNC: 61 U/L (ref 40–129)
ALT SERPL W P-5'-P-CCNC: 11 U/L (ref 10–44)
ANION GAP SERPL CALCULATED.3IONS-SCNC: 2.4 MMOL/L (ref 3.6–11.2)
ANISOCYTOSIS BLD QL: NORMAL
AST SERPL-CCNC: 20 U/L (ref 10–34)
BASOPHILS # BLD AUTO: 0.01 10*3/MM3 (ref 0–0.3)
BASOPHILS NFR BLD AUTO: 0.1 % (ref 0–2)
BILIRUB SERPL-MCNC: 0.3 MG/DL (ref 0.2–1.8)
BUN BLD-MCNC: 29 MG/DL (ref 7–21)
BUN/CREAT SERPL: 35.8 (ref 7–25)
CALCIUM SPEC-SCNC: 8.8 MG/DL (ref 7.7–10)
CHLORIDE SERPL-SCNC: 110 MMOL/L (ref 99–112)
CO2 SERPL-SCNC: 24.6 MMOL/L (ref 24.3–31.9)
CREAT BLD-MCNC: 0.81 MG/DL (ref 0.43–1.29)
DEPRECATED RDW RBC AUTO: 36 FL (ref 37–54)
EOSINOPHIL # BLD AUTO: 0 10*3/MM3 (ref 0–0.7)
EOSINOPHIL NFR BLD AUTO: 0 % (ref 0–5)
ERYTHROCYTE [DISTWIDTH] IN BLOOD BY AUTOMATED COUNT: 17.6 % (ref 11.5–14.5)
GFR SERPL CREATININE-BSD FRML MDRD: 97 ML/MIN/1.73
GLOBULIN UR ELPH-MCNC: 3 GM/DL
GLUCOSE BLD-MCNC: 143 MG/DL (ref 70–110)
HCT VFR BLD AUTO: 29.9 % (ref 42–52)
HGB BLD-MCNC: 9.7 G/DL (ref 14–18)
HYPOCHROMIA BLD QL: NORMAL
IMM GRANULOCYTES # BLD: 0.1 10*3/MM3 (ref 0–0.03)
IMM GRANULOCYTES NFR BLD: 0.6 % (ref 0–0.5)
LYMPHOCYTES # BLD AUTO: 2.02 10*3/MM3 (ref 1–3)
LYMPHOCYTES NFR BLD AUTO: 11.8 % (ref 21–51)
MCH RBC QN AUTO: 19.8 PG (ref 27–33)
MCHC RBC AUTO-ENTMCNC: 32.4 G/DL (ref 33–37)
MCV RBC AUTO: 61 FL (ref 80–94)
MICROCYTES BLD QL: NORMAL
MONOCYTES # BLD AUTO: 0.96 10*3/MM3 (ref 0.1–0.9)
MONOCYTES NFR BLD AUTO: 5.6 % (ref 0–10)
NEUTROPHILS # BLD AUTO: 14.05 10*3/MM3 (ref 1.4–6.5)
NEUTROPHILS NFR BLD AUTO: 81.9 % (ref 30–70)
OSMOLALITY SERPL CALC.SUM OF ELEC: 282.1 MOSM/KG (ref 273–305)
OVALOCYTES BLD QL SMEAR: NORMAL
PLAT MORPH BLD: NORMAL
PLATELET # BLD AUTO: 411 10*3/MM3 (ref 130–400)
PMV BLD AUTO: 10.4 FL (ref 6–10)
POTASSIUM BLD-SCNC: 4.5 MMOL/L (ref 3.5–5.3)
PROT SERPL-MCNC: 6.3 G/DL (ref 6–8)
RBC # BLD AUTO: 4.9 10*6/MM3 (ref 4.7–6.1)
SODIUM BLD-SCNC: 137 MMOL/L (ref 135–153)
TARGETS BLD QL SMEAR: NORMAL
WBC NRBC COR # BLD: 17.14 10*3/MM3 (ref 4.5–12.5)

## 2018-05-16 PROCEDURE — 25010000002 KETOROLAC TROMETHAMINE PER 15 MG: Performed by: INTERNAL MEDICINE

## 2018-05-16 PROCEDURE — 72157 MRI CHEST SPINE W/O & W/DYE: CPT

## 2018-05-16 PROCEDURE — 72157 MRI CHEST SPINE W/O & W/DYE: CPT | Performed by: RADIOLOGY

## 2018-05-16 PROCEDURE — 80053 COMPREHEN METABOLIC PANEL: CPT | Performed by: INTERNAL MEDICINE

## 2018-05-16 PROCEDURE — 85025 COMPLETE CBC W/AUTO DIFF WBC: CPT | Performed by: INTERNAL MEDICINE

## 2018-05-16 PROCEDURE — 99232 SBSQ HOSP IP/OBS MODERATE 35: CPT | Performed by: INTERNAL MEDICINE

## 2018-05-16 PROCEDURE — 0 GADOBENATE DIMEGLUMINE 529 MG/ML SOLUTION: Performed by: INTERNAL MEDICINE

## 2018-05-16 PROCEDURE — 25010000002 HEPARIN (PORCINE) PER 1000 UNITS: Performed by: INTERNAL MEDICINE

## 2018-05-16 PROCEDURE — 85007 BL SMEAR W/DIFF WBC COUNT: CPT | Performed by: INTERNAL MEDICINE

## 2018-05-16 PROCEDURE — 63710000001 DEXAMETHASONE PER 0.25 MG: Performed by: INTERNAL MEDICINE

## 2018-05-16 PROCEDURE — A9577 INJ MULTIHANCE: HCPCS | Performed by: INTERNAL MEDICINE

## 2018-05-16 RX ORDER — ACETAMINOPHEN 325 MG/1
650 TABLET ORAL EVERY 6 HOURS PRN
Status: DISCONTINUED | OUTPATIENT
Start: 2018-05-16 | End: 2018-05-17 | Stop reason: HOSPADM

## 2018-05-16 RX ORDER — SENNA PLUS 8.6 MG/1
2 TABLET ORAL 2 TIMES DAILY PRN
Status: DISCONTINUED | OUTPATIENT
Start: 2018-05-16 | End: 2018-05-17 | Stop reason: HOSPADM

## 2018-05-16 RX ORDER — DIAZEPAM 5 MG/1
5 TABLET ORAL NIGHTLY
Status: DISCONTINUED | OUTPATIENT
Start: 2018-05-16 | End: 2018-05-17

## 2018-05-16 RX ORDER — OXYCODONE HYDROCHLORIDE 15 MG/1
15 TABLET ORAL EVERY 4 HOURS
Status: DISCONTINUED | OUTPATIENT
Start: 2018-05-16 | End: 2018-05-17 | Stop reason: HOSPADM

## 2018-05-16 RX ADMIN — PANTOPRAZOLE SODIUM 40 MG: 40 TABLET, DELAYED RELEASE ORAL at 16:58

## 2018-05-16 RX ADMIN — GABAPENTIN 800 MG: 400 CAPSULE ORAL at 20:44

## 2018-05-16 RX ADMIN — DIAZEPAM 5 MG: 5 TABLET ORAL at 08:36

## 2018-05-16 RX ADMIN — DIAZEPAM 5 MG: 5 TABLET ORAL at 20:44

## 2018-05-16 RX ADMIN — NAPROXEN 500 MG: 250 TABLET ORAL at 16:58

## 2018-05-16 RX ADMIN — GABAPENTIN 800 MG: 400 CAPSULE ORAL at 06:15

## 2018-05-16 RX ADMIN — HEPARIN SODIUM 5000 UNITS: 5000 INJECTION, SOLUTION INTRAVENOUS; SUBCUTANEOUS at 08:33

## 2018-05-16 RX ADMIN — OXYCODONE HYDROCHLORIDE 15 MG: 15 TABLET ORAL at 22:24

## 2018-05-16 RX ADMIN — PANTOPRAZOLE SODIUM 40 MG: 40 TABLET, DELAYED RELEASE ORAL at 08:32

## 2018-05-16 RX ADMIN — OXYCODONE HYDROCHLORIDE 15 MG: 15 TABLET ORAL at 06:15

## 2018-05-16 RX ADMIN — OXYCODONE HYDROCHLORIDE 15 MG: 15 TABLET ORAL at 11:57

## 2018-05-16 RX ADMIN — OXYCODONE HYDROCHLORIDE 15 MG: 15 TABLET ORAL at 16:58

## 2018-05-16 RX ADMIN — KETOROLAC TROMETHAMINE 15 MG: 30 INJECTION, SOLUTION INTRAMUSCULAR; INTRAVENOUS at 08:33

## 2018-05-16 RX ADMIN — KETOROLAC TROMETHAMINE 15 MG: 30 INJECTION, SOLUTION INTRAMUSCULAR; INTRAVENOUS at 03:50

## 2018-05-16 RX ADMIN — GABAPENTIN 800 MG: 400 CAPSULE ORAL at 11:57

## 2018-05-16 RX ADMIN — DEXAMETHASONE 6 MG: 4 TABLET ORAL at 11:58

## 2018-05-16 RX ADMIN — DEXAMETHASONE 6 MG: 4 TABLET ORAL at 08:32

## 2018-05-16 RX ADMIN — GADOBENATE DIMEGLUMINE 15 ML: 529 INJECTION, SOLUTION INTRAVENOUS at 13:15

## 2018-05-16 NOTE — PROGRESS NOTES
Discharge Planning Assessment   Nabeel     Patient Name: Jhonny Washington  MRN: [id number]  Today's Date: 5/16/2018    Admit Date: 5/12/2018          Discharge Needs Assessment    No documentation.           Discharge Plan     Row Name 05/16/18 1240       Plan    Plan Pt admitted on 5/16/18.  Pt currently does not utilize DME.  Pt utilizes [facility].  SS will follow and assist with discharge needs.         Destination     No service coordination in this encounter.      Durable Medical Equipment     No service coordination in this encounter.      Dialysis/Infusion     No service coordination in this encounter.      Home Medical Care     No service coordination in this encounter.      Social Care     No service coordination in this encounter.                Demographic Summary    No documentation.           Functional Status    No documentation.           Psychosocial    No documentation.           Abuse/Neglect    No documentation.           Legal    No documentation.           Substance Abuse    No documentation.           Patient Forms    No documentation.         Amber Jimenes

## 2018-05-16 NOTE — PROGRESS NOTES
I have seen the patient in conjunction with Wendy SMITH RN    History of presenting illness:  Patient states he didn't sleep that well and is requesting his oxycodone be increased from 15-20 mg.patient is still on his right upper chest area otherwise he just states he has some numbness in his small finger right hand which is not new.    Vital Signs  Temp:  [97.3 °F (36.3 °C)-98.5 °F (36.9 °C)] 98.5 °F (36.9 °C)  Heart Rate:  [55-77] 69  Resp:  [16-18] 18  BP: (120-146)/(74-85) 120/78  Body mass index is 22.64 kg/m².      Intake/Output Summary (Last 24 hours) at 05/16/18 1749  Last data filed at 05/16/18 0813   Gross per 24 hour   Intake             1400 ml   Output             1500 ml   Net             -100 ml     Intake & Output (last 3 days)       05/13 0701 - 05/14 0700 05/14 0701 - 05/15 0700 05/15 0701 - 05/16 0700 05/16 0701 - 05/17 0700    P.O. 420 1930 1400 300    I.V. (mL/kg) 1999 (26.6)       IV Piggyback        Total Intake(mL/kg) 2419 (32.2) 1930 (25.6) 1400 (18.5) 300 (4)    Urine (mL/kg/hr) 1400 (0.8) 1300 (0.7) 1500 (0.8)     Total Output 1400 1300 1500      Net +1019 +630 -100 +300                  Physical exam:  Physical Exam   Constitutional: Well-developed, well-nourished.  Pleasant.  No distress   Sitting up and eating   Head: Normocephalic and atraumatic.  Hearing is intact, mucous membranes are moist.   Eyes:  Pupils are equal, round, and reactive to light. No scleral icterus.   Cardiovascular: Normal rate, regular rhythm and normal heart sounds.  No murmur rub or gallop  Pulmonary/Chest: Bilateral breath sounds no rhonchus rales or wheezing heard  Abdominal: Soft, flat, bowel sounds are active, nondistended nontender.  No peritoneal signs   Musculoskeletal: Strength is symmetric,\,subjectively decreased sensation right small finger  Neurological: Nonfocal, alert.   Skin: Skin is warm and dry.   Psychiatric:  Normal mood and affect.     Telemetry: sinus, reviewed     Results Review:  Lab  Results   Component Value Date    WBC 17.14 (H) 05/16/2018    HGB 9.7 (L) 05/16/2018    HCT 29.9 (L) 05/16/2018    MCV 61.0 (L) 05/16/2018     (H) 05/16/2018     Lab Results   Component Value Date    GLUCOSE 143 (H) 05/16/2018    BUN 29 (H) 05/16/2018    CREATININE 0.81 05/16/2018    EGFRIFNONA 97 05/16/2018    BCR 35.8 (H) 05/16/2018    CO2 24.6 05/16/2018    CALCIUM 8.8 05/16/2018    PROTENTOTREF 7.2 11/12/2015    ALBUMIN 3.30 (L) 05/16/2018    LABIL2 1.1 (L) 05/16/2018    AST 20 05/16/2018    ALT 11 05/16/2018       Imaging Results (last 72 hours)     Procedure Component Value Units Date/Time    XR Chest 1 View [[id number]] Collected:  05/13/18 0911     Updated:  05/13/18 0917    Narrative:       XR CHEST 1 VIEWS-     CLINICAL INDICATION: Chest pain/shortness of air/hemoptysis.          COMPARISON: 01/17/2018      TECHNIQUE: Single frontal view of the chest.     FINDINGS:  Right apical soft tissue mass again noted.  The cardiac silhouette is normal. The pulmonary vasculature is  unremarkable.  There is no evidence of an acute osseous abnormality.          Impression:       Little overall change.           This report was finalized on 5/13/2018 9:15 AM by Dr. Nasim Lovett MD.       CT Chest Pulmonary Embolism With Contrast [[id number]] Collected:  05/13/18 0910     Updated:  05/13/18 0916    Narrative:       CT CHEST PULMONARY EMBOLISM WITH CONTRAST-     CLINICAL INDICATION: Patient states lung cancer/hemoptysis/shortness of  air.          COMPARISON: 03/21/2016      PROCEDURE: Thin cut axial images were acquired through the pulmonary  vessels during the rapid infusion of IV contrast.     Additional 3-D reformatted images obtained via post-processing for  improved diagnostic accuracy and procedural planning.     Radiation dose reduction techniques were utilized per ALARA protocol.  Automated exposure control was initiated through either or Care[employer]ose or  DoseRight software packages by  protocol.            FINDINGS: Today's study demonstrates opacification of the central  pulmonary vessels.   There are no filling defects.   There is no truncation.     No evidence of a pulmonary embolus.     Abnormal soft tissue mass in the right apex larger than on the previous  exam. This does appear to involve the adjacent vertebral body.     Abnormal expansile metastatic lesion in anterior right rib.     There is no mediastinal lymph node enlargement.     No pericardial or pleural effusion.          Impression:       1. No convincing evidence of a pulmonary embolus.  2. Increasing right apical soft tissue mass with extension into the  adjacent rib and vertebral body.     This report was finalized on 5/13/2018 9:14 AM by Dr. Nasim Lovett MD.            MRI discussed with Dr. 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 MRI done, results noted, discussed with Dr. Wyatt, he is going to discuss this further with a different radiation oncologist to see if anything can be offered in blood, he does not think there is an impending spinal cord compression at this time.  I'm continuing the patient on current medications for pain and allowing palliative care to adjust these.  Once plan made for potential radiation were not patient could be discharged.  He states he does have a place to live.    Tobacco abuse, I suggested he quit however considering his overall prognosis at least from the above history do not think this will have a significant impact in his health.    DVT prophylaxis, subcutaneous heparin    High risk due to advanced cancer    Microcytic anemia, suggest from studies chronic disease, he potentially could even have some form of thalassemia.  Hemoglobin is stable.    Leukocytosis related to Decadron      Jose Luna MD  05/16/18  5:49 PM

## 2018-05-16 NOTE — PLAN OF CARE
Problem: Patient Care Overview  Goal: Plan of Care Review  Outcome: Ongoing (interventions implemented as appropriate)  Pt. Complains that pain is not being managed by current medication regimen.    05/16/18 0841   Coping/Psychosocial   Plan of Care Reviewed With patient   Plan of Care Review   Progress no change       Problem: Fall Risk (Adult)  Goal: Absence of Fall  Outcome: Ongoing (interventions implemented as appropriate)      Problem: Pain, Acute (Adult)  Goal: Acceptable Pain Control/Comfort Level  Outcome: Ongoing (interventions implemented as appropriate)      Problem: Skin Injury Risk (Adult)  Goal: Identify Related Risk Factors and Signs and Symptoms  Outcome: Outcome(s) achieved Date Met: 05/16/18    Goal: Skin Health and Integrity  Outcome: Ongoing (interventions implemented as appropriate)

## 2018-05-16 NOTE — PROGRESS NOTES
"Palliative Care Progress Note    Date of Admission: 5/12/2018    Code Status: full code, reviewed with patient no change in status  Advance Directive: no written document  Surrogate decision maker: Patient verbalizes that he would designate his daughter, Gita Palmer. He has two other daughters and one son, they all live in [facility]; patient reports they are aware of him being ill and having hospice.       Subjective:  Patient lying in bed, reports right anterior chest pain radiates through to his back. Described as \"soreness\", currently 9/10, \"severe\". Reports that he was not able to sleep overnight with the pain, but when he received oxy IR 15mg this am, it eliminated all his pain, works within 20 minutes.  Patient reported that he was not able to tolerate hydromorphone dose administered yesterday, made him nauseated and did not sleep overnight. After he received his oxy IR dose this am he was able to go outside and walk around the hospital a few times with no difficulties.   Patient denies nausea, dyspnea, good appetite, requesting \"double portions\" and having daily BM. Reports takes valium at night for his anxiety.   Reviewed current scheduled and prn medications for route, type, dose, and frequency.     diazePAM  •  ipratropium-albuterol  •  magnesium sulfate **OR** magnesium sulfate **OR** magnesium sulfate  •  potassium chloride **OR** potassium chloride **OR** potassium chloride  •  senna  •  sodium chloride    Objective:  PPS 70%   /84 (BP Location: Right arm, Patient Position: Lying)   Pulse 62   Temp 97.3 °F (36.3 °C) (Oral)   Resp 16   Ht 182.9 cm (72\")   Wt 75.7 kg (166 lb 14.4 oz)   SpO2 99%   BMI 22.64 kg/m²    Intake & Output (last day)       05/15 0701 - 05/16 0700 05/16 0701 - 05/17 0700    P.O. 1400 300    Total Intake(mL/kg) 1400 (18.5) 300 (4)    Urine (mL/kg/hr) 1500 (0.8)     Total Output 1500      Net -100 +300              Lab Results (last 24 hours)     Procedure Component " Value Units Date/Time    Comprehensive Metabolic Panel [[id number]]  (Abnormal) Collected:  05/16/18 0100    Specimen:  Blood Updated:  05/16/18 0146     Glucose 143 (H) mg/dL      BUN 29 (H) mg/dL      Creatinine 0.81 mg/dL      Sodium 137 mmol/L      Potassium 4.5 mmol/L      Chloride 110 mmol/L      CO2 24.6 mmol/L      Calcium 8.8 mg/dL      Total Protein 6.3 g/dL      Albumin 3.30 (L) g/dL      ALT (SGPT) 11 U/L      AST (SGOT) 20 U/L      Alkaline Phosphatase 61 U/L      Comment: Note New Reference Ranges        Total Bilirubin 0.3 mg/dL      eGFR Non African Amer 97 mL/min/1.73      Globulin 3.0 gm/dL      A/G Ratio 1.1 (L) g/dL      BUN/Creatinine Ratio 35.8 (H)     Anion Gap 2.4 (L) mmol/L     Osmolality, Calculated [[id number]]  (Normal) Collected:  05/16/18 0100    Specimen:  Blood Updated:  05/16/18 0146     Osmolality Calc 282.1 mOsm/kg     CBC & Differential [[id number]] Collected:  05/16/18 0100    Specimen:  Blood Updated:  05/16/18 0138    Narrative:       The following orders were created for panel order CBC & Differential.  Procedure                               Abnormality         Status                     ---------                               -----------         ------                     Scan Slide[[id number]]                                       Final result               CBC Auto Differential[[id number]]        Abnormal            Final result                 Please view results for these tests on the individual orders.    CBC Auto Differential [[id number]]  (Abnormal) Collected:  05/16/18 0100    Specimen:  Blood Updated:  05/16/18 0138     WBC 17.14 (H) 10*3/mm3      RBC 4.90 10*6/mm3      Hemoglobin 9.7 (L) g/dL      Hematocrit 29.9 (L) %      MCV 61.0 (L) fL      MCH 19.8 (L) pg      MCHC 32.4 (L) g/dL      RDW 17.6 (H) %      RDW-SD 36.0 (L) fl      MPV 10.4 (H) fL      Platelets 411 (H) 10*3/mm3      Neutrophil % 81.9 (H) %      Lymphocyte % 11.8 (L) %      Monocyte % 5.6 %       Eosinophil % 0.0 %      Basophil % 0.1 %      Immature Grans % 0.6 (H) %      Neutrophils, Absolute 14.05 (H) 10*3/mm3      Lymphocytes, Absolute 2.02 10*3/mm3      Monocytes, Absolute 0.96 (H) 10*3/mm3      Eosinophils, Absolute 0.00 10*3/mm3      Basophils, Absolute 0.01 10*3/mm3      Immature Grans, Absolute 0.10 (H) 10*3/mm3     Scan Slide [[id number]] Collected:  05/16/18 0100    Specimen:  Blood Updated:  05/16/18 0138     Anisocytosis Slight/1+     Hypochromia Slight/1+     Microcytes Large/3+     Ovalocytes Slight/1+     Target Cells Slight/1+     Platelet Morphology Normal        Imaging Results (last 24 hours)     Procedure Component Value Units Date/Time    MRI Thoracic Spine With & Without Contrast [[id number]] Collected:  05/16/18 1356     Updated:  05/16/18 1408    Narrative:       MRI THORACIC SPINE WITH / WITHOUT CONTRAST      CLINICAL INDICATION: Abnormal x-ray, thoracic spine, bone destruction.        COMPARISON: None available.     PROCEDURE: Multiple planar images of the thoracic spine were acquired in  a high field strength magnet. Several pulse sequences were used to  acquire the study. Gadolinium was administered. Images were acquired  before and after gadolinium.     FINDINGS: There is decreased signal involving the T2 vertebral body on  T1-weighted images. There is enhancement on the postgadolinium images  and there is increased signal on the STIR images. This is most  compatible with pathologic fracture. There is an adjacent right apical  soft tissue mass. The right neural foramen at T2-T3 appears to be  somewhat compromised with soft tissue.     Spinous process at T10 demonstrates enhancement and increased T2 signal  concerning for metastatic disease at this site.       Impression:       1.  Appearance compatible with metastatic disease involving the T2  vertebral body and lamina as well as transverse process on the right at  T2. Soft tissue somewhat compromising the disc space at T2-3.  2.   Appearance concerning for metastatic disease involving the spinous  process at T10.     This report was finalized on 5/16/2018 2:06 PM by Dr. Nasim Lovett MD.             Physical Exam:  Gen: NAD, resting in bed  Skin: warm, dry   Eyes: BRAYAN, conjunctiva clear and moist   HEENT: oropharynx clear, moist  Resp/thorax: even effort, non labored, CTA   CV: RRR   ABD: soft, bowel sounds +, nontender  Ext: no edema, no redness   Neuro: alert, interactive, no myoclonus   Psych: appropriate conversation and mood     Reviewed labs and diagnostic results.   Lab Results   Component Value Date    HGBA1C 5.8 (H) 02/22/2014       Results from last 7 days  Lab Units 05/16/18  0100   WBC 10*3/mm3 17.14*   HEMOGLOBIN g/dL 9.7*   HEMATOCRIT % 29.9*   PLATELETS 10*3/mm3 411*       Results from last 7 days  Lab Units 05/16/18  0100   SODIUM mmol/L 137   POTASSIUM mmol/L 4.5   CHLORIDE mmol/L 110   CO2 mmol/L 24.6   BUN mg/dL 29*   CREATININE mg/dL 0.81   CALCIUM mg/dL 8.8   BILIRUBIN mg/dL 0.3   ALK PHOS U/L 61   ALT (SGPT) U/L 11   AST (SGOT) U/L 20   GLUCOSE mg/dL 143*       Impression: 63 y.o. 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 No change in opioid regimen. Continue to monitor.    Reviewed addition of dexamethasone, NSAIDS, gabapentin to manage pain.   Add prn acetaminophen for additional antiinflammatory med.     Anxiety - scheuled valium at bedtime, assist with muscle relaxation and soft tissue mass    Constipation - prn stimulant with scheduled opioid regimen. Continue to monitor.     Plan - current hospice patient, plan on discharge home with hospice care  Focus of symptom management is to manage symptoms with  optimal function.   Patient has agreed to complete Living Will, he has spoken to his daughter Gita about his wishes. Will notify palliative nurse or SW to complete tomorrow.     Time: 30 minutes   > 50% time spent in counseling and education concerning current orders for symptom management with patient. Patient in agreement with current analgesia regimen.    Thuy Schumacher, APRN  [phone]  05/16/18  2:11 PM

## 2018-05-17 ENCOUNTER — APPOINTMENT (OUTPATIENT)
Dept: CT IMAGING | Facility: HOSPITAL | Age: 63
End: 2018-05-17

## 2018-05-17 ENCOUNTER — APPOINTMENT (OUTPATIENT)
Dept: GENERAL RADIOLOGY | Facility: HOSPITAL | Age: 63
End: 2018-05-17

## 2018-05-17 ENCOUNTER — HOSPITAL ENCOUNTER (INPATIENT)
Facility: HOSPITAL | Age: 63
LOS: 1 days | Discharge: HOSPICE | End: 2018-05-18
Attending: EMERGENCY MEDICINE | Admitting: INTERNAL MEDICINE

## 2018-05-17 VITALS
HEART RATE: 84 BPM | HEIGHT: 72 IN | DIASTOLIC BLOOD PRESSURE: 90 MMHG | SYSTOLIC BLOOD PRESSURE: 148 MMHG | TEMPERATURE: 98.1 F | OXYGEN SATURATION: 99 % | WEIGHT: 179.7 LBS | RESPIRATION RATE: 18 BRPM | BODY MASS INDEX: 24.34 KG/M2

## 2018-05-17 DIAGNOSIS — S90.02XA CONTUSION OF LEFT ANKLE, INITIAL ENCOUNTER: Primary | ICD-10-CM

## 2018-05-17 LAB
ALBUMIN SERPL-MCNC: 3.8 G/DL (ref 3.4–4.8)
ALBUMIN/GLOB SERPL: 1.3 G/DL (ref 1.5–2.5)
ALP SERPL-CCNC: 65 U/L (ref 40–129)
ALT SERPL W P-5'-P-CCNC: 15 U/L (ref 10–44)
ANION GAP SERPL CALCULATED.3IONS-SCNC: 3.8 MMOL/L (ref 3.6–11.2)
ANION GAP SERPL CALCULATED.3IONS-SCNC: 4.6 MMOL/L (ref 3.6–11.2)
ANISOCYTOSIS BLD QL: NORMAL
ANISOCYTOSIS BLD QL: NORMAL
AST SERPL-CCNC: 22 U/L (ref 10–34)
BACTERIA UR QL AUTO: ABNORMAL /HPF
BASO STIPL COARSE BLD QL SMEAR: NORMAL
BASOPHILS # BLD AUTO: 0.01 10*3/MM3 (ref 0–0.3)
BASOPHILS # BLD AUTO: 0.02 10*3/MM3 (ref 0–0.3)
BASOPHILS NFR BLD AUTO: 0.1 % (ref 0–2)
BASOPHILS NFR BLD AUTO: 0.1 % (ref 0–2)
BILIRUB SERPL-MCNC: 0.4 MG/DL (ref 0.2–1.8)
BILIRUB UR QL STRIP: NEGATIVE
BUN BLD-MCNC: 22 MG/DL (ref 7–21)
BUN BLD-MCNC: 23 MG/DL (ref 7–21)
BUN/CREAT SERPL: 30.6 (ref 7–25)
BUN/CREAT SERPL: 31.9 (ref 7–25)
CALCIUM SPEC-SCNC: 8.5 MG/DL (ref 7.7–10)
CALCIUM SPEC-SCNC: 8.6 MG/DL (ref 7.7–10)
CHLORIDE SERPL-SCNC: 108 MMOL/L (ref 99–112)
CHLORIDE SERPL-SCNC: 108 MMOL/L (ref 99–112)
CLARITY UR: CLEAR
CO2 SERPL-SCNC: 23.4 MMOL/L (ref 24.3–31.9)
CO2 SERPL-SCNC: 25.2 MMOL/L (ref 24.3–31.9)
COLOR UR: YELLOW
CREAT BLD-MCNC: 0.72 MG/DL (ref 0.43–1.29)
CREAT BLD-MCNC: 0.72 MG/DL (ref 0.43–1.29)
DEPRECATED RDW RBC AUTO: 35.5 FL (ref 37–54)
DEPRECATED RDW RBC AUTO: 36.1 FL (ref 37–54)
EOSINOPHIL # BLD AUTO: 0 10*3/MM3 (ref 0–0.7)
EOSINOPHIL # BLD AUTO: 0 10*3/MM3 (ref 0–0.7)
EOSINOPHIL NFR BLD AUTO: 0 % (ref 0–5)
EOSINOPHIL NFR BLD AUTO: 0 % (ref 0–5)
ERYTHROCYTE [DISTWIDTH] IN BLOOD BY AUTOMATED COUNT: 17.5 % (ref 11.5–14.5)
ERYTHROCYTE [DISTWIDTH] IN BLOOD BY AUTOMATED COUNT: 17.7 % (ref 11.5–14.5)
GFR SERPL CREATININE-BSD FRML MDRD: 111 ML/MIN/1.73
GFR SERPL CREATININE-BSD FRML MDRD: 111 ML/MIN/1.73
GLOBULIN UR ELPH-MCNC: 3 GM/DL
GLUCOSE BLD-MCNC: 133 MG/DL (ref 70–110)
GLUCOSE BLD-MCNC: 152 MG/DL (ref 70–110)
GLUCOSE UR STRIP-MCNC: NEGATIVE MG/DL
HCT VFR BLD AUTO: 29.9 % (ref 42–52)
HCT VFR BLD AUTO: 30.8 % (ref 42–52)
HGB BLD-MCNC: 10.1 G/DL (ref 14–18)
HGB BLD-MCNC: 9.6 G/DL (ref 14–18)
HGB UR QL STRIP.AUTO: ABNORMAL
HYALINE CASTS UR QL AUTO: ABNORMAL /LPF
HYPOCHROMIA BLD QL: NORMAL
HYPOCHROMIA BLD QL: NORMAL
IMM GRANULOCYTES # BLD: 0.19 10*3/MM3 (ref 0–0.03)
IMM GRANULOCYTES # BLD: 0.23 10*3/MM3 (ref 0–0.03)
IMM GRANULOCYTES NFR BLD: 1.3 % (ref 0–0.5)
IMM GRANULOCYTES NFR BLD: 1.7 % (ref 0–0.5)
KETONES UR QL STRIP: NEGATIVE
LEUKOCYTE ESTERASE UR QL STRIP.AUTO: NEGATIVE
LYMPHOCYTES # BLD AUTO: 1.57 10*3/MM3 (ref 1–3)
LYMPHOCYTES # BLD AUTO: 2.16 10*3/MM3 (ref 1–3)
LYMPHOCYTES NFR BLD AUTO: 11.6 % (ref 21–51)
LYMPHOCYTES NFR BLD AUTO: 14.3 % (ref 21–51)
MCH RBC QN AUTO: 19.7 PG (ref 27–33)
MCH RBC QN AUTO: 19.9 PG (ref 27–33)
MCHC RBC AUTO-ENTMCNC: 32.1 G/DL (ref 33–37)
MCHC RBC AUTO-ENTMCNC: 32.8 G/DL (ref 33–37)
MCV RBC AUTO: 60.7 FL (ref 80–94)
MCV RBC AUTO: 61.4 FL (ref 80–94)
MICROCYTES BLD QL: NORMAL
MICROCYTES BLD QL: NORMAL
MONOCYTES # BLD AUTO: 0.57 10*3/MM3 (ref 0.1–0.9)
MONOCYTES # BLD AUTO: 1.32 10*3/MM3 (ref 0.1–0.9)
MONOCYTES NFR BLD AUTO: 4.2 % (ref 0–10)
MONOCYTES NFR BLD AUTO: 8.7 % (ref 0–10)
NEUTROPHILS # BLD AUTO: 11.17 10*3/MM3 (ref 1.4–6.5)
NEUTROPHILS # BLD AUTO: 11.46 10*3/MM3 (ref 1.4–6.5)
NEUTROPHILS NFR BLD AUTO: 75.6 % (ref 30–70)
NEUTROPHILS NFR BLD AUTO: 82.4 % (ref 30–70)
NITRITE UR QL STRIP: NEGATIVE
OSMOLALITY SERPL CALC.SUM OF ELEC: 278.3 MOSM/KG (ref 273–305)
OSMOLALITY SERPL CALC.SUM OF ELEC: 279.4 MOSM/KG (ref 273–305)
OVALOCYTES BLD QL SMEAR: NORMAL
OVALOCYTES BLD QL SMEAR: NORMAL
PH UR STRIP.AUTO: 7 [PH] (ref 5–8)
PLAT MORPH BLD: NORMAL
PLAT MORPH BLD: NORMAL
PLATELET # BLD AUTO: 403 10*3/MM3 (ref 130–400)
PLATELET # BLD AUTO: 421 10*3/MM3 (ref 130–400)
PMV BLD AUTO: 10.2 FL (ref 6–10)
PMV BLD AUTO: 10.9 FL (ref 6–10)
POTASSIUM BLD-SCNC: 4.2 MMOL/L (ref 3.5–5.3)
POTASSIUM BLD-SCNC: 4.3 MMOL/L (ref 3.5–5.3)
PROT SERPL-MCNC: 6.8 G/DL (ref 6–8)
PROT UR QL STRIP: NEGATIVE
RBC # BLD AUTO: 4.87 10*6/MM3 (ref 4.7–6.1)
RBC # BLD AUTO: 5.07 10*6/MM3 (ref 4.7–6.1)
RBC # UR: ABNORMAL /HPF
REF LAB TEST METHOD: ABNORMAL
SODIUM BLD-SCNC: 136 MMOL/L (ref 135–153)
SODIUM BLD-SCNC: 137 MMOL/L (ref 135–153)
SP GR UR STRIP: 1.02 (ref 1–1.03)
SQUAMOUS #/AREA URNS HPF: ABNORMAL /HPF
TARGETS BLD QL SMEAR: NORMAL
TARGETS BLD QL SMEAR: NORMAL
UROBILINOGEN UR QL STRIP: ABNORMAL
WBC NRBC COR # BLD: 13.56 10*3/MM3 (ref 4.5–12.5)
WBC NRBC COR # BLD: 15.14 10*3/MM3 (ref 4.5–12.5)
WBC UR QL AUTO: ABNORMAL /HPF

## 2018-05-17 PROCEDURE — 72125 CT NECK SPINE W/O DYE: CPT

## 2018-05-17 PROCEDURE — 36415 COLL VENOUS BLD VENIPUNCTURE: CPT

## 2018-05-17 PROCEDURE — 72131 CT LUMBAR SPINE W/O DYE: CPT | Performed by: RADIOLOGY

## 2018-05-17 PROCEDURE — 73610 X-RAY EXAM OF ANKLE: CPT | Performed by: RADIOLOGY

## 2018-05-17 PROCEDURE — 99285 EMERGENCY DEPT VISIT HI MDM: CPT

## 2018-05-17 PROCEDURE — 94799 UNLISTED PULMONARY SVC/PX: CPT

## 2018-05-17 PROCEDURE — 99232 SBSQ HOSP IP/OBS MODERATE 35: CPT | Performed by: INTERNAL MEDICINE

## 2018-05-17 PROCEDURE — 85007 BL SMEAR W/DIFF WBC COUNT: CPT | Performed by: INTERNAL MEDICINE

## 2018-05-17 PROCEDURE — 85007 BL SMEAR W/DIFF WBC COUNT: CPT | Performed by: EMERGENCY MEDICINE

## 2018-05-17 PROCEDURE — 80053 COMPREHEN METABOLIC PANEL: CPT | Performed by: INTERNAL MEDICINE

## 2018-05-17 PROCEDURE — 81001 URINALYSIS AUTO W/SCOPE: CPT | Performed by: EMERGENCY MEDICINE

## 2018-05-17 PROCEDURE — 85025 COMPLETE CBC W/AUTO DIFF WBC: CPT | Performed by: INTERNAL MEDICINE

## 2018-05-17 PROCEDURE — 73610 X-RAY EXAM OF ANKLE: CPT

## 2018-05-17 PROCEDURE — 85025 COMPLETE CBC W/AUTO DIFF WBC: CPT | Performed by: EMERGENCY MEDICINE

## 2018-05-17 PROCEDURE — 72131 CT LUMBAR SPINE W/O DYE: CPT

## 2018-05-17 PROCEDURE — 25010000002 HEPARIN (PORCINE) PER 1000 UNITS: Performed by: INTERNAL MEDICINE

## 2018-05-17 PROCEDURE — 25010000002 HYDROMORPHONE PER 4 MG: Performed by: EMERGENCY MEDICINE

## 2018-05-17 PROCEDURE — 63710000001 DEXAMETHASONE PER 0.25 MG: Performed by: INTERNAL MEDICINE

## 2018-05-17 PROCEDURE — 72125 CT NECK SPINE W/O DYE: CPT | Performed by: RADIOLOGY

## 2018-05-17 RX ORDER — HYDROMORPHONE HCL 110MG/55ML
0.5 PATIENT CONTROLLED ANALGESIA SYRINGE INTRAVENOUS ONCE
Status: COMPLETED | OUTPATIENT
Start: 2018-05-17 | End: 2018-05-17

## 2018-05-17 RX ADMIN — OXYCODONE HYDROCHLORIDE 15 MG: 15 TABLET ORAL at 14:09

## 2018-05-17 RX ADMIN — DIAZEPAM 5 MG: 5 TABLET ORAL at 08:43

## 2018-05-17 RX ADMIN — OXYCODONE HYDROCHLORIDE 15 MG: 15 TABLET ORAL at 10:05

## 2018-05-17 RX ADMIN — SODIUM CHLORIDE 500 ML: 9 INJECTION, SOLUTION INTRAVENOUS at 23:08

## 2018-05-17 RX ADMIN — NAPROXEN 500 MG: 250 TABLET ORAL at 17:53

## 2018-05-17 RX ADMIN — GABAPENTIN 800 MG: 400 CAPSULE ORAL at 14:09

## 2018-05-17 RX ADMIN — NAPROXEN 500 MG: 250 TABLET ORAL at 08:43

## 2018-05-17 RX ADMIN — PANTOPRAZOLE SODIUM 40 MG: 40 TABLET, DELAYED RELEASE ORAL at 08:42

## 2018-05-17 RX ADMIN — HYDROMORPHONE HYDROCHLORIDE 0.5 MG: 2 INJECTION INTRAMUSCULAR; INTRAVENOUS; SUBCUTANEOUS at 23:07

## 2018-05-17 RX ADMIN — HEPARIN SODIUM 5000 UNITS: 5000 INJECTION, SOLUTION INTRAVENOUS; SUBCUTANEOUS at 08:43

## 2018-05-17 RX ADMIN — DEXAMETHASONE 6 MG: 4 TABLET ORAL at 08:42

## 2018-05-17 RX ADMIN — DEXAMETHASONE 6 MG: 4 TABLET ORAL at 14:09

## 2018-05-17 RX ADMIN — OXYCODONE HYDROCHLORIDE 15 MG: 15 TABLET ORAL at 02:15

## 2018-05-17 RX ADMIN — PANTOPRAZOLE SODIUM 40 MG: 40 TABLET, DELAYED RELEASE ORAL at 17:53

## 2018-05-17 RX ADMIN — OXYCODONE HYDROCHLORIDE 15 MG: 15 TABLET ORAL at 06:00

## 2018-05-17 RX ADMIN — OXYCODONE HYDROCHLORIDE 15 MG: 15 TABLET ORAL at 17:53

## 2018-05-17 RX ADMIN — GABAPENTIN 800 MG: 400 CAPSULE ORAL at 06:01

## 2018-05-17 NOTE — PROGRESS NOTES
"Palliative Care Progress Note    Date of Admission: 5/12/2018    Code Status: full code  Advance Directive: completed living will   Surrogate decision maker: daughter, Gita Palmer    Subjective:  Pt denies any pain, nausea, dyspnea or anxiety.   Patient reported seeing black bear this morning outside the hospital on his walk.   Good appetite, regular bowel movements   Reviewed current scheduled and prn medications for route, type, dose, and frequency.     •  acetaminophen  •  diazePAM  •  ipratropium-albuterol  •  magnesium sulfate **OR** magnesium sulfate **OR** magnesium sulfate  •  potassium chloride **OR** potassium chloride **OR** potassium chloride  •  senna  •  sodium chloride    Objective:  PPS 70%   /64 (BP Location: Right arm, Patient Position: Sitting)   Pulse 72   Temp 98 °F (36.7 °C) (Oral)   Resp 18   Ht 182.9 cm (72\")   Wt 81.5 kg (179 lb 11.2 oz)   SpO2 98%   BMI 24.37 kg/m²    Intake & Output (last day)       05/16 0701 - 05/17 0700 05/17 0701 - 05/18 0700    P.O. 880 720    Total Intake(mL/kg) 880 (10.8) 720 (8.8)    Urine (mL/kg/hr)      Total Output        Net +880 +720          Unmeasured Urine Occurrence 5 x         Lab Results (last 24 hours)     Procedure Component Value Units Date/Time    Basic Metabolic Panel [[id number]]  (Abnormal) Collected:  05/17/18 0051    Specimen:  Blood Updated:  05/17/18 0225     Glucose 133 (H) mg/dL      BUN 23 (H) mg/dL      Creatinine 0.72 mg/dL      Sodium 137 mmol/L      Potassium 4.3 mmol/L      Chloride 108 mmol/L      CO2 25.2 mmol/L      Calcium 8.5 mg/dL      eGFR Non African Amer 111 mL/min/1.73      BUN/Creatinine Ratio 31.9 (H)     Anion Gap 3.8 mmol/L     Narrative:       GFR Normal >60  Chronic Kidney Disease <60  Kidney Failure <15    Osmolality, Calculated [[id number]]  (Normal) Collected:  05/17/18 0051    Specimen:  Blood Updated:  05/17/18 0225     Osmolality Calc 279.4 mOsm/kg     CBC & Differential [[id number]] Collected:  " 05/17/18 0051    Specimen:  Blood Updated:  05/17/18 0216    Narrative:       The following orders were created for panel order CBC & Differential.  Procedure                               Abnormality         Status                     ---------                               -----------         ------                     Scan Slide[[id number]]                                       Final result               CBC Auto Differential[[id number]]        Abnormal            Final result                 Please view results for these tests on the individual orders.    CBC Auto Differential [[id number]]  (Abnormal) Collected:  05/17/18 0051    Specimen:  Blood Updated:  05/17/18 0216     WBC 15.14 (H) 10*3/mm3      RBC 4.87 10*6/mm3      Hemoglobin 9.6 (L) g/dL      Hematocrit 29.9 (L) %      MCV 61.4 (L) fL      MCH 19.7 (L) pg      MCHC 32.1 (L) g/dL      RDW 17.5 (H) %      RDW-SD 36.1 (L) fl      MPV 10.9 (H) fL      Platelets 403 (H) 10*3/mm3      Neutrophil % 75.6 (H) %      Lymphocyte % 14.3 (L) %      Monocyte % 8.7 %      Eosinophil % 0.0 %      Basophil % 0.1 %      Immature Grans % 1.3 (H) %      Neutrophils, Absolute 11.46 (H) 10*3/mm3      Lymphocytes, Absolute 2.16 10*3/mm3      Monocytes, Absolute 1.32 (H) 10*3/mm3      Eosinophils, Absolute 0.00 10*3/mm3      Basophils, Absolute 0.01 10*3/mm3      Immature Grans, Absolute 0.19 (H) 10*3/mm3     Scan Slide [[id number]] Collected:  05/17/18 0051    Specimen:  Blood Updated:  05/17/18 0216     Anisocytosis Slight/1+     Hypochromia Slight/1+     Microcytes Large/3+     Ovalocytes Slight/1+     Target Cells Slight/1+     Platelet Morphology Normal        Imaging Results (last 24 hours)     ** No results found for the last 24 hours. **          Physical Exam:  Gen: NAD, resting in bed  Skin: warm, dry   Eyes: BRAYAN, conjunctiva clear and moist   Resp/thorax: even effort, non labored, CTA   CV: RRR   ABD: soft, bowel sounds+, nontender  Ext: no edema, no redness   Neuro:  alert, interactive, no myoclonus   Psych: appropriate conversation and mood     Reviewed labs and diagnostic results.   Lab Results   Component Value Date    HGBA1C 5.8 (H) 02/22/2014       Results from last 7 days  Lab Units 05/17/18  0051   WBC 10*3/mm3 15.14*   HEMOGLOBIN g/dL 9.6*   HEMATOCRIT % 29.9*   PLATELETS 10*3/mm3 403*       Results from last 7 days  Lab Units 05/17/18  0051 05/16/18  0100   SODIUM mmol/L 137 137   POTASSIUM mmol/L 4.3 4.5   CHLORIDE mmol/L 108 110   CO2 mmol/L 25.2 24.6   BUN mg/dL 23* 29*   CREATININE mg/dL 0.72 0.81   CALCIUM mg/dL 8.5 8.8   BILIRUBIN mg/dL  --  0.3   ALK PHOS U/L  --  61   ALT (SGPT) U/L  --  11   AST (SGOT) U/L  --  20   GLUCOSE mg/dL 133* 143*       Impression: 63 y.o. 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 Continue dexamethasone, naproxyn    Anxiety - prn valium, dose this am. With change of scheduled oxycodone every 4 hours, will hold scheduled nighttime valium to assess need for valium for sleep.     Plan - currently home hospice patient - patient has reported to go and live with his friend, Mariano.   Notified  of possible discharge tomorrow.     Time: 30 minutes   > 50% time spent in counseling and education concerning current orders for symptom management with patient    Thuy Schumacher, APRN  [phone]  05/17/18  3:19 PM

## 2018-05-17 NOTE — PROGRESS NOTES
I have seen the patient in conjunction with [facility]    History of presenting illness:  Besides his right upper chest pain he has no complaints.  He continues to ambulate well in the last side a regular basis.  He states he continues deep well and no new complaints.  He has known probable metastatic plasmacytoma invading spinal column.    Vital Signs  Temp:  [98 °F (36.7 °C)-98.6 °F (37 °C)] 98 °F (36.7 °C)  Heart Rate:  [50-73] 72  Resp:  [18] 18  BP: (117-156)/(64-90) 151/64  Body mass index is 24.37 kg/m².      Intake/Output Summary (Last 24 hours) at 05/17/18 1435  Last data filed at 05/17/18 1230   Gross per 24 hour   Intake             1300 ml   Output                0 ml   Net             1300 ml     Intake & Output (last 3 days)       05/14 0701 - 05/15 0700 05/15 0701 - 05/16 0700 05/16 0701 - 05/17 0700 05/17 0701 - 05/18 0700    P.O. 1930 1400 880 720    I.V. (mL/kg)        Total Intake(mL/kg) 1930 (25.6) 1400 (18.5) 880 (10.8) 720 (8.8)    Urine (mL/kg/hr) 1300 (0.7) 1500 (0.8)      Total Output 1300 1500        Net +630 -100 +880 +720            Unmeasured Urine Occurrence   5 x           Physical exam:  Physical Exam   Constitutional: Well-developed, well-nourished.  Pleasant.  No distress   Sitting up   Head: Normocephalic and atraumatic.  Hearing is intact, mucous membranes are moist.   Eyes:  Pupils are equal, rou  Cardiovascular: Normal rate, regular rhythm and normal heart sounds.  No murmur rub or gallop  Pulmonary/Chest: Bilateral breath sounds no rhonchus rales or wheezing heard  Musculoskeletal: Strength is symmetric  Neurological: Nonfocal, alert.   Skin: Skin is warm and dry.   Psychiatric:  Normal mood and affect.     Telemetry: sinus, reviewed     Results Review:  Lab Results   Component Value Date    WBC 15.14 (H) 05/17/2018    HGB 9.6 (L) 05/17/2018    HCT 29.9 (L) 05/17/2018    MCV 61.4 (L) 05/17/2018     (H) 05/17/2018     Lab Results   Component Value Date    GLUCOSE 133 (H)  05/17/2018    BUN 23 (H) 05/17/2018    CREATININE 0.72 05/17/2018    EGFRIFNONA 111 05/17/2018    BCR 31.9 (H) 05/17/2018    CO2 25.2 05/17/2018    CALCIUM 8.5 05/17/2018    PROTENTOTREF 7.2 11/12/2015    ALBUMIN 3.30 (L) 05/16/2018    LABIL2 1.1 (L) 05/16/2018    AST 20 05/16/2018    ALT 11 05/16/2018       Imaging Results (last 72 hours)     Procedure Component Value Units Date/Time    XR Chest 1 View [[id number]] Collected:  05/13/18 0911     Updated:  05/13/18 0917    Narrative:       XR CHEST 1 VIEWS-     CLINICAL INDICATION: Chest pain/shortness of air/hemoptysis.          COMPARISON: 01/17/2018      TECHNIQUE: Single frontal view of the chest.     FINDINGS:  Right apical soft tissue mass again noted.  The cardiac silhouette is normal. The pulmonary vasculature is  unremarkable.  There is no evidence of an acute osseous abnormality.          Impression:       Little overall change.           This report was finalized on 5/13/2018 9:15 AM by Dr. Nasim Lovett MD.       CT Chest Pulmonary Embolism With Contrast [[id number]] Collected:  05/13/18 0910     Updated:  05/13/18 0916    Narrative:       CT CHEST PULMONARY EMBOLISM WITH CONTRAST-     CLINICAL INDICATION: Patient states lung cancer/hemoptysis/shortness of  air.          COMPARISON: 03/21/2016      PROCEDURE: Thin cut axial images were acquired through the pulmonary  vessels during the rapid infusion of IV contrast.     Additional 3-D reformatted images obtained via post-processing for  improved diagnostic accuracy and procedural planning.     Radiation dose reduction techniques were utilized per ALARA protocol.  Automated exposure control was initiated through either or [employer] or  D[employer] software packages by  protocol.           FINDINGS: Today's study demonstrates opacification of the central  pulmonary vessels.   There are no filling defects.   There is no truncation.     No evidence of a pulmonary embolus.     Abnormal soft tissue mass  in the right apex larger than on the previous  exam. This does appear to involve the adjacent vertebral body.     Abnormal expansile metastatic lesion in anterior right rib.     There is no mediastinal lymph node enlargement.     No pericardial or pleural effusion.          Impression:       1. No convincing evidence of a pulmonary embolus.  2. Increasing right apical soft tissue mass with extension into the  adjacent rib and vertebral body.     This report was finalized on 5/13/2018 9:14 AM by Dr. Nasim Lovett MD.                Assessment/Plan     CP secondary to tumor, possible plasmacytoma invading the vertebral area. MRI done, awaiting to hear back from radiation oncology about further plans.  Pain is being    Tobacco abuse, encouraged to quit although going on hospice I'm not certain this is going to have a significant impact long-term.    DVT prophylaxis, subcutaneous heparin    High risk due to advanced cancer    Microcytic anemia, suggest from studies chronic disease, he potentially could even have some form of thalassemia.  Hemoglobin is stable.    Leukocytosis related to Decadron, trending downward    Discussed with palliative care who wished to keep the patient overnight to observe, plan probable discharge tomorrow.  Patient plans on going home to live with the friend Mariano Luna MD  05/17/18  2:35 PM

## 2018-05-17 NOTE — ACP (ADVANCE CARE PLANNING)
Patient has completed a [place] Living Will today naming his daughter Gita Palmer as his Health Care Surrogate. He did not elect a secondary HCS. The patient is provided education about common health related topics requiring surrogacy and is encouraged to discuss with his daughter. The pt is provided the original document along with 2 additional copies for his surrogate. A copy was placed in the medical records basket and the patient folder at the nurses station. Please note that a living will and code status are not the same. The patient did verify that his full code status reflects his current wishes. Please call with any questions or concerns.    Jemima Purvis, RN, BSN, PN  Advance Care Planning Facilitator  Ext: [phone]

## 2018-05-17 NOTE — PROGRESS NOTES
Patient is stable from pulmonary and critical care standpoint of view.  Pulmonary we'll sign off on the patient.  Please call us with any questions or concerns.  Thank you    Dr. Arevalo

## 2018-05-18 ENCOUNTER — APPOINTMENT (OUTPATIENT)
Dept: CT IMAGING | Facility: HOSPITAL | Age: 63
End: 2018-05-18

## 2018-05-18 ENCOUNTER — APPOINTMENT (OUTPATIENT)
Dept: GENERAL RADIOLOGY | Facility: HOSPITAL | Age: 63
End: 2018-05-18

## 2018-05-18 VITALS
BODY MASS INDEX: 22.92 KG/M2 | DIASTOLIC BLOOD PRESSURE: 86 MMHG | OXYGEN SATURATION: 97 % | TEMPERATURE: 98.7 F | HEIGHT: 72 IN | SYSTOLIC BLOOD PRESSURE: 153 MMHG | WEIGHT: 169.2 LBS | RESPIRATION RATE: 20 BRPM | HEART RATE: 66 BPM
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
## 2018-05-18 LAB
ALBUMIN SERPL-MCNC: 3.4 G/DL (ref 3.4–4.8)
ALBUMIN/GLOB SERPL: 1.2 G/DL (ref 1.5–2.5)
ALP SERPL-CCNC: 55 U/L (ref 40–129)
ALT SERPL W P-5'-P-CCNC: 16 U/L (ref 10–44)
ANION GAP SERPL CALCULATED.3IONS-SCNC: 1.1 MMOL/L (ref 3.6–11.2)
ANISOCYTOSIS BLD QL: NORMAL
AST SERPL-CCNC: 18 U/L (ref 10–34)
BASOPHILS # BLD AUTO: 0.01 10*3/MM3 (ref 0–0.3)
BASOPHILS NFR BLD AUTO: 0.1 % (ref 0–2)
BILIRUB SERPL-MCNC: 0.4 MG/DL (ref 0.2–1.8)
BUN BLD-MCNC: 27 MG/DL (ref 7–21)
BUN/CREAT SERPL: 37.5 (ref 7–25)
CALCIUM SPEC-SCNC: 8.4 MG/DL (ref 7.7–10)
CHLORIDE SERPL-SCNC: 110 MMOL/L (ref 99–112)
CO2 SERPL-SCNC: 25.9 MMOL/L (ref 24.3–31.9)
CREAT BLD-MCNC: 0.72 MG/DL (ref 0.43–1.29)
DEPRECATED RDW RBC AUTO: 36.5 FL (ref 37–54)
EOSINOPHIL # BLD AUTO: 0.01 10*3/MM3 (ref 0–0.7)
EOSINOPHIL NFR BLD AUTO: 0.1 % (ref 0–5)
ERYTHROCYTE [DISTWIDTH] IN BLOOD BY AUTOMATED COUNT: 17.8 % (ref 11.5–14.5)
GFR SERPL CREATININE-BSD FRML MDRD: 111 ML/MIN/1.73
GLOBULIN UR ELPH-MCNC: 2.8 GM/DL
GLUCOSE BLD-MCNC: 120 MG/DL (ref 70–110)
HCT VFR BLD AUTO: 30 % (ref 42–52)
HGB BLD-MCNC: 9.5 G/DL (ref 14–18)
HYPOCHROMIA BLD QL: NORMAL
IMM GRANULOCYTES # BLD: 0.19 10*3/MM3 (ref 0–0.03)
IMM GRANULOCYTES NFR BLD: 1.2 % (ref 0–0.5)
LYMPHOCYTES # BLD AUTO: 2.57 10*3/MM3 (ref 1–3)
LYMPHOCYTES NFR BLD AUTO: 15.7 % (ref 21–51)
MCH RBC QN AUTO: 19.4 PG (ref 27–33)
MCHC RBC AUTO-ENTMCNC: 31.7 G/DL (ref 33–37)
MCV RBC AUTO: 61.3 FL (ref 80–94)
MICROCYTES BLD QL: NORMAL
MONOCYTES # BLD AUTO: 1.75 10*3/MM3 (ref 0.1–0.9)
MONOCYTES NFR BLD AUTO: 10.7 % (ref 0–10)
NEUTROPHILS # BLD AUTO: 11.86 10*3/MM3 (ref 1.4–6.5)
NEUTROPHILS NFR BLD AUTO: 72.2 % (ref 30–70)
OSMOLALITY SERPL CALC.SUM OF ELEC: 280.1 MOSM/KG (ref 273–305)
OVALOCYTES BLD QL SMEAR: NORMAL
PLAT MORPH BLD: NORMAL
PLATELET # BLD AUTO: 375 10*3/MM3 (ref 130–400)
PMV BLD AUTO: 9.9 FL (ref 6–10)
POTASSIUM BLD-SCNC: 4.2 MMOL/L (ref 3.5–5.3)
PROT SERPL-MCNC: 6.2 G/DL (ref 6–8)
RBC # BLD AUTO: 4.89 10*6/MM3 (ref 4.7–6.1)
SODIUM BLD-SCNC: 137 MMOL/L (ref 135–153)
TARGETS BLD QL SMEAR: NORMAL
WBC NRBC COR # BLD: 16.39 10*3/MM3 (ref 4.5–12.5)

## 2018-05-18 PROCEDURE — 74176 CT ABD & PELVIS W/O CONTRAST: CPT | Performed by: RADIOLOGY

## 2018-05-18 PROCEDURE — 74176 CT ABD & PELVIS W/O CONTRAST: CPT

## 2018-05-18 PROCEDURE — 71046 X-RAY EXAM CHEST 2 VIEWS: CPT

## 2018-05-18 PROCEDURE — 99238 HOSP IP/OBS DSCHRG MGMT 30/<: CPT | Performed by: INTERNAL MEDICINE

## 2018-05-18 PROCEDURE — 99221 1ST HOSP IP/OBS SF/LOW 40: CPT | Performed by: HOSPITALIST

## 2018-05-18 PROCEDURE — 85007 BL SMEAR W/DIFF WBC COUNT: CPT | Performed by: HOSPITALIST

## 2018-05-18 PROCEDURE — 85025 COMPLETE CBC W/AUTO DIFF WBC: CPT | Performed by: HOSPITALIST

## 2018-05-18 PROCEDURE — 63710000001 DEXAMETHASONE PER 0.25 MG: Performed by: HOSPITALIST

## 2018-05-18 PROCEDURE — 72128 CT CHEST SPINE W/O DYE: CPT

## 2018-05-18 PROCEDURE — 72128 CT CHEST SPINE W/O DYE: CPT | Performed by: RADIOLOGY

## 2018-05-18 PROCEDURE — 94799 UNLISTED PULMONARY SVC/PX: CPT

## 2018-05-18 PROCEDURE — 70450 CT HEAD/BRAIN W/O DYE: CPT | Performed by: RADIOLOGY

## 2018-05-18 PROCEDURE — 70450 CT HEAD/BRAIN W/O DYE: CPT

## 2018-05-18 PROCEDURE — 80053 COMPREHEN METABOLIC PANEL: CPT | Performed by: HOSPITALIST

## 2018-05-18 PROCEDURE — 71046 X-RAY EXAM CHEST 2 VIEWS: CPT | Performed by: RADIOLOGY

## 2018-05-18 RX ORDER — NITROGLYCERIN 0.4 MG/1
0.4 TABLET SUBLINGUAL
Status: DISCONTINUED | OUTPATIENT
Start: 2018-05-18 | End: 2018-05-18 | Stop reason: HOSPADM

## 2018-05-18 RX ORDER — SODIUM CHLORIDE 0.9 % (FLUSH) 0.9 %
1-10 SYRINGE (ML) INJECTION AS NEEDED
Status: DISCONTINUED | OUTPATIENT
Start: 2018-05-18 | End: 2018-05-18 | Stop reason: HOSPADM

## 2018-05-18 RX ORDER — SENNOSIDES 8.6 MG
2 TABLET ORAL 2 TIMES DAILY
Qty: 100 TABLET | Refills: 0 | Status: SHIPPED | OUTPATIENT
Start: 2018-05-18

## 2018-05-18 RX ORDER — DULOXETIN HYDROCHLORIDE 30 MG/1
30 CAPSULE, DELAYED RELEASE ORAL DAILY
Qty: 7 CAPSULE | Refills: 0 | Status: SHIPPED | OUTPATIENT
Start: 2018-05-18

## 2018-05-18 RX ORDER — PANTOPRAZOLE SODIUM 40 MG/1
40 TABLET, DELAYED RELEASE ORAL
Status: DISCONTINUED | OUTPATIENT
Start: 2018-05-18 | End: 2018-05-18 | Stop reason: HOSPADM

## 2018-05-18 RX ORDER — OXYCODONE HYDROCHLORIDE 20 MG/1
20 TABLET ORAL EVERY 4 HOURS PRN
COMMUNITY
End: 2018-05-18 | Stop reason: HOSPADM

## 2018-05-18 RX ORDER — MAGNESIUM SULFATE HEPTAHYDRATE 40 MG/ML
2 INJECTION, SOLUTION INTRAVENOUS AS NEEDED
Status: DISCONTINUED | OUTPATIENT
Start: 2018-05-18 | End: 2018-05-18 | Stop reason: HOSPADM

## 2018-05-18 RX ORDER — DEXAMETHASONE 4 MG/1
6 TABLET ORAL
Status: DISCONTINUED | OUTPATIENT
Start: 2018-05-18 | End: 2018-05-18 | Stop reason: HOSPADM

## 2018-05-18 RX ORDER — PANTOPRAZOLE SODIUM 40 MG/1
40 TABLET, DELAYED RELEASE ORAL 2 TIMES DAILY
Qty: 14 TABLET | Refills: 0 | Status: SHIPPED | OUTPATIENT
Start: 2018-05-18

## 2018-05-18 RX ORDER — HEPARIN SODIUM 5000 [USP'U]/ML
5000 INJECTION, SOLUTION INTRAVENOUS; SUBCUTANEOUS EVERY 12 HOURS SCHEDULED
Status: DISCONTINUED | OUTPATIENT
Start: 2018-05-18 | End: 2018-05-18

## 2018-05-18 RX ORDER — KETOROLAC TROMETHAMINE 10 MG/1
20 TABLET, FILM COATED ORAL ONCE
Status: COMPLETED | OUTPATIENT
Start: 2018-05-18 | End: 2018-05-18

## 2018-05-18 RX ORDER — DEXAMETHASONE 6 MG/1
6 TABLET ORAL 2 TIMES DAILY WITH MEALS
Qty: 60 TABLET | Refills: 0 | Status: SHIPPED | OUTPATIENT
Start: 2018-05-18 | End: 2018-05-18

## 2018-05-18 RX ORDER — NAPROXEN 500 MG/1
500 TABLET ORAL 2 TIMES DAILY
Qty: 14 TABLET | Refills: 0 | Status: SHIPPED | OUTPATIENT
Start: 2018-05-18

## 2018-05-18 RX ORDER — POTASSIUM CHLORIDE 750 MG/1
40 CAPSULE, EXTENDED RELEASE ORAL AS NEEDED
Status: DISCONTINUED | OUTPATIENT
Start: 2018-05-18 | End: 2018-05-18 | Stop reason: HOSPADM

## 2018-05-18 RX ORDER — OXYCODONE HYDROCHLORIDE 15 MG/1
15 TABLET ORAL
Status: DISCONTINUED | OUTPATIENT
Start: 2018-05-18 | End: 2018-05-18 | Stop reason: HOSPADM

## 2018-05-18 RX ORDER — IPRATROPIUM BROMIDE AND ALBUTEROL SULFATE 2.5; .5 MG/3ML; MG/3ML
3 SOLUTION RESPIRATORY (INHALATION) EVERY 6 HOURS PRN
Status: DISCONTINUED | OUTPATIENT
Start: 2018-05-18 | End: 2018-05-18 | Stop reason: HOSPADM

## 2018-05-18 RX ORDER — GABAPENTIN 400 MG/1
800 CAPSULE ORAL EVERY 8 HOURS SCHEDULED
Status: DISCONTINUED | OUTPATIENT
Start: 2018-05-18 | End: 2018-05-18 | Stop reason: HOSPADM

## 2018-05-18 RX ORDER — DEXAMETHASONE 4 MG/1
4 TABLET ORAL 2 TIMES DAILY WITH MEALS
Qty: 14 TABLET | Refills: 0 | Status: SHIPPED | OUTPATIENT
Start: 2018-05-18

## 2018-05-18 RX ORDER — POTASSIUM CHLORIDE 1.5 G/1.77G
40 POWDER, FOR SOLUTION ORAL AS NEEDED
Status: DISCONTINUED | OUTPATIENT
Start: 2018-05-18 | End: 2018-05-18 | Stop reason: HOSPADM

## 2018-05-18 RX ORDER — NAPROXEN 250 MG/1
500 TABLET ORAL 2 TIMES DAILY WITH MEALS
Status: DISCONTINUED | OUTPATIENT
Start: 2018-05-18 | End: 2018-05-18 | Stop reason: HOSPADM

## 2018-05-18 RX ORDER — CASTOR OIL AND BALSAM, PERU 788; 87 MG/G; MG/G
OINTMENT TOPICAL EVERY 12 HOURS SCHEDULED
Status: DISCONTINUED | OUTPATIENT
Start: 2018-05-18 | End: 2018-05-18 | Stop reason: HOSPADM

## 2018-05-18 RX ORDER — DIAZEPAM 5 MG/1
5 TABLET ORAL DAILY PRN
Status: DISCONTINUED | OUTPATIENT
Start: 2018-05-18 | End: 2018-05-18 | Stop reason: HOSPADM

## 2018-05-18 RX ORDER — SENNA PLUS 8.6 MG/1
2 TABLET ORAL 2 TIMES DAILY PRN
Status: DISCONTINUED | OUTPATIENT
Start: 2018-05-18 | End: 2018-05-18 | Stop reason: HOSPADM

## 2018-05-18 RX ORDER — ACETAMINOPHEN 325 MG/1
650 TABLET ORAL EVERY 6 HOURS PRN
Status: DISCONTINUED | OUTPATIENT
Start: 2018-05-18 | End: 2018-05-18 | Stop reason: HOSPADM

## 2018-05-18 RX ORDER — MAGNESIUM SULFATE HEPTAHYDRATE 40 MG/ML
4 INJECTION, SOLUTION INTRAVENOUS AS NEEDED
Status: DISCONTINUED | OUTPATIENT
Start: 2018-05-18 | End: 2018-05-18 | Stop reason: HOSPADM

## 2018-05-18 RX ORDER — POTASSIUM CHLORIDE 7.45 MG/ML
10 INJECTION INTRAVENOUS
Status: DISCONTINUED | OUTPATIENT
Start: 2018-05-18 | End: 2018-05-18 | Stop reason: HOSPADM

## 2018-05-18 RX ORDER — OXYCODONE HYDROCHLORIDE 15 MG/1
15 TABLET ORAL
Start: 2018-05-18

## 2018-05-18 RX ADMIN — DEXAMETHASONE 6 MG: 4 TABLET ORAL at 11:42

## 2018-05-18 RX ADMIN — PANTOPRAZOLE SODIUM 40 MG: 40 TABLET, DELAYED RELEASE ORAL at 08:17

## 2018-05-18 RX ADMIN — OXYCODONE HYDROCHLORIDE 15 MG: 15 TABLET ORAL at 08:16

## 2018-05-18 RX ADMIN — GABAPENTIN 800 MG: 400 CAPSULE ORAL at 05:34

## 2018-05-18 RX ADMIN — DIAZEPAM 5 MG: 5 TABLET ORAL at 08:14

## 2018-05-18 RX ADMIN — GABAPENTIN 800 MG: 400 CAPSULE ORAL at 14:57

## 2018-05-18 RX ADMIN — CASTOR OIL AND BALSAM, PERU: 788; 87 OINTMENT TOPICAL at 11:25

## 2018-05-18 RX ADMIN — OXYCODONE HYDROCHLORIDE 15 MG: 15 TABLET ORAL at 11:43

## 2018-05-18 RX ADMIN — OXYCODONE HYDROCHLORIDE 15 MG: 15 TABLET ORAL at 05:34

## 2018-05-18 RX ADMIN — ACETAMINOPHEN 650 MG: 325 TABLET, FILM COATED ORAL at 14:57

## 2018-05-18 RX ADMIN — NAPROXEN 500 MG: 250 TABLET ORAL at 08:17

## 2018-05-18 RX ADMIN — KETOROLAC TROMETHAMINE 20 MG: 10 TABLET, FILM COATED ORAL at 01:08

## 2018-05-18 RX ADMIN — DEXAMETHASONE 6 MG: 4 TABLET ORAL at 08:14
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
## 2018-05-18 NOTE — ED NOTES
Pt stated he didn't know he couldn't go down to the store while he was a patient in the hospital.     Madeline Queen RN  05/18/18 0119       Madeline Queen RN  05/18/18 0120
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
## 2018-05-18 NOTE — PLAN OF CARE
Problem: Anxiety (Adult)  Goal: Identify Related Risk Factors and Signs and Symptoms  Outcome: Ongoing (interventions implemented as appropriate)

## 2018-05-18 NOTE — ED NOTES
Backboard removed by Dr. Nova at this time. C-collar remains in place     Mary Vásquez RN  05/17/18 7326
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
## 2018-05-18 NOTE — ED PROVIDER NOTES
Subjective   History of Present Illness    Review of Systems    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, Right [place] of Lung with T2 vertebral, and second Rib infiltration, S/P radiation/Chemotherapy   • Polysubstance dependency    • TIA (transient ischemic attack)     2014       Allergies   Allergen Reactions   • Sulfa Antibiotics Anaphylaxis       Past Surgical History:   Procedure Laterality Date   • APPENDECTOMY     • BACK SURGERY     • DEBRIDEMENT OF ISCHEAL ULCER/BUTTOCKS WOUND N/A 1/19/2018    Procedure: DEBRIDEMENT OF RIGHT HIP ULCER/BUTTOCKS WOUND;  Surgeon: Mohan Santoyo MD;  Location: [facility];  Service:    • LUNG BIOPSY  2015       History reviewed. No pertinent family history.    Social History     Social History   • Marital status:      Social History Main Topics   • Smoking status: Current Every Day Smoker     Packs/day: 0.50     Types: Cigarettes   • Smokeless tobacco: Never Used   • Alcohol use No   • Drug use: No      Comment: oxycodone 20 mg tabs QID and valium 10 mg q 12 hours    • Sexual activity: Defer     Other Topics Concern   • Not on file           Objective   Physical Exam    Procedures           ED Course  ED Course as of May 18 0400   Fri May 18, 2018   0247 Additional scans requested by admitting are negative for any acute pathology.   [MH]      ED Course User Index  [MH] Jenny Jean Baptiste DO                  MDM      Final diagnoses:   Contusion of left ankle, initial encounter            Jenny Jean Baptiste DO  05/18/18 6099
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
## 2018-05-18 NOTE — ED NOTES
Pt c/o left foot and right flank pain. No swelling or discoloration noted to either site. Denies LOC.Rigid C-collar in place     Madeline Queen RN  05/18/18 8947

## 2018-05-18 NOTE — PROGRESS NOTES
Discharge Planning Assessment   Nabeel     Patient Name: Jhonny Washington  MRN: [id number]  Today's Date: 5/18/2018    Admit Date: 5/17/2018          Discharge Needs Assessment    No documentation.           Discharge Plan     Row Name 05/18/18 1430       Plan    Final Discharge Disposition Code 50 - home with hospice    Final Note Pt to be discharged home on this date with continued [facility].  HOB aware per K[place].          Destination     No service coordination in this encounter.      Durable Medical Equipment     No service coordination in this encounter.      Dialysis/Infusion     No service coordination in this encounter.      Home Medical Care     No service coordination in this encounter.      Social Care     No service coordination in this encounter.        Expected Discharge Date and Time     Expected Discharge Date Expected Discharge Time    May 18, 2018               Demographic Summary    No documentation.           Functional Status    No documentation.           Psychosocial    No documentation.           Abuse/Neglect    No documentation.           Legal    No documentation.           Substance Abuse    No documentation.           Patient Forms    No documentation.         Amber Jimenes

## 2018-05-18 NOTE — NURSING NOTE
Patient instructed that he is not to leave floor. Offered to have MD order nicotine patch if needed, patient stated that the patch makes him sick and makes his lung hurt. Lead nurse Hugo Knight RN present during assessment and conversation with patient reiterated the importance of patient staying on the floor and not leaving to go smoke. Patient states he will not go off hospital property again. Hugo and I reinforced that the patient is not to go off the floor to smoke. Bed alarm on.
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
## 2018-05-21 ENCOUNTER — LAB REQUISITION (OUTPATIENT)
Dept: LAB | Facility: HOSPITAL | Age: 63
End: 2018-05-21

## 2018-05-21 DIAGNOSIS — C34.90 MALIGNANT NEOPLASM OF UNSPECIFIED PART OF UNSPECIFIED BRONCHUS OR LUNG (HCC): ICD-10-CM

## 2018-05-21 LAB
6-ACETYL MORPHINE: NEGATIVE
AMPHET+METHAMPHET UR QL: NEGATIVE
BARBITURATES UR QL SCN: NEGATIVE
BENZODIAZ UR QL SCN: NEGATIVE
BUPRENORPHINE SERPL-MCNC: NEGATIVE NG/ML
CANNABINOIDS SERPL QL: NEGATIVE
COCAINE UR QL: NEGATIVE
METHADONE UR QL SCN: NEGATIVE
OPIATES UR QL: NEGATIVE
OXYCODONE UR QL SCN: NEGATIVE
PCP UR QL SCN: NEGATIVE

## 2018-05-21 PROCEDURE — 80307 DRUG TEST PRSMV CHEM ANLYZR: CPT | Performed by: PODIATRIST

## 2018-05-23 ENCOUNTER — HOSPITAL ENCOUNTER (EMERGENCY)
Facility: HOSPITAL | Age: 63
Discharge: HOME | End: 2018-05-24
Attending: EMERGENCY MEDICINE | Admitting: EMERGENCY MEDICINE

## 2018-05-23 VITALS
HEART RATE: 96 BPM | BODY MASS INDEX: 20.59 KG/M2 | DIASTOLIC BLOOD PRESSURE: 78 MMHG | RESPIRATION RATE: 20 BRPM | WEIGHT: 152 LBS | OXYGEN SATURATION: 97 % | HEIGHT: 72 IN | SYSTOLIC BLOOD PRESSURE: 111 MMHG | TEMPERATURE: 97 F

## 2018-05-23 DIAGNOSIS — G89.4 CHRONIC PAIN SYNDROME: Primary | ICD-10-CM

## 2018-05-23 PROCEDURE — 99283 EMERGENCY DEPT VISIT LOW MDM: CPT

## 2018-05-24 VITALS
WEIGHT: 152 LBS | TEMPERATURE: 97.6 F | OXYGEN SATURATION: 98 % | HEIGHT: 72 IN | DIASTOLIC BLOOD PRESSURE: 84 MMHG | SYSTOLIC BLOOD PRESSURE: 113 MMHG | HEART RATE: 82 BPM | RESPIRATION RATE: 18 BRPM | BODY MASS INDEX: 20.59 KG/M2

## 2018-05-24 DIAGNOSIS — M54.50 CHRONIC MIDLINE LOW BACK PAIN WITHOUT SCIATICA: Primary | ICD-10-CM

## 2018-05-24 DIAGNOSIS — G89.29 CHRONIC MIDLINE LOW BACK PAIN WITHOUT SCIATICA: Primary | ICD-10-CM

## 2018-05-24 PROCEDURE — 99284 EMERGENCY DEPT VISIT MOD MDM: CPT

## 2018-05-24 RX ORDER — HYDROCODONE BITARTRATE AND ACETAMINOPHEN 10; 325 MG/1; MG/1
1 TABLET ORAL ONCE
Status: COMPLETED | OUTPATIENT
Start: 2018-05-24 | End: 2018-05-24

## 2018-05-24 RX ADMIN — HYDROCODONE BITARTRATE AND ACETAMINOPHEN 1 TABLET: 10; 325 TABLET ORAL at 08:31

## 2018-05-24 NOTE — ED NOTES
Discharge instructions reviewed with patient, patient instructed to return to ED if symptoms worsen or if any new problems arise. Patient verbalizes understanding of discharge instructions, patient ambulatory out of ED. No acute distress noted.       Shahla Jimenez RN  05/24/18 7168

## 2018-05-24 NOTE — ED PROVIDER NOTES
Subjective   Pt comes in again today with complaint of pain.  Pt reports he has less than 6 months to live from multiple myeloma.  Pt has Known spinal lytic lesions.  Pt reports that hospice would not give him any pain medication after he lost his home pain medications 2 days ago.  Pt was recently admitted for pain control for same.  He was discharge on 5/18 with home hospice and Oxycodone 15mg prescription.  Pt demanded more pain medication.  He stood up and left without completing eval.        History provided by:  Patient and medical records  Illness   Location:  All over  Severity:  Severe  Onset quality:  Unable to specify  Timing:  Constant  Progression:  Worsening  Chronicity:  Chronic  Relieved by:  None tried  Associated symptoms: myalgias    Associated symptoms: no congestion, no cough, no diarrhea, no ear pain, no fatigue, no fever, no loss of consciousness, no nausea, no rhinorrhea, no shortness of breath, no sore throat, no vomiting and no wheezing        Review of Systems   Constitutional: Positive for activity change. Negative for appetite change, chills, diaphoresis, fatigue and fever.   HENT: Negative.  Negative for congestion, ear pain, rhinorrhea, sore throat, trouble swallowing and voice change.    Eyes: Negative.    Respiratory: Negative.  Negative for cough, chest tightness, shortness of breath and wheezing.    Cardiovascular: Negative.    Gastrointestinal: Negative.  Negative for diarrhea, nausea and vomiting.   Endocrine: Negative.    Genitourinary: Negative.  Negative for difficulty urinating.   Musculoskeletal: Positive for arthralgias and myalgias.   Skin: Negative.    Allergic/Immunologic: Negative.    Neurological: Negative.  Negative for loss of consciousness.   Hematological: Negative.    Psychiatric/Behavioral: Negative.    All other systems reviewed and are negative.      Past Medical History:   Diagnosis Date   • Anxiety    • Arthritis    • Chronic pain    • Decubitus ulcer    •  Depression    • Migraine headache    • Neck fracture    • Plasmacytoma/Multiple myeloma     Dx: October 2015, Right [place] of Lung with T2 vertebral, and second Rib infiltration, S/P radiation/Chemotherapy   • Polysubstance dependency    • TIA (transient ischemic attack)     2014       Allergies   Allergen Reactions   • Sulfa Antibiotics Anaphylaxis       Past Surgical History:   Procedure Laterality Date   • APPENDECTOMY     • BACK SURGERY     • DEBRIDEMENT OF ISCHEAL ULCER/BUTTOCKS WOUND N/A 1/19/2018    Procedure: DEBRIDEMENT OF RIGHT HIP ULCER/BUTTOCKS WOUND;  Surgeon: Mohan Santoyo MD;  Location[facility];  Service:    • LUNG BIOPSY  2015       Family History   Problem Relation Age of Onset   • No Known Problems Son    • No Known Problems Mother    • No Known Problems Father    • No Known Problems Sister    • No Known Problems Brother    • No Known Problems Daughter    • No Known Problems Maternal Grandmother    • No Known Problems Maternal Grandfather    • No Known Problems Paternal Grandmother    • No Known Problems Paternal Grandfather    • No Known Problems Cousin    • Rheum arthritis Neg Hx    • Osteoarthritis Neg Hx    • Asthma Neg Hx    • Diabetes Neg Hx    • Heart failure Neg Hx    • Hyperlipidemia Neg Hx    • Hypertension Neg Hx    • Migraines Neg Hx    • Rashes / Skin problems Neg Hx    • Seizures Neg Hx    • Stroke Neg Hx    • Thyroid disease Neg Hx        Social History     Social History   • Marital status:      Social History Main Topics   • Smoking status: Current Every Day Smoker     Packs/day: 0.50     Types: Cigarettes   • Smokeless tobacco: Never Used   • Alcohol use No   • Drug use: No      Comment: oxycodone 20 mg tabs QID and valium 10 mg q 12 hours    • Sexual activity: Defer     Other Topics Concern   • Not on file           Objective   Physical Exam   Constitutional: He is oriented to person, place, and time. He appears well-developed and well-nourished. He appears  distressed.   agitated   HENT:   Head: Normocephalic and atraumatic.   Nose: Nose normal.   Moist mucus membranes   Eyes: EOM are normal. Right eye exhibits no discharge. Left eye exhibits no discharge. No scleral icterus.   Conjunctiva injected   Neck: Normal range of motion. Neck supple. No JVD present. No tracheal deviation present. No thyromegaly present.   Cardiovascular: Normal rate, regular rhythm, normal heart sounds and intact distal pulses.  Exam reveals no gallop and no friction rub.    No murmur heard.  Pulmonary/Chest: Effort normal. No stridor. No respiratory distress. He has wheezes. He has no rales.   Scattered bilateral coarse wheezing   Abdominal: He exhibits no distension.   Musculoskeletal: Normal range of motion. He exhibits no deformity.   Lymphadenopathy:     He has no cervical adenopathy.   Neurological: He is alert and oriented to person, place, and time. He exhibits normal muscle tone. Coordination normal.   Skin: Skin is warm and dry. Capillary refill takes less than 2 seconds. No pallor.   Psychiatric:   agitated   Nursing note and vitals reviewed.      Procedures           ED Course      I have encouraged patient to resume contact with home hospice to arrange ongoing pain management.                 MDM  Number of Diagnoses or Management Options  Chronic pain syndrome: established and worsening     Amount and/or Complexity of Data Reviewed  Decide to obtain previous medical records or to obtain history from someone other than the patient: yes    Risk of Complications, Morbidity, and/or Mortality  Presenting problems: moderate  Diagnostic procedures: moderate  Management options: minimal    Patient Progress  Patient progress: stable        Final diagnoses:   Chronic pain syndrome            Giovanny Cancino MD  05/24/18 0014

## 2018-05-24 NOTE — ED NOTES
Discharge instructions reviewed with patient, patient instructed to return to ED if symptoms worsen or if any new problems arise. Patient verbalizes understanding of discharge instructions, patient ambulatory out of ED. No acute distress noted.       Shahla Jimenez RN  05/24/18 0907
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
## 2018-05-24 NOTE — ED PROVIDER NOTES
Subjective   63-year-old male presents today with back pain.  He states he has a history of multiple myeloma with splenic lesions in his spine.  He states his pain is out of control.  He states he is supposed to be on oxycodone 20 mg.  He states his last pain medication was more than 48 hours ago.  He states this is because he is out of his pain medication.  When I asked him why he was out, he states he dropped his medication while he was standing in a gazebo and it fell through the cracks.  He states hospice will not write him any more pain medication.  He states his doctor is mad because he dropped his medications.  He states he is supposed to go to a nursing home on Thursday.  When I tell him that today is Thursday he states well may be it is Friday or Saturday but I don't really know.  He states he was told he has to be in a more stable environment before they will write him more pain medication.  He was seen here in the ER earlier this morning he said complaint.  He left after becoming angry because the provider would not write him a prescription for pain medication.        History provided by:  Patient  Back Pain   Location:  Thoracic spine  Quality:  Aching  Radiates to:  Does not radiate  Pain severity:  Severe  Pain is:  Same all the time  Onset quality:  Gradual  Duration: this is chronic, going on for several years.  Timing:  Constant  Progression:  Worsening  Chronicity:  Chronic  Context: not recent injury    Relieved by:  Nothing  Worsened by:  Nothing  Associated symptoms: no abdominal pain, no abdominal swelling, no bladder incontinence, no bowel incontinence, no chest pain, no dysuria, no fever, no headaches, no leg pain, no numbness, no paresthesias, no pelvic pain, no perianal numbness, no tingling, no weakness and no weight loss    Risk factors: hx of cancer        Review of Systems   Constitutional: Negative for fever and weight loss.   HENT: Negative.    Eyes: Negative.    Respiratory:  Negative.    Cardiovascular: Negative for chest pain.   Gastrointestinal: Negative for abdominal pain and bowel incontinence.   Genitourinary: Negative for bladder incontinence, dysuria and pelvic pain.   Musculoskeletal: Positive for back pain.   Skin: Negative.    Neurological: Negative for tingling, weakness, numbness, headaches and paresthesias.   Psychiatric/Behavioral: Negative.    All other systems reviewed and are negative.      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, and second Rib infiltration, S/P radiation/Chemotherapy   • Polysubstance dependency    • TIA (transient ischemic attack)     2014       Allergies   Allergen Reactions   • Sulfa Antibiotics Anaphylaxis       Past Surgical History:   Procedure Laterality Date   • APPENDECTOMY     • BACK SURGERY     • DEBRIDEMENT OF ISCHEAL ULCER/BUTTOCKS WOUND N/A 1/19/2018    Procedure: DEBRIDEMENT OF RIGHT HIP ULCER/BUTTOCKS WOUND;  Surgeon: Mohan Santoyo MD;  Location: [facility];  Service:    • LUNG BIOPSY  2015       Family History   Problem Relation Age of Onset   • No Known Problems Son    • No Known Problems Mother    • No Known Problems Father    • No Known Problems Sister    • No Known Problems Brother    • No Known Problems Daughter    • No Known Problems Maternal Grandmother    • No Known Problems Maternal Grandfather    • No Known Problems Paternal Grandmother    • No Known Problems Paternal Grandfather    • No Known Problems Cousin    • Rheum arthritis Neg Hx    • Osteoarthritis Neg Hx    • Asthma Neg Hx    • Diabetes Neg Hx    • Heart failure Neg Hx    • Hyperlipidemia Neg Hx    • Hypertension Neg Hx    • Migraines Neg Hx    • Rashes / Skin problems Neg Hx    • Seizures Neg Hx    • Stroke Neg Hx    • Thyroid disease Neg Hx         Social History     Social History   • Marital status:      Social History Main Topics   • Smoking status: Current Every Day Smoker     Packs/day: 0.50     Types: Cigarettes   • Smokeless tobacco: Never Used   • Alcohol use No   • Drug use: No      Comment: oxycodone 20 mg tabs QID and valium 10 mg q 12 hours    • Sexual activity: Defer     Other Topics Concern   • Not on file           Objective   Physical Exam   Constitutional: He is oriented to person, place, and time. He appears well-developed and well-nourished. No distress.   Laying on the bed on his side, curled up   HENT:   Head: Normocephalic and atraumatic.   Right Ear: External ear normal.   Left Ear: External ear normal.   Nose: Nose normal.   Mouth/Throat: Oropharynx is clear and moist.   Eyes: Conjunctivae and EOM are normal. Pupils are equal, round, and reactive to light.   Neck: Normal range of motion. Neck supple.   Cardiovascular: Normal rate, regular rhythm, normal heart sounds and intact distal pulses.    Pulmonary/Chest: Effort normal and breath sounds normal. He exhibits no tenderness.   Abdominal: Soft. Bowel sounds are normal. There is no tenderness.   Musculoskeletal: Normal range of motion. He exhibits no deformity.   Mild tenderness to palpation to the upper lumbar area, midline   Neurological: He is alert and oriented to person, place, and time. No cranial nerve deficit or sensory deficit. Coordination normal.   Skin: Skin is warm and dry. Capillary refill takes less than 2 seconds. No rash noted.   Patient has a healing decubitus ulcer to the sacral area, wound care and dressing change performed   Psychiatric: He has a normal mood and affect. His behavior is normal. Judgment and thought content normal.   Nursing note and vitals reviewed.      Procedures           ED Course  ED Course as of May 24 0945   Thu May 24, 2018   0943 I discussed with patient that I would be able to give him a pain pill in the ED but I would be  unable to write him a script for pain medication and he would have to follow through with Hospice's plan for his treatment.  He acknowledged understanding.  [AH]      ED Course User Index  [] JARETH Trevizo                  MDM  Number of Diagnoses or Management Options  Chronic back pain, unspecified back location, unspecified back pain laterality:   Patient Progress  Patient progress: stable        Final diagnoses:   Chronic midline low back pain without sciatica            JARETH Trevizo  05/24/18 0934

## 2018-05-24 NOTE — ED NOTES
"Contacted pt's RN, Vibha, with [facility] in [place].  RN states that every week when they go to his home, his pain medications have been \"missing\" and the pt was unable to account for missing medications.  Hospice doctor told RN that she would not order additional narcotic pain medications for the pt due to the apparent instability of his home environment and inability to keep medications secure.  Pt has naprosyn and decadron prescribed at home for pain control.  RN states that hospice did drug screen on pt that was negative for his prescribed medications. RN states that they do weekly dressing changes consisting of painting wound with betadine and replacing dressing.  Pt has dressing supplies at home and had a caregiver that was educated to do the pt's additional dressing changes daily.  Pt's RN states that they have been working to obtain nursing home placement for patient to provide more secure environment for pt so that prescriptions for narcotic medications can resume, but at this time they have been unable to find a facility able to take pt.  JARETH Mcnamara, notified.  Shahla Jimenez RN, notified.     Allyn Beltrán RN  05/24/18 0939    "

## 2018-05-24 NOTE — ED NOTES
Patient wound cleansed with wound , rinsed, patted dry and dressed with bordered gauze foam dressing. Patient tolerated well. Patient instructed regarding wound dressing and importance of keeping wound as clean as possible. Verbalizes understanding. Patient provided with no rinse wound  for wound and instructed on home use. Verbalizes understanding. Awaiting further orders, will continue to monitor and follow plan of care.      Shahla Jimenez RN  05/24/18 4944

## 2018-06-04 ENCOUNTER — HOSPITAL ENCOUNTER (EMERGENCY)
Facility: HOSPITAL | Age: 63
Discharge: HOME | End: 2018-06-04
Attending: FAMILY MEDICINE | Admitting: NURSE PRACTITIONER

## 2018-06-04 ENCOUNTER — HOSPITAL ENCOUNTER (EMERGENCY)
Facility: HOSPITAL | Age: 63
Discharge: LEFT BEFORE DISCHARGE | End: 2018-06-04

## 2018-06-04 VITALS
HEIGHT: 72 IN | TEMPERATURE: 97.2 F | SYSTOLIC BLOOD PRESSURE: 118 MMHG | OXYGEN SATURATION: 97 % | BODY MASS INDEX: 20.45 KG/M2 | HEART RATE: 80 BPM | WEIGHT: 151 LBS | RESPIRATION RATE: 16 BRPM | DIASTOLIC BLOOD PRESSURE: 68 MMHG

## 2018-06-04 DIAGNOSIS — G89.3 CHRONIC PAIN DUE TO NEOPLASM: Primary | ICD-10-CM

## 2018-06-04 PROCEDURE — 99283 EMERGENCY DEPT VISIT LOW MDM: CPT

## 2018-06-04 RX ORDER — HYDROCODONE BITARTRATE AND ACETAMINOPHEN 5; 325 MG/1; MG/1
1 TABLET ORAL EVERY 6 HOURS PRN
Status: DISCONTINUED | OUTPATIENT
Start: 2018-06-04 | End: 2018-06-05 | Stop reason: HOSPADM

## 2018-06-05 NOTE — ED NOTES
"Attempted to administer medication per provider order, handed medication to patient, before taking medication patient states \"what milligram is that.\" Explained to patient that provider has 5-325 mg norco ordered, after breaking medication in half patient puts medication back into hand, states \"I don't want that shit, I'll just get up and leave, I'm tired of this shit, what about trying to give me a five milligram.\" Explained importance of medication, patient continues to refuse, patient noted to be getting out of stretcher and ambulatory out of ED at this time. Patient discharged with verbal instructions.     Maverick Street RN  06/04/18 7362    "

## 2018-06-05 NOTE — ED PROVIDER NOTES
Subjective     History provided by:  Patient  Back Pain   Location:  Lumbar spine  Quality:  Aching and stabbing  Radiates to:  Does not radiate  Pain severity:  Moderate  Pain is:  Same all the time  Timing:  Constant  Progression:  Worsening  Chronicity:  Chronic  Relieved by:  Nothing  Worsened by:  Movement  Ineffective treatments:  None tried  Associated symptoms: leg pain    Associated symptoms: no abdominal pain, no chest pain, no dysuria and no fever        Review of Systems   Constitutional: Negative.  Negative for fever.   HENT: Negative.    Respiratory: Negative.    Cardiovascular: Negative.  Negative for chest pain.   Gastrointestinal: Negative.  Negative for abdominal pain.   Endocrine: Negative.    Genitourinary: Negative.  Negative for dysuria.   Musculoskeletal: Positive for back pain.   Skin: Negative.    Neurological: Negative.    Psychiatric/Behavioral: Negative.    All other systems reviewed and are negative.      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, and second Rib infiltration, S/P radiation/Chemotherapy   • Polysubstance dependency    • TIA (transient ischemic attack)     2014       Allergies   Allergen Reactions   • Sulfa Antibiotics Anaphylaxis       Past Surgical History:   Procedure Laterality Date   • APPENDECTOMY     • BACK SURGERY     • DEBRIDEMENT OF ISCHEAL ULCER/BUTTOCKS WOUND N/A 1/19/2018    Procedure: DEBRIDEMENT OF RIGHT HIP ULCER/BUTTOCKS WOUND;  Surgeon: Mohan Santoyo MD;  Location: [facility];  Service:    • LUNG BIOPSY  2015       Family History   Problem Relation Age of Onset   • No Known Problems Son    • No Known Problems Mother    • No Known Problems Father    • No Known Problems Sister    • No Known Problems Brother    • No Known Problems Daughter     • No Known Problems Maternal Grandmother    • No Known Problems Maternal Grandfather    • No Known Problems Paternal Grandmother    • No Known Problems Paternal Grandfather    • No Known Problems Cousin    • Rheum arthritis Neg Hx    • Osteoarthritis Neg Hx    • Asthma Neg Hx    • Diabetes Neg Hx    • Heart failure Neg Hx    • Hyperlipidemia Neg Hx    • Hypertension Neg Hx    • Migraines Neg Hx    • Rashes / Skin problems Neg Hx    • Seizures Neg Hx    • Stroke Neg Hx    • Thyroid disease Neg Hx        Social History     Social History   • Marital status:      Social History Main Topics   • Smoking status: Current Every Day Smoker     Packs/day: 0.50     Types: Cigarettes   • Smokeless tobacco: Never Used   • Alcohol use No   • Drug use: No      Comment: oxycodone 20 mg tabs QID and valium 10 mg q 12 hours    • Sexual activity: Defer     Other Topics Concern   • Not on file           Objective   Physical Exam   Constitutional: He is oriented to person, place, and time. He appears well-developed and well-nourished. No distress.   HENT:   Head: Normocephalic and atraumatic.   Right Ear: External ear normal.   Left Ear: External ear normal.   Nose: Nose normal.   Eyes: Conjunctivae and EOM are normal. Pupils are equal, round, and reactive to light.   Neck: Normal range of motion. Neck supple. No JVD present. No tracheal deviation present.   Cardiovascular: Normal rate, regular rhythm and normal heart sounds.    No murmur heard.  Pulmonary/Chest: Effort normal and breath sounds normal. No respiratory distress. He has no wheezes.   Abdominal: Soft. Bowel sounds are normal. There is no tenderness.   Musculoskeletal: He exhibits no edema or deformity.        Lumbar back: He exhibits decreased range of motion.   Neurological: He is alert and oriented to person, place, and time. No cranial nerve deficit.   Skin: Skin is warm and dry. No rash noted. He is not diaphoretic. No erythema. No pallor.   Psychiatric: He  has a normal mood and affect. His behavior is normal. Thought content normal.   Nursing note and vitals reviewed.      Procedures           ED Course  ED Course as of Jun 05 0031   Mon Jun 04, 2018   7185 Advised patient that he can have one dose of pain medication in the ER.  For further pain control he needs to follow up with PCP or oncology.   [FARIDA]      ED Course User Index  [FARIDA] Mindy Madden, JOSÉ MIGUEL                  MDM  Number of Diagnoses or Management Options  Chronic pain due to neoplasm: established and worsening  Risk of Complications, Morbidity, and/or Mortality  Presenting problems: low  Diagnostic procedures: low  Management options: low    Patient Progress  Patient progress: stable        Final diagnoses:   Chronic pain due to neoplasm            JOSÉ MIGUEL Cheatham  06/05/18 0031

## 2018-06-05 NOTE — ED NOTES
Fall bracelet applied at this time, patient placed back into waiting area in wheelchair, explained importance of patient remaining in ED waiting area and to not get up without assistance, understanding verbalized.     Maverick Street RN  06/04/18 2060

## 2018-06-05 NOTE — ED NOTES
Attempted to call patient to put ID band on bracelet, patient not in ER waiting area or outside.     Maverick Street RN  06/04/18 7553

## 2018-06-05 NOTE — ED NOTES
Attempted to call patient to triage patient at this time, patient continues to not be in ED waiting area or outside.     Maverick Street RN  06/04/18 2026

## 2018-06-05 NOTE — ED NOTES
"Patient presents to the ER tonight from home due to chronic back pain that has been ongoing \"for awhile\" now. Patient states that he has a history of cancer, states that he has a tumor on his spine that is causing increased pain. Patient ambulatory around ER waiting area, patient noted to be snoring and asleep when entered room. Patient is alert and oriented x4, respirations are even and unlabored, NADN, will continue to monitor and follow plan of care.     Maverick Street RN  06/05/18 0200    "

## 2018-06-05 NOTE — ED NOTES
"Patient refuses discharge vital signs, emphasized importance of vital signs prior to discharge, patient continues to be upset and states \"I'm just leaving.\" Patient ambulatory out of ED     Maverick Street RN  06/05/18 0321    "

## 2018-06-05 NOTE — ED NOTES
Patient noted to be walking back independently from vending machine to ED waiting area with can of coke and a sandwich. Explained importance of patient remaining in ED waiting area to be assessed and to be roomed when availability, understanding verbalized.     Maverick Street RN  06/04/18 7887

## 2018-06-07 ENCOUNTER — HOSPITAL ENCOUNTER (EMERGENCY)
Facility: HOSPITAL | Age: 63
Discharge: HOME | End: 2018-06-07
Attending: EMERGENCY MEDICINE | Admitting: EMERGENCY MEDICINE

## 2018-06-07 VITALS
WEIGHT: 151 LBS | RESPIRATION RATE: 18 BRPM | TEMPERATURE: 97.8 F | SYSTOLIC BLOOD PRESSURE: 147 MMHG | HEIGHT: 72 IN | OXYGEN SATURATION: 98 % | DIASTOLIC BLOOD PRESSURE: 94 MMHG | BODY MASS INDEX: 20.45 KG/M2 | HEART RATE: 65 BPM

## 2018-06-07 DIAGNOSIS — M54.9 CHRONIC BACK PAIN GREATER THAN 3 MONTHS DURATION: Primary | ICD-10-CM

## 2018-06-07 DIAGNOSIS — G89.29 CHRONIC BACK PAIN GREATER THAN 3 MONTHS DURATION: Primary | ICD-10-CM

## 2018-06-07 PROCEDURE — 99284 EMERGENCY DEPT VISIT MOD MDM: CPT

## 2018-06-07 RX ORDER — DEXAMETHASONE SODIUM PHOSPHATE 4 MG/ML
8 INJECTION, SOLUTION INTRA-ARTICULAR; INTRALESIONAL; INTRAMUSCULAR; INTRAVENOUS; SOFT TISSUE ONCE
Status: DISCONTINUED | OUTPATIENT
Start: 2018-06-07 | End: 2018-06-07 | Stop reason: HOSPADM

## 2018-06-07 NOTE — ED PROVIDER NOTES
Subjective   Patient presents to ER via EMS with hurting all over.        Back Pain   Location:  Generalized  Quality:  Aching  Pain severity:  Moderate  Pain is:  Worse during the night  Onset quality:  Gradual  Timing:  Constant  Progression:  Worsening  Chronicity:  Chronic      Review of Systems   Constitutional: Positive for activity change and appetite change.   HENT: Negative.    Eyes: Negative.    Respiratory: Negative.    Cardiovascular: Negative.    Gastrointestinal: Negative.    Endocrine: Negative.    Genitourinary: Negative.    Musculoskeletal: Positive for back pain.   Skin: Negative.    Allergic/Immunologic: Negative.    Neurological: Negative.    Hematological: Negative.    Psychiatric/Behavioral: Negative.        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, and second Rib infiltration, S/P radiation/Chemotherapy   • Polysubstance dependency    • TIA (transient ischemic attack)     2014       Allergies   Allergen Reactions   • Sulfa Antibiotics Anaphylaxis       Past Surgical History:   Procedure Laterality Date   • APPENDECTOMY     • BACK SURGERY     • DEBRIDEMENT OF ISCHEAL ULCER/BUTTOCKS WOUND N/A 1/19/2018    Procedure: DEBRIDEMENT OF RIGHT HIP ULCER/BUTTOCKS WOUND;  Surgeon: Mohan Santoyo MD;  Location: [facility];  Service:    • LUNG BIOPSY  2015       Family History   Problem Relation Age of Onset   • No Known Problems Son    • No Known Problems Mother    • No Known Problems Father    • No Known Problems Sister    • No Known Problems Brother    • No Known Problems Daughter    • No Known Problems Maternal Grandmother    • No Known Problems Maternal Grandfather    • No Known Problems Paternal Grandmother    • No Known Problems Paternal Grandfather    • No Known Problems Cousin     • Rheum arthritis Neg Hx    • Osteoarthritis Neg Hx    • Asthma Neg Hx    • Diabetes Neg Hx    • Heart failure Neg Hx    • Hyperlipidemia Neg Hx    • Hypertension Neg Hx    • Migraines Neg Hx    • Rashes / Skin problems Neg Hx    • Seizures Neg Hx    • Stroke Neg Hx    • Thyroid disease Neg Hx        Social History     Social History   • Marital status:      Social History Main Topics   • Smoking status: Current Every Day Smoker     Packs/day: 0.50     Types: Cigarettes   • Smokeless tobacco: Never Used   • Alcohol use No   • Drug use: No      Comment: oxycodone 20 mg tabs QID and valium 10 mg q 12 hours    • Sexual activity: Defer     Other Topics Concern   • Not on file           Objective   Physical Exam   Constitutional: He appears well-developed and well-nourished.   HENT:   Head: Normocephalic and atraumatic.   Eyes: Pupils are equal, round, and reactive to light.   Neck: Normal range of motion.   Cardiovascular: Normal rate.    Pulmonary/Chest: Effort normal.   Abdominal: Soft.   Neurological: He is alert.   Skin: Skin is warm.   Psychiatric: He has a normal mood and affect.       Procedures           ED Course                  MDM      Final diagnoses:   Chronic back pain greater than 3 months duration            Russell Nova MD  06/07/18 0125

## 2018-06-07 NOTE — ED NOTES
"Pt refused steroid shot at this time. States \"I'm dying, this is cancer pain and steroids won't help with cancer pain, I need at least a Percocet\". Dr. Nova made aware of pt statements     Mary Vásquez RN  06/07/18 0131    "

## 2018-06-07 NOTE — ED NOTES
"Patient presents to Emergency Department with complaints of chronic \"Cancer Pain\". Patient reports, \"I'm dying and nobody will give me pain medicine.\"      Manjula Howard RN  06/07/18 0148    "

## 2019-01-26 ENCOUNTER — HOSPITAL ENCOUNTER (EMERGENCY)
Facility: HOSPITAL | Age: 64
Discharge: ANOTHER HOSPITAL | End: 2019-01-27
Attending: EMERGENCY MEDICINE
Payer: MEDICARE

## 2019-01-26 DIAGNOSIS — Z85.79 HISTORY OF MULTIPLE MYELOMA: ICD-10-CM

## 2019-01-26 DIAGNOSIS — C79.9 METASTATIC CANCER (HCC): ICD-10-CM

## 2019-01-26 DIAGNOSIS — J18.9 PNEUMONIA DUE TO ORGANISM: ICD-10-CM

## 2019-01-26 DIAGNOSIS — T81.31XA DEHISCENCE OF OPERATIVE WOUND, INITIAL ENCOUNTER: Primary | ICD-10-CM

## 2019-01-26 PROCEDURE — 99284 EMERGENCY DEPT VISIT MOD MDM: CPT

## 2019-01-27 ENCOUNTER — APPOINTMENT (OUTPATIENT)
Dept: GENERAL RADIOLOGY | Facility: HOSPITAL | Age: 64
End: 2019-01-27
Payer: MEDICARE

## 2019-01-27 VITALS
HEIGHT: 72 IN | HEART RATE: 85 BPM | DIASTOLIC BLOOD PRESSURE: 67 MMHG | RESPIRATION RATE: 16 BRPM | WEIGHT: 160 LBS | TEMPERATURE: 97.6 F | SYSTOLIC BLOOD PRESSURE: 98 MMHG | BODY MASS INDEX: 21.67 KG/M2 | OXYGEN SATURATION: 97 %

## 2019-01-27 LAB
A/G RATIO: 0.9 (ref 0.8–2)
ALBUMIN SERPL-MCNC: 3.3 G/DL (ref 3.4–4.8)
ALP BLD-CCNC: 97 U/L (ref 25–100)
ALT SERPL-CCNC: 10 U/L (ref 4–36)
ANION GAP SERPL CALCULATED.3IONS-SCNC: 10 MMOL/L (ref 3–16)
AST SERPL-CCNC: 21 U/L (ref 8–33)
BASOPHILS ABSOLUTE: 0.1 K/UL (ref 0–0.1)
BASOPHILS RELATIVE PERCENT: 0.5 %
BILIRUB SERPL-MCNC: 0.3 MG/DL (ref 0.3–1.2)
BUN BLDV-MCNC: 16 MG/DL (ref 6–20)
CALCIUM SERPL-MCNC: 8.7 MG/DL (ref 8.5–10.5)
CHLORIDE BLD-SCNC: 106 MMOL/L (ref 98–107)
CO2: 26 MMOL/L (ref 20–30)
CREAT SERPL-MCNC: <0.5 MG/DL (ref 0.4–1.2)
EOSINOPHILS ABSOLUTE: 0.1 K/UL (ref 0–0.4)
EOSINOPHILS RELATIVE PERCENT: 1.3 %
GFR AFRICAN AMERICAN: >59
GFR NON-AFRICAN AMERICAN: >59
GLOBULIN: 3.8 G/DL
GLUCOSE BLD-MCNC: 86 MG/DL (ref 74–106)
HCT VFR BLD CALC: 31.6 % (ref 40–54)
HEMOGLOBIN: 9.5 G/DL (ref 13–18)
IMMATURE GRANULOCYTES #: 0.4 K/UL
IMMATURE GRANULOCYTES %: 4 % (ref 0–5)
LYMPHOCYTES ABSOLUTE: 2.9 K/UL (ref 1.5–4)
LYMPHOCYTES RELATIVE PERCENT: 28.8 %
MCH RBC QN AUTO: 19.8 PG (ref 27–32)
MCHC RBC AUTO-ENTMCNC: 30.1 G/DL (ref 31–35)
MCV RBC AUTO: 66 FL (ref 80–100)
MONOCYTES ABSOLUTE: 1.2 K/UL (ref 0.2–0.8)
MONOCYTES RELATIVE PERCENT: 11.9 %
NEUTROPHILS ABSOLUTE: 5.4 K/UL (ref 2–7.5)
NEUTROPHILS RELATIVE PERCENT: 53.5 %
PDW BLD-RTO: 20.5 % (ref 11–16)
PLATELET # BLD: 440 K/UL (ref 150–400)
PMV BLD AUTO: 9 FL (ref 6–10)
POTASSIUM REFLEX MAGNESIUM: 3.9 MMOL/L (ref 3.4–5.1)
RBC # BLD: 4.79 M/UL (ref 4.5–6)
SODIUM BLD-SCNC: 142 MMOL/L (ref 136–145)
TOTAL PROTEIN: 7.1 G/DL (ref 6.4–8.3)
WBC # BLD: 10.1 K/UL (ref 4–11)

## 2019-01-27 PROCEDURE — 6360000002 HC RX W HCPCS: Performed by: EMERGENCY MEDICINE

## 2019-01-27 PROCEDURE — 96367 TX/PROPH/DG ADDL SEQ IV INF: CPT

## 2019-01-27 PROCEDURE — 6360000002 HC RX W HCPCS

## 2019-01-27 PROCEDURE — 36415 COLL VENOUS BLD VENIPUNCTURE: CPT

## 2019-01-27 PROCEDURE — 96365 THER/PROPH/DIAG IV INF INIT: CPT

## 2019-01-27 PROCEDURE — 2580000003 HC RX 258: Performed by: EMERGENCY MEDICINE

## 2019-01-27 PROCEDURE — 80053 COMPREHEN METABOLIC PANEL: CPT

## 2019-01-27 PROCEDURE — 2580000003 HC RX 258

## 2019-01-27 PROCEDURE — 85025 COMPLETE CBC W/AUTO DIFF WBC: CPT

## 2019-01-27 PROCEDURE — 71045 X-RAY EXAM CHEST 1 VIEW: CPT

## 2019-01-27 PROCEDURE — 96366 THER/PROPH/DIAG IV INF ADDON: CPT

## 2019-01-27 RX ORDER — DEXAMETHASONE 2 MG/1
2 TABLET ORAL DAILY
COMMUNITY

## 2019-01-27 RX ORDER — DEXTROSE MONOHYDRATE 50 MG/ML
INJECTION, SOLUTION INTRAVENOUS
Status: COMPLETED
Start: 2019-01-27 | End: 2019-01-27

## 2019-01-27 RX ORDER — ACETAMINOPHEN 325 MG/1
650 TABLET ORAL EVERY 6 HOURS PRN
COMMUNITY

## 2019-01-27 RX ORDER — AMOXICILLIN 250 MG
2 CAPSULE ORAL DAILY
COMMUNITY

## 2019-01-27 RX ORDER — CALCIUM CARBONATE 200(500)MG
1 TABLET,CHEWABLE ORAL 3 TIMES DAILY PRN
COMMUNITY

## 2019-01-27 RX ORDER — CLONAZEPAM 1 MG/1
1 TABLET ORAL 2 TIMES DAILY
COMMUNITY

## 2019-01-27 RX ORDER — VANCOMYCIN HYDROCHLORIDE 750 MG/15ML
INJECTION, POWDER, LYOPHILIZED, FOR SOLUTION INTRAVENOUS
Status: COMPLETED
Start: 2019-01-27 | End: 2019-01-27

## 2019-01-27 RX ORDER — FENTANYL 100 UG/H
1 PATCH TRANSDERMAL
COMMUNITY

## 2019-01-27 RX ORDER — IPRATROPIUM BROMIDE AND ALBUTEROL SULFATE 2.5; .5 MG/3ML; MG/3ML
1 SOLUTION RESPIRATORY (INHALATION) EVERY 6 HOURS
COMMUNITY

## 2019-01-27 RX ORDER — AMOXICILLIN AND CLAVULANATE POTASSIUM 875; 125 MG/1; MG/1
1 TABLET, FILM COATED ORAL 2 TIMES DAILY
COMMUNITY

## 2019-01-27 RX ORDER — OXYCODONE HYDROCHLORIDE 5 MG/1
10 TABLET ORAL EVERY 4 HOURS PRN
COMMUNITY

## 2019-01-27 RX ORDER — VANCOMYCIN HYDROCHLORIDE 500 MG/10ML
INJECTION, POWDER, LYOPHILIZED, FOR SOLUTION INTRAVENOUS
Status: COMPLETED
Start: 2019-01-27 | End: 2019-01-27

## 2019-01-27 RX ORDER — DOCUSATE SODIUM 100 MG/1
100 CAPSULE, LIQUID FILLED ORAL 2 TIMES DAILY
COMMUNITY

## 2019-01-27 RX ORDER — BISACODYL 10 MG
10 SUPPOSITORY, RECTAL RECTAL DAILY PRN
COMMUNITY

## 2019-01-27 RX ORDER — VALPROIC ACID 250 MG/1
750 CAPSULE, LIQUID FILLED ORAL DAILY
COMMUNITY

## 2019-01-27 RX ORDER — LORAZEPAM 2 MG/ML
INJECTION INTRAMUSCULAR
Status: COMPLETED
Start: 2019-01-27 | End: 2019-01-27

## 2019-01-27 RX ADMIN — DEXTROSE MONOHYDRATE: 5 INJECTION, SOLUTION INTRAVENOUS at 02:53

## 2019-01-27 RX ADMIN — VANCOMYCIN HYDROCHLORIDE 750 MG: 750 INJECTION, POWDER, LYOPHILIZED, FOR SOLUTION INTRAVENOUS at 02:53

## 2019-01-27 RX ADMIN — VANCOMYCIN HYDROCHLORIDE 500 MG: 500 INJECTION, POWDER, LYOPHILIZED, FOR SOLUTION INTRAVENOUS at 02:54

## 2019-01-27 RX ADMIN — DEXTROSE MONOHYDRATE 1 G: 5 INJECTION INTRAVENOUS at 02:28

## 2019-01-27 RX ADMIN — LORAZEPAM 1 MG: 2 INJECTION INTRAMUSCULAR; INTRAVENOUS at 03:32

## 2019-01-27 NOTE — CONSULTS
Empiric therapy or pathogen-specific therapy for methicillin-resistant S. aureus: IV: 15 to 20 mg/kg/dose. Give Vancomycin 1250 mg IV x1.   Mark Quiñones.1/27/2019 1:53 AM

## 2019-01-27 NOTE — ED NOTES
Unable to give report,charge nurse advises pt. Is going to another floor. Waiting to hear from them.       Zachery Patiño RN  01/27/19 7890

## 2019-01-27 NOTE — ED NOTES
Contacted [facility] for medical records, they will fax records to [phone].       Carmita Zhao  01/27/19 0035

## 2019-01-27 NOTE — ED NOTES
Spoke with Levon Huerta at [employer] pt.  To go to original assignment [address]  01/27/19 6111

## 2019-01-27 NOTE — ED NOTES
Pt. Agitated,trying to get out of bed. MD notified and orders given for [id number].      Angelina Calhoun RN  01/27/19 1080

## 2019-01-27 NOTE — ED NOTES
Pt.asleep,waiting to hear from [facility] regarding bed assognment.       Lilian Baum RN  01/27/19 9518

## 2019-01-27 NOTE — ED PROVIDER NOTES
[address] [place] [address] Court  eMERGENCY dEPARTMENT eNCOUnter      Pt Name: Travis Watson  MRN: [id number]  [place]: 1955  Date of evaluation: 1/26/2019  Provider: Lala Welch, [facility]       Chief Complaint   Patient presents with    Wound Dehiscence     mid back wound dehiscence         HISTORY OF PRESENT ILLNESS  (Location/Symptom, Timing/Onset, Context/Setting, Quality, Duration, Modifying Factors, Severity.)   Travis Watson is a 62 y.o. male who presents to the emergency department from local nursing home for evaluation of a wound dehisced since on his upper back. The history is difficult to obtain as the patient is not fully alert whether it and does not provide much additional history at this time. He himself denies any chest pain or difficulty breathing, no back pain. HPI is limited to nursing Notes from the nursing home as well as the patient's daughter states she has been much more confused since being at the nursing home, she is planning to take over by mouth we this upcoming week. She does want the patient to be full code,, states his procedures from a spinal tumor and mass were completed in December at [facility] 6 notes were reviewed. REVIEW OFSYSTEMS    (2-9 systems for level 4, 10 or more for level 5)   ROS:  unable to fully perform review of systems due to patient's altered state. PAST MEDICAL HISTORY     Past Medical History:   Diagnosis Date    Atrial fibrillation ([address]     Combinations of drug dependence excluding opioid type drug ([address]     COPD (chronic obstructive pulmonary disease) ([address]     Lung cancer ([address]     with metastisis    Manic bipolar I disorder ([address]     Multiple myeloma ([address]     Pathologic fracture     of T2 vertebrae    Pneumonia          SURGICAL HISTORY     No past surgical history on file.       CURRENT MEDICATIONS       Previous Medications    ACETAMINOPHEN (TYLENOL) 325 MG TABLET    Take ED Triage Vitals [01/27/19 0003]   BP Temp Temp Source Pulse Resp SpO2 Height Weight   98/67 97.6 °F (36.4 °C) Oral 85 16 97 % 6' (1.829 m) 160 lb (72.6 kg)       Physical Exam  General :Patient is awake, sitting upright in the bed, appears underlying dementia,, but he is smiling. HEENT: Pupils are equally round and reactive to light, EOMI, conjunctivae clear, sclerae white, there is no injection no icterus. Oral mucosa ismoist, no exudate. Uvula is midline  Neck: Neck is supple, full range of motion, trachea midline  Cardiac: Heart regular rate, rhythm, no murmurs, rubs, or gallops  Lungs: Lungs are clear to auscultation, there is no wheezing, rhonchi, or rales. There is no use of accessory muscles. Abdomen: Abdomen is soft, nontender, nondistended. There is no firm or pulsatile masses, no rebound rigidity or guarding. Musculoskeletal: on review of the lower cervical upper thoracic spine there is an area of wound dehiscence from prior surgical intervention along the midline vertically oriented which is approximately 4 inches, exposed down to the muscle bodies, the spinous process is visible in this region. There is contracture noted along this dehisced area. No active bleeding or drainage from the site. 5 out of 5 strength in all 4 extremities. No focal muscle deficits are appreciated  Neuro: Motor intact, sensory intact, level of consciousness is normal  Dermatology: Skin is warm and dry  Psych: Mentation is grossly normal, cognition is grossly normal. Affect is appropriate.       DIAGNOSTIC RESULTS     EKG: All EKG's are interpreted by the Emergency Department Physician who either signs or Co-signs this chart in the [address] a cardiologist.      RADIOLOGY:   Non-plain film images such as CT, Ultrasound and MRI are read by theradiologist. Plain radiographic images are visualized and preliminarily interpreted by the emergency physician with the below findings:      [] Radiologist's Report Reviewed:  XR CHEST PORTABLE   Final Result     Opacities within the medial lower lungs likely representing an    inflammatory or infectious process. ED BEDSIDE ULTRASOUND:   Performed by ED Physician - none    LABS:  The lab has difficulty with electronic results,  His white count is 10, hemoglobin 9.5 platelets of 791, metabolic panel without any significant abnormalities, creatinine 0.5, left lites stable. I have reviewed and interpreted all of the currently available lab results from this visit (ifapplicable):  Results for orders placed or performed during the hospital encounter of 01/26/19   Comprehensive Metabolic Panel w/ Reflex to MG   Result Value Ref Range    Sodium 142 136 - 145 mmol/L    Potassium reflex Magnesium 3.9 3.4 - 5.1 mmol/L    Chloride 106 98 - 107 mmol/L    CO2 26 20 - 30 mmol/L    Anion Gap 10 3 - 16    Glucose 86 74 - 106 mg/dL    BUN 16 6 - 20 mg/dL    CREATININE <0.5 0.4 - 1.2 mg/dL    GFR Non-African American >59 >59    GFR African American >59 >59    Calcium 8.7 8.5 - 10.5 mg/dL    Total Protein 7.1 6.4 - 8.3 g/dL    Alb 3.3 (L) 3.4 - 4.8 g/dL    Albumin/Globulin Ratio 0.9 0.8 - 2.0    Total Bilirubin 0.3 0.3 - 1.2 mg/dL    Alkaline Phosphatase 97 25 - 100 U/L    ALT 10 4 - 36 U/L    AST 21 8 - 33 U/L    Globulin 3.8 g/dL        All other labs were within normal range or not returned as of this dictation.     EMERGENCY DEPARTMENT COURSE and DIFFERENTIAL DIAGNOSIS/MDM:   Vitals:    Vitals:    01/27/19 0003   BP: 98/67   Pulse: 85   Resp: 16   Temp: 97.6 °F (36.4 °C)   TempSrc: Oral   SpO2: 97%   Weight: 160 lb (72.6 kg)   Height: 6' (1.829 m)       MEDICATIONS ADMINISTERED IN ED:  Medications   cefTRIAXone (ROCEPHIN) 1 g IVPB in 50 mL D5W minibag (not administered)   vancomycin (VANCOCIN) 1,250 mg in dextrose 5 % 250 mL IVPB (not administered)       patient presents for evaluation of a wound dehiscence, he does not have much information to provide as he has underlying dementia, appears to have history of  Lung cancer with multiple metastases and bony metastases, requiring some type of surgical intervention with tumor removal sometime in December. History is somewhat limited as the patient does not provide much additional information and he was sent from a nursing home with limited paperwork. Patient had been seen at [facility] and evaluated for multiple different complaints, he has filed with neurosurgery at their facility with a prior admission last month as well as a few weeks ago with atrial fibrillation and multifocal pneumonia. 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 We did obtain IV, blood work as well as chest x-ray imaging. Given the postsurgical wound dehiscence the patient wanted to be evaluated by surgical specialist at [facility].  We will discuss the case with hospitalist on call at [facility]. I discussed the case with both the hospitalist, Dr. Tommy Parra, and spine surgeon, Dr. Jeffrey Avalos take 3% the patient transferred to their facility for further workup and evaluation. Patient remained stable. CONSULTS:  PHARMACY TO DOSE VANCOMYCIN    PROCEDURES:  Procedures    CRITICAL CARE TIME    Total Critical Care time was 0 minutes, excluding separately reportable procedures. There was a high probability of clinicallysignificant/life threatening deterioration in the patient's condition which required my urgent intervention. FINAL IMPRESSION      1. Dehiscence of operative wound, initial encounter    2. Metastatic cancer ([facility]    3. History of multiple myeloma    4. Pneumonia due to organism          DISPOSITION/PLAN   DISPOSITION Decision To Transfer 01/27/2019 01:56:49 AM      PATIENT REFERRED TO:  No follow-up provider specified.     DISCHARGE MEDICATIONS:  New Prescriptions    No medications on file       Comment: Please note this report has been produced using speech recognition software and may contain errorsrelated to that system including errors in grammar, punctuation, and spelling, as well as words and phrases that may be inappropriate. If there are any questions or concerns please feel free to contact the dictating providerfor clarification.     Alanis Ash DO  Attending Emergency Physician              Alanis Ash DO  01/27/19 4550

## 2021-02-25 DIAGNOSIS — Z23 IMMUNIZATION DUE: ICD-10-CM

## 2021-07-01 NOTE — ED NOTES
Pt.pulled out IV. #24 left hand after 4 attempts.      Bernard Monahan, KATELYNN  01/27/19 5959 Voicemail full, unable to leave message

## 2022-07-09 NOTE — PLAN OF CARE
Problem: Skin Integrity Impairment, Risk/Actual (Adult)  Goal: Skin Integrity/Wound Healing  Outcome: Ongoing (interventions implemented as appropriate)      Problem: Pain, Chronic (Adult)  Goal: Identify Related Risk Factors and Signs and Symptoms  Outcome: Ongoing (interventions implemented as appropriate)    Goal: Acceptable Pain Control/Comfort Level  Outcome: Ongoing (interventions implemented as appropriate)      Problem: Patient Care Overview (Adult)  Goal: Plan of Care Review  Outcome: Ongoing (interventions implemented as appropriate)    Goal: Adult Individualization and Mutuality  Outcome: Ongoing (interventions implemented as appropriate)    Goal: Discharge Needs Assessment  Outcome: Ongoing (interventions implemented as appropriate)      Problem: Pressure Ulcer Risk (Brien Scale) (Adult,Obstetrics,Pediatric)  Goal: Identify Related Risk Factors and Signs and Symptoms  Outcome: Ongoing (interventions implemented as appropriate)    Goal: Skin Integrity  Outcome: Ongoing (interventions implemented as appropriate)      Problem: Fall Risk (Adult)  Goal: Absence of Falls  Outcome: Ongoing (interventions implemented as appropriate)         with patient

## 2024-09-26 NOTE — NURSING NOTE
PT. REFUSES TO HAVE WOUND PICTURES TOOK BEFORE DISCHARGE TO [facility]. HE STATES THAT IT HURTS TO BAD TO UNDRESS IT.    5

## (undated) DEVICE — PK BASIC 70

## (undated) DEVICE — APPL CHLORAPREP W/TINT 26ML ORNG

## (undated) DEVICE — 2963 MEDIPORE SOFT CLOTH TAPE 3 IN X 10 YD 12 RLS/CS: Brand: 3M™ MEDIPORE™

## (undated) DEVICE — DRSNG PAD ABD 8X10IN STRL

## (undated) DEVICE — BANDAGE,GAUZE,BULKEE II,4.5"X4.1YD,STRL: Brand: MEDLINE

## (undated) DEVICE — ENCORE® LATEX MICRO SIZE 7.5, STERILE LATEX POWDER-FREE SURGICAL GLOVE: Brand: ENCORE

## (undated) DEVICE — PAD GRND REM POLYHESIVE A/ DISP

## (undated) DEVICE — DRSNG WND SIL OPTIFOAM GENTLE BRDR ADHS W/SA 4X4IN

## (undated) DEVICE — VERSAJET II HYDROSURGERY SYSTEM HANDSET, 45DEG 14MM EXACT: Brand: VERSAJET